# Patient Record
Sex: FEMALE | Race: BLACK OR AFRICAN AMERICAN | NOT HISPANIC OR LATINO | Employment: FULL TIME | URBAN - METROPOLITAN AREA
[De-identification: names, ages, dates, MRNs, and addresses within clinical notes are randomized per-mention and may not be internally consistent; named-entity substitution may affect disease eponyms.]

---

## 2017-01-11 ENCOUNTER — PATIENT MESSAGE (OUTPATIENT)
Dept: INTERNAL MEDICINE | Facility: CLINIC | Age: 52
End: 2017-01-11

## 2017-01-11 ENCOUNTER — PATIENT MESSAGE (OUTPATIENT)
Dept: OBSTETRICS AND GYNECOLOGY | Facility: CLINIC | Age: 52
End: 2017-01-11

## 2017-01-12 RX ORDER — NAPROXEN 500 MG/1
TABLET ORAL
Qty: 60 TABLET | Refills: 1 | Status: SHIPPED | OUTPATIENT
Start: 2017-01-12 | End: 2017-02-17

## 2017-01-19 ENCOUNTER — PATIENT MESSAGE (OUTPATIENT)
Dept: PODIATRY | Facility: CLINIC | Age: 52
End: 2017-01-19

## 2017-01-26 ENCOUNTER — HOSPITAL ENCOUNTER (OUTPATIENT)
Dept: RADIOLOGY | Facility: HOSPITAL | Age: 52
Discharge: HOME | End: 2017-01-26
Attending: ORTHOPAEDIC SURGERY
Payer: COMMERCIAL

## 2017-01-26 ENCOUNTER — OFFICE VISIT (OUTPATIENT)
Dept: SPORTS MEDICINE | Facility: CLINIC | Age: 52
End: 2017-01-26
Payer: COMMERCIAL

## 2017-01-26 VITALS
WEIGHT: 263 LBS | DIASTOLIC BLOOD PRESSURE: 101 MMHG | HEIGHT: 62 IN | BODY MASS INDEX: 48.4 KG/M2 | HEART RATE: 76 BPM | SYSTOLIC BLOOD PRESSURE: 163 MMHG

## 2017-01-26 DIAGNOSIS — M25.511 CHRONIC RIGHT SHOULDER PAIN: Primary | ICD-10-CM

## 2017-01-26 DIAGNOSIS — G89.29 CHRONIC RIGHT SHOULDER PAIN: Primary | ICD-10-CM

## 2017-01-26 DIAGNOSIS — M54.9 BACK PAIN: ICD-10-CM

## 2017-01-26 DIAGNOSIS — G89.29 CHRONIC RIGHT SHOULDER PAIN: ICD-10-CM

## 2017-01-26 DIAGNOSIS — M25.511 CHRONIC RIGHT SHOULDER PAIN: ICD-10-CM

## 2017-01-26 PROCEDURE — 1159F MED LIST DOCD IN RCRD: CPT | Mod: S$GLB,,, | Performed by: ORTHOPAEDIC SURGERY

## 2017-01-26 PROCEDURE — 73030 X-RAY EXAM OF SHOULDER: CPT | Mod: TC,PO,RT

## 2017-01-26 PROCEDURE — 73030 X-RAY EXAM OF SHOULDER: CPT | Mod: 26,RT,, | Performed by: RADIOLOGY

## 2017-01-26 PROCEDURE — 99999 PR PBB SHADOW E&M-EST. PATIENT-LVL III: CPT | Mod: PBBFAC,,, | Performed by: ORTHOPAEDIC SURGERY

## 2017-01-26 PROCEDURE — 99214 OFFICE O/P EST MOD 30 MIN: CPT | Mod: S$GLB,,, | Performed by: ORTHOPAEDIC SURGERY

## 2017-01-26 NOTE — MR AVS SNAPSHOT
[facility]  [address]  [facility] [address]  Phone: [phone]                  Paloma Bang   2017 12:45 PM   Appointment    Description:  Female : 1965   Provider:  Bridger Ernst MD   Department:  [facility]                To Do List           Future Appointments        Provider Department Dept Phone    2017 12:45 PM Bridger Ernst MD [facility] [phone]      Goals (5 Years of Data)     None      [facility]     [facility] Care [facility] -  Assistance  Registered nurses in the [facility] On Call Center provide clinical advisement, health education, appointment booking, and other advisory services.  Call for this free service at [phone].             Medications           Message regarding Medications     Verify the changes and/or additions to your medication regime listed below are the same as discussed with your clinician today.  If any of these changes or additions are incorrect, please notify your healthcare provider.             Verify that the below list of medications is an accurate representation of the medications you are currently taking.  If none reported, the list may be blank. If incorrect, please contact your healthcare provider. Carry this list with you in case of emergency.           Current Medications     cyclobenzaprine (FLEXERIL) 5 MG tablet Take 1 tablet (5 mg total) by mouth 3 (three) times daily as needed for Muscle spasms (no working or driving on this medication).    cyclobenzaprine (FLEXERIL) 5 MG tablet TAKE 1 TABLET (5 MG TOTAL) BY MOUTH NIGHTLY.    desonide (DESOWEN) 0.05 % lotion Apply topically 2 (two) times daily. On face    diclofenac sodium 1.5 % Drop Apply 1 application topically 3 (three) times daily.    ERGOCALCIFEROL, VITAMIN D2, (VITAMIN D ORAL) Take 1,000 mg by mouth once daily.    hydrocortisone (WESTCORT) 0.2 % cream Apply topically 2 (two)  times daily.    hydrOXYzine HCl (ATARAX) 25 MG tablet Take 1 tablet (25 mg total) by mouth nightly as needed for Itching.    multivitamin with minerals tablet Take 1 tablet by mouth once daily.    mupirocin (BACTROBAN) 2 % ointment Apply topically 3 (three) times daily.    naproxen (NAPROSYN) 500 MG tablet TAKE 1 TABLET (500 MG TOTAL) BY MOUTH 2 (TWO) TIMES DAILY.    pantoprazole (PROTONIX) 40 MG tablet Take 1 tablet (40 mg total) by mouth once daily.    silver sulfADIAZINE 1% (SILVADENE) 1 % cream Apply topically 2 (two) times daily. Apply with a Qtip    sulfamethoxazole-trimethoprim 800-160mg (BACTRIM DS) 800-160 mg Tab Take 1 tablet by mouth 2 (two) times daily.    tramadol (ULTRAM) 50 mg tablet Take 1 tablet (50 mg total) by mouth 3 (three) times daily as needed for Pain.    triamcinolone acetonide 0.1% (KENALOG) 0.1 % cream Apply topically 2 (two) times daily.           Clinical Reference Information           Vital Signs - Last Recorded     LMP                   (LMP Unknown)           Allergies as of 1/26/2017     No Known Allergies      Immunizations Administered on Date of Encounter - 1/26/2017     None

## 2017-01-26 NOTE — PROGRESS NOTES
CC: RIGHT shoulder pain     51 y.o. Female with a history of right shoulder pain for 10 years.  She reports getting an MRI for both shoulders about 10 years ago that showed a bilateral rotator cuff tears.  At the time she had a left rotator cuff repair, but never addressed the right shoulder.  She tried physical therapy last year and reports temporary improvement of pain and symptoms.    She works at a desk.  No longer lifts anything heavy due to the right shoulder pain.    She reports that the pain and weakness is worse with overhead activity. It also bothers her at night.    Is affecting ADLs.  Pain is 5/10 at it's worst.       Past Medical History   Diagnosis Date    Deep vein thrombosis      occurred after ankle fracture    GERD (gastroesophageal reflux disease)     Joint pain     Morbid obesity     Pulmonary embolism      occurred after ankle fracture       Past Surgical History   Procedure Laterality Date    Hysterectomy       section      Ankle surgery      Myomectomy      Laparoscopic gastric banding      Pilonidal cyst drainage       removed    Back surgery       Lumbar spinal fusion with shaving of a disk    Rotator cuff repair       Left       Family History   Problem Relation Age of Onset    Diabetes Father     Hypertension Father     Diabetes Mother     Hypertension Mother     Breast cancer Neg Hx     Colon cancer Neg Hx     Ovarian cancer Neg Hx     Acne Neg Hx     Eczema Neg Hx     Lupus Neg Hx     Psoriasis Neg Hx     Melanoma Neg Hx          Current Outpatient Prescriptions:     cyclobenzaprine (FLEXERIL) 5 MG tablet, Take 1 tablet (5 mg total) by mouth 3 (three) times daily as needed for Muscle spasms (no working or driving on this medication)., Disp: 45 tablet, Rfl: 1    cyclobenzaprine (FLEXERIL) 5 MG tablet, TAKE 1 TABLET (5 MG TOTAL) BY MOUTH NIGHTLY., Disp: 30 tablet, Rfl: 3    naproxen (NAPROSYN) 500 MG tablet, TAKE 1 TABLET (500 MG TOTAL) BY MOUTH 2 (TWO)  TIMES DAILY., Disp: 60 tablet, Rfl: 1    desonide (DESOWEN) 0.05 % lotion, Apply topically 2 (two) times daily. On face, Disp: 118 mL, Rfl: 3    diclofenac sodium 1.5 % Drop, Apply 1 application topically 3 (three) times daily., Disp: 150 mL, Rfl: 2    ERGOCALCIFEROL, VITAMIN D2, (VITAMIN D ORAL), Take 1,000 mg by mouth once daily., Disp: , Rfl:     hydrocortisone (WESTCORT) 0.2 % cream, Apply topically 2 (two) times daily., Disp: 15 g, Rfl: 0    hydrOXYzine HCl (ATARAX) 25 MG tablet, Take 1 tablet (25 mg total) by mouth nightly as needed for Itching., Disp: 30 tablet, Rfl: 0    multivitamin with minerals tablet, Take 1 tablet by mouth once daily., Disp: , Rfl:     mupirocin (BACTROBAN) 2 % ointment, Apply topically 3 (three) times daily., Disp: 30 g, Rfl: 0    pantoprazole (PROTONIX) 40 MG tablet, Take 1 tablet (40 mg total) by mouth once daily. (Patient taking differently: Take 40 mg by mouth daily as needed. ), Disp: 30 tablet, Rfl: 11    silver sulfADIAZINE 1% (SILVADENE) 1 % cream, Apply topically 2 (two) times daily. Apply with a Qtip, Disp: 85 g, Rfl: 1    sulfamethoxazole-trimethoprim 800-160mg (BACTRIM DS) 800-160 mg Tab, Take 1 tablet by mouth 2 (two) times daily., Disp: 14 tablet, Rfl: 0    tramadol (ULTRAM) 50 mg tablet, Take 1 tablet (50 mg total) by mouth 3 (three) times daily as needed for Pain., Disp: 60 tablet, Rfl: 1    triamcinolone acetonide 0.1% (KENALOG) 0.1 % cream, Apply topically 2 (two) times daily., Disp: 80 g, Rfl: 0    Review of patient's allergies indicates:  No Known Allergies       REVIEW OF SYSTEMS:  Constitution: Negative. Negative for chills, fever and night sweats.   HENT: Negative for congestion and headaches.    Eyes: Negative for blurred vision, left vision loss and right vision loss.   Cardiovascular: Negative for chest pain and syncope.   Respiratory: Negative for cough and shortness of breath.    Endocrine: Negative for polydipsia, polyphagia and polyuria.  "  Hematologic/Lymphatic: Negative for bleeding problem. Does not bruise/bleed easily.   Skin: Negative for dry skin, itching and rash.   Musculoskeletal: Negative for falls.  Positive for right shoulder pain and muscle weakness.   Gastrointestinal: Negative for abdominal pain and bowel incontinence.   Genitourinary: Negative for bladder incontinence and nocturia.   Neurological: Negative for disturbances in coordination, loss of balance and seizures.   Psychiatric/Behavioral: Negative for depression. The patient does not have insomnia.    Allergic/Immunologic: Negative for hives and persistent infections.      PHYSICAL EXAMINATION:  Vitals:    Visit Vitals    BP (!) 163/101    Pulse 76    Ht 5' 2" (1.575 m)    Wt 119.3 kg (263 lb)    LMP  (LMP Unknown)    BMI 48.1 kg/m2      General: The patient is alert and oriented x 3.  Mood is pleasant.  Observation of ears, eyes and nose reveal no gross abnormalities.  No labored breathing observed.  Gait is coordinated. Patient can toe walk and heel walk without difficulty.      RIGHT Shoulder / Upper Extremity Exam    OBSERVATION:     Swelling  none  Deformity  none   Discoloration  none   Scapular winging none   Scars   none  Atrophy  none    TENDERNESS / CREPITUS (T/C):          T/C      T/C   Clavicle   -/-  SUPRAspinatus    +/-     AC Jt.    -/-  INFRAspinatus  -/-    SC Jt.    -/-  Deltoid    -/-      G. Tuberosity  -/-  LH BICEP groove  +/-   Acromion:  -/-  Midline Neck   -/-     Scapular Spine -/-  Trapezium   -/-   SMA Scapula  -/-  GH jt. line - post  +/-     Scapulothoracic  -/-         ROM: (* = with pain)  Left shoulder   Right shoulder        AROM (PROM)   AROM (PROM)   FE    170° (175°)     160° (165°)     ER at 0°    60°  (65°)    30°  (35°)   IR (spine level)   T10     L5    STRENGTH: (* = with pain) Left shoulder   Right " shoulder   SCAPTION   5/5    4+/5    IR    5/5    4+/5   ER    5/5    4+/5   BICEPS   5/5    4+/5   Deltoid    5/5    5/5     SIGNS:  Painful side       NEER   +    OPROSPERS  neg    EASTMAN   +    SPEEDS  neg     DROP ARM   -   BELLY PRESS neg   Superior escape none    LIFT-OFF  neg   X-Body ADD    neg    MOVING VALGUS neg        STABILITY TESTING    Left shoulder   Right shoulder    Translation     Anterior  up face     up face    Posterior  up face    up face    Sulcus   < 10mm    < 10 mm     Signs   Apprehension   neg      neg       Relocation   no change     no change      Jerk test  neg     neg    EXTREMITY NEURO-VASCULAR EXAM:    Sensation grossly intact to light touch all dermatomal regions.    DTR 2+ Biceps, Triceps, BR and Negative Galos sign   Grossly intact motor function at Elbow, Wrist and Hand   Distal pulses radial and ulnar 2+, brisk cap refill, symmetric.      NECK:  Painless FROM and spinous processes non-tender. Negative Spurlings sign.      OTHER FINDINGS:  + scapular dyskinesia    XRAYS (1/26/17): Mild DJD.  No fracture or dislocation.  No bone destruction identified.      ASSESSMENT:     Right shoulder pain  Back pain    PLAN:      1. MRI to evaluate for progressing rotator cuff tear  2. Follow up in clinic for MRI results  3. Consult to back and spine    All questions were answered, patient will contact us for questions or concerns in the interim.

## 2017-02-01 ENCOUNTER — PATIENT MESSAGE (OUTPATIENT)
Dept: PODIATRY | Facility: CLINIC | Age: 52
End: 2017-02-01

## 2017-02-01 ENCOUNTER — PATIENT MESSAGE (OUTPATIENT)
Dept: OBSTETRICS AND GYNECOLOGY | Facility: CLINIC | Age: 52
End: 2017-02-01

## 2017-02-01 ENCOUNTER — PATIENT MESSAGE (OUTPATIENT)
Dept: INTERNAL MEDICINE | Facility: CLINIC | Age: 52
End: 2017-02-01

## 2017-02-02 ENCOUNTER — HOSPITAL ENCOUNTER (OUTPATIENT)
Dept: RADIOLOGY | Facility: HOSPITAL | Age: 52
Discharge: HOME | End: 2017-02-02
Attending: ORTHOPAEDIC SURGERY
Payer: COMMERCIAL

## 2017-02-02 DIAGNOSIS — G89.29 CHRONIC RIGHT SHOULDER PAIN: ICD-10-CM

## 2017-02-02 DIAGNOSIS — M25.511 CHRONIC RIGHT SHOULDER PAIN: ICD-10-CM

## 2017-02-02 PROCEDURE — 73221 MRI JOINT UPR EXTREM W/O DYE: CPT | Mod: TC,RT

## 2017-02-02 PROCEDURE — 73221 MRI JOINT UPR EXTREM W/O DYE: CPT | Mod: 26,RT,, | Performed by: RADIOLOGY

## 2017-02-07 ENCOUNTER — OFFICE VISIT (OUTPATIENT)
Dept: SPORTS MEDICINE | Facility: CLINIC | Age: 52
End: 2017-02-07
Payer: COMMERCIAL

## 2017-02-07 VITALS
SYSTOLIC BLOOD PRESSURE: 147 MMHG | HEIGHT: 62 IN | HEART RATE: 100 BPM | BODY MASS INDEX: 48.4 KG/M2 | WEIGHT: 263 LBS | DIASTOLIC BLOOD PRESSURE: 97 MMHG

## 2017-02-07 DIAGNOSIS — M54.2 NECK PAIN: Primary | ICD-10-CM

## 2017-02-07 PROCEDURE — 99214 OFFICE O/P EST MOD 30 MIN: CPT | Mod: S$GLB,,, | Performed by: ORTHOPAEDIC SURGERY

## 2017-02-07 PROCEDURE — 99999 PR PBB SHADOW E&M-EST. PATIENT-LVL III: CPT | Mod: PBBFAC,,, | Performed by: ORTHOPAEDIC SURGERY

## 2017-02-07 NOTE — PROGRESS NOTES
CC: RIGHT shoulder pain     51 y.o. Female with a history of right shoulder pain for 10 years.  She reports getting an MRI for both shoulders about 10 years ago that showed a bilateral rotator cuff tears.  At the time she had a left rotator cuff repair, but never addressed the right shoulder.  She tried physical therapy last year and reports temporary improvement of pain and symptoms.    She works at a desk.  No longer lifts anything heavy due to the right shoulder pain.    She reports that the pain and weakness is worse with overhead activity. It also bothers her at night.    Is affecting ADLs.  Pain is 5/10 at it's worst.       Past Medical History   Diagnosis Date    Deep vein thrombosis      occurred after ankle fracture    GERD (gastroesophageal reflux disease)     Joint pain     Morbid obesity     Pulmonary embolism      occurred after ankle fracture       Past Surgical History   Procedure Laterality Date    Hysterectomy       section      Ankle surgery      Myomectomy      Laparoscopic gastric banding      Pilonidal cyst drainage       removed    Back surgery       Lumbar spinal fusion with shaving of a disk    Rotator cuff repair       Left       Family History   Problem Relation Age of Onset    Diabetes Father     Hypertension Father     Diabetes Mother     Hypertension Mother     Breast cancer Neg Hx     Colon cancer Neg Hx     Ovarian cancer Neg Hx     Acne Neg Hx     Eczema Neg Hx     Lupus Neg Hx     Psoriasis Neg Hx     Melanoma Neg Hx          Current Outpatient Prescriptions:     cyclobenzaprine (FLEXERIL) 5 MG tablet, Take 1 tablet (5 mg total) by mouth 3 (three) times daily as needed for Muscle spasms (no working or driving on this medication)., Disp: 45 tablet, Rfl: 1    cyclobenzaprine (FLEXERIL) 5 MG tablet, TAKE 1 TABLET (5 MG TOTAL) BY MOUTH NIGHTLY., Disp: 30 tablet, Rfl: 3    ERGOCALCIFEROL, VITAMIN D2, (VITAMIN D ORAL), Take 1,000 mg by mouth once daily.,  Disp: , Rfl:     hydrOXYzine HCl (ATARAX) 25 MG tablet, Take 1 tablet (25 mg total) by mouth nightly as needed for Itching., Disp: 30 tablet, Rfl: 0    multivitamin with minerals tablet, Take 1 tablet by mouth once daily., Disp: , Rfl:     mupirocin (BACTROBAN) 2 % ointment, Apply topically 3 (three) times daily., Disp: 30 g, Rfl: 0    naproxen (NAPROSYN) 500 MG tablet, TAKE 1 TABLET (500 MG TOTAL) BY MOUTH 2 (TWO) TIMES DAILY., Disp: 60 tablet, Rfl: 1    silver sulfADIAZINE 1% (SILVADENE) 1 % cream, Apply topically 2 (two) times daily. Apply with a Qtip, Disp: 85 g, Rfl: 1    sulfamethoxazole-trimethoprim 800-160mg (BACTRIM DS) 800-160 mg Tab, Take 1 tablet by mouth 2 (two) times daily., Disp: 14 tablet, Rfl: 0    tramadol (ULTRAM) 50 mg tablet, Take 1 tablet (50 mg total) by mouth 3 (three) times daily as needed for Pain., Disp: 60 tablet, Rfl: 1    desonide (DESOWEN) 0.05 % lotion, Apply topically 2 (two) times daily. On face, Disp: 118 mL, Rfl: 3    diclofenac sodium 1.5 % Drop, Apply 1 application topically 3 (three) times daily., Disp: 150 mL, Rfl: 2    hydrocortisone (WESTCORT) 0.2 % cream, Apply topically 2 (two) times daily., Disp: 15 g, Rfl: 0    pantoprazole (PROTONIX) 40 MG tablet, Take 1 tablet (40 mg total) by mouth once daily. (Patient taking differently: Take 40 mg by mouth daily as needed. ), Disp: 30 tablet, Rfl: 11    triamcinolone acetonide 0.1% (KENALOG) 0.1 % cream, Apply topically 2 (two) times daily., Disp: 80 g, Rfl: 0    Review of patient's allergies indicates:  No Known Allergies       REVIEW OF SYSTEMS:  Constitution: Negative. Negative for chills, fever and night sweats.   HENT: Negative for congestion and headaches.    Eyes: Negative for blurred vision, left vision loss and right vision loss.   Cardiovascular: Negative for chest pain and syncope.   Respiratory: Negative for cough and shortness of breath.    Endocrine: Negative for polydipsia, polyphagia and polyuria.  "  Hematologic/Lymphatic: Negative for bleeding problem. Does not bruise/bleed easily.   Skin: Negative for dry skin, itching and rash.   Musculoskeletal: Negative for falls.  Positive for right shoulder pain and muscle weakness.   Gastrointestinal: Negative for abdominal pain and bowel incontinence.   Genitourinary: Negative for bladder incontinence and nocturia.   Neurological: Negative for disturbances in coordination, loss of balance and seizures.   Psychiatric/Behavioral: Negative for depression. The patient does not have insomnia.    Allergic/Immunologic: Negative for hives and persistent infections.      PHYSICAL EXAMINATION:  Vitals:    Visit Vitals    BP (!) 147/97    Pulse 100    Ht 5' 2" (1.575 m)    Wt 119.3 kg (263 lb)    LMP  (LMP Unknown)    BMI 48.1 kg/m2      General: The patient is alert and oriented x 3.  Mood is pleasant.  Observation of ears, eyes and nose reveal no gross abnormalities.  No labored breathing observed.  Gait is coordinated. Patient can toe walk and heel walk without difficulty.      RIGHT Shoulder / Upper Extremity Exam    OBSERVATION:     Swelling  none  Deformity  none   Discoloration  none   Scapular winging none   Scars   none  Atrophy  none    TENDERNESS / CREPITUS (T/C):          T/C      T/C   Clavicle   -/-  SUPRAspinatus    +/-     AC Jt.    -/-  INFRAspinatus  -/-    SC Jt.    -/-  Deltoid    -/-      G. Tuberosity  -/-  LH BICEP groove  +/-   Acromion:  -/-  Midline Neck   -/-     Scapular Spine -/-  Trapezium   -/-   SMA Scapula  -/-  GH jt. line - post  +/-     Scapulothoracic  -/-         ROM: (* = with pain)  Left shoulder   Right shoulder        AROM (PROM)   AROM (PROM)   FE    170° (175°)     160° (165°)     ER at 0°    60°  (65°)    30°  (35°)   IR (spine level)   T10     L5    STRENGTH: (* = with pain) Left shoulder   Right " shoulder   SCAPTION   5/5    4+/5    IR    5/5    4+/5   ER    5/5    4+/5   BICEPS   5/5    4+/5   Deltoid    5/5    5/5     SIGNS:  Painful side       NEER   +    OPROSPERS  neg    EASTMAN   +    SPEEDS  neg     DROP ARM   -   BELLY PRESS neg   Superior escape none    LIFT-OFF  neg   X-Body ADD    neg    MOVING VALGUS neg        STABILITY TESTING    Left shoulder   Right shoulder    Translation     Anterior  up face     up face    Posterior  up face    up face    Sulcus   < 10mm    < 10 mm     Signs   Apprehension   neg      neg       Relocation   no change     no change      Jerk test  neg     neg    EXTREMITY NEURO-VASCULAR EXAM:    Sensation grossly intact to light touch all dermatomal regions.    DTR 2+ Biceps, Triceps, BR and Negative Galos sign   Grossly intact motor function at Elbow, Wrist and Hand   Distal pulses radial and ulnar 2+, brisk cap refill, symmetric.      NECK:  Painless FROM and spinous processes non-tender. Negative Spurlings sign.      OTHER FINDINGS:  + scapular dyskinesia    XRAYS (1/26/17): Mild DJD.  No fracture or dislocation.  No bone destruction identified.      MRI:  Full-thickness, near full-width tear of the supraspinatus tendon with 2.3 cm of medial fiber retraction and mild resulting volume loss. See description.    Moderate infraspinatus tendinosis with a small intratendinous footprint tear that extends medially up to the myotendinous junction.    Severe biceps tendinosis with high-grade interstitial tearing.    Degenerative fraying of the glenoid labrum.    Moderate AC joint hypertrophy with moderate undersurface spurring of the anterior-inferior acromion.    Subacromial/subdeltoid bursitis      ASSESSMENT:     Right shoulder pain  Back pain    PLAN:      1. Pain control    2. Follow up with ortho spine; will call after spine followup to schedule surgery    3.We reviewed with Paloma today, the pathology and natural history of her diagnosis. We have discussed a  variety of treatment options including medications, physical therapy and other alternative treatments. I also explained the indications, risks and benefits of surgery. After discussion, Paloma decided to proceed with surgery. The decision was made to go forward with     Right shoulder arthroscopic rotator cuff repair  Right shoulder DCE/ acromioplasty  Right shoulder subpec biceps tenodesis      The details of the surgical procedure were explained, including the location of probable incisions and a description of likely hardware and/or grafts to be used.  The patient understands the likely convalescence after surgery.  Also, we have thoroughly discussed the risks, benefits and alternatives to surgery, including, but not limited to, the risk of infection, joint stiffness, blood clot (including DVT and/or pulmonary embolus), neurologic and vascular injury.  It was explained that, if tissue has been repaired or reconstructed, there is a chance of failure, which may require further management.      All of the patient's questions were answered and informed consent was obtained. The patient will contact us if they have any questions or concerns in the interim.

## 2017-02-07 NOTE — MR AVS SNAPSHOT
[facility]  [address]  [facility] [address]  Phone: [phone]                  Paloma Bang   2017 2:45 PM   Appointment    Description:  Female : 1965   Provider:  Bridger Ernst MD   Department:  [facility]                To Do List           Future Appointments        Provider Department Dept Phone    2017 3:15 PM Elaine Tirado MD [facility] - OB/GYN [address] [phone]    2017 11:00 AM Elio Ac DPM [facility] - Podiatry [phone]      Goals (5 Years of Data)     None      [facility]     [facility] On Call Nurse Care Line -  Assistance  Registered nurses in the [facility] On Call Center provide clinical advisement, health education, appointment booking, and other advisory services.  Call for this free service at [phone].             Medications           Message regarding Medications     Verify the changes and/or additions to your medication regime listed below are the same as discussed with your clinician today.  If any of these changes or additions are incorrect, please notify your healthcare provider.             Verify that the below list of medications is an accurate representation of the medications you are currently taking.  If none reported, the list may be blank. If incorrect, please contact your healthcare provider. Carry this list with you in case of emergency.           Current Medications     cyclobenzaprine (FLEXERIL) 5 MG tablet Take 1 tablet (5 mg total) by mouth 3 (three) times daily as needed for Muscle spasms (no working or driving on this medication).    cyclobenzaprine (FLEXERIL) 5 MG tablet TAKE 1 TABLET (5 MG TOTAL) BY MOUTH NIGHTLY.    desonide (DESOWEN) 0.05 % lotion Apply topically 2 (two) times daily. On face    diclofenac sodium 1.5 % Drop Apply 1 application topically 3 (three) times daily.    ERGOCALCIFEROL, VITAMIN D2, (VITAMIN D ORAL) Take 1,000 mg by mouth once  daily.    hydrocortisone (WESTCORT) 0.2 % cream Apply topically 2 (two) times daily.    hydrOXYzine HCl (ATARAX) 25 MG tablet Take 1 tablet (25 mg total) by mouth nightly as needed for Itching.    multivitamin with minerals tablet Take 1 tablet by mouth once daily.    mupirocin (BACTROBAN) 2 % ointment Apply topically 3 (three) times daily.    naproxen (NAPROSYN) 500 MG tablet TAKE 1 TABLET (500 MG TOTAL) BY MOUTH 2 (TWO) TIMES DAILY.    pantoprazole (PROTONIX) 40 MG tablet Take 1 tablet (40 mg total) by mouth once daily.    silver sulfADIAZINE 1% (SILVADENE) 1 % cream Apply topically 2 (two) times daily. Apply with a Qtip    sulfamethoxazole-trimethoprim 800-160mg (BACTRIM DS) 800-160 mg Tab Take 1 tablet by mouth 2 (two) times daily.    tramadol (ULTRAM) 50 mg tablet Take 1 tablet (50 mg total) by mouth 3 (three) times daily as needed for Pain.    triamcinolone acetonide 0.1% (KENALOG) 0.1 % cream Apply topically 2 (two) times daily.           Clinical Reference Information           Your Vitals Were     Last Period                   (LMP Unknown)           Allergies as of 2/7/2017     No Known Allergies      Immunizations Administered on Date of Encounter - 2/7/2017     None      Language Assistance Services     ATTENTION: Language assistance services are available, free of charge. Please call [phone].      ATENCIÓN: Si habla español, tiene a howard disposición servicios gratuitos de asistencia lingüística. Llame al [phone].     [facility] Ý: N?u b?n nói Ti?ng Vi?t, có các d?ch v? h? tr? ngôn ng? mi?n phí dành cho b?n. G?i s? [phone].         [facility] complies with applicable Federal civil rights laws and does not discriminate on the basis of race, color, national origin, age, disability, or sex.

## 2017-02-08 ENCOUNTER — OFFICE VISIT (OUTPATIENT)
Dept: INTERNAL MEDICINE | Facility: CLINIC | Age: 52
End: 2017-02-08
Payer: COMMERCIAL

## 2017-02-08 VITALS
WEIGHT: 271.38 LBS | HEART RATE: 80 BPM | SYSTOLIC BLOOD PRESSURE: 130 MMHG | RESPIRATION RATE: 10 BRPM | HEIGHT: 62 IN | BODY MASS INDEX: 49.94 KG/M2 | DIASTOLIC BLOOD PRESSURE: 88 MMHG

## 2017-02-08 DIAGNOSIS — J32.9 SINUSITIS, UNSPECIFIED CHRONICITY, UNSPECIFIED LOCATION: Primary | ICD-10-CM

## 2017-02-08 PROCEDURE — 99213 OFFICE O/P EST LOW 20 MIN: CPT | Mod: S$GLB,,, | Performed by: INTERNAL MEDICINE

## 2017-02-08 PROCEDURE — 99999 PR PBB SHADOW E&M-EST. PATIENT-LVL III: CPT | Mod: PBBFAC,,, | Performed by: INTERNAL MEDICINE

## 2017-02-08 RX ORDER — FLUCONAZOLE 150 MG/1
150 TABLET ORAL ONCE
Qty: 2 TABLET | Refills: 0 | Status: SHIPPED | OUTPATIENT
Start: 2017-02-08 | End: 2017-02-08

## 2017-02-08 RX ORDER — FLUTICASONE PROPIONATE 50 MCG
1 SPRAY, SUSPENSION (ML) NASAL DAILY
Qty: 16 G | Refills: 11 | Status: SHIPPED | OUTPATIENT
Start: 2017-02-08 | End: 2017-03-10

## 2017-02-08 RX ORDER — AMOXICILLIN AND CLAVULANATE POTASSIUM 875; 125 MG/1; MG/1
1 TABLET, FILM COATED ORAL 2 TIMES DAILY
Qty: 14 TABLET | Refills: 0 | Status: SHIPPED | OUTPATIENT
Start: 2017-02-08 | End: 2021-12-08 | Stop reason: SDUPTHER

## 2017-02-08 NOTE — PROGRESS NOTES
Subjective:       Patient ID: Paloma Bang is a 51 y.o. female.    Chief Complaint: Sore Throat; Fever; and Generalized Body Aches    HPI     51-year-old female here for evaluation of sore throat, fever, and body aches.  She has had congestion, sore throat, body aches.  She has had subjective fevers.  She has had stomach cramping as well.  She has had loose bowels, but no diarrhea.  She has had otalgia.  Right eye pain.  This has been going on a week.  She thought she could shake it.  She has taken OTC theraflu, chloraseptic spray.  She has a cough which is dry.  She was trying to cough up some mucus.  This is the same as she was a week ago.  Sore throat is better.  She has been having a hard time sleeping.  She has sinus congestion, so she cannot breath.  She tosses and turns trying to get comfortable.    Review of Systems    Objective:      Physical Exam   Constitutional: She is oriented to person, place, and time. She appears well-developed and well-nourished.   HENT:   Head: Normocephalic and atraumatic.   Mouth/Throat: No oropharyngeal exudate.   Eyes: EOM are normal. Pupils are equal, round, and reactive to light. Right eye exhibits no discharge. Left eye exhibits no discharge. No scleral icterus.   Neck: Normal range of motion. Neck supple. No tracheal deviation present. No thyromegaly present.   Cardiovascular: Normal rate, regular rhythm and normal heart sounds.  Exam reveals no gallop and no friction rub.    No murmur heard.  Pulmonary/Chest: Effort normal and breath sounds normal. No respiratory distress. She has no wheezes. She has no rales. She exhibits no tenderness.   Abdominal: Soft. Bowel sounds are normal. She exhibits no distension and no mass. There is no tenderness. There is no rebound and no guarding.   Musculoskeletal: Normal range of motion. She exhibits no edema or tenderness.   Neurological: She is alert and oriented to person, place, and time.   Skin: Skin is warm and dry. No  rash noted. No erythema. No pallor.   Psychiatric: She has a normal mood and affect. Her behavior is normal.   Vitals reviewed.      Assessment:       1. Sinusitis, unspecified chronicity, unspecified location        Plan:       1.  Augmentin 875 mg twice a day ×7 days, Flonase, over-the-counter antihistamine.  Diflucan if needed for yeast infection.

## 2017-02-13 ENCOUNTER — OFFICE VISIT (OUTPATIENT)
Dept: OBSTETRICS AND GYNECOLOGY | Facility: CLINIC | Age: 52
End: 2017-02-13
Attending: OBSTETRICS & GYNECOLOGY
Payer: COMMERCIAL

## 2017-02-13 VITALS
DIASTOLIC BLOOD PRESSURE: 80 MMHG | HEIGHT: 62 IN | WEIGHT: 271.63 LBS | SYSTOLIC BLOOD PRESSURE: 118 MMHG | BODY MASS INDEX: 49.99 KG/M2

## 2017-02-13 DIAGNOSIS — Z12.11 ENCOUNTER FOR SCREENING COLONOSCOPY: ICD-10-CM

## 2017-02-13 DIAGNOSIS — Z01.419 VISIT FOR GYNECOLOGIC EXAMINATION: Primary | ICD-10-CM

## 2017-02-13 DIAGNOSIS — Z12.31 ENCOUNTER FOR SCREENING MAMMOGRAM FOR BREAST CANCER: ICD-10-CM

## 2017-02-13 PROCEDURE — 99396 PREV VISIT EST AGE 40-64: CPT | Mod: S$GLB,,, | Performed by: OBSTETRICS & GYNECOLOGY

## 2017-02-13 PROCEDURE — 99999 PR PBB SHADOW E&M-EST. PATIENT-LVL III: CPT | Mod: PBBFAC,,, | Performed by: OBSTETRICS & GYNECOLOGY

## 2017-02-13 RX ORDER — FLUCONAZOLE 50 MG/1
TABLET ORAL DAILY
COMMUNITY
End: 2017-04-26 | Stop reason: ALTCHOICE

## 2017-02-13 NOTE — PROGRESS NOTES
"CC: Well woman exam    Paloma Bang is a 51 y.o. female  presents for a well woman exam.  LMP: No LMP recorded (lmp unknown). Patient has had a hysterectomy..      Reports continued LLQ pain.  She is s/p hysterectomy and LSO.      Past Medical History   Diagnosis Date    Deep vein thrombosis      occurred after ankle fracture    GERD (gastroesophageal reflux disease)     Joint pain     Morbid obesity     Pulmonary embolism      occurred after ankle fracture     Past Surgical History   Procedure Laterality Date    Hysterectomy       section      Ankle surgery      Myomectomy      Laparoscopic gastric banding      Pilonidal cyst drainage       removed    Back surgery       Lumbar spinal fusion with shaving of a disk    Rotator cuff repair       Left     Social History     Social History    Marital status: Single     Spouse name: N/A    Number of children: N/A    Years of education: N/A     Social History Main Topics    Smoking status: Never Smoker    Smokeless tobacco: Never Used    Alcohol use Yes      Comment: occasionally/ not weekly    Drug use: No    Sexual activity: Not Currently     Birth control/ protection: Surgical     Other Topics Concern    None     Social History Narrative     Family History   Problem Relation Age of Onset    Diabetes Father     Hypertension Father     Diabetes Mother     Hypertension Mother     Breast cancer Neg Hx     Colon cancer Neg Hx     Ovarian cancer Neg Hx     Acne Neg Hx     Eczema Neg Hx     Lupus Neg Hx     Psoriasis Neg Hx     Melanoma Neg Hx      OB History      Para Term  AB TAB SAB Ectopic Multiple Living    2 1   1     1          Visit Vitals    /80    Ht 5' 2" (1.575 m)    Wt 123.2 kg (271 lb 9.7 oz)    LMP  (LMP Unknown)    BMI 49.68 kg/m2         ROS:    ROS:  GENERAL: Denies weight gain or weight loss. Feeling well overall.   SKIN: Denies rash or lesions.   HEAD: Denies head injury or " headache.   NODES: Denies enlarged lymph nodes.   CHEST: Denies chest pain or shortness of breath.   CARDIOVASCULAR: Denies palpitations or left sided chest pain.   ABDOMEN: No abdominal pain, constipation, diarrhea, nausea, vomiting or rectal bleeding.   URINARY: No frequency, dysuria, hematuria, or burning on urination.  REPRODUCTIVE: See HPI.   BREASTS: The patient performs breast self-examination and denies pain, lumps, or nipple discharge.   HEMATOLOGIC: No easy bruisability or excessive bleeding.   MUSCULOSKELETAL: Denies joint pain or swelling.   NEUROLOGIC: Denies syncope or weakness.   PSYCHIATRIC: Denies depression, anxiety or mood swings.    PHYSICAL EXAM:    APPEARANCE: Well nourished, well developed, in no acute distress.  AFFECT: WNL, alert and oriented x 3  SKIN: No acne or hirsutism  NECK: Neck symmetric without masses or thyromegaly  NODES: No inguinal, cervical, axillary, or femoral lymph node enlargement  CHEST: Good respiratory effect  ABDOMEN: Soft.  No tenderness or masses.  No hepatosplenomegaly.  No hernias.  BREASTS: Symmetrical, no skin changes or visible lesions.  No palpable masses, nipple discharge bilaterally.  PELVIC: Normal external genitalia without lesions.  Normal hair distribution.  Adequate perineal body, normal urethral meatus.  Vagina moist and well rugated without lesions or discharge.  Cervix pink, without lesions, discharge or tenderness.  No significant cystocele or rectocele.  Bimanual exam shows uterus to be normal size, regular, mobile and nontender.  Adnexa without masses or tenderness.    RECTAL: Rectovaginal exam confirms above with normal sphincter tone, no masses.  EXTREMITIES: No edema.      ICD-10-CM ICD-9-CM    1. Visit for gynecologic examination Z01.419 V72.31    2. Encounter for screening mammogram for breast cancer Z12.31 V76.12 Mammo Digital Screening Bilat with CAD         Patient was counseled today on A.C.S. Pap guidelines and recommendations for yearly  pelvic exams, mammograms and monthly self breast exams; to see her PCP for other health maintenance.     Recommended f/u with PCP regarding LLQ pain as multiple ultrasounds and CT Scan have been within normal limits.      Screening colonoscopy ordered.      Return in about 1 year (around 2/13/2018).

## 2017-02-16 ENCOUNTER — TELEPHONE (OUTPATIENT)
Dept: ORTHOPEDICS | Facility: CLINIC | Age: 52
End: 2017-02-16

## 2017-02-16 NOTE — TELEPHONE ENCOUNTER
Spoke to pt regarding appt Friday 2/17/17, 930a with Aniya Armstrong PA-C. Pt was informed to arrive on the 2nd floor at 9a, then up to floor 5 to see Aniya. Pt verbalized understanding.    Esther HAMMER

## 2017-02-17 ENCOUNTER — HOSPITAL ENCOUNTER (OUTPATIENT)
Dept: RADIOLOGY | Facility: HOSPITAL | Age: 52
Discharge: HOME | End: 2017-02-17
Attending: ORTHOPAEDIC SURGERY
Payer: COMMERCIAL

## 2017-02-17 ENCOUNTER — OFFICE VISIT (OUTPATIENT)
Dept: ORTHOPEDICS | Facility: CLINIC | Age: 52
End: 2017-02-17
Payer: COMMERCIAL

## 2017-02-17 ENCOUNTER — OFFICE VISIT (OUTPATIENT)
Dept: PODIATRY | Facility: CLINIC | Age: 52
End: 2017-02-17
Payer: COMMERCIAL

## 2017-02-17 VITALS — BODY MASS INDEX: 48.02 KG/M2 | WEIGHT: 271 LBS | HEIGHT: 63 IN

## 2017-02-17 VITALS
HEART RATE: 84 BPM | HEIGHT: 63 IN | DIASTOLIC BLOOD PRESSURE: 86 MMHG | SYSTOLIC BLOOD PRESSURE: 129 MMHG | WEIGHT: 271.81 LBS | BODY MASS INDEX: 48.16 KG/M2

## 2017-02-17 DIAGNOSIS — M25.511 RIGHT SHOULDER PAIN, UNSPECIFIED CHRONICITY: ICD-10-CM

## 2017-02-17 DIAGNOSIS — M54.2 NECK PAIN: Primary | ICD-10-CM

## 2017-02-17 DIAGNOSIS — M54.2 NECK PAIN: ICD-10-CM

## 2017-02-17 DIAGNOSIS — M54.50 BILATERAL LOW BACK PAIN WITHOUT SCIATICA, UNSPECIFIED CHRONICITY: ICD-10-CM

## 2017-02-17 DIAGNOSIS — M21.6X9 ACQUIRED EQUINUS DEFORMITY OF FOOT, UNSPECIFIED LATERALITY: Primary | ICD-10-CM

## 2017-02-17 DIAGNOSIS — G60.9 IDIOPATHIC PERIPHERAL NEUROPATHY: ICD-10-CM

## 2017-02-17 DIAGNOSIS — M54.9 BACK PAIN, UNSPECIFIED BACK LOCATION, UNSPECIFIED BACK PAIN LATERALITY, UNSPECIFIED CHRONICITY: ICD-10-CM

## 2017-02-17 PROCEDURE — 99999 PR PBB SHADOW E&M-EST. PATIENT-LVL IV: CPT | Mod: PBBFAC,,, | Performed by: PHYSICIAN ASSISTANT

## 2017-02-17 PROCEDURE — 72050 X-RAY EXAM NECK SPINE 4/5VWS: CPT | Mod: TC

## 2017-02-17 PROCEDURE — 99999 PR PBB SHADOW E&M-EST. PATIENT-LVL III: CPT | Mod: PBBFAC,,, | Performed by: PODIATRIST

## 2017-02-17 PROCEDURE — 72141 MRI NECK SPINE W/O DYE: CPT | Mod: TC

## 2017-02-17 PROCEDURE — 72050 X-RAY EXAM NECK SPINE 4/5VWS: CPT | Mod: 26,,, | Performed by: RADIOLOGY

## 2017-02-17 PROCEDURE — 72141 MRI NECK SPINE W/O DYE: CPT | Mod: 26,,, | Performed by: RADIOLOGY

## 2017-02-17 PROCEDURE — 99213 OFFICE O/P EST LOW 20 MIN: CPT | Mod: S$GLB,,, | Performed by: PODIATRIST

## 2017-02-17 PROCEDURE — 99204 OFFICE O/P NEW MOD 45 MIN: CPT | Mod: S$GLB,,, | Performed by: PHYSICIAN ASSISTANT

## 2017-02-17 RX ORDER — MELOXICAM 15 MG/1
15 TABLET ORAL DAILY
Qty: 30 TABLET | Refills: 1 | Status: SHIPPED | OUTPATIENT
Start: 2017-02-17 | End: 2017-03-19

## 2017-02-17 RX ORDER — METHOCARBAMOL 750 MG/1
750 TABLET, FILM COATED ORAL 3 TIMES DAILY
Qty: 60 TABLET | Refills: 0 | Status: SHIPPED | OUTPATIENT
Start: 2017-02-17 | End: 2017-06-09 | Stop reason: SDUPTHER

## 2017-02-17 RX ORDER — TRAMADOL HYDROCHLORIDE 50 MG/1
50-100 TABLET ORAL
Qty: 90 TABLET | Refills: 0 | Status: SHIPPED | OUTPATIENT
Start: 2017-02-17 | End: 2017-02-27

## 2017-02-17 NOTE — PROGRESS NOTES
DATE: 2017  PATIENT: Paloma Bang    Supervising Physician: Marin Presley M.D.    CHIEF COMPLAINT: neck pain, back pain and right shoulder pain    HISTORY:  Paloma Bang is a 51 y.o. female who fell in the bathtub in 2016 here for initial evaluation of neck pain, right shoulder pain, and back pain pain (Neck - 4, Arm - 5). The pain has been present since she fell.  She has a long history of right shoulder pain.  She is seeing Dr. Ernst in Sports Medicine and is discussing surgery with him.  The patient describes the pain as aching. The pain is worse with laying on her right side and at night and improved by sitting up. There is associated numbness and tingling in the right hand. There is subjective weakness in the right arm. Prior treatments have included naproxen, tramadol, flexeril and physical therapy, but no ESIs or surgery.  She reports good relief with physical therapy but had to stop because she was traveling and never got back into it.       The patient denies myelopathic symptoms such as handwriting changes or difficulty with buttons/coins/keys. Denies perineal paresthesias, bowel/bladder dysfunction.    PAST MEDICAL/SURGICAL HISTORY:  Past Medical History   Diagnosis Date    Deep vein thrombosis      occurred after ankle fracture    GERD (gastroesophageal reflux disease)     Joint pain     Morbid obesity     Pulmonary embolism      occurred after ankle fracture     Past Surgical History   Procedure Laterality Date    Hysterectomy       section      Ankle surgery      Myomectomy      Laparoscopic gastric banding      Pilonidal cyst drainage       removed    Back surgery       Lumbar spinal fusion with shaving of a disk    Rotator cuff repair       Left       Medications:  Current Outpatient Prescriptions on File Prior to Visit   Medication Sig Dispense Refill    ERGOCALCIFEROL, VITAMIN D2, (VITAMIN D ORAL) Take 1,000 mg by mouth once daily.       fluconazole (DIFLUCAN) 50 MG Tab Take by mouth once daily.      fluticasone (FLONASE) 50 mcg/actuation nasal spray 1 spray by Each Nare route once daily. 16 g 11    multivitamin with minerals tablet Take 1 tablet by mouth once daily.      [DISCONTINUED] naproxen (NAPROSYN) 500 MG tablet TAKE 1 TABLET (500 MG TOTAL) BY MOUTH 2 (TWO) TIMES DAILY. 60 tablet 1    [DISCONTINUED] tramadol (ULTRAM) 50 mg tablet Take 1 tablet (50 mg total) by mouth 3 (three) times daily as needed for Pain. 60 tablet 1    desonide (DESOWEN) 0.05 % lotion Apply topically 2 (two) times daily. On face 118 mL 3    diclofenac sodium 1.5 % Drop Apply 1 application topically 3 (three) times daily. 150 mL 2    hydrocortisone (WESTCORT) 0.2 % cream Apply topically 2 (two) times daily. 15 g 0    pantoprazole (PROTONIX) 40 MG tablet Take 1 tablet (40 mg total) by mouth once daily. (Patient taking differently: Take 40 mg by mouth daily as needed. ) 30 tablet 11    triamcinolone acetonide 0.1% (KENALOG) 0.1 % cream Apply topically 2 (two) times daily. 80 g 0     No current facility-administered medications on file prior to visit.        Social History:   Social History     Social History    Marital status: Single     Spouse name: N/A    Number of children: N/A    Years of education: N/A     Occupational History    Not on file.     Social History Main Topics    Smoking status: Never Smoker    Smokeless tobacco: Never Used    Alcohol use Yes      Comment: occasionally/ not weekly    Drug use: No    Sexual activity: Not Currently     Birth control/ protection: Surgical     Other Topics Concern    Not on file     Social History Narrative       REVIEW OF SYSTEMS:  Constitution: Negative. Negative for chills, fever and night sweats.   Cardiovascular: Negative for chest pain and syncope.   Respiratory: Negative for cough and shortness of breath.   Gastrointestinal: See HPI. Negative for nausea/vomiting. Negative for abdominal  "pain.  Genitourinary: See HPI. Negative for discoloration or dysuria.  Skin: Negative for dry skin, itching and rash.   Hematologic/Lymphatic: Negative for bleeding problem. Does not bruise/bleed easily.   Musculoskeletal: Negative for falls and muscle weakness.   Neurological: See HPI. No seizures.   Endocrine: Negative for polydipsia, polyphagia and polyuria.   Allergic/Immunologic: Negative for hives and persistent infections.  Psychiatric/Behavioral: Negative for depression and insomnia.         EXAM:  Visit Vitals    /86 (BP Location: Right arm, Patient Position: Sitting, BP Method: Automatic)    Pulse 84    Ht 5' 3" (1.6 m)    Wt 123.3 kg (271 lb 13.2 oz)    LMP  (LMP Unknown)    BMI 48.15 kg/m2       General: The patient is a very pleasant 51 y.o. female in no apparent distress, the patient is oriented to person, place and time.  Psych: Normal mood and affect  HEENT: Vision grossly intact, hearing intact to the spoken word.  Lungs: Respirations unlabored.  Gait: Normal station and gait, no difficulty with toe or heel walk.   Skin: Cervical skin negative for rashes, lesions, hairy patches and surgical scars.  Range of motion: Cervical range of motion is acceptable. There is mild tenderness to palpation.  Spinal Balance: Global saggital and coronal spinal balance acceptable, no significant for scoliosis and kyphosis.  Musculoskeletal: No pain with the range of motion of the bilateral shoulders and elbows. Normal bulk and contour of the bilateral hands.  Vascular: Bilateral hands warm and well perfused, radial pulses 2+ bilaterally.  Neurological: Normal strength and tone in all major motor groups in the bilateral upper and lower extremities. Normal sensation to light touch in the C5-T1 and L2-S1 dermatomes bilaterally.  Deep tendon reflexes symmetric 2+ in the bilateral upper and lower extremities.  Negative Inverted Radial Reflex and Gilliland's bilaterally. Negative Babinski bilaterally. "     IMAGING:   Today I personally reviewed AP, Lat and Flex/Ex  upright C-spine films that demonstrate mild degenerative changes of the cervical spine.    MRI cervical spine demonstrates mild to moderate degenerative changes with mild spinal stenosis and multilevel mild to moderate neural foraminal narrowing.    ASSESSMENT/PLAN:    Diagnoses and all orders for this visit:    Neck pain  -     Ambulatory Referral to Physical/Occupational Therapy    Bilateral low back pain without sciatica, unspecified chronicity  -     Ambulatory Referral to Physical/Occupational Therapy    Right shoulder pain, unspecified chronicity  -     Ambulatory Referral to Physical/Occupational Therapy    Other orders  -     tramadol (ULTRAM) 50 mg tablet; Take 1-2 tablets ( mg total) by mouth every 4 to 6 hours as needed.  -     meloxicam (MOBIC) 15 MG tablet; Take 1 tablet (15 mg total) by mouth once daily.  -     methocarbamol (ROBAXIN) 750 MG Tab; Take 1 tablet (750 mg total) by mouth 3 (three) times daily. As needed for muscle spasms    The patient is having neck pain, back pain and right shoulder pain.  No myelopathic symptoms.  She had good relief with physical therapy in the past but had to stop because she was traveling and then never returned.  Referral for PT at [facility] placed today.  Will switch her to mobic and robaxin as she feels the naproxen and flexeril are not providing much relief anymore.  Follow up with me after therapy in about 6 weeks if symptoms persist.     Return in about 6 weeks (around 3/31/2017), or if symptoms worsen or fail to improve.

## 2017-02-17 NOTE — PROGRESS NOTES
Subjective:      Patient ID: Paloma Bang is a 51 y.o. female.    Chief Complaint: Foot Pain (bilateral)    Neck, back, and radiating right leg pain.  Gradual onset following a fall, worsening over past several months/years, aggravated by increased weight bearing, shoe gear, pressure.  No previous medical treatment.  OTC pain med not helping.  Wants orthotics to try to help symptoms.      Review of Systems   Constitution: Negative for chills, diaphoresis, fever, malaise/fatigue and night sweats.   Cardiovascular: Negative for claudication, cyanosis, leg swelling and syncope.   Skin: Negative for color change, dry skin, nail changes, rash, suspicious lesions and unusual hair distribution.   Musculoskeletal: Negative for falls, joint pain, joint swelling, muscle cramps, muscle weakness and stiffness.   Gastrointestinal: Negative for constipation, diarrhea, nausea and vomiting.   Neurological: Positive for paresthesias and sensory change. Negative for brief paralysis, disturbances in coordination, focal weakness, numbness and tremors.           Objective:      Physical Exam   Constitutional: She appears well-developed and well-nourished. She is cooperative. No distress.   Cardiovascular:   Pulses:       Popliteal pulses are 2+ on the right side, and 2+ on the left side.        Dorsalis pedis pulses are 2+ on the right side, and 2+ on the left side.        Posterior tibial pulses are 2+ on the right side, and 2+ on the left side.   Capillary refill 3 seconds all toes/distal feet, all toes/both feet warm to touch.      Negative lymphadenopathy bilateral popliteal fossa and tarsal tunnel.      Negavie lower extremity edema bilateral.     Musculoskeletal:        Right ankle: Normal. She exhibits normal range of motion, no swelling, no ecchymosis, no deformity, no laceration and normal pulse. Achilles tendon normal. Achilles tendon exhibits no pain, no defect and normal Treadwell's test results.   Ankle  dorsiflexion decreased at <10 degrees bilateral with moderate increase with knee flexion bilateral.    Otherwise, Normal angle, base, station of gait. All ten toes without clubbing, cyanosis, or signs of ischemia.  No pain to palpation bilateral lower extremities.  Range of motion, stability, muscle strength, and muscle tone normal bilateral feet and legs.     Lymphadenopathy: No inguinal adenopathy noted on the right or left side.   Negative lymphadenopathy bilateral popliteal fossa and tarsal tunnel.   Neurological: She is alert. She has normal strength. She displays no atrophy and no tremor. No sensory deficit. She exhibits normal muscle tone. She displays no seizure activity. Gait normal.   Reflex Scores:       Patellar reflexes are 2+ on the right side and 2+ on the left side.       Achilles reflexes are 2+ on the right side and 2+ on the left side.  Negative tinel sign to percussion sural, superficial peroneal, deep peroneal, saphenous, and posterior tibial nerves right and left ankles and feet.     Skin: Skin is warm, dry and intact. No abrasion, no bruising, no burn, no ecchymosis, no laceration, no lesion and no rash noted. She is not diaphoretic. No cyanosis or erythema. No pallor. Nails show no clubbing.     Skin is normal age and health appropriate color, turgor, texture, and temperature bilateral lower extremities without ulceration, hyperpigmentation, discoloration, masses nodules or cords palpated.  No ecchymosis, erythema, edema, or cardinal signs of infection bilateral lower extremities.               Assessment:       Encounter Diagnoses   Name Primary?    Acquired equinus deformity of foot, unspecified laterality Yes    Back pain, unspecified back location, unspecified back pain laterality, unspecified chronicity     Idiopathic peripheral neuropathy          Plan:       Paloma was seen today for foot pain.    Diagnoses and all orders for this visit:    Acquired equinus deformity of foot,  unspecified laterality  -     ORTHOTIC DEVICE (DME)    Back pain, unspecified back location, unspecified back pain laterality, unspecified chronicity  -     Ambulatory consult to Physical Medicine Rehab  -     ORTHOTIC DEVICE (DME)    Idiopathic peripheral neuropathy  -     EMG - 2 Extremities; Future  -     Ambulatory consult to Physical Medicine Rehab  -     ORTHOTIC DEVICE (DME)      I counseled the patient on her conditions, their implications and medical management.        counselledthat orthotics may affect back, leg, neck, but not in any qualtifiable  Or predictable way.    Rx custom orthotics.      Consult PM&R, EMG.          Return if symptoms worsen or fail to improve.

## 2017-03-06 ENCOUNTER — OFFICE VISIT (OUTPATIENT)
Dept: INTERNAL MEDICINE | Facility: CLINIC | Age: 52
End: 2017-03-06
Payer: COMMERCIAL

## 2017-03-06 VITALS
HEIGHT: 62 IN | TEMPERATURE: 98 F | SYSTOLIC BLOOD PRESSURE: 149 MMHG | DIASTOLIC BLOOD PRESSURE: 101 MMHG | WEIGHT: 276.25 LBS | BODY MASS INDEX: 50.83 KG/M2 | RESPIRATION RATE: 16 BRPM | HEART RATE: 85 BPM

## 2017-03-06 DIAGNOSIS — I10 ESSENTIAL HYPERTENSION: ICD-10-CM

## 2017-03-06 DIAGNOSIS — L30.9 DERMATITIS OF FACE: Primary | ICD-10-CM

## 2017-03-06 PROCEDURE — 99999 PR PBB SHADOW E&M-EST. PATIENT-LVL III: CPT | Mod: PBBFAC,,, | Performed by: INTERNAL MEDICINE

## 2017-03-06 PROCEDURE — 3077F SYST BP >= 140 MM HG: CPT | Mod: S$GLB,,, | Performed by: INTERNAL MEDICINE

## 2017-03-06 PROCEDURE — 99214 OFFICE O/P EST MOD 30 MIN: CPT | Mod: 25,S$GLB,, | Performed by: INTERNAL MEDICINE

## 2017-03-06 PROCEDURE — 96372 THER/PROPH/DIAG INJ SC/IM: CPT | Mod: S$GLB,,, | Performed by: INTERNAL MEDICINE

## 2017-03-06 PROCEDURE — 1160F RVW MEDS BY RX/DR IN RCRD: CPT | Mod: S$GLB,,, | Performed by: INTERNAL MEDICINE

## 2017-03-06 PROCEDURE — 3080F DIAST BP >= 90 MM HG: CPT | Mod: S$GLB,,, | Performed by: INTERNAL MEDICINE

## 2017-03-06 RX ORDER — LOSARTAN POTASSIUM AND HYDROCHLOROTHIAZIDE 12.5; 5 MG/1; MG/1
1 TABLET ORAL DAILY
Qty: 30 TABLET | Refills: 0 | Status: SHIPPED | OUTPATIENT
Start: 2017-03-06 | End: 2017-04-26 | Stop reason: SDUPTHER

## 2017-03-06 RX ORDER — DESONIDE 0.5 MG/ML
LOTION TOPICAL 2 TIMES DAILY
Qty: 118 ML | Refills: 0 | Status: SHIPPED | OUTPATIENT
Start: 2017-03-06 | End: 2017-07-26

## 2017-03-06 RX ORDER — TRIAMCINOLONE ACETONIDE 40 MG/ML
40 INJECTION, SUSPENSION INTRA-ARTICULAR; INTRAMUSCULAR
Status: COMPLETED | OUTPATIENT
Start: 2017-03-06 | End: 2017-03-06

## 2017-03-06 RX ADMIN — TRIAMCINOLONE ACETONIDE 40 MG: 40 INJECTION, SUSPENSION INTRA-ARTICULAR; INTRAMUSCULAR at 03:03

## 2017-03-06 NOTE — PROGRESS NOTES
Subjective:       Patient ID: Paloma Bang is a 51 y.o. female.    Chief Complaint: Rash    HPI   Pt with HTN is here for evaluation of an itchy erythematous rash located on the right side of her face and forehead which has been worsening over the last 3-4 days. Minimal relief with Abx ointment. Nothing makes it worse. Pt denies any environmental/food allergies.   Review of Systems   Constitutional: Negative for activity change, appetite change, chills, diaphoresis, fatigue, fever and unexpected weight change.   HENT: Negative for postnasal drip, rhinorrhea, sinus pressure, sneezing, sore throat, trouble swallowing and voice change.    Respiratory: Negative for cough, shortness of breath and wheezing.    Cardiovascular: Negative for chest pain, palpitations and leg swelling.   Gastrointestinal: Negative for abdominal pain, blood in stool, constipation, diarrhea, nausea and vomiting.   Genitourinary: Negative for dysuria.   Musculoskeletal: Negative for arthralgias and myalgias.   Skin: Positive for rash. Negative for wound.   Allergic/Immunologic: Negative for environmental allergies and food allergies.   Hematological: Negative for adenopathy. Does not bruise/bleed easily.       Objective:      Physical Exam   Constitutional: She is oriented to person, place, and time. She appears well-developed and well-nourished. No distress.   HENT:   Head: Normocephalic and atraumatic.       Right Ear: External ear normal.   Left Ear: External ear normal.   Nose: Nose normal.   Mouth/Throat: Oropharynx is clear and moist. No oropharyngeal exudate.   erythema   Eyes: Conjunctivae and EOM are normal. Pupils are equal, round, and reactive to light. Right eye exhibits no discharge. Left eye exhibits no discharge. No scleral icterus.   Neck: Neck supple. No JVD present.   Cardiovascular: Normal rate, regular rhythm, normal heart sounds and intact distal pulses.    Pulmonary/Chest: Effort normal and breath sounds normal. No  respiratory distress. She has no wheezes. She has no rales.   Abdominal: Soft. Bowel sounds are normal. There is no tenderness.   Musculoskeletal: She exhibits no edema.   Lymphadenopathy:     She has no cervical adenopathy.   Neurological: She is alert and oriented to person, place, and time.   Skin: Skin is warm and dry. No rash noted. She is not diaphoretic. No pallor.       Assessment:       1. Dermatitis of face    2. Essential hypertension        Plan:    1. Refill Desonide lotion BID PRN, start zyrtec daily, Kenalog 40 mg IM   2. Rx Hyzaar 50-12.5 mg daily       Document BP BID   3. F/u in 2 weeks for HTN

## 2017-03-15 ENCOUNTER — CLINICAL SUPPORT (OUTPATIENT)
Dept: REHABILITATION | Facility: HOSPITAL | Age: 52
End: 2017-03-15
Attending: ORTHOPAEDIC SURGERY
Payer: COMMERCIAL

## 2017-03-15 DIAGNOSIS — M54.2 NECK PAIN: ICD-10-CM

## 2017-03-15 DIAGNOSIS — S43.401A SPRAIN OF RIGHT SHOULDER, UNSPECIFIED SHOULDER SPRAIN TYPE, INITIAL ENCOUNTER: Primary | ICD-10-CM

## 2017-03-15 DIAGNOSIS — M54.41 BILATERAL LOW BACK PAIN WITH RIGHT-SIDED SCIATICA, UNSPECIFIED CHRONICITY: ICD-10-CM

## 2017-03-15 PROCEDURE — 97161 PT EVAL LOW COMPLEX 20 MIN: CPT

## 2017-03-15 NOTE — PROGRESS NOTES
Physical Therapy Evaluation    Name: Paloma Bang  Clinic Number: [id number]      Diagnosis:   No diagnosis found.  Physician: Marin Presley MD  Treatment Orders: PT Eval and Treat    Past Medical History:   Diagnosis Date    Deep vein thrombosis     occurred after ankle fracture    GERD (gastroesophageal reflux disease)     Joint pain     Morbid obesity     Pulmonary embolism     occurred after ankle fracture     Current Outpatient Prescriptions   Medication Sig    desonide (DESOWEN) 0.05 % lotion Apply topically 2 (two) times daily. On face    ERGOCALCIFEROL, VITAMIN D2, (VITAMIN D ORAL) Take 1,000 mg by mouth once daily.    fluconazole (DIFLUCAN) 50 MG Tab Take by mouth once daily.    hydrocortisone (WESTCORT) 0.2 % cream Apply topically 2 (two) times daily.    losartan-hydrochlorothiazide 50-12.5 mg (HYZAAR) 50-12.5 mg per tablet Take 1 tablet by mouth once daily.    meloxicam (MOBIC) 15 MG tablet Take 1 tablet (15 mg total) by mouth once daily.    multivitamin with minerals tablet Take 1 tablet by mouth once daily.    pantoprazole (PROTONIX) 40 MG tablet Take 1 tablet (40 mg total) by mouth once daily. (Patient taking differently: Take 40 mg by mouth daily as needed. )    triamcinolone acetonide 0.1% (KENALOG) 0.1 % cream Apply topically 2 (two) times daily.     No current facility-administered medications for this visit.      Review of patient's allergies indicates:  No Known Allergies  Precautions: hx of back fusion    Evaluation Date: 03/15/2017  Visit # authorized: 25  Authorization period:  Until 12/31/17      Subjective   Onset/LINDSEY: sudden    Primary concern/ Chief complaints:    Paloma is a 51 y.o. female that presents to [facility] outpatient physical therapy secondary to low back pain with L sided sciatica, neck pain and R shoulder pain. Pt states that she slipped and fell when stepping out of a tall bathtub, bracing her fall with her right shoulder on the bathroom floor  around July 2, 2016. Pt has had no falls since then. Pt reports some difficulty with maintaining her balance while walking and while standing statically. Pt reports that at times, her right leg will want to buckle on her, but has not happened.     Pt reports pain in her neck with lying on her back and with looking up and holding her head looking down. Pt reports numbness that will radiate in her entire hand diffusely. Has never had any numbness in the left hand with the exception of pain in her pinky.     Pt reports pain in the right shoulder increases with lying on her right side, overhead reaching, carrying objects, and abduction.     Pt reports having nerve damage and cramps in her right leg and reports numbness in the right leg. Pt reports having a supraspinatus tear in her right side and is looking to avoid surgery.     X-ray was taken and revealed no fractures or dislocations.  Pt has had previous L4-5 fusion and L RCR and reports doing well with her healing process with both. Pt uses moist heat, stretching in the past, previous physical therapy, naproxen to control LBP and shoulder symptoms. Pt reports past success with previous physical therapy. Pt has a decreased ability to perform ADLs. Pt works as a  and job related duties include computer work, move supply boxes.  No cultural, environmental, or spiritual barriers identified to treatment or learning.    Pain Scale:  Lashandra rates neck pain on a scale of 0-10 to be 7 at worst; 2 currently; 0 at best .  Lashandra rates shoulder pain on a scale of 0-10 to be 10 at worst; 5 currently; 3 at best.   Lashandra rates low back pain on a scale of 0-10 to be 10 at worst; 5 currently; 3 at best.    Patient Goals: To improve strength and to be able to reach overhead without pain or limitations.      Objective     Observation: ambulates independently to clinic    Posture:  Increased lumbar lordosis with anterior pelvic tilt    Cervical range of  motion:   Flexion: 45, reported pain at end range as a pop/stretch in the middle of the neck  Extension: 40, no pain  Side bending L: 25, stretch in R upper trap  Side bending R: 25, pinch on R side of neck  Rotation L: 100%, very little pain  Rotation R: 80%    Active Range of Motion:   Shoulder Right Left   Flexion 130* 160   Abduction 110* 160   ER at 0 50 50   IR WNL WNL   Extension 20 20   *pain at the anterior R shoulder    Functional reach: RUE able to reach back of head overhead, and able to reach S1 when reaching behind. LUE able to reach L3 and C7.    Strength:  Shoulder Right Left   Flexion 4-/5 5/5   Abduction 4-/5 5/5   ER 4-/5 5/5   IR 4-/5 5/5   Middle trap 4/5 5/5   Lower trap 4-/5 5/5   Rhomboids 4+/5 5/5       Lumbar Range of Motion:    Degrees Pain   Flexion 60   None, HS stretch        Extension 10   R radicular pain to buttocks        Left Side Bending 25 L side pain        Right Side Bending 25 R side pain        Left Rotation 50% No pain   Right Rotation 50% No pain       Lower Extremity Strength  Right LE  Left LE    Quadriceps: 4/5 Quadriceps: 5/5   Hamstrings: 4+/5 Hamstrings: 5/5   Iliospoas: 4/5 Iliospoas: 5/5   Hip extension:  4/5 Hip extension: 4/5   PGM: 4-/5 PGM: 4/5   Hip ER:  4-/5 Hip ER: 5/5   Hip IR: 3+/5 Hip IR: 5/5   Ankle dorsiflexion: 5/5 Ankle dorsiflexion: 5/5   Ankle plantarflexion: 5/5 Ankle plantarflexion: 5/5     Dermatomes (sensation): impaired on the R throughout S1 dermatome  Myotomes: normal    Special Tests:  -SLR Test: negative B  -Bridge Test: positive     Joint Mobility: unable to assess secondary to excess adipose tissue    Flexibility: decreased in bilateral hip flexors and hamstrings as assessed posturally and with lumbar AROM measurements (knee flexion noted with lumbar flexion, increased lumbar lordosis in standing)      PT Evaluation Completed? Yes  Discussed Plan of Care with patient: Yes    TREATMENT:  Paloma received therapeutic exercises to develop  strength and endurance, flexibility for 10 minutes including:   Levator scapula stretches, scapular rows with OTB, supine SLR     Instructed pt. Regarding: Proper technique with all exercises. Pt demo good understanding of the education provided. Paloma demonstrated good return demonstration of activities.      Assessment     This is a 51 y.o. female referred to outpatient physical therapy and presents with a medical diagnosis of B LBP with R sided sciatica, neck pain, and R shoulder sprain. Pt presents with decreased R shoulder AROM, decreased R shoulder strength, decreased lumbar ROM, decreased core/hip strength, decreased neck ROM, decreased periscapular strength, and decreased flexibility.  Pt will benefit from physcial therapy services, specifically R shoulder stretching, R shoulder strengthening, B LE stretching and core/hip strengthening, stretching of tight neck musculature and periscapular strengthening in order to maximize pain free functional independence. The following goals were discussed with the patient and patient is in agreement with them as to be addressed in the treatment plan. Pt was given a HEP consisting of exercises listed above. Pt verbally understood the instructions as they were given and demonstrated proper form and technique during therapy. Pt was advised to perform these exercises free of pain, and to stop performing them if pain occurs.       Patient History Examination Clinical Presentation Clinical Decision Making   Comorbidities:  Shoulder pain, weakness, back pain, right foot pain, morbid obesity, pmh of lumbar fusion    Personal Factors:  None       Activity and Participation Restriction:  General tasks and demands  Mobility    Body Systems:  Musculoskeletal  Neuromuscular    Body Regions:  Shoulder  Lumbar  Cervical Stable and uncomplicated     Low          Medical necessity is demonstrated by the following IMPAIRMENTS/PROMBLEM LIST:   1)Increase in pain level limiting  function   2)decreased lumbar ROM/R shoulder ROM   3)decreased core/hip strength and R shoulder strength   4)decreased flexibility   5)Lack of HEP    GOALS: Short Term Goals:  3 weeks  1.Report decreased overall pain  < / =  3-4 /10  to increase tolerance for ADLs.  2. Pt to increase B LE flexibility in order to improve posture and gait.   3. Increase lumbar ROM to min to no loss in order to improve functional mobility.    4. Increase R shoulder AROM to WNLs in order to improve functional mobility.    5. Increase strength by 1 MMT grade in B LE and R shoulder in order to improve tolerance for ADLs.  6. Pt to tolerate HEP to improve ROM and independence with ADL's      Long Term Goals: 6 weeks  1.Report decreased overall pain  <   / =  1-2  /10  to increase tolerance for ADLs.  2.Increase strength to >/= 1.5 MMT grade for B LE  to increase tolerance for ADL and work activities.  3. Increase strength to >/= 1.5 MMT grade for R shoulder  to increase tolerance for ADL and work activities.  4. Pt to demonstrate negative Bridge Test in order to show improved core strength for lumbar stabilization.   5. Pt to be independent with HEP.    Plan     Pt will be treated by physical therapy 1-2 times a week for Pt Education, HEP, therapeutic exercises, neuromuscular re-education, joint mobilizations, modalities prn to achieve established goals. Paloma may at times be seen by a PTA as part of the Rehab Team.     Cont PT for 6 weeks.     Olivia Pitts, PT, DPT  03/15/2017      I certify the need for these services furnished under this plan of treatment and while under my care.______________________________ Physician/Referring Practitioner  Date of Signature

## 2017-03-16 PROBLEM — M54.2 NECK PAIN: Status: ACTIVE | Noted: 2017-03-16

## 2017-03-16 PROBLEM — M54.41 BILATERAL LOW BACK PAIN WITH RIGHT-SIDED SCIATICA: Status: ACTIVE | Noted: 2017-03-16

## 2017-03-16 PROBLEM — M54.42 CHRONIC BILATERAL LOW BACK PAIN WITH LEFT-SIDED SCIATICA: Status: ACTIVE | Noted: 2017-03-16

## 2017-03-16 PROBLEM — G89.29 CHRONIC BILATERAL LOW BACK PAIN WITH LEFT-SIDED SCIATICA: Status: ACTIVE | Noted: 2017-03-16

## 2017-03-16 NOTE — PLAN OF CARE
Physical Therapy Evaluation    Name: Paloma Bang  Clinic Number: [id number]      Diagnosis:   No diagnosis found.  Physician: Marin Presley MD  Treatment Orders: PT Eval and Treat    Past Medical History:   Diagnosis Date    Deep vein thrombosis     occurred after ankle fracture    GERD (gastroesophageal reflux disease)     Joint pain     Morbid obesity     Pulmonary embolism     occurred after ankle fracture     Current Outpatient Prescriptions   Medication Sig    desonide (DESOWEN) 0.05 % lotion Apply topically 2 (two) times daily. On face    ERGOCALCIFEROL, VITAMIN D2, (VITAMIN D ORAL) Take 1,000 mg by mouth once daily.    fluconazole (DIFLUCAN) 50 MG Tab Take by mouth once daily.    hydrocortisone (WESTCORT) 0.2 % cream Apply topically 2 (two) times daily.    losartan-hydrochlorothiazide 50-12.5 mg (HYZAAR) 50-12.5 mg per tablet Take 1 tablet by mouth once daily.    meloxicam (MOBIC) 15 MG tablet Take 1 tablet (15 mg total) by mouth once daily.    multivitamin with minerals tablet Take 1 tablet by mouth once daily.    pantoprazole (PROTONIX) 40 MG tablet Take 1 tablet (40 mg total) by mouth once daily. (Patient taking differently: Take 40 mg by mouth daily as needed. )    triamcinolone acetonide 0.1% (KENALOG) 0.1 % cream Apply topically 2 (two) times daily.     No current facility-administered medications for this visit.      Review of patient's allergies indicates:  No Known Allergies  Precautions: hx of back fusion    Evaluation Date: 03/15/2017  Visit # authorized: 25  Authorization period:  Until 12/31/17      Subjective   Onset/LINDSEY: sudden    Primary concern/ Chief complaints:    Paloma is a 51 y.o. female that presents to [facility] outpatient physical therapy secondary to low back pain with L sided sciatica, neck pain and R shoulder pain. Pt states that she slipped and fell when stepping out of a tall bathtub, bracing her fall with her right shoulder on the bathroom floor  around July 2, 2016. Pt has had no falls since then. Pt reports some difficulty with maintaining her balance while walking and while standing statically. Pt reports that at times, her right leg will want to buckle on her, but has not happened.     Pt reports pain in her neck with lying on her back and with looking up and holding her head looking down. Pt reports numbness that will radiate in her entire hand diffusely. Has never had any numbness in the left hand with the exception of pain in her pinky.     Pt reports pain in the right shoulder increases with lying on her right side, overhead reaching, carrying objects, and abduction.     Pt reports having nerve damage and cramps in her right leg and reports numbness in the right leg. Pt reports having a supraspinatus tear in her right side and is looking to avoid surgery.     X-ray was taken and revealed no fractures or dislocations.  Pt has had previous L4-5 fusion and L RCR and reports doing well with her healing process with both. Pt uses moist heat, stretching in the past, previous physical therapy, naproxen to control LBP and shoulder symptoms. Pt reports past success with previous physical therapy. Pt has a decreased ability to perform ADLs. Pt works as a  and job related duties include computer work, move supply boxes.  No cultural, environmental, or spiritual barriers identified to treatment or learning.    Pain Scale:  Lashandra rates neck pain on a scale of 0-10 to be 7 at worst; 2 currently; 0 at best .  Lashandra rates shoulder pain on a scale of 0-10 to be 10 at worst; 5 currently; 3 at best.   Lashandra rates low back pain on a scale of 0-10 to be 10 at worst; 5 currently; 3 at best.    Patient Goals: To improve strength and to be able to reach overhead without pain or limitations.      Objective     Observation: ambulates independently to clinic    Posture:  Increased lumbar lordosis with anterior pelvic tilt    Cervical range of  motion:   Flexion: 45, reported pain at end range as a pop/stretch in the middle of the neck  Extension: 40, no pain  Side bending L: 25, stretch in R upper trap  Side bending R: 25, pinch on R side of neck  Rotation L: 100%, very little pain  Rotation R: 80%    Active Range of Motion:   Shoulder Right Left   Flexion 130* 160   Abduction 110* 160   ER at 0 50 50   IR WNL WNL   Extension 20 20   *pain at the anterior R shoulder    Functional reach: RUE able to reach back of head overhead, and able to reach S1 when reaching behind. LUE able to reach L3 and C7.    Strength:  Shoulder Right Left   Flexion 4-/5 5/5   Abduction 4-/5 5/5   ER 4-/5 5/5   IR 4-/5 5/5   Middle trap 4/5 5/5   Lower trap 4-/5 5/5   Rhomboids 4+/5 5/5       Lumbar Range of Motion:    Degrees Pain   Flexion 60   None, HS stretch        Extension 10   R radicular pain to buttocks        Left Side Bending 25 L side pain        Right Side Bending 25 R side pain        Left Rotation 50% No pain   Right Rotation 50% No pain       Lower Extremity Strength  Right LE  Left LE    Quadriceps: 4/5 Quadriceps: 5/5   Hamstrings: 4+/5 Hamstrings: 5/5   Iliospoas: 4/5 Iliospoas: 5/5   Hip extension:  4/5 Hip extension: 4/5   PGM: 4-/5 PGM: 4/5   Hip ER:  4-/5 Hip ER: 5/5   Hip IR: 3+/5 Hip IR: 5/5   Ankle dorsiflexion: 5/5 Ankle dorsiflexion: 5/5   Ankle plantarflexion: 5/5 Ankle plantarflexion: 5/5     Dermatomes (sensation): impaired on the R throughout S1 dermatome  Myotomes: normal    Special Tests:  -SLR Test: negative B  -Bridge Test: positive     Joint Mobility: unable to assess secondary to excess adipose tissue    Flexibility: decreased in bilateral hip flexors and hamstrings as assessed posturally and with lumbar AROM measurements (knee flexion noted with lumbar flexion, increased lumbar lordosis in standing)      PT Evaluation Completed? Yes  Discussed Plan of Care with patient: Yes    TREATMENT:  Paloma received therapeutic exercises to develop  strength and endurance, flexibility for 10 minutes including:   Levator scapula stretches, scapular rows with OTB, supine SLR     Instructed pt. Regarding: Proper technique with all exercises. Pt demo good understanding of the education provided. Paloma demonstrated good return demonstration of activities.      Assessment     This is a 51 y.o. female referred to outpatient physical therapy and presents with a medical diagnosis of B LBP with R sided sciatica, neck pain, and R shoulder sprain. Pt presents with decreased R shoulder AROM, decreased R shoulder strength, decreased lumbar ROM, decreased core/hip strength, decreased neck ROM, decreased periscapular strength, and decreased flexibility.  Pt will benefit from physcial therapy services, specifically R shoulder stretching, R shoulder strengthening, B LE stretching and core/hip strengthening, stretching of tight neck musculature and periscapular strengthening in order to maximize pain free functional independence. The following goals were discussed with the patient and patient is in agreement with them as to be addressed in the treatment plan. Pt was given a HEP consisting of exercises listed above. Pt verbally understood the instructions as they were given and demonstrated proper form and technique during therapy. Pt was advised to perform these exercises free of pain, and to stop performing them if pain occurs.       Patient History Examination Clinical Presentation Clinical Decision Making   Comorbidities:  Shoulder pain, weakness, back pain, right foot pain, morbid obesity, pmh of lumbar fusion    Personal Factors:  None       Activity and Participation Restriction:  General tasks and demands  Mobility    Body Systems:  Musculoskeletal  Neuromuscular    Body Regions:  Shoulder  Lumbar  Cervical Stable and uncomplicated     Low          Medical necessity is demonstrated by the following IMPAIRMENTS/PROMBLEM LIST:   1)Increase in pain level limiting  function   2)decreased lumbar ROM/R shoulder ROM   3)decreased core/hip strength and R shoulder strength   4)decreased flexibility   5)Lack of HEP    GOALS: Short Term Goals:  3 weeks  1.Report decreased overall pain  < / =  3-4 /10  to increase tolerance for ADLs.  2. Pt to increase B LE flexibility in order to improve posture and gait.   3. Increase lumbar ROM to min to no loss in order to improve functional mobility.    4. Increase R shoulder AROM to WNLs in order to improve functional mobility.    5. Increase strength by 1 MMT grade in B LE and R shoulder in order to improve tolerance for ADLs.  6. Pt to tolerate HEP to improve ROM and independence with ADL's      Long Term Goals: 6 weeks  1.Report decreased overall pain  <   / =  1-2  /10  to increase tolerance for ADLs.  2.Increase strength to >/= 1.5 MMT grade for B LE  to increase tolerance for ADL and work activities.  3. Increase strength to >/= 1.5 MMT grade for R shoulder  to increase tolerance for ADL and work activities.  4. Pt to demonstrate negative Bridge Test in order to show improved core strength for lumbar stabilization.   5. Pt to be independent with HEP.    Plan     Pt will be treated by physical therapy 1-2 times a week for Pt Education, HEP, therapeutic exercises, neuromuscular re-education, joint mobilizations, modalities prn to achieve established goals. Paloma may at times be seen by a PTA as part of the Rehab Team.     Cont PT for 6 weeks.     Olivia Pitts, PT, DPT  03/15/2017      I certify the need for these services furnished under this plan of treatment and while under my care.______________________________ Physician/Referring Practitioner  Date of Signature

## 2017-03-20 ENCOUNTER — CLINICAL SUPPORT (OUTPATIENT)
Dept: REHABILITATION | Facility: HOSPITAL | Age: 52
End: 2017-03-20
Attending: ORTHOPAEDIC SURGERY
Payer: COMMERCIAL

## 2017-03-20 DIAGNOSIS — G89.29 CHRONIC BILATERAL LOW BACK PAIN WITH LEFT-SIDED SCIATICA: ICD-10-CM

## 2017-03-20 DIAGNOSIS — S43.401A SPRAIN OF RIGHT SHOULDER, UNSPECIFIED SHOULDER SPRAIN TYPE, INITIAL ENCOUNTER: Primary | ICD-10-CM

## 2017-03-20 DIAGNOSIS — M54.2 NECK PAIN: ICD-10-CM

## 2017-03-20 DIAGNOSIS — M54.41 BILATERAL LOW BACK PAIN WITH RIGHT-SIDED SCIATICA, UNSPECIFIED CHRONICITY: ICD-10-CM

## 2017-03-20 DIAGNOSIS — M54.42 CHRONIC BILATERAL LOW BACK PAIN WITH LEFT-SIDED SCIATICA: ICD-10-CM

## 2017-03-20 PROCEDURE — 97110 THERAPEUTIC EXERCISES: CPT

## 2017-03-20 NOTE — PROGRESS NOTES
Physical Therapy Progress Note     Name: Paloma Bang  Clinic Number: [id number]  Diagnosis:   Encounter Diagnoses   Name Primary?    Sprain of right shoulder, unspecified shoulder sprain type, initial encounter Yes    Neck pain     Bilateral low back pain with right-sided sciatica, unspecified chronicity      Physician: Marin Presley MD  Treatment Orders: PT Eval and Treat  Past Medical History:   Diagnosis Date    Deep vein thrombosis     occurred after ankle fracture    GERD (gastroesophageal reflux disease)     Joint pain     Morbid obesity     Pulmonary embolism     occurred after ankle fracture       Precautions: standard;   Visit #: 2/12  Authorization Period Expiration:  12/31/2017  Referring Provider: Marin Presley MD  Subjective   Pt reports: Doing better today. Has mostly central low back pain, but is trying to prevent from sitting too long.  Pain Scale:  6/10    Objective     Patient received group therapy to increase strength with 1 other patients with activities as follows:     MH to lumbar spine x8 min at end of treatment    Paloma received therapeutic exercises to develop core stabilization for 50 minutes including:     Nustep x6 min resistance 4  LTR with physioball 2x15  DKTC with physioball 3x10  Bridging on physioball 3x10  SKTC stretch 2x30 sec BLE  HSS on stairs 2x30 sec BLE  Hip abduction machine 3x10 40#  Hip flexor stretch on stairs 3x30 sec BLE    Written Home Exercises Provided: on initial evaluation, includes levator scapula stretches, scapular rows with OTB, supine SLR  Pt demo good understanding of the education provided. Paloma demonstrated good return demonstration of activities.     Education provided re:  No spiritual or educational barriers to learning provided    Pt has no cultural, educational or language barriers to learning provided.  Assessment   This is a 51 y.o. female referred to outpatient  physical therapy and presents with a medical diagnosis of right shoulder sprain, low back pain, neck pain and demonstrates limitations as described in the problem list Pt prognosis is Fair. Pt will continue to benefit from skilled outpatient physical therapy to address the deficits listed below in the problem list, provide pt/family education and to maximize pt's level of independence in the home and community environment.     Pt tolerated treatment well and was able to complete all exercises without increases in pain. Would benefit from continued AROM exercise and core strengthening as tolerated to improve stability and mobility at the lumbar spine. Continue as tolerated.     Anticipated barriers to physical therapy: none    Pt's spiritual, cultural and educational needs considered and pt agreeable to plan of care and goals as stated below:     GOALS: Short Term Goals:  3 weeks  1.Report decreased overall pain  < / =  3-4 /10  to increase tolerance for ADLs.  2. Pt to increase B LE flexibility in order to improve posture and gait.   3. Increase lumbar ROM to min to no loss in order to improve functional mobility.    4. Increase R shoulder AROM to WNLs in order to improve functional mobility.    5. Increase strength by 1 MMT grade in B LE and R shoulder in order to improve tolerance for ADLs.  6. Pt to tolerate HEP to improve ROM and independence with ADL's      Long Term Goals: 6 weeks  1.Report decreased overall pain  <   / =  1-2  /10  to increase tolerance for ADLs.  2.Increase strength to >/= 1.5 MMT grade for B LE  to increase tolerance for ADL and work activities.  3. Increase strength to >/= 1.5 MMT grade for R shoulder  to increase tolerance for ADL and work activities.  4. Pt to demonstrate negative Bridge Test in order to show improved core strength for lumbar stabilization.   5. Pt to be independent with HEP.       Plan   Continue with established Plan of Care towards PT goals.   PT/PTA  face-to-face:discussed Pt's POC and progress towards established PT goals.    Olivia Pitts DPT  3/20/2017

## 2017-03-22 ENCOUNTER — CLINICAL SUPPORT (OUTPATIENT)
Dept: REHABILITATION | Facility: HOSPITAL | Age: 52
End: 2017-03-22
Attending: ORTHOPAEDIC SURGERY
Payer: COMMERCIAL

## 2017-03-22 DIAGNOSIS — S43.401A SPRAIN OF RIGHT SHOULDER, UNSPECIFIED SHOULDER SPRAIN TYPE, INITIAL ENCOUNTER: Primary | ICD-10-CM

## 2017-03-29 ENCOUNTER — CLINICAL SUPPORT (OUTPATIENT)
Dept: REHABILITATION | Facility: HOSPITAL | Age: 52
End: 2017-03-29
Attending: ORTHOPAEDIC SURGERY
Payer: COMMERCIAL

## 2017-03-29 DIAGNOSIS — S43.401A SPRAIN OF RIGHT SHOULDER, UNSPECIFIED SHOULDER SPRAIN TYPE, INITIAL ENCOUNTER: Primary | ICD-10-CM

## 2017-03-29 PROCEDURE — 97110 THERAPEUTIC EXERCISES: CPT

## 2017-03-29 NOTE — PROGRESS NOTES
Physical Therapy Progress Note     Name: Paloma Bang  Clinic Number: [id number]  Diagnosis:   Encounter Diagnosis   Name Primary?    Sprain of right shoulder, unspecified shoulder sprain type, initial encounter Yes     Physician: Marin Presley MD  Treatment Orders: PT Eval and Treat  Past Medical History:   Diagnosis Date    Deep vein thrombosis     occurred after ankle fracture    GERD (gastroesophageal reflux disease)     Joint pain     Morbid obesity     Pulmonary embolism     occurred after ankle fracture       Precautions: standard;   Visit #: 2/12  Authorization Period Expiration:  12/31/2017  Referring Provider: Marin Presley MD  Subjective   Pt reports: I am doing good , having a little pain in the lower back and down the R leg  Pain Scale:  3/10    Objective     Patient received group therapy to increase strength with 1 other patients with activities as follows:     MH to lumbar spine x8 min at end of treatment    Paloma received therapeutic exercises to develop core stabilization for 50 minutes including:     Nustep x6 min resistance 4  LTR with physioball 2x15  DKTC with physioball 3x10  Bridging on physioball 3x10  SKTC stretch 2x30 sec BLE  HSS on stairs 2x30 sec BLE  Hip abduction machine 3x10 40#  Hip Adduction machine 50# 3x10  Hip flexor stretch on stairs 3x30 sec BLE  Supine SLR's 2x10    Written Home Exercises Provided: on initial evaluation, includes levator scapula stretches, scapular rows with OTB, supine SLR  Pt demo good understanding of the education provided. Paloma demonstrated good return demonstration of activities.     Education provided re:  No spiritual or educational barriers to learning provided    Pt has no cultural, educational or language barriers to learning provided.  Assessment   This is a 51 y.o. female referred to outpatient physical therapy and presents with a medical diagnosis of right  shoulder sprain, low back pain, neck pain and demonstrates limitations as described in the problem list Pt prognosis is Fair. Pt will continue to benefit from skilled outpatient physical therapy to address the deficits listed below in the problem list, provide pt/family education and to maximize pt's level of independence in the home and community environment.     Pt tolerated treatment well and was able to complete all exercises without increases in pain.  Pt progressed in there-ex today.   Would benefit from continued AROM exercise and core strengthening as tolerated to improve stability and mobility at the lumbar spine. Continue as tolerated.     Anticipated barriers to physical therapy: none    Pt's spiritual, cultural and educational needs considered and pt agreeable to plan of care and goals as stated below:     GOALS: Short Term Goals:  3 weeks  1.Report decreased overall pain  < / =  3-4 /10  to increase tolerance for ADLs.  2. Pt to increase B LE flexibility in order to improve posture and gait.   3. Increase lumbar ROM to min to no loss in order to improve functional mobility.    4. Increase R shoulder AROM to WNLs in order to improve functional mobility.    5. Increase strength by 1 MMT grade in B LE and R shoulder in order to improve tolerance for ADLs.  6. Pt to tolerate HEP to improve ROM and independence with ADL's      Long Term Goals: 6 weeks  1.Report decreased overall pain  <   / =  1-2  /10  to increase tolerance for ADLs.  2.Increase strength to >/= 1.5 MMT grade for B LE  to increase tolerance for ADL and work activities.  3. Increase strength to >/= 1.5 MMT grade for R shoulder  to increase tolerance for ADL and work activities.  4. Pt to demonstrate negative Bridge Test in order to show improved core strength for lumbar stabilization.   5. Pt to be independent with HEP.       Plan   Continue with established Plan of Care towards PT goals.   PT/PTA face-to-face:discussed Pt's POC and progress  towards established PT goals.    Bridger Quiroga, PTA  3/29/2017

## 2017-04-03 ENCOUNTER — CLINICAL SUPPORT (OUTPATIENT)
Dept: REHABILITATION | Facility: HOSPITAL | Age: 52
End: 2017-04-03
Attending: ORTHOPAEDIC SURGERY
Payer: COMMERCIAL

## 2017-04-03 DIAGNOSIS — M54.41 BILATERAL LOW BACK PAIN WITH RIGHT-SIDED SCIATICA, UNSPECIFIED CHRONICITY: ICD-10-CM

## 2017-04-03 DIAGNOSIS — S43.401A SPRAIN OF RIGHT SHOULDER, UNSPECIFIED SHOULDER SPRAIN TYPE, INITIAL ENCOUNTER: Primary | ICD-10-CM

## 2017-04-03 DIAGNOSIS — M54.2 NECK PAIN: ICD-10-CM

## 2017-04-03 DIAGNOSIS — R29.898 BILATERAL LEG WEAKNESS: ICD-10-CM

## 2017-04-03 PROCEDURE — 97110 THERAPEUTIC EXERCISES: CPT

## 2017-04-03 NOTE — PROGRESS NOTES
Physical Therapy Progress Note     Name: Paloma Bang  Clinic Number: [id number]  Diagnosis:   Encounter Diagnoses   Name Primary?    Sprain of right shoulder, unspecified shoulder sprain type, initial encounter Yes    Neck pain     Bilateral low back pain with right-sided sciatica, unspecified chronicity      Physician: Marin Presley MD  Treatment Orders: PT Eval and Treat  Past Medical History:   Diagnosis Date    Deep vein thrombosis     occurred after ankle fracture    GERD (gastroesophageal reflux disease)     Joint pain     Morbid obesity     Pulmonary embolism     occurred after ankle fracture       Precautions: standard;   Visit #: 5/12  Authorization Period Expiration:  12/31/2017  Referring Provider: Marin Presley MD  Subjective   Pt reports: Pt reports that she is having some ankle pain on her right side. Hasn't had numbness lately, but more pressure on the outside of her foot an ankle.  Pain Scale:  3/10    Objective     Patient received group therapy to increase strength with 1 other patients with activities as follows:     Paloma received therapeutic exercises to develop core stabilization for 60 minutes including:     Nustep x6 min resistance 4  Seated dorsiflexion, inversion and eversion with YTB 2x10 all  LTR with physioball 2x15  DKTC with physioball 2x15  Bridging on physioball 3x10  SKTC stretch 2x30 sec BLE  Supine SLR's 2x10  HSS on stairs 2x30 sec BLE  Hip abduction machine 3x10 40#  Hip Adduction machine 50# 3x10  Hip flexor stretch on stairs 3x30 sec BLE      Written Home Exercises Provided: on initial evaluation, includes levator scapula stretches, scapular rows with OTB, supine SLR  Pt demo good understanding of the education provided. Paloma demonstrated good return demonstration of activities.     Education provided re:  No spiritual or educational barriers to learning provided    Pt has no cultural,  educational or language barriers to learning provided.  Assessment   This is a 51 y.o. female referred to outpatient physical therapy and presents with a medical diagnosis of right shoulder sprain, low back pain, neck pain and demonstrates limitations as described in the problem list Pt prognosis is Fair. Pt will continue to benefit from skilled outpatient physical therapy to address the deficits listed below in the problem list, provide pt/family education and to maximize pt's level of independence in the home and community environment.     Pt tolerated treatment well and was able to complete all exercises without increases in pain.  Pt progressed in there-ex today. Added ankle therex to increase stability and improve biomechanics of RLE.  Would benefit from continued AROM exercise and core strengthening as tolerated to improve stability and mobility at the lumbar spine. Continue as tolerated.     Anticipated barriers to physical therapy: none    Pt's spiritual, cultural and educational needs considered and pt agreeable to plan of care and goals as stated below:     GOALS: Short Term Goals:  3 weeks  1.Report decreased overall pain  < / =  3-4 /10  to increase tolerance for ADLs.  2. Pt to increase B LE flexibility in order to improve posture and gait.   3. Increase lumbar ROM to min to no loss in order to improve functional mobility.    4. Increase R shoulder AROM to WNLs in order to improve functional mobility.    5. Increase strength by 1 MMT grade in B LE and R shoulder in order to improve tolerance for ADLs.  6. Pt to tolerate HEP to improve ROM and independence with ADL's      Long Term Goals: 6 weeks  1.Report decreased overall pain  <   / =  1-2  /10  to increase tolerance for ADLs.  2.Increase strength to >/= 1.5 MMT grade for B LE  to increase tolerance for ADL and work activities.  3. Increase strength to >/= 1.5 MMT grade for R shoulder  to increase tolerance for ADL and work activities.  4. Pt to  demonstrate negative Bridge Test in order to show improved core strength for lumbar stabilization.   5. Pt to be independent with HEP.       Plan   Continue with established Plan of Care towards PT goals.   PT/PTA face-to-face:discussed Pt's POC and progress towards established PT goals.    Olivia Pitts DPT  4/3/2017

## 2017-04-11 ENCOUNTER — CLINICAL SUPPORT (OUTPATIENT)
Dept: REHABILITATION | Facility: HOSPITAL | Age: 52
End: 2017-04-11
Attending: ORTHOPAEDIC SURGERY
Payer: COMMERCIAL

## 2017-04-11 DIAGNOSIS — S43.401A SPRAIN OF RIGHT SHOULDER, UNSPECIFIED SHOULDER SPRAIN TYPE, INITIAL ENCOUNTER: Primary | ICD-10-CM

## 2017-04-11 PROCEDURE — 97110 THERAPEUTIC EXERCISES: CPT

## 2017-04-11 NOTE — PROGRESS NOTES
Physical Therapy Progress Note     Name: Paloma Bang  Clinic Number: [id number]  Diagnosis:   Encounter Diagnosis   Name Primary?    Sprain of right shoulder, unspecified shoulder sprain type, initial encounter Yes     Physician: Marin Presley MD  Treatment Orders: PT Eval and Treat  Past Medical History:   Diagnosis Date    Deep vein thrombosis     occurred after ankle fracture    GERD (gastroesophageal reflux disease)     Joint pain     Morbid obesity     Pulmonary embolism     occurred after ankle fracture       Precautions: standard;   Visit #: 5/12  Authorization Period Expiration:  12/31/2017  Referring Provider: Marin Presley MD  Subjective   Pt reports: Pt reports 2/10 LBP today  Pain Scale:  2/10    Objective          Paloma was instructed in and performed therapeutic exercises to develop core stabilization for 60 minutes including:     stat bike 10 min  Seated dorsiflexion, inversion and eversion with YTB 3x10 all  LTR with physioball 2x15  DKTC with physioball 2x15  Bridging on physioball 3x10  SKTC stretch 3x30 sec BLE  Supine SLR's 3x10  HSS on stairs 3x30 sec BLE  Hip abduction machine 3x10 40#  Hip Adduction machine 50# 3x10  Hip flexor stretch on stairs 3x30 sec BLE      Written Home Exercises Provided: on initial evaluation, includes levator scapula stretches, scapular rows with OTB, supine SLR  Pt demo good understanding of the education provided. Paloma demonstrated good return demonstration of activities.     Education provided re:  No spiritual or educational barriers to learning provided    Pt has no cultural, educational or language barriers to learning provided.  Assessment   Pt gab tx with ther ex well with no c/o pn sx throughout tx. This is a 51 y.o. female referred to outpatient physical therapy and presents with a medical diagnosis of right shoulder sprain, low back pain, neck pain and demonstrates  limitations as described in the problem list Pt prognosis is Fair. Pt will continue to benefit from skilled outpatient physical therapy to address the deficits listed below in the problem list, provide pt/family education and to maximize pt's level of independence in the home and community environment.     Pt tolerated treatment well and was able to complete all exercises without increases in pain.  Pt progressed in there-ex today. Added ankle therex to increase stability and improve biomechanics of RLE.  Would benefit from continued AROM exercise and core strengthening as tolerated to improve stability and mobility at the lumbar spine. Continue as tolerated.     Anticipated barriers to physical therapy: none    Pt's spiritual, cultural and educational needs considered and pt agreeable to plan of care and goals as stated below:     GOALS: Short Term Goals:  3 weeks  1.Report decreased overall pain  < / =  3-4 /10  to increase tolerance for ADLs.  2. Pt to increase B LE flexibility in order to improve posture and gait.   3. Increase lumbar ROM to min to no loss in order to improve functional mobility.    4. Increase R shoulder AROM to WNLs in order to improve functional mobility.    5. Increase strength by 1 MMT grade in B LE and R shoulder in order to improve tolerance for ADLs.  6. Pt to tolerate HEP to improve ROM and independence with ADL's      Long Term Goals: 6 weeks  1.Report decreased overall pain  <   / =  1-2  /10  to increase tolerance for ADLs.  2.Increase strength to >/= 1.5 MMT grade for B LE  to increase tolerance for ADL and work activities.  3. Increase strength to >/= 1.5 MMT grade for R shoulder  to increase tolerance for ADL and work activities.  4. Pt to demonstrate negative Bridge Test in order to show improved core strength for lumbar stabilization.   5. Pt to be independent with HEP.       Plan   Continue with established Plan of Care towards PT goals.   PT/PTA face-to-face:discussed Pt's POC  and progress towards established PT goals.    Olivia Pitts, DPT  4/3/2017

## 2017-04-23 ENCOUNTER — NURSE TRIAGE (OUTPATIENT)
Dept: ADMINISTRATIVE | Facility: CLINIC | Age: 52
End: 2017-04-23

## 2017-04-23 NOTE — TELEPHONE ENCOUNTER
Reason for Disposition   [1] MILD-MODERATE headache AND [2] present > 72 hours   [1] BP  >= 140/90 AND [2] not taking BP medications    Protocols used: ST HEADACHE-A-,  HIGH BLOOD PRESSURE-A-    Pt calling with concerns of headaches for 3 days and obtained a blood pressure reading of 157/99.  Pt rates pain 7/10 on pain scale.  Pt states she has not taken a pain reliever for headache.  Advised Tylenol or Ibuprofen.  Offered UC appointment today, pt declined.  Care Advice given.

## 2017-04-24 ENCOUNTER — CLINICAL SUPPORT (OUTPATIENT)
Dept: REHABILITATION | Facility: HOSPITAL | Age: 52
End: 2017-04-24
Attending: ORTHOPAEDIC SURGERY
Payer: COMMERCIAL

## 2017-04-24 ENCOUNTER — TELEPHONE (OUTPATIENT)
Dept: INTERNAL MEDICINE | Facility: CLINIC | Age: 52
End: 2017-04-24

## 2017-04-24 PROCEDURE — 97530 THERAPEUTIC ACTIVITIES: CPT

## 2017-04-24 PROCEDURE — 97110 THERAPEUTIC EXERCISES: CPT

## 2017-04-24 NOTE — PROGRESS NOTES
Physical Therapy Progress Note     Name: Paloma Bang  Clinic Number: [id number]  Diagnosis:   No diagnosis found.  Physician: Marin Presley MD  Treatment Orders: PT Eval and Treat  Past Medical History:   Diagnosis Date    Deep vein thrombosis     occurred after ankle fracture    GERD (gastroesophageal reflux disease)     Joint pain     Morbid obesity     Pulmonary embolism     occurred after ankle fracture       Precautions: standard;   Visit #: 6/12  Authorization Period Expiration:  12/31/2017  Referring Provider: Marin Presley MD  Subjective   Pt reports: Pt continues to have pressure on the outside of her L ankle. Reports tightness in the ankle. Back isn't doing too bad, but reports some upper trap strain today. Reports aching in both sides of her vertebrae in her back. Has more upper trap pain than low back pain, though. Reports having trouble with high blood pressure which is new for her. Was instructed to see her MD within 24 hours of calling [facility] yesterday, but was unable. During completion of upper trap stretches, pt reported feeling dizzy and that her head was starting to pound.  Pain Scale:  2/10    Objective     Patient received individual therapy to increase strength with 0 other patients with activities as follows:     Paloma received therapeutic exercises to develop core stabilization for 15 minutes including:     UBE x6 min in reverse  Seated upper trap stretch 3x30 sec    Pt reported headache at this time, requested BP to be taken as she has not had a chance to check today.     BP taken twice in in clinic at pt request (L brachial): 183/127, 181/137. Taken on the RUE and was 189/152.  Norm is 106/70 per pt. Due to APTA contraindications to participation in exercise/physical therapy including resting DBP >110mmHg, today's visit was stopped at this point. Pt called [facility] on-call at this point and talked to a nurse who  suggested taking BP on the RUE which was still high. Pt called her daughter to drive her to the ER at her nurse's request. Pt stayed in-clinic under PT supervision until her daughter arrived. Continued to have pounding in her head on departure, but reported that it had not gotten better or worse than when it started.    Written Home Exercises Provided: on initial evaluation, includes levator scapula stretches, scapular rows with OTB, supine SLR  Pt demo good understanding of the education provided. Obiera demonstrated good return demonstration of activities.     Education provided re:  No spiritual or educational barriers to learning provided    Pt has no cultural, educational or language barriers to learning provided.  Assessment   This is a 51 y.o. female referred to outpatient physical therapy and presents with a medical diagnosis of right shoulder sprain, low back pain, neck pain and demonstrates limitations as described in the problem list Pt prognosis is Fair. Pt will continue to benefit from skilled outpatient physical therapy to address the deficits listed below in the problem list, provide pt/family education and to maximize pt's level of independence in the home and community environment.     Pt tolerated treatment poorly today due to problems with hypertension and headaches. Pt's diastolic blood pressure was deemed too high to exercise today and treatment will be deferred when this occurs in the future. Instructed pt to see her MD to regulate her new symptoms of HTN. Continue as tolerated at next visit.     Anticipated barriers to physical therapy: none    Pt's spiritual, cultural and educational needs considered and pt agreeable to plan of care and goals as stated below:     GOALS: Short Term Goals:  3 weeks  1.Report decreased overall pain  < / =  3-4 /10  to increase tolerance for ADLs.  2. Pt to increase B LE flexibility in order to improve posture and gait.   3. Increase lumbar ROM to min to no  loss in order to improve functional mobility.    4. Increase R shoulder AROM to WNLs in order to improve functional mobility.    5. Increase strength by 1 MMT grade in B LE and R shoulder in order to improve tolerance for ADLs.  6. Pt to tolerate HEP to improve ROM and independence with ADL's      Long Term Goals: 6 weeks  1.Report decreased overall pain  <   / =  1-2  /10  to increase tolerance for ADLs.  2.Increase strength to >/= 1.5 MMT grade for B LE  to increase tolerance for ADL and work activities.  3. Increase strength to >/= 1.5 MMT grade for R shoulder  to increase tolerance for ADL and work activities.  4. Pt to demonstrate negative Bridge Test in order to show improved core strength for lumbar stabilization.   5. Pt to be independent with HEP.       Plan   Continue with established Plan of Care towards PT goals.   PT/PTA face-to-face:discussed Pt's POC and progress towards established PT goals.    Olivia Pitts DPT  4/24/2017

## 2017-04-24 NOTE — TELEPHONE ENCOUNTER
I spoke to the patient.  Her BP is elevated 181/136.  Has not started any new medications today. I asked her to recheck it while I had her on the line.  183/127 1st reading, 181/136. Patient in pain 9/10. Pain started with physical therapy . This weekend 157/99. Took a Goody Headache powder. Is having pain in her head. RIght are 189/152. Patient never informed she was supposed to start the Losartan-hydrochlorothiazide according to her.  She is having a headache 9/10 and eye pain as well. Advised to go to ED and book a follow up visit with PCP after.

## 2017-04-25 ENCOUNTER — PATIENT MESSAGE (OUTPATIENT)
Dept: INTERNAL MEDICINE | Facility: CLINIC | Age: 52
End: 2017-04-25

## 2017-04-26 ENCOUNTER — OFFICE VISIT (OUTPATIENT)
Dept: INTERNAL MEDICINE | Facility: CLINIC | Age: 52
End: 2017-04-26
Payer: COMMERCIAL

## 2017-04-26 VITALS
TEMPERATURE: 99 F | HEART RATE: 88 BPM | SYSTOLIC BLOOD PRESSURE: 158 MMHG | RESPIRATION RATE: 16 BRPM | WEIGHT: 270.75 LBS | HEIGHT: 63 IN | BODY MASS INDEX: 47.97 KG/M2 | DIASTOLIC BLOOD PRESSURE: 90 MMHG

## 2017-04-26 DIAGNOSIS — R94.31 ABNORMAL EKG: ICD-10-CM

## 2017-04-26 DIAGNOSIS — R07.9 CHEST PAIN, UNSPECIFIED TYPE: ICD-10-CM

## 2017-04-26 DIAGNOSIS — R35.0 URINE FREQUENCY: ICD-10-CM

## 2017-04-26 DIAGNOSIS — I10 ESSENTIAL HYPERTENSION: Primary | ICD-10-CM

## 2017-04-26 LAB
BILIRUB UR QL STRIP: NEGATIVE
CLARITY UR REFRACT.AUTO: CLEAR
COLOR UR AUTO: YELLOW
GLUCOSE UR QL STRIP: NEGATIVE
HGB UR QL STRIP: ABNORMAL
KETONES UR QL STRIP: NEGATIVE
LEUKOCYTE ESTERASE UR QL STRIP: NEGATIVE
MICROSCOPIC COMMENT: NORMAL
NITRITE UR QL STRIP: NEGATIVE
PH UR STRIP: 7 [PH] (ref 5–8)
PROT UR QL STRIP: NEGATIVE
RBC #/AREA URNS AUTO: 2 /HPF (ref 0–4)
SP GR UR STRIP: 1.02 (ref 1–1.03)
SQUAMOUS #/AREA URNS AUTO: 1 /HPF
URN SPEC COLLECT METH UR: ABNORMAL
UROBILINOGEN UR STRIP-ACNC: NEGATIVE EU/DL
WBC #/AREA URNS AUTO: 0 /HPF (ref 0–5)

## 2017-04-26 PROCEDURE — 1160F RVW MEDS BY RX/DR IN RCRD: CPT | Mod: S$GLB,,, | Performed by: INTERNAL MEDICINE

## 2017-04-26 PROCEDURE — 87086 URINE CULTURE/COLONY COUNT: CPT

## 2017-04-26 PROCEDURE — 93010 ELECTROCARDIOGRAM REPORT: CPT | Mod: S$GLB,,, | Performed by: INTERNAL MEDICINE

## 2017-04-26 PROCEDURE — 99214 OFFICE O/P EST MOD 30 MIN: CPT | Mod: S$GLB,,, | Performed by: INTERNAL MEDICINE

## 2017-04-26 PROCEDURE — 99999 PR PBB SHADOW E&M-EST. PATIENT-LVL III: CPT | Mod: PBBFAC,,, | Performed by: INTERNAL MEDICINE

## 2017-04-26 PROCEDURE — 93005 ELECTROCARDIOGRAM TRACING: CPT | Mod: S$GLB,,, | Performed by: INTERNAL MEDICINE

## 2017-04-26 PROCEDURE — 81001 URINALYSIS AUTO W/SCOPE: CPT

## 2017-04-26 PROCEDURE — 3077F SYST BP >= 140 MM HG: CPT | Mod: S$GLB,,, | Performed by: INTERNAL MEDICINE

## 2017-04-26 PROCEDURE — 3080F DIAST BP >= 90 MM HG: CPT | Mod: S$GLB,,, | Performed by: INTERNAL MEDICINE

## 2017-04-26 RX ORDER — FLUTICASONE PROPIONATE 50 MCG
SPRAY, SUSPENSION (ML) NASAL
COMMUNITY
Start: 2017-04-21 | End: 2020-10-30

## 2017-04-26 RX ORDER — AMLODIPINE BESYLATE 5 MG/1
TABLET ORAL DAILY
Status: ON HOLD | COMMUNITY
Start: 2017-04-24 | End: 2017-11-07

## 2017-04-26 RX ORDER — TRAMADOL HYDROCHLORIDE 50 MG/1
TABLET ORAL
COMMUNITY
Start: 2017-04-21 | End: 2017-07-26

## 2017-04-26 RX ORDER — LOSARTAN POTASSIUM AND HYDROCHLOROTHIAZIDE 12.5; 5 MG/1; MG/1
1 TABLET ORAL DAILY
Qty: 30 TABLET | Refills: 6 | Status: SHIPPED | OUTPATIENT
Start: 2017-04-26 | End: 2017-07-10

## 2017-04-26 RX ORDER — PANTOPRAZOLE SODIUM 40 MG/1
TABLET, DELAYED RELEASE ORAL
COMMUNITY
Start: 2017-04-24 | End: 2017-07-26

## 2017-04-26 NOTE — PROGRESS NOTES
Subjective:       Patient ID: Paloma Bang is a 51 y.o. female.    Chief Complaint: Hypertension and Urinary Frequency    Patient is a 51 y.o.female who presents today for ER follow up. Her blood pressure had been running high at home and she had a headache. She checked her bp at home and it was 155/99. The nurse triage advised her to be seen in the ER and take advil or tylenol. In the  ER, she was given a medication to lower her bp. She was started on amlodipine 5mg in the evening from the emergency room.  She does complain of a headache this morning; right sided in nature. Sometimes blurry vision. Sometimes chest pain. Usually it is sharp, lasts 30-60 seconds intermittently. Usually at rest. Today, she also complains of urinary frequency. No dysuria.    Review of Systems   Constitutional: Negative for appetite change, chills, diaphoresis, fatigue and fever.   HENT: Negative for congestion, dental problem, ear discharge, ear pain, hearing loss, postnasal drip, sinus pressure and sore throat.    Eyes: Negative for discharge, redness and itching.   Respiratory: Negative for cough, chest tightness, shortness of breath and wheezing.    Cardiovascular: Negative for chest pain, palpitations and leg swelling.   Gastrointestinal: Negative for abdominal pain, constipation, diarrhea, nausea and vomiting.   Endocrine: Negative for cold intolerance and heat intolerance.   Genitourinary: Positive for frequency. Negative for difficulty urinating, hematuria and urgency.   Musculoskeletal: Negative for arthralgias, back pain, gait problem, myalgias and neck pain.   Skin: Negative for color change and rash.   Neurological: Positive for headaches. Negative for dizziness and syncope.   Hematological: Negative for adenopathy.   Psychiatric/Behavioral: Negative for behavioral problems and sleep disturbance. The patient is not nervous/anxious.        Objective:      Physical Exam   Constitutional: She is oriented to person,  place, and time. She appears well-developed and well-nourished. No distress.   HENT:   Head: Normocephalic and atraumatic.   Right Ear: External ear normal.   Left Ear: External ear normal.   Eyes: Conjunctivae and EOM are normal. Pupils are equal, round, and reactive to light. Right eye exhibits no discharge. Left eye exhibits no discharge. No scleral icterus.   Neck: Normal range of motion. Neck supple. No JVD present. No thyromegaly present.   Cardiovascular: Normal rate, regular rhythm, normal heart sounds and intact distal pulses.  Exam reveals no gallop and no friction rub.    No murmur heard.  Pulmonary/Chest: Effort normal and breath sounds normal. No stridor. No respiratory distress. She has no wheezes. She has no rales. She exhibits no tenderness.   Abdominal: Soft. Bowel sounds are normal. She exhibits no distension. There is no tenderness. There is no rebound.   Musculoskeletal: Normal range of motion. She exhibits no edema or tenderness.   Lymphadenopathy:     She has no cervical adenopathy.   Neurological: She is alert and oriented to person, place, and time.   Skin: Skin is warm. No rash noted. She is not diaphoretic. No erythema.   Psychiatric: She has a normal mood and affect. Her behavior is normal.   Nursing note and vitals reviewed.      Assessment and Plan:       1. Essential hypertension  - advised pt to start a low salt diet  - monitor bp at home bid  - continue amlodipine 5 mg at night  - start losartan/ hctz 50/12.5 mg in the AM  - rtc in one week with pcp to monitor bp  - advised pt to book a f/u with her pcp for her labs to be done; last done in 2015    2. Chest pain, unspecified type  - EKG 12-lead  - cardiac treadmill stress test ordered since ekg with st changes    3. Urine frequency  - Urinalysis  - Urine culture        No Follow-up on file.

## 2017-04-27 ENCOUNTER — TELEPHONE (OUTPATIENT)
Dept: INTERNAL MEDICINE | Facility: CLINIC | Age: 52
End: 2017-04-27

## 2017-04-27 DIAGNOSIS — Z00.00 ROUTINE GENERAL MEDICAL EXAMINATION AT A HEALTH CARE FACILITY: Primary | ICD-10-CM

## 2017-04-27 LAB — BACTERIA UR CULT: NORMAL

## 2017-04-27 NOTE — TELEPHONE ENCOUNTER
----- Message from Morena Peterson sent at 4/26/2017  8:37 AM CDT -----  Contact: [phone]  Hello, pt is requesting a urgent appointment for a B/P f/u. She is requesting a call to discuss.    Doctor appointment and lab have been scheduled.  Please link lab orders to the lab appointment.  Date of doctor appointment:  8/18/17  Physical or EP:  Physical  Date of lab appointment:  8/11/17  Comments:

## 2017-04-28 ENCOUNTER — TELEPHONE (OUTPATIENT)
Dept: INTERNAL MEDICINE | Facility: CLINIC | Age: 52
End: 2017-04-28

## 2017-04-28 NOTE — TELEPHONE ENCOUNTER
BP check has been schedule and also the Stress test referral has been sent to the referral coordinator to scheduled

## 2017-04-28 NOTE — TELEPHONE ENCOUNTER
----- Message from Kami Peck sent at 4/27/2017 12:35 PM CDT -----  Contact: pt [phone]  Patient is returning a phone call.  Who left a message for the patient: Hui  Does patient know what this is regarding:  A message was left  Comments:

## 2017-05-02 ENCOUNTER — CLINICAL SUPPORT (OUTPATIENT)
Dept: INTERNAL MEDICINE | Facility: CLINIC | Age: 52
End: 2017-05-02
Payer: COMMERCIAL

## 2017-05-02 NOTE — PROGRESS NOTES
LOV-4/26/2017  158/90  Patient came in the office for a BP check today and the BP was 126/90 pulse 77  Patient states she has a stress test scheduled and she has been taking her BP meds as indicated Patient states she will call for a f/u  D/C patient

## 2017-05-04 ENCOUNTER — CLINICAL SUPPORT (OUTPATIENT)
Dept: CARDIOLOGY | Facility: CLINIC | Age: 52
End: 2017-05-04
Payer: COMMERCIAL

## 2017-05-04 DIAGNOSIS — R94.31 ABNORMAL EKG: ICD-10-CM

## 2017-05-04 LAB — DIASTOLIC DYSFUNCTION: NO

## 2017-05-04 PROCEDURE — 93015 CV STRESS TEST SUPVJ I&R: CPT | Mod: S$GLB,,, | Performed by: INTERNAL MEDICINE

## 2017-06-12 RX ORDER — METHOCARBAMOL 750 MG/1
750 TABLET, FILM COATED ORAL 3 TIMES DAILY
Qty: 60 TABLET | Refills: 0 | Status: SHIPPED | OUTPATIENT
Start: 2017-06-12 | End: 2017-07-03 | Stop reason: SDUPTHER

## 2017-06-12 NOTE — PROGRESS NOTES
Name: Chentenancy Bang  Referring Provider: Fernandez  PT Order: PT evaluate and treat  Clinical #: [id number]  Discharge Summary Date: 6/12/2017  Diagnosis: neck pain, low back pain without sciatica, right shoulder pain    Patient was seen for 7 OP PT visits from 3/15/2017 to 4/24/2017. Pt missed/no visit 4 scheduled sessions. Treatment included: evaluation, HEP, pt education, aquatic therapy, joint mobilizations, ther ex, and stretching. PT unable to fully assess goal achievement as pt did not return for follow up sessions/did not reschedule follow up visits. This patient is discharged from OP PT Services.    Olivia Pitts DPT  6/12/2017

## 2017-07-05 RX ORDER — METHOCARBAMOL 750 MG/1
750 TABLET, FILM COATED ORAL 3 TIMES DAILY
Qty: 60 TABLET | Refills: 0 | Status: SHIPPED | OUTPATIENT
Start: 2017-07-05 | End: 2017-07-26

## 2017-07-10 RX ORDER — LOSARTAN POTASSIUM AND HYDROCHLOROTHIAZIDE 12.5; 5 MG/1; MG/1
1 TABLET ORAL DAILY
Qty: 30 TABLET | Refills: 5 | Status: SHIPPED | OUTPATIENT
Start: 2017-07-10 | End: 2017-08-18

## 2017-07-26 ENCOUNTER — OFFICE VISIT (OUTPATIENT)
Dept: INTERNAL MEDICINE | Facility: CLINIC | Age: 52
End: 2017-07-26
Payer: COMMERCIAL

## 2017-07-26 VITALS
DIASTOLIC BLOOD PRESSURE: 86 MMHG | HEART RATE: 79 BPM | TEMPERATURE: 99 F | HEIGHT: 63 IN | BODY MASS INDEX: 48.64 KG/M2 | SYSTOLIC BLOOD PRESSURE: 124 MMHG | WEIGHT: 274.5 LBS

## 2017-07-26 DIAGNOSIS — M47.816 OSTEOARTHRITIS OF LUMBAR SPINE, UNSPECIFIED SPINAL OSTEOARTHRITIS COMPLICATION STATUS: Primary | ICD-10-CM

## 2017-07-26 DIAGNOSIS — E66.01 MORBID OBESITY WITH BODY MASS INDEX OF 45.0-49.9 IN ADULT: ICD-10-CM

## 2017-07-26 PROCEDURE — 99214 OFFICE O/P EST MOD 30 MIN: CPT | Mod: S$GLB,,, | Performed by: INTERNAL MEDICINE

## 2017-07-26 PROCEDURE — 99999 PR PBB SHADOW E&M-EST. PATIENT-LVL III: CPT | Mod: PBBFAC,,, | Performed by: INTERNAL MEDICINE

## 2017-07-26 RX ORDER — TRAMADOL HYDROCHLORIDE 50 MG/1
50 TABLET ORAL EVERY 6 HOURS PRN
Qty: 60 TABLET | Refills: 3 | Status: SHIPPED | OUTPATIENT
Start: 2017-07-26 | End: 2017-08-05

## 2017-07-26 RX ORDER — METHOCARBAMOL 750 MG/1
750 TABLET, FILM COATED ORAL 3 TIMES DAILY PRN
Qty: 80 TABLET | Refills: 3 | Status: SHIPPED | OUTPATIENT
Start: 2017-07-26 | End: 2017-08-05

## 2017-07-26 NOTE — PROGRESS NOTES
Subjective:       Patient ID: Paloma Bang is a 51 y.o. female.    Chief Complaint: Back Pain (10 per pain scale)    HPI   Pt with severe arthritis of low back, Morbid obesity is here c/o worsening LBP over the past few months. It is described as a dull ache without radiation which is worse in the morning.  Some relief with Tramadol   Review of Systems   Constitutional: Negative for activity change, appetite change, chills, diaphoresis, fatigue, fever and unexpected weight change.   HENT: Negative for postnasal drip, rhinorrhea, sinus pressure, sneezing, sore throat, trouble swallowing and voice change.    Respiratory: Negative for cough, shortness of breath and wheezing.    Cardiovascular: Negative for chest pain, palpitations and leg swelling.   Gastrointestinal: Negative for abdominal pain, blood in stool, constipation, diarrhea, nausea and vomiting.   Genitourinary: Negative for dysuria.   Musculoskeletal: Positive for arthralgias, back pain and myalgias.   Skin: Negative for rash and wound.   Allergic/Immunologic: Negative for environmental allergies and food allergies.   Hematological: Negative for adenopathy. Does not bruise/bleed easily.       Objective:      Physical Exam   Constitutional: She is oriented to person, place, and time. She appears well-developed and well-nourished. No distress.   HENT:   Head: Normocephalic and atraumatic.   Eyes: Conjunctivae and EOM are normal. Pupils are equal, round, and reactive to light. Right eye exhibits no discharge. Left eye exhibits no discharge. No scleral icterus.   Neck: Neck supple. No JVD present.   Cardiovascular: Normal rate, regular rhythm, normal heart sounds and intact distal pulses.    Pulmonary/Chest: Effort normal and breath sounds normal. No respiratory distress. She has no wheezes. She has no rales.   Musculoskeletal: She exhibits no edema.        Lumbar back: She exhibits tenderness, pain and spasm. She exhibits normal pulse.    Lymphadenopathy:     She has no cervical adenopathy.   Neurological: She is alert and oriented to person, place, and time.   Skin: Skin is warm and dry. No rash noted. She is not diaphoretic. No pallor.       Assessment:       1. Osteoarthritis of lumbar spine, unspecified spinal osteoarthritis complication status    2. Morbid obesity with body mass index of 45.0-49.9 in adult        Plan:    1. Rx Robaxin 750 mg TID PRN and Tramadol 50 mg q6 PRN severe pain        Referral to PT   2. Pt advised on proper diet/exercise for weight loss

## 2017-08-07 ENCOUNTER — PATIENT MESSAGE (OUTPATIENT)
Dept: DERMATOLOGY | Facility: CLINIC | Age: 52
End: 2017-08-07

## 2017-08-07 ENCOUNTER — CLINICAL SUPPORT (OUTPATIENT)
Dept: REHABILITATION | Facility: HOSPITAL | Age: 52
End: 2017-08-07
Attending: INTERNAL MEDICINE
Payer: COMMERCIAL

## 2017-08-07 DIAGNOSIS — M54.41 BILATERAL LOW BACK PAIN WITH RIGHT-SIDED SCIATICA, UNSPECIFIED CHRONICITY: Primary | ICD-10-CM

## 2017-08-07 PROCEDURE — 97161 PT EVAL LOW COMPLEX 20 MIN: CPT

## 2017-08-07 PROCEDURE — 97110 THERAPEUTIC EXERCISES: CPT

## 2017-08-07 NOTE — PROGRESS NOTES
Physical Therapy Evaluation    Name: Paloma Bang  Clinic Number: [id number]      Diagnosis:   Encounter Diagnosis   Name Primary?    Bilateral low back pain with right-sided sciatica, unspecified chronicity Yes     Physician: Anshu Cheung, *  Treatment Orders: PT Eval and Treat    Past Medical History:   Diagnosis Date    Deep vein thrombosis     occurred after ankle fracture    GERD (gastroesophageal reflux disease)     Joint pain     Morbid obesity     Pulmonary embolism     occurred after ankle fracture     Current Outpatient Prescriptions   Medication Sig    amlodipine (NORVASC) 5 MG tablet     ERGOCALCIFEROL, VITAMIN D2, (VITAMIN D ORAL) Take 1,000 mg by mouth once daily.    fluticasone (FLONASE) 50 mcg/actuation nasal spray     losartan-hydrochlorothiazide 50-12.5 mg (HYZAAR) 50-12.5 mg per tablet TAKE 1 TABLET BY MOUTH ONCE DAILY.    multivitamin with minerals tablet Take 1 tablet by mouth once daily.     No current facility-administered medications for this visit.      Review of patient's allergies indicates:  No Known Allergies  Precautions: none    Evaluation Date: 08/07/2017  Visit # authorized: 1/20  Authorization expiration: 12/31/2017  Plan of Care Expiration: 9/7/2017    Subjective   Onset/LINDSEY: gradual    Primary concern/ Chief complaints:    Paloma is a 51 y.o. female with a PMH of lumbar surgery (repaired and fused L4-5, shaved off bulging disc in 2009) and morbid obesity that presents to [facility] outpatient physical therapy secondary to back pain that has worsened over time. Pt reports being in constant pain across her low back to the point that it is debilitating. Pt has problems in the morninig, even with simple things like rolling over in bed and walking. Her pain is worst in the morning. Reports that her pain is like a severe ache. She saw her MD for orthotics and it was never set up.  Injury/surgery occurred on years ago.  X-ray was taken and revealed lumbar  arthritis. Previous treatment included PT which helped. Pt reports that rolling over in the morning, sitting down, getting up from sitting down, and long distance walking increases low back pain and reports low back pain at worst is a 10+ on the VAS. Pt uses muscle relaxers, tramadol PRN at night to control low back pain symptoms. Pt has a decreased ability to perform ADLs such as sitting, standing, walking. 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 Pt reports intermittent numbness and tingling in both hamstring distributions to the knee. Reports that it can be hard to determine what is new and what is not due to past nerve damage. No cultural, environmental, or spiritual barriers identified to treatment or learning.      Pain Scale: Paloma rates pain on a scale of 0-10 to be 10 at worst; 2 currently; 2 at best .    Patient Goals: Pt would like to decrease pain and increase function so she can return to pain-free completion of all normal daily activities.        Objective     Observation: ambulates independently, slowed gait    Posture:  Increased lordosis    Lumbar Range of Motion:    Degrees Pain   Flexion 55 degrees  (60 is norm) Pain running to B thighs in the back        Extension 15 degrees  (25 is norm) Pain at end range, achey        Left Side Bending 20 degrees  (25 is norm) Pulling in R QL        Right Side Bending 15 degrees  (25 is norm) Pulling in L QL, more painful than L side bending        Left Rotation   30% Achey pain at end range        Right Rotation   30% Achey pain at end range             Lower Extremity Strength  Right LE  Left LE    Knee extension: 4+/5 Knee extension: 4+/5   Knee flexion: 4/5 Knee flexion:  Twinge in R low back 4/5   Hip flexion: 4/5 Hip flexion: 4/5   Hip extension:  4-/5 Hip extension: 4-/5   Hip abduction: 4-/5 Hip abduction: 4-/5   Hip adduction: 4+/5 Hip adduction  4+/5   Ankle dorsiflexion: 5/5 Ankle dorsiflexion: 5/5   Ankle plantarflexion: 5/5 Ankle plantarflexion: 5/5       Special Tests:  -SLR Test: + BLE  -Bridge Test: +    Joint Mobility: WNL throughout Lx with exception of L4-5 to PAs secondary to Lx fusion    Palpation: ttp to bilateral lumbar paraspinals    Sensation: mildly decreased R sural distribution    Flexibility: WNL    Outcome measures:    FOTO back: patient score = 55% impairment    G-code mobility current:  CK (indicating 40-60% impaired)    G-code mobility goal:  CK (indicating 40-60% impaired)    PT reviewed FOTO scores for Paloma Bang on 08/07/2017.   FOTO scores were entered into [employer] - see media section.    PT Evaluation Completed? Yes  Discussed Plan of Care with patient: Yes    TREATMENT:  Paloma received therapeutic exercises to develop strength and endurance, flexibility for 15 minutes including:  LTR 2x15     Instructed pt. Regarding: Proper technique with all exercises. Pt demo good understanding of the education provided. Paloma demonstrated good return demonstration of activities.    Assessment     This is a 51 y.o. female referred to outpatient physical therapy and presents with a medical diagnosis of low back pain and demonstrates limitations as described in the problem list. Pt will benefit from physical therapy services in order to maximize pain free functional independence. The following goals were discussed with the patient and patient is in agreement with them as to be addressed in the treatment plan. Pt was given a HEP consisting of lower trunk rotations. Pt verbally understood the instructions as they were given and demonstrated proper form and technique during therapy. Pt was advised to perform these exercises free of pain, and to stop performing them if pain occurs.     Pt presents with limitations in lumbar AROM in all planes of motion and presented with weak core musculature indicating decreased support  of the lumbar spine with completion of ADLs. In order to decrease stresses on the lumbar spine with completion of functional movements, Paloma Bang  would greatly benefit from stretching of bilateral hamstrings and hip flexors as well as strengthening of core and hip musculature in order to increase dynamic stability at the lumbar spine and improve biomechanics throughout the lumbar spine.    As pt's tolerance for land therapy is significantly decreased based on lack of tolerance for positional changes, pt would benefit from a trial of aquatic therapy with a goal of progression to land therapy as soon as possible in order to continue progression of strengthening and facilitate return to pain-free completion of ADLs.      Patient History Examination Clinical Presentation Clinical Decision Making   Comorbidities:  Morbid obesity    Personal Factors:  none       Activity and Participation Restriction:  Mobility  General tasks and demands    Body Systems:  Musculoskeletal      Body Regions:  Lumbar Stable and uncomplicated     Low              Medical necessity is demonstrated by the following IMPAIRMENTS/PROBLEM LIST:   1)Increase in pain level limiting function   2)Decreased range of motion limiting function   3)Decreased strength limiting function   4)Impaired posture   5)Lack of HEP    GOALS: Short Term Goals: 3 weeks  1. Report decreased low back pain  <   / =  2  /10  to increase tolerance for completion of ADLs  2. Pt to increase B popliteal angle by > or = 10 degrees in order to improve flexibility and posture.   3. Increased MMT  for  hip extension to 4+/5 bilaterally to increase tolerance for ADL and work activities.  4. Pt to achieve 60 degrees of active lumbar flexion in order to demonstrate improvements in mobility.   5. Pt to tolerate HEP to improve ROM and independence with ADL's    Long Term Goals: 6 weeks  1. Report decreased low back pain  <   / =  1 /10  to increase tolerance for completion  of ADLs  2. Pt to increase B popliteal angle by > or = 20 degrees in order to improve flexibility and posture.   3. Increased MMT  for  hip extension to 5/5 bilaterally to increase tolerance for ADL and work activities.  4. Pt will report at CK level (40-60% impaired) on FOTO score for low back pain disability to demonstrate decrease in disability and improvement in back pain.  5. Pt to be Independent with HEP to improve ROM and independence with ADL's  6. Pt to demonstrate negative Bridge Test in order to show improved core strength for lumbar stabilization.     Plan     Pt will be treated by physical therapy 1-3 times a week for 6 weeks for Pt Education, HEP, therapeutic exercises, neuromuscular re-education, joint mobilizations, modalities prn to achieve established goals. Paloma may at times be seen by a PTA as part of the Rehab Team.     Cont PT for 6 weeks.     Olivia Pitts, DPT  08/07/2017.     I certify the need for these services furnished under this plan of treatment and while under my care.    ______________________________ Physician/Referring Practitioner  Date of Signature

## 2017-08-08 NOTE — PLAN OF CARE
Physical Therapy Evaluation    Name: Paloma Bang  Clinic Number: [id number]      Diagnosis:   Encounter Diagnosis   Name Primary?    Bilateral low back pain with right-sided sciatica, unspecified chronicity Yes     Physician: Asnhu Cheung, *  Treatment Orders: PT Eval and Treat    Past Medical History:   Diagnosis Date    Deep vein thrombosis     occurred after ankle fracture    GERD (gastroesophageal reflux disease)     Joint pain     Morbid obesity     Pulmonary embolism     occurred after ankle fracture     Current Outpatient Prescriptions   Medication Sig    amlodipine (NORVASC) 5 MG tablet     ERGOCALCIFEROL, VITAMIN D2, (VITAMIN D ORAL) Take 1,000 mg by mouth once daily.    fluticasone (FLONASE) 50 mcg/actuation nasal spray     losartan-hydrochlorothiazide 50-12.5 mg (HYZAAR) 50-12.5 mg per tablet TAKE 1 TABLET BY MOUTH ONCE DAILY.    multivitamin with minerals tablet Take 1 tablet by mouth once daily.     No current facility-administered medications for this visit.      Review of patient's allergies indicates:  No Known Allergies  Precautions: none    Evaluation Date: 08/07/2017  Visit # authorized: 1/20  Authorization expiration: 12/31/2017  Plan of Care Expiration: 9/7/2017    Subjective   Onset/LINDSEY: gradual    Primary concern/ Chief complaints:    Paloma is a 51 y.o. female with a PMH of lumbar surgery (repaired and fused L4-5, shaved off bulging disc in 2009) and morbid obesity that presents to [facility] outpatient physical therapy secondary to back pain that has worsened over time. Pt reports being in constant pain across her low back to the point that it is debilitating. Pt has problems in the morninig, even with simple things like rolling over in bed and walking. Her pain is worst in the morning. Reports that her pain is like a severe ache. She saw her MD for orthotics and it was never set up.  Injury/surgery occurred on years ago.  X-ray was taken and revealed lumbar  arthritis. Previous treatment included PT which helped. Pt reports that rolling over in the morning, sitting down, getting up from sitting down, and long distance walking increases low back pain and reports low back pain at worst is a 10+ on the VAS. Pt uses muscle relaxers, tramadol PRN at night to control low back pain symptoms. Pt has a decreased ability to perform ADLs such as sitting, standing, walking. 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 Pt reports intermittent numbness and tingling in both hamstring distributions to the knee. Reports that it can be hard to determine what is new and what is not due to past nerve damage. No cultural, environmental, or spiritual barriers identified to treatment or learning.      Pain Scale: Paloma rates pain on a scale of 0-10 to be 10 at worst; 2 currently; 2 at best .    Patient Goals: Pt would like to decrease pain and increase function so she can return to pain-free completion of all normal daily activities.        Objective     Observation: ambulates independently, slowed gait    Posture:  Increased lordosis    Lumbar Range of Motion:    Degrees Pain   Flexion 55 degrees  (60 is norm) Pain running to B thighs in the back        Extension 15 degrees  (25 is norm) Pain at end range, achey        Left Side Bending 20 degrees  (25 is norm) Pulling in R QL        Right Side Bending 15 degrees  (25 is norm) Pulling in L QL, more painful than L side bending        Left Rotation   30% Achey pain at end range        Right Rotation   30% Achey pain at end range             Lower Extremity Strength  Right LE  Left LE    Knee extension: 4+/5 Knee extension: 4+/5   Knee flexion: 4/5 Knee flexion:  Twinge in R low back 4/5   Hip flexion: 4/5 Hip flexion: 4/5   Hip extension:  4-/5 Hip extension: 4-/5   Hip abduction: 4-/5 Hip abduction: 4-/5   Hip adduction: 4+/5 Hip adduction  4+/5   Ankle dorsiflexion: 5/5 Ankle dorsiflexion: 5/5   Ankle plantarflexion: 5/5 Ankle plantarflexion: 5/5       Special Tests:  -SLR Test: + BLE  -Bridge Test: +    Joint Mobility: WNL throughout Lx with exception of L4-5 to PAs secondary to Lx fusion    Palpation: ttp to bilateral lumbar paraspinals    Sensation: mildly decreased R sural distribution    Flexibility: WNL    Outcome measures:    FOTO back: patient score = 55% impairment    G-code mobility current:  CK (indicating 40-60% impaired)    G-code mobility goal:  CK (indicating 40-60% impaired)    PT reviewed FOTO scores for Paloma Bang on 08/07/2017.   FOTO scores were entered into [employer] - see media section.    PT Evaluation Completed? Yes  Discussed Plan of Care with patient: Yes    TREATMENT:  Palmoa received therapeutic exercises to develop strength and endurance, flexibility for 15 minutes including:  LTR 2x15     Instructed pt. Regarding: Proper technique with all exercises. Pt demo good understanding of the education provided. Paloma demonstrated good return demonstration of activities.    Assessment     This is a 51 y.o. female referred to outpatient physical therapy and presents with a medical diagnosis of low back pain and demonstrates limitations as described in the problem list. Pt will benefit from physical therapy services in order to maximize pain free functional independence. The following goals were discussed with the patient and patient is in agreement with them as to be addressed in the treatment plan. Pt was given a HEP consisting of lower trunk rotations. Pt verbally understood the instructions as they were given and demonstrated proper form and technique during therapy. Pt was advised to perform these exercises free of pain, and to stop performing them if pain occurs.     Pt presents with limitations in lumbar AROM in all planes of motion and presented with weak core musculature indicating decreased support  of the lumbar spine with completion of ADLs. In order to decrease stresses on the lumbar spine with completion of functional movements, Paloma Bang  would greatly benefit from stretching of bilateral hamstrings and hip flexors as well as strengthening of core and hip musculature in order to increase dynamic stability at the lumbar spine and improve biomechanics throughout the lumbar spine.    As pt's tolerance for land therapy is significantly decreased based on lack of tolerance for positional changes, pt would benefit from a trial of aquatic therapy with a goal of progression to land therapy as soon as possible in order to continue progression of strengthening and facilitate return to pain-free completion of ADLs.      Patient History Examination Clinical Presentation Clinical Decision Making   Comorbidities:  Morbid obesity    Personal Factors:  none       Activity and Participation Restriction:  Mobility  General tasks and demands    Body Systems:  Musculoskeletal      Body Regions:  Lumbar Stable and uncomplicated     Low              Medical necessity is demonstrated by the following IMPAIRMENTS/PROBLEM LIST:   1)Increase in pain level limiting function   2)Decreased range of motion limiting function   3)Decreased strength limiting function   4)Impaired posture   5)Lack of HEP    GOALS: Short Term Goals: 3 weeks  1. Report decreased low back pain  <   / =  2  /10  to increase tolerance for completion of ADLs  2. Pt to increase B popliteal angle by > or = 10 degrees in order to improve flexibility and posture.   3. Increased MMT  for  hip extension to 4+/5 bilaterally to increase tolerance for ADL and work activities.  4. Pt to achieve 60 degrees of active lumbar flexion in order to demonstrate improvements in mobility.   5. Pt to tolerate HEP to improve ROM and independence with ADL's    Long Term Goals: 6 weeks  1. Report decreased low back pain  <   / =  1 /10  to increase tolerance for completion  of ADLs  2. Pt to increase B popliteal angle by > or = 20 degrees in order to improve flexibility and posture.   3. Increased MMT  for  hip extension to 5/5 bilaterally to increase tolerance for ADL and work activities.  4. Pt will report at CK level (40-60% impaired) on FOTO score for low back pain disability to demonstrate decrease in disability and improvement in back pain.  5. Pt to be Independent with HEP to improve ROM and independence with ADL's  6. Pt to demonstrate negative Bridge Test in order to show improved core strength for lumbar stabilization.     Plan     Pt will be treated by physical therapy 1-3 times a week for 6 weeks for Pt Education, HEP, therapeutic exercises, neuromuscular re-education, joint mobilizations, modalities prn to achieve established goals. Paloma may at times be seen by a PTA as part of the Rehab Team.     Cont PT for 6 weeks.     Olivia Pitts, DPT  08/07/2017.     I certify the need for these services furnished under this plan of treatment and while under my care.    ______________________________ Physician/Referring Practitioner  Date of Signature

## 2017-08-09 ENCOUNTER — CLINICAL SUPPORT (OUTPATIENT)
Dept: REHABILITATION | Facility: HOSPITAL | Age: 52
End: 2017-08-09
Attending: INTERNAL MEDICINE
Payer: COMMERCIAL

## 2017-08-09 DIAGNOSIS — M54.41 BILATERAL LOW BACK PAIN WITH RIGHT-SIDED SCIATICA, UNSPECIFIED CHRONICITY: Primary | ICD-10-CM

## 2017-08-09 PROCEDURE — 97113 AQUATIC THERAPY/EXERCISES: CPT

## 2017-08-09 NOTE — PROGRESS NOTES
Time In: 1735  Time Out: 1855    Subjective  Pt reports: pain across low back and extending down LLE to knee    Pt pain level: 5/10       Objective    Treatment: Pt was instructed in and performed therapeutic exercises to develop  for 60 minutes. Patient performed therapeutic exercises consisting of the following exercises:    Warm-up Laps 2 x each  fwd/bkw/lat     LE Stretch: 2 x 20 sec  HS     LE exs: 20x each   Mini Squats with QS  Heel Raise with GS  HS curl/Hip ext  Hip flex/LAQ  Hip abd/add  Step ups    Core:  DKTC in // bars 2x10 np    UE exs: 20x each  Shld flex/ext apo   Shld abd/add apo  Lat pull ytb  Horiz Row rtc     Endurance:   Marching 2 min  Bicycle in // bars 2 min    Cool down laps 1 x each  fwd/bkw/lat      Patient was not issued HEP for pool.      Assessment  Pt's tolerated initial aquatic tx well w/ no increase in symptoms.  Tx focused on BLE flexibility/strengthening and hip/core stabilization.  Will progress as tolerated.  Patient will continue to benefit from skilled PT intervention.    Paloma Bang is making good progress towards established goals.    Medical necessity is demonstrated by the following IMPAIRMENTS/PROBLEM LIST:   1)Increase in pain level limiting function   2)Decreased range of motion limiting function   3)Decreased strength limiting function   4)Impaired posture   5)Lack of HEP    GOALS: Short Term Goals: 3 weeks  1. Report decreased low back pain  <   / =  2  /10  to increase tolerance for completion of ADLs  2. Pt to increase B popliteal angle by > or = 10 degrees in order to improve flexibility and posture.   3. Increased MMT  for  hip extension to 4+/5 bilaterally to increase tolerance for ADL and work activities.  4. Pt to achieve 60 degrees of active lumbar flexion in order to demonstrate improvements in mobility.   5. Pt to tolerate HEP to improve ROM and independence with ADL's    Long Term Goals: 6 weeks  1. Report decreased low back pain  <   / =  1 /10   to increase tolerance for completion of ADLs  2. Pt to increase B popliteal angle by > or = 20 degrees in order to improve flexibility and posture.   3. Increased MMT  for  hip extension to 5/5 bilaterally to increase tolerance for ADL and work activities.  4. Pt will report at CK level (40-60% impaired) on FOTO score for low back pain disability to demonstrate decrease in disability and improvement in back pain.  5. Pt to be Independent with HEP to improve ROM and independence with ADL's  6. Pt to demonstrate negative Bridge Test in order to show improved core strength for lumbar stabilization.       Plan  Pt will be treated by physical therapy 1-3 times a week for 6 weeks for Pt Education, HEP, therapeutic exercises, neuromuscular re-education, joint mobilizations, modalities prn to achieve established goals. Paloma may at times be seen by a PTA as part of the Rehab Team.

## 2017-08-11 ENCOUNTER — LAB VISIT (OUTPATIENT)
Dept: LAB | Facility: HOSPITAL | Age: 52
End: 2017-08-11
Attending: INTERNAL MEDICINE
Payer: COMMERCIAL

## 2017-08-11 DIAGNOSIS — Z00.00 ROUTINE GENERAL MEDICAL EXAMINATION AT A HEALTH CARE FACILITY: ICD-10-CM

## 2017-08-11 LAB
25(OH)D3+25(OH)D2 SERPL-MCNC: 20 NG/ML
ALBUMIN SERPL BCP-MCNC: 3.3 G/DL
ALP SERPL-CCNC: 65 U/L
ALT SERPL W/O P-5'-P-CCNC: 13 U/L
ANION GAP SERPL CALC-SCNC: 10 MMOL/L
AST SERPL-CCNC: 18 U/L
BASOPHILS # BLD AUTO: 0.04 K/UL
BASOPHILS NFR BLD: 0.5 %
BILIRUB SERPL-MCNC: 0.7 MG/DL
BUN SERPL-MCNC: 12 MG/DL
CALCIUM SERPL-MCNC: 9.2 MG/DL
CHLORIDE SERPL-SCNC: 104 MMOL/L
CHOLEST/HDLC SERPL: 2.8 {RATIO}
CO2 SERPL-SCNC: 25 MMOL/L
CREAT SERPL-MCNC: 0.9 MG/DL
DIFFERENTIAL METHOD: ABNORMAL
EOSINOPHIL # BLD AUTO: 0.2 K/UL
EOSINOPHIL NFR BLD: 2 %
ERYTHROCYTE [DISTWIDTH] IN BLOOD BY AUTOMATED COUNT: 14.8 %
EST. GFR  (AFRICAN AMERICAN): >60 ML/MIN/1.73 M^2
EST. GFR  (NON AFRICAN AMERICAN): >60 ML/MIN/1.73 M^2
ESTIMATED AVG GLUCOSE: 134 MG/DL
GLUCOSE SERPL-MCNC: 106 MG/DL
HBA1C MFR BLD HPLC: 6.3 %
HCT VFR BLD AUTO: 40.9 %
HDL/CHOLESTEROL RATIO: 35.7 %
HDLC SERPL-MCNC: 154 MG/DL
HDLC SERPL-MCNC: 55 MG/DL
HGB BLD-MCNC: 13.9 G/DL
LDLC SERPL CALC-MCNC: 79.4 MG/DL
LYMPHOCYTES # BLD AUTO: 3.1 K/UL
LYMPHOCYTES NFR BLD: 38 %
MCH RBC QN AUTO: 29.7 PG
MCHC RBC AUTO-ENTMCNC: 34 G/DL
MCV RBC AUTO: 87 FL
MONOCYTES # BLD AUTO: 0.6 K/UL
MONOCYTES NFR BLD: 8 %
NEUTROPHILS # BLD AUTO: 4.1 K/UL
NEUTROPHILS NFR BLD: 51.3 %
NONHDLC SERPL-MCNC: 99 MG/DL
PLATELET # BLD AUTO: 277 K/UL
PMV BLD AUTO: 10.5 FL
POTASSIUM SERPL-SCNC: 4.2 MMOL/L
PROT SERPL-MCNC: 6.9 G/DL
RBC # BLD AUTO: 4.68 M/UL
SODIUM SERPL-SCNC: 139 MMOL/L
TRIGL SERPL-MCNC: 98 MG/DL
TSH SERPL DL<=0.005 MIU/L-ACNC: 2.26 UIU/ML
WBC # BLD AUTO: 8.05 K/UL

## 2017-08-11 PROCEDURE — 83036 HEMOGLOBIN GLYCOSYLATED A1C: CPT

## 2017-08-11 PROCEDURE — 80053 COMPREHEN METABOLIC PANEL: CPT

## 2017-08-11 PROCEDURE — 84443 ASSAY THYROID STIM HORMONE: CPT

## 2017-08-11 PROCEDURE — 85025 COMPLETE CBC W/AUTO DIFF WBC: CPT

## 2017-08-11 PROCEDURE — 36415 COLL VENOUS BLD VENIPUNCTURE: CPT | Mod: PO

## 2017-08-11 PROCEDURE — 80061 LIPID PANEL: CPT

## 2017-08-11 PROCEDURE — 82306 VITAMIN D 25 HYDROXY: CPT

## 2017-08-14 ENCOUNTER — CLINICAL SUPPORT (OUTPATIENT)
Dept: REHABILITATION | Facility: HOSPITAL | Age: 52
End: 2017-08-14
Attending: INTERNAL MEDICINE
Payer: COMMERCIAL

## 2017-08-14 DIAGNOSIS — M54.41 BILATERAL LOW BACK PAIN WITH RIGHT-SIDED SCIATICA, UNSPECIFIED CHRONICITY: Primary | ICD-10-CM

## 2017-08-14 PROCEDURE — 97113 AQUATIC THERAPY/EXERCISES: CPT

## 2017-08-14 NOTE — PROGRESS NOTES
Time In: 1730  Time Out: 1830     Subjective  Pt reports: pain across low back and extending down LLE to knee    Pt pain level: 4/10       Objective    Treatment: Pt was instructed in and performed therapeutic exercises to develop  for 60 minutes. Patient performed therapeutic exercises consisting of the following exercises:    Warm-up Laps 2 x each  fwd/bkw/lat     LE Stretch: 2 x 20 sec  HS     LE exs: 10x each   Mini Squats with QS  Heel Raise with GS  HS curl/Hip ext  Hip flex/LAQ  Hip abd/add  Hip ext  Step ups    Core:  DKTC in // bars 2x10 np    UE exs: 10x each  Shld flex/ext apo   Shld abd/add apo  Lat pull ytb  Horiz Row rtc     Endurance:   Marching 2 min np  Bicycle in // bars 2 min np    Cool down laps 1 x each  fwd/bkw/lat      Patient was not issued HEP for pool.      Assessment  Pt reported significant amount of pain upon awakening and getting out of bed.  Pt was instructed to observe whether or not pain is flexion based or extension based. Tx focused on BLE flexibility/strengthening and hip/core stabilization.  Will progress as tolerated.  Patient will continue to benefit from skilled PT intervention.    Paloma Bang is making good progress towards established goals.    Medical necessity is demonstrated by the following IMPAIRMENTS/PROBLEM LIST:   1)Increase in pain level limiting function   2)Decreased range of motion limiting function   3)Decreased strength limiting function   4)Impaired posture   5)Lack of HEP    GOALS: Short Term Goals: 3 weeks  1. Report decreased low back pain  <   / =  2  /10  to increase tolerance for completion of ADLs  2. Pt to increase B popliteal angle by > or = 10 degrees in order to improve flexibility and posture.   3. Increased MMT  for  hip extension to 4+/5 bilaterally to increase tolerance for ADL and work activities.  4. Pt to achieve 60 degrees of active lumbar flexion in order to demonstrate improvements in mobility.   5. Pt to tolerate HEP to improve  ROM and independence with ADL's    Long Term Goals: 6 weeks  1. Report decreased low back pain  <   / =  1 /10  to increase tolerance for completion of ADLs  2. Pt to increase B popliteal angle by > or = 20 degrees in order to improve flexibility and posture.   3. Increased MMT  for  hip extension to 5/5 bilaterally to increase tolerance for ADL and work activities.  4. Pt will report at CK level (40-60% impaired) on FOTO score for low back pain disability to demonstrate decrease in disability and improvement in back pain.  5. Pt to be Independent with HEP to improve ROM and independence with ADL's  6. Pt to demonstrate negative Bridge Test in order to show improved core strength for lumbar stabilization.       Plan  Pt will be treated by physical therapy 1-3 times a week for 6 weeks for Pt Education, HEP, therapeutic exercises, neuromuscular re-education, joint mobilizations, modalities prn to achieve established goals. Paloma may at times be seen by a PTA as part of the Rehab Team.

## 2017-08-18 ENCOUNTER — LAB VISIT (OUTPATIENT)
Dept: LAB | Facility: HOSPITAL | Age: 52
End: 2017-08-18
Attending: INTERNAL MEDICINE
Payer: COMMERCIAL

## 2017-08-18 ENCOUNTER — OFFICE VISIT (OUTPATIENT)
Dept: INTERNAL MEDICINE | Facility: CLINIC | Age: 52
End: 2017-08-18
Payer: COMMERCIAL

## 2017-08-18 VITALS
DIASTOLIC BLOOD PRESSURE: 60 MMHG | BODY MASS INDEX: 48.05 KG/M2 | HEIGHT: 63 IN | SYSTOLIC BLOOD PRESSURE: 108 MMHG | HEART RATE: 68 BPM | RESPIRATION RATE: 16 BRPM | TEMPERATURE: 99 F | WEIGHT: 271.19 LBS

## 2017-08-18 DIAGNOSIS — Z12.11 COLON CANCER SCREENING: ICD-10-CM

## 2017-08-18 DIAGNOSIS — Z00.00 ROUTINE GENERAL MEDICAL EXAMINATION AT A HEALTH CARE FACILITY: Primary | ICD-10-CM

## 2017-08-18 DIAGNOSIS — Z00.00 ROUTINE GENERAL MEDICAL EXAMINATION AT A HEALTH CARE FACILITY: ICD-10-CM

## 2017-08-18 LAB
BACTERIA #/AREA URNS AUTO: NORMAL /HPF
BILIRUB UR QL STRIP: NEGATIVE
CLARITY UR REFRACT.AUTO: ABNORMAL
COLOR UR AUTO: YELLOW
GLUCOSE UR QL STRIP: NEGATIVE
HGB UR QL STRIP: ABNORMAL
KETONES UR QL STRIP: NEGATIVE
LEUKOCYTE ESTERASE UR QL STRIP: NEGATIVE
MICROSCOPIC COMMENT: NORMAL
NITRITE UR QL STRIP: NEGATIVE
PH UR STRIP: 5 [PH] (ref 5–8)
PROT UR QL STRIP: NEGATIVE
RBC #/AREA URNS AUTO: 1 /HPF (ref 0–4)
SP GR UR STRIP: 1.02 (ref 1–1.03)
SQUAMOUS #/AREA URNS AUTO: 1 /HPF
URN SPEC COLLECT METH UR: ABNORMAL
UROBILINOGEN UR STRIP-ACNC: NEGATIVE EU/DL
WBC #/AREA URNS AUTO: 1 /HPF (ref 0–5)

## 2017-08-18 PROCEDURE — 99999 PR PBB SHADOW E&M-EST. PATIENT-LVL III: CPT | Mod: PBBFAC,,, | Performed by: INTERNAL MEDICINE

## 2017-08-18 PROCEDURE — 99396 PREV VISIT EST AGE 40-64: CPT | Mod: S$GLB,,, | Performed by: INTERNAL MEDICINE

## 2017-08-18 PROCEDURE — 81001 URINALYSIS AUTO W/SCOPE: CPT

## 2017-08-18 PROCEDURE — 87086 URINE CULTURE/COLONY COUNT: CPT

## 2017-08-18 RX ORDER — LOSARTAN POTASSIUM 50 MG/1
50 TABLET ORAL DAILY
Qty: 90 TABLET | Refills: 3 | Status: SHIPPED | OUTPATIENT
Start: 2017-08-18 | End: 2018-05-30 | Stop reason: SDUPTHER

## 2017-08-18 RX ORDER — MELOXICAM 15 MG/1
15 TABLET ORAL DAILY PRN
Qty: 90 TABLET | Refills: 1 | Status: SHIPPED | OUTPATIENT
Start: 2017-08-18 | End: 2017-11-03

## 2017-08-18 RX ORDER — ERGOCALCIFEROL 1.25 MG/1
50000 CAPSULE ORAL
Qty: 12 CAPSULE | Refills: 0 | Status: SHIPPED | OUTPATIENT
Start: 2017-08-18 | End: 2017-11-23 | Stop reason: SDUPTHER

## 2017-08-18 RX ORDER — MELOXICAM 15 MG/1
1 TABLET ORAL DAILY PRN
COMMUNITY
Start: 2017-06-09 | End: 2017-08-18 | Stop reason: SDUPTHER

## 2017-08-18 RX ORDER — TRAMADOL HYDROCHLORIDE 50 MG/1
1 TABLET ORAL DAILY PRN
COMMUNITY
Start: 2017-08-13 | End: 2018-05-30 | Stop reason: SDUPTHER

## 2017-08-18 RX ORDER — METHOCARBAMOL 750 MG/1
750 TABLET, FILM COATED ORAL 3 TIMES DAILY
Qty: 40 TABLET | Refills: 0
Start: 2017-08-18 | End: 2017-08-28

## 2017-08-18 NOTE — PROGRESS NOTES
The patient is a 51 y.o. old female who presents to the office for a physical.  Review of labs reveals a depressed vitamin D and microscopic hematuria.    PAST MEDICAL HISTORY  Past Medical History:   Diagnosis Date    Deep vein thrombosis     occurred after ankle fracture    GERD (gastroesophageal reflux disease)     Joint pain     Morbid obesity     Pulmonary embolism     occurred after ankle fracture       SURGICAL HISTORY:  Past Surgical History:   Procedure Laterality Date    ankle surgery      back surgery      Lumbar spinal fusion with shaving of a disk     SECTION      HYSTERECTOMY      LAPAROSCOPIC GASTRIC BANDING      MYOMECTOMY      PILONIDAL CYST DRAINAGE      removed    ROTATOR CUFF REPAIR      Left         MEDS:  Medcard reviewed and updated    ALLERGIES: Allergy Card reviewed and updated    SOCIAL HISTORY:   The patient is a nonsmoker, denies alcohol or illicit drug use.    ROS:  GENERAL: No fever, chills or weight loss.  Positive fatigue.  SKIN: No rashes.  HEAD: No headaches or recent head trauma.  EYES: No photophobia, ocular pain or diplopia.  EARS: Denies ear pain, discharge or vertigo.  NOSE: No epistaxis or postnasal drip.  MOUTH & THROAT: No hoarseness or change in voice.   NODES: Intermittent swollen glands.  CHEST: Denies shortness of breath, wheezing, cough and sputum production.  CARDIOVASCULAR: Occasional sharp chest pain.  Denies palpitations.  ABDOMEN: Appetite fine. Denies abdominal pain, constipation or blood in stool.  Occasional diarrhea.  URINARY: No dysuria or hematuria.  MUSCULOSKELETAL: No joint stiffness or swelling. Positive back pain.  NEUROLOGIC: No history of seizures.  ENDOCRINE: Denies polyuria or polydipsia.  PSYCHIATRIC: Denies mood swings, depression, anxiety, homicidal or suicidal thoughts.    SCREENINGS:  Last cholesterol: 2017  Last colonoscopy: none  Last mammogram: 2016  Last Gyn exam:   Last tetanus:   Last Pneumovax:  none  Last eye exam: July 2016  Last bone density: none  Last menstrual period: hysterectomy    PE:   Vitals:  Vitals:    08/18/17 1310   BP: 108/60   Pulse: 68   Resp: 16   Temp: 98.6 °F (37 °C)       APPEARANCE: Well nourished, well developed, in no acute distress.    EYES: Sclerae anicteric. PERRL. EOMI.      EARS: TM's intact. No retraction or perforation.    NOSE: Mucosa pink. Airway clear.  MOUTH & THROAT: No tonsillar enlargement. No pharyngeal erythema or exudate. No stridor.  NECK: Supple, no thyromegaly.  CHEST: Lungs clear to auscultation with unlabored respirations.  CARDIOVASCULAR: Normal S1, S2. No murmurs. No carotid bruits. No pedal edema.  ABDOMEN: Bowel sounds normal. Not distended. Soft. No tenderness or masses.   MUSCULOSKELETAL:  Normal gait, no cyanosis or clubbing.   SKIN: Normal skin turgor, warm and dry.  NEUROLOGIC: Cranial Nerves: Intact.  PSYCHIATRIC: The patient is oriented to person, place, and time and has a pleasant affect.        ASSESSMENT/PLAN:  Paloma was seen today for annual exam.    Diagnoses and all orders for this visit:    Routine general medical examination at a health care facility  -     Urinalysis; Future  -     Urine culture; Future    Colon cancer screening  -     Case request GI: COLONOSCOPY    Other orders  -     losartan (COZAAR) 50 MG tablet; Take 1 tablet (50 mg total) by mouth once daily.  -     meloxicam (MOBIC) 15 MG tablet; Take 1 tablet (15 mg total) by mouth daily as needed.  -     ergocalciferol (ERGOCALCIFEROL) 50,000 unit Cap; Take 1 capsule (50,000 Units total) by mouth every 7 days.  -     methocarbamol (ROBAXIN) 750 MG Tab; Take 1 tablet (750 mg total) by mouth 3 (three) times daily.

## 2017-08-20 LAB — BACTERIA UR CULT: NO GROWTH

## 2017-08-21 ENCOUNTER — PATIENT MESSAGE (OUTPATIENT)
Dept: INTERNAL MEDICINE | Facility: CLINIC | Age: 52
End: 2017-08-21

## 2017-08-21 ENCOUNTER — CLINICAL SUPPORT (OUTPATIENT)
Dept: REHABILITATION | Facility: HOSPITAL | Age: 52
End: 2017-08-21
Attending: INTERNAL MEDICINE
Payer: COMMERCIAL

## 2017-08-21 DIAGNOSIS — M54.41 BILATERAL LOW BACK PAIN WITH RIGHT-SIDED SCIATICA, UNSPECIFIED CHRONICITY: Primary | ICD-10-CM

## 2017-08-21 PROCEDURE — 97113 AQUATIC THERAPY/EXERCISES: CPT

## 2017-08-21 NOTE — PROGRESS NOTES
Time In: 1745  Time Out: 1900     Subjective  Pt reports: pain across low back but does not currently have any pain extending down LLE.  Pt also reported that pain did extend down LLE after standing for an extended period of time, but LLE pain subsided since then.       Pt pain level: 3/10        Objective    Treatment: Pt was instructed in and performed therapeutic exercises to develop  for 60 minutes. Patient performed therapeutic exercises consisting of the following exercises:    Warm-up Laps 2 x each  fwd/bkw/lat     LE Stretch: 2 x 20 sec  HS     LE exs: 30x each   Mini Squats with QS  Heel Raise with GS  HS curl/Hip ext (c/o LBP post exercise)  Hip flex/LAQ  Hip abd/add  Step ups    Core:  DKTC in // bars 2x10   Bicycle in // bars 2 min    UE exs: 30x each  Shld flex/ext apo   Shld abd/add apo  Lat pull ytb  Horiz Row rtc     Endurance:   Marching 2 min np    Cool down laps 1 x each  fwd/bkw/lat      Patient was not issued HEP for pool.      Assessment  Pt reported a slight increase in pain post hip extension exercise. Pt was instructed to observe whether or not pain is flexion based or extension based. Tx focused on BLE flexibility/strengthening and hip/core stabilization.  Will progress as tolerated.  Patient will continue to benefit from skilled PT intervention.    Paloma Bang is making good progress towards established goals.    Medical necessity is demonstrated by the following IMPAIRMENTS/PROBLEM LIST:   1)Increase in pain level limiting function   2)Decreased range of motion limiting function   3)Decreased strength limiting function   4)Impaired posture   5)Lack of HEP    GOALS: Short Term Goals: 3 weeks  1. Report decreased low back pain  <   / =  2  /10  to increase tolerance for completion of ADLs  2. Pt to increase B popliteal angle by > or = 10 degrees in order to improve flexibility and posture.   3. Increased MMT  for  hip extension to 4+/5 bilaterally to increase tolerance for ADL  and work activities.  4. Pt to achieve 60 degrees of active lumbar flexion in order to demonstrate improvements in mobility.   5. Pt to tolerate HEP to improve ROM and independence with ADL's    Long Term Goals: 6 weeks  1. Report decreased low back pain  <   / =  1 /10  to increase tolerance for completion of ADLs  2. Pt to increase B popliteal angle by > or = 20 degrees in order to improve flexibility and posture.   3. Increased MMT  for  hip extension to 5/5 bilaterally to increase tolerance for ADL and work activities.  4. Pt will report at CK level (40-60% impaired) on FOTO score for low back pain disability to demonstrate decrease in disability and improvement in back pain.  5. Pt to be Independent with HEP to improve ROM and independence with ADL's  6. Pt to demonstrate negative Bridge Test in order to show improved core strength for lumbar stabilization.       Plan  Pt will be treated by physical therapy 1-3 times a week for 6 weeks for Pt Education, HEP, therapeutic exercises, neuromuscular re-education, joint mobilizations, modalities prn to achieve established goals. Paloma may at times be seen by a PTA as part of the Rehab Team.

## 2017-08-24 ENCOUNTER — TELEPHONE (OUTPATIENT)
Dept: INTERNAL MEDICINE | Facility: CLINIC | Age: 52
End: 2017-08-24

## 2017-08-28 ENCOUNTER — HOSPITAL ENCOUNTER (OUTPATIENT)
Dept: RADIOLOGY | Facility: OTHER | Age: 52
Discharge: HOME | End: 2017-08-28
Attending: OBSTETRICS & GYNECOLOGY
Payer: COMMERCIAL

## 2017-08-28 DIAGNOSIS — Z12.31 ENCOUNTER FOR SCREENING MAMMOGRAM FOR BREAST CANCER: ICD-10-CM

## 2017-08-28 PROCEDURE — 77063 BREAST TOMOSYNTHESIS BI: CPT | Mod: 26,,, | Performed by: RADIOLOGY

## 2017-08-28 PROCEDURE — 77067 SCR MAMMO BI INCL CAD: CPT | Mod: 26,,, | Performed by: RADIOLOGY

## 2017-08-28 PROCEDURE — 77067 SCR MAMMO BI INCL CAD: CPT | Mod: TC

## 2017-08-30 ENCOUNTER — CLINICAL SUPPORT (OUTPATIENT)
Dept: REHABILITATION | Facility: HOSPITAL | Age: 52
End: 2017-08-30
Attending: INTERNAL MEDICINE
Payer: COMMERCIAL

## 2017-08-30 DIAGNOSIS — M54.41 BILATERAL LOW BACK PAIN WITH RIGHT-SIDED SCIATICA, UNSPECIFIED CHRONICITY: Primary | ICD-10-CM

## 2017-08-30 PROCEDURE — 97113 AQUATIC THERAPY/EXERCISES: CPT

## 2017-08-30 NOTE — PROGRESS NOTES
Time In: 1735  Time Out: 1830     Subjective  Pt reports: pain across low back/hip region but does not currently have any pain extending down LLE; also reports that pain is worst in the morning after laying on side for the whole night.    Pt pain level: 4/10       Objective    Treatment: Pt was instructed in and performed therapeutic exercises to develop  for 60 minutes. Patient performed therapeutic exercises consisting of the following exercises:    Warm-up Laps 2 x each  fwd/bkw/lat     LE Stretch: 3 x 20 sec  HS  Hip flexor stretch     LE exs: 30x each   Mini Squats with QS  Heel Raise with GS  HS curl/Hip ext (c/o LBP post exercise)  Hip flex/LAQ  Hip abd/add  Step ups     Core:  DKTC in // bars 2x10   Bicycle in // bars 2 min    UE exs: 30x each  Shld flex/ext apo   Shld abd/add apo  Lat pull otb  Horiz Row rtc     Endurance:   Marching 2 min np    Cool down laps 1 x each  fwd/bkw/lat      Patient was not issued HEP for pool.      Assessment    Pt appears to alleviate pain with flexion based movement patterns. Pt could benefit from strengthening glut max & core and stretching hip flexors/low back musculature in order to decrease lordosis and alleviate low back symptoms. Tx focused on BLE flexibility/strengthening and hip/core stabilization.  Will progress as tolerated.  Patient will continue to benefit from skilled PT intervention.    Paloma Bang is making good progress towards established goals.    Medical necessity is demonstrated by the following IMPAIRMENTS/PROBLEM LIST:   1)Increase in pain level limiting function   2)Decreased range of motion limiting function   3)Decreased strength limiting function   4)Impaired posture   5)Lack of HEP    GOALS: Short Term Goals: 3 weeks  1. Report decreased low back pain  <   / =  2  /10  to increase tolerance for completion of ADLs  2. Pt to increase B popliteal angle by > or = 10 degrees in order to improve flexibility and posture.   3. Increased MMT  for   hip extension to 4+/5 bilaterally to increase tolerance for ADL and work activities.  4. Pt to achieve 60 degrees of active lumbar flexion in order to demonstrate improvements in mobility.   5. Pt to tolerate HEP to improve ROM and independence with ADL's    Long Term Goals: 6 weeks  1. Report decreased low back pain  <   / =  1 /10  to increase tolerance for completion of ADLs  2. Pt to increase B popliteal angle by > or = 20 degrees in order to improve flexibility and posture.   3. Increased MMT  for  hip extension to 5/5 bilaterally to increase tolerance for ADL and work activities.  4. Pt will report at CK level (40-60% impaired) on FOTO score for low back pain disability to demonstrate decrease in disability and improvement in back pain.  5. Pt to be Independent with HEP to improve ROM and independence with ADL's  6. Pt to demonstrate negative Bridge Test in order to show improved core strength for lumbar stabilization.       Plan  Pt will be treated by physical therapy 1-3 times a week for 6 weeks for Pt Education, HEP, therapeutic exercises, neuromuscular re-education, joint mobilizations, modalities prn to achieve established goals. Paloma may at times be seen by a PTA as part of the Rehab Team.

## 2017-09-12 ENCOUNTER — PATIENT MESSAGE (OUTPATIENT)
Dept: INTERNAL MEDICINE | Facility: CLINIC | Age: 52
End: 2017-09-12

## 2017-09-13 ENCOUNTER — CLINICAL SUPPORT (OUTPATIENT)
Dept: REHABILITATION | Facility: HOSPITAL | Age: 52
End: 2017-09-13
Attending: INTERNAL MEDICINE
Payer: COMMERCIAL

## 2017-09-13 DIAGNOSIS — M54.41 BILATERAL LOW BACK PAIN WITH RIGHT-SIDED SCIATICA, UNSPECIFIED CHRONICITY: Primary | ICD-10-CM

## 2017-09-13 PROCEDURE — 97110 THERAPEUTIC EXERCISES: CPT

## 2017-09-13 PROCEDURE — 97530 THERAPEUTIC ACTIVITIES: CPT

## 2017-09-13 NOTE — PROGRESS NOTES
Physical Therapy Pool Reassessment    Name: Paloma Bang  Clinic Number: [id number]      Diagnosis:   Encounter Diagnosis   Name Primary?    Bilateral low back pain with right-sided sciatica, unspecified chronicity Yes     Physician: Anshu Cheung, *  Treatment Orders: PT Eval and Treat    Past Medical History:   Diagnosis Date    Deep vein thrombosis     occurred after ankle fracture    GERD (gastroesophageal reflux disease)     Joint pain     Morbid obesity     Pulmonary embolism     occurred after ankle fracture     Current Outpatient Prescriptions   Medication Sig    amlodipine (NORVASC) 5 MG tablet Take by mouth once daily.     ergocalciferol (ERGOCALCIFEROL) 50,000 unit Cap Take 1 capsule (50,000 Units total) by mouth every 7 days.    ERGOCALCIFEROL, VITAMIN D2, (VITAMIN D ORAL) Take 1,000 mg by mouth once daily.    fluticasone (FLONASE) 50 mcg/actuation nasal spray     losartan (COZAAR) 50 MG tablet Take 1 tablet (50 mg total) by mouth once daily.    meloxicam (MOBIC) 15 MG tablet Take 1 tablet (15 mg total) by mouth daily as needed.    multivitamin with minerals tablet Take 1 tablet by mouth once daily.    tramadol (ULTRAM) 50 mg tablet Take 1 tablet by mouth daily as needed.     No current facility-administered medications for this visit.      Review of patient's allergies indicates:  No Known Allergies  Precautions: none    Evaluation Date: 09/13/2017  Visit # authorized: 6/20  Authorization expiration: 12/31/2017  Plan of Care Expiration: 9/7/2017    Subjective   Onset/LINDSEY: gradual    Primary concern/ Chief complaints:    Paloma is a 51 y.o. female with a PMH of lumbar surgery (repaired and fused L4-5, shaved off bulging disc in 2009) and morbid obesity that presents to [facility] outpatient physical therapy secondary to back pain that has worsened over time.     Pt had some difficulty initially in the pool, but they were adjusted and her difficulty has decreased. Still thinks  her problem is because of her feet. Reports feeling about the same with transitional movements.     Pt has continued problems in the morninig, even with simple things like rolling over in bed and walking. 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 Is still limiting her lifting at work. Pt reports intermittent numbness and tingling in both hamstring distributions to the knee, but less than she used to get on her first day. No cultural, environmental, or spiritual barriers identified to treatment or learning.    Pain Scale: Chentejeremiethomas rates pain on a scale of 0-10 to be 10 at worst; 2 currently; 2 at best .    Patient Goals: Pt would like to decrease pain and increase function so she can return to pain-free completion of all normal daily activities.    Objective     Observation: ambulates independently, slowed gait    Posture:  Increased lordosis    Lumbar Range of Motion:    Degrees Pain   Flexion 60 degrees  (60 is norm) No pin        Extension 20 degrees  (25 is norm) Pain at end range, achey        Left Side Bending 20 degrees  (25 is norm) Pulling in R QL        Right Side Bending 15 degrees  (25 is norm) Pulling in L glutes        Left Rotation   100% No pain        Right Rotation   100% No pain           Lower Extremity Strength  Right LE  Left LE    Knee extension: 4+/5 Knee extension: 4+/5   Knee flexion: 4/5 Knee flexion:  Twinge in R low back 4/5   Hip flexion: 4/5 Hip flexion: 4/5   Hip extension:  4-/5 Hip extension: 4-/5   Hip abduction: 4-/5 Hip abduction: 4-/5   Hip adduction: 4+/5 Hip adduction 4+/5   Ankle dorsiflexion: 5/5 Ankle dorsiflexion: 5/5   Ankle plantarflexion: 5/5 Ankle plantarflexion: 5/5       Special Tests:  -SLR Test: + BLE  -Bridge Test: +    Joint Mobility: WNL throughout Lx with exception of L4-5 to PAs secondary to Lx fusion    Palpation: ttp to bilateral lumbar  paraspinals    Sensation: mildly decreased R sural distribution    Flexibility: WNL    Outcome measures:    FOTO back: patient score = 38% impairment    G-code mobility current:  CJ (indicating 20-40% impaired)    G-code mobility goal:  CK (indicating 40-60% impaired)    PT reviewed FOTO scores for Paloma Bang on 09/13/2017.   FOTO scores were entered into [employer] - see media section.    TREATMENT:  Paloma received therapeutic exercises to develop strength and endurance, flexibility for 15 minutes including:  LTR 2x15, hooklying clamshells with PTB 3x10     Instructed pt. Regarding: Proper technique with all exercises. Pt demo good understanding of the education provided. Paloma demonstrated good return demonstration of activities.    Assessment     This is a 51 y.o. female referred to outpatient physical therapy and presents with a medical diagnosis of low back pain and demonstrates limitations as described in the problem list. Pt will benefit from physical therapy services in order to maximize pain free functional independence. The following goals were discussed with the patient and patient is in agreement with them as to be addressed in the treatment plan. Pt was given a HEP consisting of lower trunk rotations and increased today to include hooklying clamshells with PTB. Pt verbally understood the instructions as they were given and demonstrated proper form and technique during therapy. Pt was advised to perform these exercises free of pain, and to stop performing them if pain occurs.     Pt presents with limitations in lumbar AROM in all planes of motion except for rotation and flexion and presented with weak core musculature indicating decreased support of the lumbar spine with completion of ADLs. In order to decrease stresses on the lumbar spine with completion of functional movements, Paloma Bang  would greatly benefit from stretching of bilateral hamstrings and hip flexors  as well as strengthening of core and hip musculature in order to increase dynamic stability at the lumbar spine and improve biomechanics throughout the lumbar spine.    As pt's tolerance for land therapy continues to be significantly decreased based on lack of tolerance for positional changes, pt would benefit from continued aquatic therapy with a goal of progression to land therapy as soon as possible in order to continue progression of strengthening and facilitate return to pain-free completion of ADLs. PT is progressing well and has met 3/5 short term goals and 1/6 long term goals. Pt to continue as tolerated.       Patient History Examination Clinical Presentation Clinical Decision Making   Comorbidities:  Morbid obesity    Personal Factors:  none       Activity and Participation Restriction:  Mobility  General tasks and demands    Body Systems:  Musculoskeletal      Body Regions:  Lumbar Stable and uncomplicated     Low              Medical necessity is demonstrated by the following IMPAIRMENTS/PROBLEM LIST:   1)Increase in pain level limiting function   2)Decreased range of motion limiting function   3)Decreased strength limiting function   4)Impaired posture   5)Lack of HEP    GOALS: Short Term Goals: 3 weeks  1. Report decreased low back pain  <   / =  2  /10  to increase tolerance for completion of ADLs MET:9/13/2017  2. Pt to increase B popliteal angle by > or = 10 degrees in order to improve flexibility and posture.   3. Increased MMT  for  hip extension to 4+/5 bilaterally to increase tolerance for ADL and work activities.  4. Pt to achieve 60 degrees of active lumbar flexion in order to demonstrate improvements in mobility.  MET:9/13/2017  5. Pt to tolerate HEP to improve ROM and independence with ADL's  MET:9/13/2017    Long Term Goals: 6 weeks  1. Report decreased low back pain  <   / =  1 /10  to increase tolerance for completion of ADLs  2. Pt to increase B popliteal angle by > or = 20 degrees in  order to improve flexibility and posture.   3. Increased MMT  for  hip extension to 5/5 bilaterally to increase tolerance for ADL and work activities.  4. Pt will report at CK level (40-60% impaired) on FOTO score for low back pain disability to demonstrate decrease in disability and improvement in back pain.  MET:9/13/2017  5. Pt to be Independent with HEP to improve ROM and independence with ADL's  6. Pt to demonstrate negative Bridge Test in order to show improved core strength for lumbar stabilization.     Plan     Pt will be treated by physical therapy 1-3 times a week for 6 weeks for Pt Education, HEP, therapeutic exercises, neuromuscular re-education, joint mobilizations, modalities prn to achieve established goals. Paloma may at times be seen by a PTA as part of the Rehab Team.     Cont PT for 6 weeks.     Olivia Pitts, DPT  09/13/2017.     I certify the need for these services furnished under this plan of treatment and while under my care.    ______________________________ Physician/Referring Practitioner  Date of Signature

## 2017-09-15 ENCOUNTER — TELEPHONE (OUTPATIENT)
Dept: INTERNAL MEDICINE | Facility: CLINIC | Age: 52
End: 2017-09-15

## 2017-09-19 ENCOUNTER — PATIENT MESSAGE (OUTPATIENT)
Dept: INTERNAL MEDICINE | Facility: CLINIC | Age: 52
End: 2017-09-19

## 2017-09-19 NOTE — TELEPHONE ENCOUNTER
Try calling the patient to advise the forms where ready and have been faxed (FMLA) sent a message to the portal as well

## 2017-09-25 ENCOUNTER — CLINICAL SUPPORT (OUTPATIENT)
Dept: REHABILITATION | Facility: HOSPITAL | Age: 52
End: 2017-09-25
Attending: INTERNAL MEDICINE
Payer: COMMERCIAL

## 2017-09-25 DIAGNOSIS — M54.41 BILATERAL LOW BACK PAIN WITH RIGHT-SIDED SCIATICA, UNSPECIFIED CHRONICITY: Primary | ICD-10-CM

## 2017-09-25 PROCEDURE — 97113 AQUATIC THERAPY/EXERCISES: CPT

## 2017-09-25 NOTE — PROGRESS NOTES
Time In: 1745  Time Out: 1850      Subjective  Pt reports: pain across low back/hip region extending posteriorly down L thigh.    Pt pain level: 3/10       Objective    Treatment: Pt was instructed in and performed therapeutic exercises to develop  for 60 minutes. Patient performed therapeutic exercises consisting of the following exercises:    Warm-up Laps 2 x each  fwd/bkw/lat     LE Stretch: 3 x 20 sec  HS  Hip flexor stretch     LE exs: 30x each   Mini Squats with QS  Heel Raise with GS  Hip flex/LAQ  Hip ext  Hip abd/add  Step ups performed 30x with RLE but only 15x with LLE secondary to fatigue and LBP    Core:  DKTC in // bars 2x10 np  Bicycle in // bars 2 min np    UE exs: 30x each  Shld flex/ext apo   Shld abd/add apo  Lat pull otb  Horiz Row rtc     Endurance:   Marching 2 min np    Cool down laps 1 x each  fwd/bkw/lat      Patient was not issued HEP for pool.      Assessment  Pt demonstrated hip flexor/HS tightness and hip extensor weakness.  Pt could benefit from strengthening glut max & core and stretching hip flexors/low back musculature in order to decrease lordosis and alleviate low back symptoms. Tx focused on BLE flexibility/strengthening and hip/core stabilization.  Will progress as tolerated.  Patient will continue to benefit from skilled PT intervention.    [employer] [facility] Bang is making good progress towards established goals.    Medical necessity is demonstrated by the following IMPAIRMENTS/PROBLEM LIST:   1)Increase in pain level limiting function   2)Decreased range of motion limiting function   3)Decreased strength limiting function   4)Impaired posture   5)Lack of HEP    GOALS: Short Term Goals: 3 weeks  1. Report decreased low back pain  <   / =  2  /10  to increase tolerance for completion of ADLs  2. Pt to increase B popliteal angle by > or = 10 degrees in order to improve flexibility and posture.   3. Increased MMT  for  hip extension to 4+/5 bilaterally to increase tolerance for ADL  and work activities.  4. Pt to achieve 60 degrees of active lumbar flexion in order to demonstrate improvements in mobility.   5. Pt to tolerate HEP to improve ROM and independence with ADL's    Long Term Goals: 6 weeks  1. Report decreased low back pain  <   / =  1 /10  to increase tolerance for completion of ADLs  2. Pt to increase B popliteal angle by > or = 20 degrees in order to improve flexibility and posture.   3. Increased MMT  for  hip extension to 5/5 bilaterally to increase tolerance for ADL and work activities.  4. Pt will report at CK level (40-60% impaired) on FOTO score for low back pain disability to demonstrate decrease in disability and improvement in back pain.  5. Pt to be Independent with HEP to improve ROM and independence with ADL's  6. Pt to demonstrate negative Bridge Test in order to show improved core strength for lumbar stabilization.       Plan  Pt will be treated by physical therapy 1-3 times a week for 6 weeks for Pt Education, HEP, therapeutic exercises, neuromuscular re-education, joint mobilizations, modalities prn to achieve established goals. Paloma may at times be seen by a PTA as part of the Rehab Team.

## 2017-10-12 ENCOUNTER — CLINICAL SUPPORT (OUTPATIENT)
Dept: REHABILITATION | Facility: HOSPITAL | Age: 52
End: 2017-10-12
Attending: INTERNAL MEDICINE
Payer: COMMERCIAL

## 2017-10-12 DIAGNOSIS — M54.41 BILATERAL LOW BACK PAIN WITH RIGHT-SIDED SCIATICA, UNSPECIFIED CHRONICITY: Primary | ICD-10-CM

## 2017-10-12 PROCEDURE — 97150 GROUP THERAPEUTIC PROCEDURES: CPT

## 2017-10-12 PROCEDURE — 97110 THERAPEUTIC EXERCISES: CPT

## 2017-10-12 NOTE — PROGRESS NOTES
Physical Therapy Pool Reassessment    Name: Paloma Bang  Clinic Number: [id number]      Diagnosis:   Encounter Diagnosis   Name Primary?    Bilateral low back pain with right-sided sciatica, unspecified chronicity Yes     Physician: Anshu Cheung, *  Treatment Orders: PT Eval and Treat    Past Medical History:   Diagnosis Date    Deep vein thrombosis     occurred after ankle fracture    GERD (gastroesophageal reflux disease)     Joint pain     Morbid obesity     Pulmonary embolism     occurred after ankle fracture     Current Outpatient Prescriptions   Medication Sig    amlodipine (NORVASC) 5 MG tablet Take by mouth once daily.     ergocalciferol (ERGOCALCIFEROL) 50,000 unit Cap Take 1 capsule (50,000 Units total) by mouth every 7 days.    ERGOCALCIFEROL, VITAMIN D2, (VITAMIN D ORAL) Take 1,000 mg by mouth once daily.    fluticasone (FLONASE) 50 mcg/actuation nasal spray     losartan (COZAAR) 50 MG tablet Take 1 tablet (50 mg total) by mouth once daily.    meloxicam (MOBIC) 15 MG tablet Take 1 tablet (15 mg total) by mouth daily as needed.    multivitamin with minerals tablet Take 1 tablet by mouth once daily.    tramadol (ULTRAM) 50 mg tablet Take 1 tablet by mouth daily as needed.     No current facility-administered medications for this visit.      Review of patient's allergies indicates:  No Known Allergies  Precautions: none    Evaluation Date: 10/12/2017  Visit # authorized: 8/20  Authorization expiration: 12/31/2017  Plan of Care Expiration: 9/7/2017    Subjective   Onset/LINDSEY: gradual    Primary concern/ Chief complaints:    Paloma is a 51 y.o. female with a PMH of lumbar surgery (repaired and fused L4-5, shaved off bulging disc in 2009) and morbid obesity that presents to [facility] outpatient physical therapy secondary to back pain that has worsened over time.     Reports that the pool has been fine, but that she has been having trouble at home which sometimes prevents her from  coming in. Pt reports that she had increased back pain and was unable to complete PT. Still working on orthotics on her shoes. She has been sleeping on her sofa instead of her bed and for some reason, this has been helping. Reports less pain upon waking, and thinks that her sofa may be more firm and may be supporting her better. Continues to have back and hip tightness and pain and it is a strain to stand up straight and lean bacck. Has been forcing herself to stand up straight but she will get numbness/tingling with this and first thing in the morning. Reports that her numbness and tingling is still only going to her knees. Been consistently participating in her HEP with no problems.     Pain Scale: Paloma rates pain on a scale of 0-10 to be 10 at worst; 3 currently; 2 at best .    Patient Goals: Pt would like to decrease pain and increase function so she can return to pain-free completion of all normal daily activities.    Objective     Observation: ambulates independently    Posture:  Mild forward lean    Lumbar Range of Motion:    Degrees Pain   Flexion 60 degrees  (60 is norm) No pain, but reports a strain        Extension 15 degrees  (25 is norm) Pain at end range, achey        Left Side Bending 20 degrees  (25 is norm) -        Right Side Bending 25 degrees  (25 is norm) -        Left Rotation   100% No pain        Right Rotation   100% No pain           Lower Extremity Strength  Right LE  Left LE    Knee extension: 4+/5 Knee extension: 4+/5   Knee flexion: 4/5 Knee flexion: 4/5   Hip flexion: 4/5 Hip flexion: 4/5   Hip extension:  4-/5 Hip extension: 4-/5   Hip abduction: 4/5 Hip abduction: 4/5   Hip adduction: 4+/5 Hip adduction 4+/5   Ankle dorsiflexion: 5/5 Ankle dorsiflexion: 5/5   Ankle plantarflexion: 5/5 Ankle plantarflexion: 5/5       Special Tests:  -SLR Test: + BLE  -Bridge Test: -    Joint Mobility: WNL throughout Lx with exception of L4-5 to PAs secondary to Lx fusion    Palpation: ttp to  bilateral lumbar paraspinals    Sensation: mildly decreased R sural distribution    Flexibility: WNL    Outcome measures:    FOTO back: patient score = 44% impairment    G-code mobility current:  CK (indicating 40-60% impaired)    G-code mobility goal:  CK (indicating 40-60% impaired)    PT reviewed FOTO scores for Paloma Bang on 10/12/2017.   FOTO scores were entered into [employer] - see media section.    TREATMENT:  Paloma received therapeutic exercises to develop strength and endurance, flexibility for 15 minutes including:  Bridging 1x10, seated sciatic nerve glides 1x10 BLE     Instructed pt. Regarding: Proper technique with all exercises. Pt demo good understanding of the education provided. Paloma demonstrated good return demonstration of activities.    Assessment     This is a 51 y.o. female referred to outpatient physical therapy and presents with a medical diagnosis of low back pain and demonstrates limitations as described in the problem list. Pt will benefit from physical therapy services in order to maximize pain free functional independence. The following goals were discussed with the patient and patient is in agreement with them as to be addressed in the treatment plan. Pt was given a HEP consisting of lower trunk rotations and hooklying clamshells with PTB and increased today to include bridging. Pt verbally understood the instructions as they were given and demonstrated proper form and technique during therapy. Pt was advised to perform these exercises free of pain, and to stop performing them if pain occurs.     Pt presents with limitations in lumbar AROM in all planes of motion except for rotation and flexion and presented with weak core musculature indicating decreased support of the lumbar spine with completion of ADLs. Extension range of motion was less than last visit, but bridging test was negative, so pt would benefit from increased bridging at home in order to  encourage further lumbar extension and to increase hip strengthening. In order to decrease stresses on the lumbar spine with completion of functional movements, Paloma Bang  would greatly benefit from stretching of bilateral hamstrings and hip flexors as well as strengthening of core and hip musculature in order to increase dynamic stability at the lumbar spine and improve biomechanics throughout the lumbar spine.    As pt's tolerance for land therapy continues to be significantly decreased based on lack of tolerance for positional changes and mat exercises, pt would benefit from continued aquatic therapy with a goal of progression to land therapy as soon as possible in order to continue progression of strengthening and facilitate return to pain-free completion of ADLs. PT is progressing fairly and has met 3/5 short term goals and 3/6 long term goals. Pt to continue as tolerated.       Patient History Examination Clinical Presentation Clinical Decision Making   Comorbidities:  Morbid obesity    Personal Factors:  none       Activity and Participation Restriction:  Mobility  General tasks and demands    Body Systems:  Musculoskeletal      Body Regions:  Lumbar Stable and uncomplicated     Low              Medical necessity is demonstrated by the following IMPAIRMENTS/PROBLEM LIST:   1)Increase in pain level limiting function   2)Decreased range of motion limiting function   3)Decreased strength limiting function   4)Impaired posture   5)Lack of HEP    GOALS: Short Term Goals: 3 weeks  1. Report decreased low back pain  <   / =  2  /10  to increase tolerance for completion of ADLs MET:9/13/2017  2. Pt to increase B popliteal angle by > or = 10 degrees in order to improve flexibility and posture.   3. Increased MMT  for  hip extension to 4+/5 bilaterally to increase tolerance for ADL and work activities.  4. Pt to achieve 60 degrees of active lumbar flexion in order to demonstrate improvements in mobility.   MET:9/13/2017  5. Pt to tolerate HEP to improve ROM and independence with ADL's  MET:9/13/2017    Long Term Goals: 6 weeks  1. Report decreased low back pain  <   / =  1 /10  to increase tolerance for completion of ADLs  2. Pt to increase B popliteal angle by > or = 20 degrees in order to improve flexibility and posture.   3. Increased MMT  for  hip extension to 5/5 bilaterally to increase tolerance for ADL and work activities.  4. Pt will report at CK level (40-60% impaired) on FOTO score for low back pain disability to demonstrate decrease in disability and improvement in back pain.  MET:9/13/2017  5. Pt to be Independent with HEP to improve ROM and independence with ADL's  MET:10/12/2017  6. Pt to demonstrate negative Bridge Test in order to show improved core strength for lumbar stabilization. MET:10/12/2017    Plan     Pt will be treated by physical therapy 1-3 times a week for 6 weeks for Pt Education, HEP, therapeutic exercises, neuromuscular re-education, joint mobilizations, modalities prn to achieve established goals. Paloma may at times be seen by a PTA as part of the Rehab Team.     Cont PT for 6 weeks.     Olivia Pitts, ANASTASIYA  10/12/2017.     I certify the need for these services furnished under this plan of treatment and while under my care.    ______________________________ Physician/Referring Practitioner  Date of Signature

## 2017-10-18 ENCOUNTER — CLINICAL SUPPORT (OUTPATIENT)
Dept: REHABILITATION | Facility: HOSPITAL | Age: 52
End: 2017-10-18
Attending: INTERNAL MEDICINE
Payer: COMMERCIAL

## 2017-10-18 ENCOUNTER — TELEPHONE (OUTPATIENT)
Dept: INTERNAL MEDICINE | Facility: CLINIC | Age: 52
End: 2017-10-18

## 2017-10-18 DIAGNOSIS — M54.41 BILATERAL LOW BACK PAIN WITH RIGHT-SIDED SCIATICA, UNSPECIFIED CHRONICITY: Primary | ICD-10-CM

## 2017-10-18 PROCEDURE — 97113 AQUATIC THERAPY/EXERCISES: CPT

## 2017-10-18 NOTE — PROGRESS NOTES
Time In: 1700  Time Out: 1730      Subjective  Pt reports: pain across low back/hip region extending posteriorly down L thigh to knee.    Pt pain level: 8/10 upon arrival       Objective    Treatment: Pt was instructed in and performed therapeutic exercises to develop  for 60 minutes. Patient performed therapeutic exercises consisting of the following exercises:      Warm-up Laps 3 x each  fwd/bkw    DKTC in // bars 2x5   Static BLE extensions with TA activation in // bars with UE support 4x3 min     Cool down laps 2x   bkw      Not Performed:  LE Stretch: 3 x 20 sec  HS  Hip flexor stretch   LE exs: 30x each   Mini Squats with QS  Heel Raise with GS  Hip flex/LAQ  Hip ext  Hip abd/add  Step ups performed 30x with RLE but only 15x with LLE secondary to fatigue and LBP  Core:  DKTC in // bars 2x10 np  Bicycle in // bars 2 min np  UE exs: 30x each  Shld flex/ext apo   Shld abd/add apo  Lat pull otb  Horiz Row rtc  Endurance:   Marching 2 min np    Patient was not issued HEP for pool.      Assessment  Pt arrived with substantial amount of low back pain with radicular symptoms extending posteriorly on LLE.  Pt was only able to perform gentle ambulation and isometric exercises during today's tx session. Pt benefited from today's tx session as pt's gait drastically improved post aquatic tx session with reduced pain/symtoms. Pt could benefit from strengthening glut max & core and stretching hip flexors/low back musculature in order to decrease lordosis, protect spine and alleviate low back symptoms. Will progress as tolerated.  Patient will continue to benefit from skilled PT intervention.    Paloma Tomasludwin Bang is making good progress towards established goals.    Medical necessity is demonstrated by the following IMPAIRMENTS/PROBLEM LIST:   1)Increase in pain level limiting function   2)Decreased range of motion limiting function   3)Decreased strength limiting function   4)Impaired posture   5)Lack of HEP    GOALS:  Short Term Goals: 3 weeks  1. Report decreased low back pain  <   / =  2  /10  to increase tolerance for completion of ADLs  2. Pt to increase B popliteal angle by > or = 10 degrees in order to improve flexibility and posture.   3. Increased MMT  for  hip extension to 4+/5 bilaterally to increase tolerance for ADL and work activities.  4. Pt to achieve 60 degrees of active lumbar flexion in order to demonstrate improvements in mobility.   5. Pt to tolerate HEP to improve ROM and independence with ADL's    Long Term Goals: 6 weeks  1. Report decreased low back pain  <   / =  1 /10  to increase tolerance for completion of ADLs  2. Pt to increase B popliteal angle by > or = 20 degrees in order to improve flexibility and posture.   3. Increased MMT  for  hip extension to 5/5 bilaterally to increase tolerance for ADL and work activities.  4. Pt will report at CK level (40-60% impaired) on FOTO score for low back pain disability to demonstrate decrease in disability and improvement in back pain.  5. Pt to be Independent with HEP to improve ROM and independence with ADL's  6. Pt to demonstrate negative Bridge Test in order to show improved core strength for lumbar stabilization.       Plan  Pt will be treated by physical therapy 1-3 times a week for 6 weeks for Pt Education, HEP, therapeutic exercises, neuromuscular re-education, joint mobilizations, modalities prn to achieve established goals. Paloma may at times be seen by a PTA as part of the Rehab Team.

## 2017-10-18 NOTE — TELEPHONE ENCOUNTER
----- Message from Emilie Emerson sent at 10/18/2017  8:24 AM CDT -----  Contact: Self [phone]  Pt believes her medications are causing an allergic reaction. She has been itchy all over when she started to take the estrovin. Please advise    Would also like referral for pain management.

## 2017-10-25 ENCOUNTER — CLINICAL SUPPORT (OUTPATIENT)
Dept: REHABILITATION | Facility: HOSPITAL | Age: 52
End: 2017-10-25
Attending: INTERNAL MEDICINE
Payer: COMMERCIAL

## 2017-10-25 DIAGNOSIS — M54.41 BILATERAL LOW BACK PAIN WITH RIGHT-SIDED SCIATICA, UNSPECIFIED CHRONICITY: Primary | ICD-10-CM

## 2017-10-25 PROCEDURE — 97113 AQUATIC THERAPY/EXERCISES: CPT

## 2017-10-25 NOTE — PROGRESS NOTES
Time In: 1655  Time Out: 1800      Subjective  Pt reports: only moderate pain across low back/hip region extending posteriorly down B thighs to knees.    Pt pain level: 5/10 upon arrival       Objective    Treatment: Pt was instructed in and performed therapeutic exercises to develop  for 60 minutes. Patient performed therapeutic exercises consisting of the following exercises:    Warm-up Laps 2 x each  fwd/bkw    LE Stretch: 2 x 30 sec  HS  Hip flexor stretch    LE exs:    Mini Squats with QS 12x (c/o increased LBP after 12 reps)  Heel Raise with GS 15x  Hip flex/LAQ np  Hip ext np  Hip abd/add 15x  Step ups performed 7x RLE only (c/o increased LBP after 7 reps)    Core:  DKTC in // bars 2x10   Bicycle in // bars 2 min     UE exs:   Shld flex/ext apo   Shld abd/add apo  Lat pull otb np  Horiz Row rtc np    Endurance:   Marching 2 min np    Cool-down Laps 1x each   Fwd/bkw/lat    Patient was not issued HEP for pool.      Assessment  Pt arrived with decreased low back pain and BLE radicular symptoms when compared to the previous tx visit.  Pt's aquatic exercises were limited secondary to low back and radicular pain. Pt could benefit from strengthening glut max & core and stretching hip flexors/low back musculature in order to decrease lordosis, protect spine and alleviate low back symptoms. Will progress as tolerated.  Patient will continue to benefit from skilled PT intervention.    Paloma Bang is making good progress towards established goals.    Medical necessity is demonstrated by the following IMPAIRMENTS/PROBLEM LIST:   1)Increase in pain level limiting function   2)Decreased range of motion limiting function   3)Decreased strength limiting function   4)Impaired posture   5)Lack of HEP    GOALS: Short Term Goals: 3 weeks  1. Report decreased low back pain  <   / =  2  /10  to increase tolerance for completion of ADLs  2. Pt to increase B popliteal angle by > or = 10 degrees in order to improve  flexibility and posture.   3. Increased MMT  for  hip extension to 4+/5 bilaterally to increase tolerance for ADL and work activities.  4. Pt to achieve 60 degrees of active lumbar flexion in order to demonstrate improvements in mobility.   5. Pt to tolerate HEP to improve ROM and independence with ADL's    Long Term Goals: 6 weeks  1. Report decreased low back pain  <   / =  1 /10  to increase tolerance for completion of ADLs  2. Pt to increase B popliteal angle by > or = 20 degrees in order to improve flexibility and posture.   3. Increased MMT  for  hip extension to 5/5 bilaterally to increase tolerance for ADL and work activities.  4. Pt will report at CK level (40-60% impaired) on FOTO score for low back pain disability to demonstrate decrease in disability and improvement in back pain.  5. Pt to be Independent with HEP to improve ROM and independence with ADL's  6. Pt to demonstrate negative Bridge Test in order to show improved core strength for lumbar stabilization.       Plan  Pt will be treated by physical therapy 1-3 times a week for 6 weeks for Pt Education, HEP, therapeutic exercises, neuromuscular re-education, joint mobilizations, modalities prn to achieve established goals. Paloma may at times be seen by a PTA as part of the Rehab Team.

## 2017-10-27 ENCOUNTER — TELEPHONE (OUTPATIENT)
Dept: INTERNAL MEDICINE | Facility: CLINIC | Age: 52
End: 2017-10-27

## 2017-10-27 ENCOUNTER — TELEPHONE (OUTPATIENT)
Dept: PAIN MEDICINE | Facility: CLINIC | Age: 52
End: 2017-10-27

## 2017-10-27 DIAGNOSIS — M54.9 BACK PAIN, UNSPECIFIED BACK LOCATION, UNSPECIFIED BACK PAIN LATERALITY, UNSPECIFIED CHRONICITY: Primary | ICD-10-CM

## 2017-11-01 ENCOUNTER — TELEPHONE (OUTPATIENT)
Dept: PAIN MEDICINE | Facility: OTHER | Age: 52
End: 2017-11-01

## 2017-11-02 ENCOUNTER — CLINICAL SUPPORT (OUTPATIENT)
Dept: REHABILITATION | Facility: HOSPITAL | Age: 52
End: 2017-11-02
Attending: INTERNAL MEDICINE
Payer: COMMERCIAL

## 2017-11-02 DIAGNOSIS — M54.41 BILATERAL LOW BACK PAIN WITH RIGHT-SIDED SCIATICA, UNSPECIFIED CHRONICITY: Primary | ICD-10-CM

## 2017-11-02 PROCEDURE — G8980 MOBILITY D/C STATUS: HCPCS | Mod: CK

## 2017-11-02 PROCEDURE — 97110 THERAPEUTIC EXERCISES: CPT

## 2017-11-02 PROCEDURE — G8979 MOBILITY GOAL STATUS: HCPCS | Mod: CK

## 2017-11-02 NOTE — PROGRESS NOTES
Physical Therapy Pool Reassessment    Name: Paloma Bang  Clinic Number: [id number]      Diagnosis:   Encounter Diagnosis   Name Primary?    Bilateral low back pain with right-sided sciatica, unspecified chronicity Yes     Physician: Anshu Cheung, *  Treatment Orders: PT Eval and Treat    Past Medical History:   Diagnosis Date    Deep vein thrombosis     occurred after ankle fracture    GERD (gastroesophageal reflux disease)     Joint pain     Morbid obesity     Pulmonary embolism     occurred after ankle fracture     Current Outpatient Prescriptions   Medication Sig    amlodipine (NORVASC) 5 MG tablet Take by mouth once daily.     ergocalciferol (ERGOCALCIFEROL) 50,000 unit Cap Take 1 capsule (50,000 Units total) by mouth every 7 days.    ERGOCALCIFEROL, VITAMIN D2, (VITAMIN D ORAL) Take 1,000 mg by mouth once daily.    fluticasone (FLONASE) 50 mcg/actuation nasal spray     losartan (COZAAR) 50 MG tablet Take 1 tablet (50 mg total) by mouth once daily.    meloxicam (MOBIC) 15 MG tablet Take 1 tablet (15 mg total) by mouth daily as needed.    multivitamin with minerals tablet Take 1 tablet by mouth once daily.    tramadol (ULTRAM) 50 mg tablet Take 1 tablet by mouth daily as needed.     No current facility-administered medications for this visit.      Review of patient's allergies indicates:  No Known Allergies  Precautions: none    Evaluation Date: 11/02/2017  Visit # authorized: 11/20  Authorization expiration: 12/31/2017  Plan of Care Expiration: 9/7/2017    Subjective   Onset/LINDSEY: gradual    Primary concern/ Chief complaints:    Paloma is a 52 y.o. female with a PMH of lumbar surgery (repaired and fused L4-5, shaved off bulging disc in 2009) and morbid obesity that presents to [facility] outpatient physical therapy secondary to back pain that has worsened over time.     10/12/2017: Reports that the pool has been fine, but that she has been having trouble at home which sometimes  prevents her from coming in. Pt reports that she had increased back pain and was unable to complete PT. Still working on orthotics on her shoes. She has been sleeping on her sofa instead of her bed and for some reason, this has been helping. Reports less pain upon waking, and thinks that her sofa may be more firm and may be supporting her better. Continues to have back and hip tightness and pain and it is a strain to stand up straight and lean back. Has been forcing herself to stand up straight but she will get numbness/tingling with this and first thing in the morning. Reports that her numbness and tingling is still only going to her knees. Been consistently participating in her HEP with no problems.     11/2/2017: Has an appointment with her MD for further evaluation of her back, and she is to see a pain management MD. Wants to know what is causing her pain to get worse, especially on her left side. Reports that her pain will leave her so stiff that she will start to lean forward and feel like her back is getting locked out. Will try to to back bends, but reports more strain in the back of her thighs with this. Pt wants to lose weight, but would like to decrease her pain so she can participate in more exercise.    Pain Scale: Paloma rates pain on a scale of 0-10 to be 10 at worst; 3 currently; 2 at best .    Patient Goals: Pt would like to decrease pain and increase function so she can return to pain-free completion of all normal daily activities.    Objective     Observation: ambulates independently    Posture:  Mild forward lean    Lumbar Range of Motion:    Degrees Pain   Flexion 60 degrees  (60 is norm) No pain, and only felt a strain at long-term down      Extension 10 degrees  (25 is norm) Pain at end range, unable to go farther        Left Side Bending 20 degrees  (25 is norm) -        Right Side Bending 25 degrees  (25 is norm) -        Left Rotation   100% No pain        Right Rotation   100% No pain            Lower Extremity Strength  Right LE  Left LE    Knee extension: 4+/5 Knee extension: 4+/5   Knee flexion: 4/5 Knee flexion: 4/5   Hip flexion: 4/5 Hip flexion: 4/5   Hip extension:  4-/5 Hip extension: 4-/5   Hip abduction: 4/5 Hip abduction: 4/5   Hip adduction: 4+/5 Hip adduction 4+/5   Ankle dorsiflexion: 5/5 Ankle dorsiflexion: 5/5   Ankle plantarflexion: 5/5 Ankle plantarflexion: 5/5       Special Tests:  -SLR Test: + BLE  -Bridge Test: -    Joint Mobility: WNL throughout Lx with exception of L4-5 to PAs secondary to Lx fusion    Palpation: ttp to bilateral lumbar paraspinals    Sensation: mildly decreased R sural distribution    Flexibility: WNL    Outcome measures:    FOTO back: patient score = 44% impairment    G-code mobility current:  CK (indicating 40-60% impaired)    G-code mobility goal:  CK (indicating 40-60% impaired)    PT reviewed FOTO scores for Paloma Bang on 11/02/2017.   FOTO scores were entered into [employer] - see media section.    TREATMENT:  Paloma received therapeutic exercises to develop strength and endurance, flexibility for 15 minutes including:  Bridging 1x10, seated sciatic nerve glides 1x10 BLE     Instructed pt. Regarding: Proper technique with all exercises. Pt demo good understanding of the education provided. Paloma demonstrated good return demonstration of activities.    Assessment     This is a 52 y.o. female referred to outpatient physical therapy and presents with a medical diagnosis of low back pain and demonstrates limitations as described in the problem list. Pt will benefit from physical therapy services in order to maximize pain free functional independence. The following goals were discussed with the patient and patient is in agreement with them as to be addressed in the treatment plan. Pt was given a HEP consisting of lower trunk rotations and hooklying clamshells with PTB and increased today to include bridging. Pt verbally understood the  instructions as they were given and demonstrated proper form and technique during therapy. Pt was advised to perform these exercises free of pain, and to stop performing them if pain occurs.     Pt presents with limitations in lumbar AROM most notably in extension and presented with weak core musculature indicating decreased support of the lumbar spine with completion of ADLs. Pt has been performing well in aqua therapy but has had decreased progress due to decreased participation among other factors including high pain levels. PT is progressing fairly and has met 3/5 short term goals and 3/6 long term goals. Due to decreased progress and difficulty attending her visits, pt is being discharged and is to continue aqua therapy at [facility].      Patient History Examination Clinical Presentation Clinical Decision Making   Comorbidities:  Morbid obesity    Personal Factors:  none       Activity and Participation Restriction:  Mobility  General tasks and demands    Body Systems:  Musculoskeletal      Body Regions:  Lumbar Stable and uncomplicated     Low              Medical necessity is demonstrated by the following IMPAIRMENTS/PROBLEM LIST:   1)Increase in pain level limiting function   2)Decreased range of motion limiting function   3)Decreased strength limiting function   4)Impaired posture   5)Lack of HEP    GOALS: Short Term Goals: 3 weeks  1. Report decreased low back pain  <   / =  2  /10  to increase tolerance for completion of ADLs MET:9/13/2017  2. Pt to increase B popliteal angle by > or = 10 degrees in order to improve flexibility and posture.   3. Increased MMT  for  hip extension to 4+/5 bilaterally to increase tolerance for ADL and work activities.  4. Pt to achieve 60 degrees of active lumbar flexion in order to demonstrate improvements in mobility.  MET:9/13/2017  5. Pt to tolerate HEP to improve ROM and independence with ADL's  MET:9/13/2017    Long Term Goals: 6 weeks  1. Report decreased  low back pain  <   / =  1 /10  to increase tolerance for completion of ADLs  2. Pt to increase B popliteal angle by > or = 20 degrees in order to improve flexibility and posture.   3. Increased MMT  for  hip extension to 5/5 bilaterally to increase tolerance for ADL and work activities.  4. Pt will report at CK level (40-60% impaired) on FOTO score for low back pain disability to demonstrate decrease in disability and improvement in back pain.  MET:9/13/2017  5. Pt to be Independent with HEP to improve ROM and independence with ADL's  MET:10/12/2017  6. Pt to demonstrate negative Bridge Test in order to show improved core strength for lumbar stabilization. MET:10/12/2017    Plan     Pt is discharged with aquatic HEP for continued progression towards remaining goals.    Olivia Pitts DPT  11/02/2017.

## 2017-11-03 ENCOUNTER — OFFICE VISIT (OUTPATIENT)
Dept: PAIN MEDICINE | Facility: CLINIC | Age: 52
End: 2017-11-03
Attending: ANESTHESIOLOGY
Payer: COMMERCIAL

## 2017-11-03 VITALS
RESPIRATION RATE: 18 BRPM | HEIGHT: 63 IN | WEIGHT: 273.38 LBS | TEMPERATURE: 98 F | OXYGEN SATURATION: 99 % | BODY MASS INDEX: 48.44 KG/M2

## 2017-11-03 DIAGNOSIS — G89.29 CHRONIC RIGHT SHOULDER PAIN: ICD-10-CM

## 2017-11-03 DIAGNOSIS — M54.16 LUMBAR RADICULOPATHY, CHRONIC: ICD-10-CM

## 2017-11-03 DIAGNOSIS — M25.511 CHRONIC RIGHT SHOULDER PAIN: ICD-10-CM

## 2017-11-03 DIAGNOSIS — M47.816 FACET ARTHRITIS OF LUMBAR REGION: Primary | ICD-10-CM

## 2017-11-03 DIAGNOSIS — M51.36 DDD (DEGENERATIVE DISC DISEASE), LUMBAR: ICD-10-CM

## 2017-11-03 DIAGNOSIS — M79.10 MYALGIA: ICD-10-CM

## 2017-11-03 PROCEDURE — 99244 OFF/OP CNSLTJ NEW/EST MOD 40: CPT | Mod: S$GLB,,, | Performed by: ANESTHESIOLOGY

## 2017-11-03 PROCEDURE — 99999 PR PBB SHADOW E&M-EST. PATIENT-LVL III: CPT | Mod: PBBFAC,,, | Performed by: ANESTHESIOLOGY

## 2017-11-03 RX ORDER — ETODOLAC 400 MG/1
400 TABLET, FILM COATED ORAL 2 TIMES DAILY PRN
Qty: 60 TABLET | Refills: 5 | Status: SHIPPED | OUTPATIENT
Start: 2017-11-03 | End: 2018-04-02

## 2017-11-03 RX ORDER — METHOCARBAMOL 500 MG/1
500 TABLET, FILM COATED ORAL 3 TIMES DAILY PRN
Qty: 90 TABLET | Refills: 5 | Status: SHIPPED | OUTPATIENT
Start: 2017-11-03 | End: 2017-12-03

## 2017-11-03 NOTE — PROGRESS NOTES
"Subjective:     Patient ID: Paloma Bang is a 52 y.o. female.    Chief Complaint: Pain    Consulted by: Dr. Mayorga     Disclaimer: This note was generated using voice recognition software.  There may be a typographical errors that were missed during proofreading.      HPI:    Paloma Bang is a 52 y.o. female who presents today with low back pain that started in 7/2016 when she fell out of a tub at a hotel.  Since that time, she has had low back pain that is her low back that is equal bilaterally and radiates into the posterior aspect of her left > right leg to the knee.  It used to go past her knee, but this has resolved. She has some ongoing numbness and tingling in her right leg that has been present since prior to her lumbar surgery in 2009.   This pain is described in detail below.    Physical Therapy: She just finished a course of PT.  She is doing her HEP    Non-pharmacologic Treatment:     · Ice/Heat: Heat helps  · TENS: Not tried  · Massage: Not tried  · Chiropractic care: Not tried  · Acupuncture: Not tried  · Other: She wants to get orthotics         Pain Medications:         · Currently taking: tramadol 50 mg once or twice a month (puts her to sleep), meloxicam every other day,     · Has tried in the past:  Naproxen, flexeril, topical oil from a holistic provider    · Has not tried: Tylenol, other muscle relaxants, TCAs, SNRIs, anticonvulsants, topical creams    Blood thinners: None    Interventional Therapies: None    Relevant Surgeries: Lumbar L4/5 surgery 2009 for "sciatic issue"    Affecting sleep? Yes    Affecting daily activities? Yes    Depressive symptoms? No          · SI/HI? 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    Pain Scales  Best:/10  Worst:  Usually:  Today: 5/10    Low-back Pain   This is a chronic problem. The " current episode started more than 1 year ago. The problem occurs constantly.       No flowsheet data found.    Review of Systems   Musculoskeletal: Positive for back pain, joint pain, myalgias and stiffness.   Psychiatric/Behavioral: Negative for altered mental status and substance abuse.   All other systems reviewed and are negative.            Past Medical History:   Diagnosis Date    Deep vein thrombosis     occurred after ankle fracture    GERD (gastroesophageal reflux disease)     Joint pain     Morbid obesity     Pulmonary embolism     occurred after ankle fracture       Past Surgical History:   Procedure Laterality Date    ankle surgery      back surgery      Lumbar spinal fusion with shaving of a disk     SECTION      HYSTERECTOMY      2007    LAPAROSCOPIC GASTRIC BANDING      MYOMECTOMY      PILONIDAL CYST DRAINAGE      removed    ROTATOR CUFF REPAIR      Left       Review of patient's allergies indicates:  No Known Allergies    Current Outpatient Prescriptions   Medication Sig Dispense Refill    amlodipine (NORVASC) 5 MG tablet Take by mouth once daily.       ergocalciferol (ERGOCALCIFEROL) 50,000 unit Cap Take 1 capsule (50,000 Units total) by mouth every 7 days. 12 capsule 0    fluticasone (FLONASE) 50 mcg/actuation nasal spray       losartan (COZAAR) 50 MG tablet Take 1 tablet (50 mg total) by mouth once daily. 90 tablet 3    meloxicam (MOBIC) 15 MG tablet Take 1 tablet (15 mg total) by mouth daily as needed. 90 tablet 1    multivitamin with minerals tablet Take 1 tablet by mouth once daily.      tramadol (ULTRAM) 50 mg tablet Take 1 tablet by mouth daily as needed.       No current facility-administered medications for this visit.        Family History   Problem Relation Age of Onset    Diabetes Father     Hypertension Father     Diabetes Mother     Hypertension Mother     Breast cancer Neg Hx     Colon cancer Neg Hx     Ovarian cancer Neg Hx     Acne Neg Hx      "Eczema Neg Hx     Lupus Neg Hx     Psoriasis Neg Hx     Melanoma Neg Hx        Social History     Social History    Marital status: Single     Spouse name: N/A    Number of children: N/A    Years of education: N/A     Occupational History    Not on file.     Social History Main Topics    Smoking status: Never Smoker    Smokeless tobacco: Never Used    Alcohol use Yes      Comment: occasionally/ not weekly    Drug use: No    Sexual activity: Not Currently     Birth control/ protection: Surgical     Other Topics Concern    Not on file     Social History Narrative    No narrative on file       Objective:     Vitals:    11/03/17 0801   Resp: 18   SpO2: 99%   Weight: 124 kg (273 lb 5.9 oz)   Height: 5' 3" (1.6 m)   PainSc:   5   PainLoc: Back       GEN:  Well developed, well nourished.  No acute distress. No pain behavior.  HEENT:  No trauma.  Mucous membranes moist.  Nares patent bilaterally.  PSYCH: Normal affect. Thought content appropriate.  CHEST:  Breathing symmetric.  No audible wheezing.  ABD: Soft, non-tender, non-distended.  SKIN:  Warm, pink, dry.  No rash on exposed areas.    EXT:  No cyanosis, clubbing, or edema.  No color change or changes in nail or hair growth.  NEURO/MUSCULOSKELETAL:  Fully alert, oriented, and appropriate. Speech normal zeynep. No cranial nerve deficits.   Gait: Antalgic.  Present trendelenburg sign bilaterally.   Motor Strength: 5/5 motor strength throughout lower extremities.   Sensory: Decreased sensation in a right L5 sensory deficit in the lower extremities.   Reflexes:  2+ and symmetric throughout.  Downgoing Babinski's bilaterally.  No clonus or spasticity.  L-Spine:  Decreased ROM with pain on flexion, extension, lateral rotation. Positive facet loading bilaterally.  Negative SLR bilaterally.    SI Joint/Hip: Negative IVETTE bilaterally.  Negative FADIR bilaterally.  TTP over lumbar paraspinals, bilateral SI joints      Imaging:        The imaging studies listed " below were independently reviewed by me, and I agree with the findings as documented below.     Narrative     History: Back pain    Comparison: 11/20/15    Result: 3 view of the lumbar spine.      Severe facet arthropathy is seen from L3/4 through L5/S1. Grade 1 anterolisthesis of L3 in relation to L4 is appreciated, with alignment at other levels appearing essentially unremarkable.  Vertebral body heights are normally maintained, without compression deformity at any level.  There is no marked disc narrowing at any level.  Minor marginal vertebral endplate spurring at a few lumbar levels is seen.  Lab band again noted. Mild constipation. In comparison to the prior study, there is no adverse interval changes.   Impression      No adverse interval change         Assessment:     Encounter Diagnoses   Name Primary?    Facet arthritis of lumbar region Yes    DDD (degenerative disc disease), lumbar     Lumbar radiculopathy, chronic     Myalgia     Chronic right shoulder pain        Plan:     Paloma was seen today for low-back pain.    Diagnoses and all orders for this visit:    Facet arthritis of lumbar region  -     MRI Lumbar Spine Without Contrast; Future  -     methocarbamol (ROBAXIN) 500 MG Tab; Take 1 tablet (500 mg total) by mouth 3 (three) times daily as needed (muscle spasms).  -     etodolac (LODINE) 400 MG tablet; Take 1 tablet (400 mg total) by mouth 2 (two) times daily as needed.    DDD (degenerative disc disease), lumbar  -     MRI Lumbar Spine Without Contrast; Future  -     methocarbamol (ROBAXIN) 500 MG Tab; Take 1 tablet (500 mg total) by mouth 3 (three) times daily as needed (muscle spasms).  -     etodolac (LODINE) 400 MG tablet; Take 1 tablet (400 mg total) by mouth 2 (two) times daily as needed.    Lumbar radiculopathy, chronic  -     MRI Lumbar Spine Without Contrast; Future  -     methocarbamol (ROBAXIN) 500 MG Tab; Take 1 tablet (500 mg total) by mouth 3 (three) times daily as needed  (muscle spasms).  -     etodolac (LODINE) 400 MG tablet; Take 1 tablet (400 mg total) by mouth 2 (two) times daily as needed.    Myalgia  -     MRI Lumbar Spine Without Contrast; Future  -     methocarbamol (ROBAXIN) 500 MG Tab; Take 1 tablet (500 mg total) by mouth 3 (three) times daily as needed (muscle spasms).  -     etodolac (LODINE) 400 MG tablet; Take 1 tablet (400 mg total) by mouth 2 (two) times daily as needed.    Chronic right shoulder pain       Her pain is consistent with the above.    We discussed the assessment and recommendations.  All available images were reviewed. We discussed the disease process, prognosis, treatment plan, and risks and benefits. The patient is aware of the risks and benefits of the medications being prescribed, common side effects, and proper usage. The following is the plan we agreed on:     1. Schedule for left facet joint injections, L3-4, L4-5, and L5-S1  2. Schedule for MRI lumbar spine to evaluate for any disc pathology give past history of lumbar surgery and finding consistent with a chronic right L5 radiculopathy  3. Trial Etodolac 400 mg BID PRN  4. Trial Methocarbamol 500 mg TID PRN (start at night)  5. She also asked about FRP.  She may be a candidate for this in the future, but we need to start basic treatment first  6. RTC in 3-6 weeks or sooner if needed.    Thank you for allowing me to participate in the care of this patient.   Please do not hesitate to call me at [phone] with any questions or concerns.    Greater than 25 minutes spent in total in todays visit with the patient, with more than half that time direct face to face counseling and education with the patient today. We discussed the disease process, prognosis, treatment plan, and risks and benefits.    The above plan and management options were discussed at length with patient. Patient is in agreement with the above and verbalized understanding. It will be communicated with the referring physician  via electronic record, fax, or mail.

## 2017-11-03 NOTE — PATIENT INSTRUCTIONS
We are starting two new medications today:    Robaxim (methocarbamol) is a muscle relaxant.  You may use this up to three times daily.  This medication might make you sleepy, so do not drive until you know how this medication will affect you.    Etodolac 400 mg up to twice daily as needed.  Take medication with meals.  If you experience any upset stomach, nausea, or vomiting, stop taking this medication.

## 2017-11-03 NOTE — LETTER
November 6, 2017      Jenn Mayorga MD  [address] [facility] [place] [address]           [facility]  [address]  [facility] [address]  Phone: [phone]  Fax: [phone]          Patient: Paloma Bang   MR Number: [id number]   YOB: 1965   Date of Visit: 11/3/2017       Dear Dr. Jenn Mayorga:    Thank you for referring Paloma Bang to me for evaluation. Attached you will find relevant portions of my assessment and plan of care.    If you have questions, please do not hesitate to call me. I look forward to following Paloma Bang along with you.    Sincerely,    Jia Yang MD    Enclosure  CC:  No Recipients    If you would like to receive this communication electronically, please contact [email or web address] or [phone] to request more information on [facility] [employer] [facility] Link access.    For providers and/or their staff who would like to refer a patient to [facility], please contact us through our one-stop-shop provider referral line, [facility], at [phone].    If you feel you have received this communication in error or would no longer like to receive these types of communications, please e-mail [email or web address]

## 2017-11-07 ENCOUNTER — SURGERY (OUTPATIENT)
Age: 52
End: 2017-11-07

## 2017-11-07 ENCOUNTER — HOSPITAL ENCOUNTER (OUTPATIENT)
Facility: OTHER | Age: 52
Discharge: HOME | End: 2017-11-07
Attending: ANESTHESIOLOGY | Admitting: ANESTHESIOLOGY
Payer: COMMERCIAL

## 2017-11-07 VITALS
RESPIRATION RATE: 18 BRPM | SYSTOLIC BLOOD PRESSURE: 143 MMHG | BODY MASS INDEX: 48.37 KG/M2 | DIASTOLIC BLOOD PRESSURE: 79 MMHG | WEIGHT: 273 LBS | HEART RATE: 7 BPM | TEMPERATURE: 98 F | OXYGEN SATURATION: 100 % | HEIGHT: 63 IN

## 2017-11-07 DIAGNOSIS — G89.29 CHRONIC PAIN: ICD-10-CM

## 2017-11-07 DIAGNOSIS — M47.816 SPONDYLOSIS OF LUMBAR REGION WITHOUT MYELOPATHY OR RADICULOPATHY: Primary | ICD-10-CM

## 2017-11-07 PROCEDURE — 64493 INJ PARAVERT F JNT L/S 1 LEV: CPT | Mod: LT,,, | Performed by: ANESTHESIOLOGY

## 2017-11-07 PROCEDURE — 64495 INJ PARAVERT F JNT L/S 3 LEV: CPT | Performed by: ANESTHESIOLOGY

## 2017-11-07 PROCEDURE — 64493 INJ PARAVERT F JNT L/S 1 LEV: CPT | Performed by: ANESTHESIOLOGY

## 2017-11-07 PROCEDURE — 25500020 PHARM REV CODE 255: Performed by: PHYSICAL MEDICINE & REHABILITATION

## 2017-11-07 PROCEDURE — 25000003 PHARM REV CODE 250: Performed by: PHYSICAL MEDICINE & REHABILITATION

## 2017-11-07 PROCEDURE — 64495 INJ PARAVERT F JNT L/S 3 LEV: CPT | Mod: LT,,, | Performed by: ANESTHESIOLOGY

## 2017-11-07 PROCEDURE — 63600175 PHARM REV CODE 636 W HCPCS: Performed by: PHYSICAL MEDICINE & REHABILITATION

## 2017-11-07 PROCEDURE — 64494 INJ PARAVERT F JNT L/S 2 LEV: CPT | Performed by: ANESTHESIOLOGY

## 2017-11-07 PROCEDURE — S0020 INJECTION, BUPIVICAINE HYDRO: HCPCS | Performed by: PHYSICAL MEDICINE & REHABILITATION

## 2017-11-07 PROCEDURE — 64494 INJ PARAVERT F JNT L/S 2 LEV: CPT | Mod: LT,,, | Performed by: ANESTHESIOLOGY

## 2017-11-07 RX ORDER — SODIUM CHLORIDE 9 MG/ML
500 INJECTION, SOLUTION INTRAVENOUS CONTINUOUS
Status: DISCONTINUED | OUTPATIENT
Start: 2017-11-07 | End: 2017-11-07 | Stop reason: HOSPADM

## 2017-11-07 RX ORDER — METHYLPREDNISOLONE ACETATE 40 MG/ML
40 INJECTION, SUSPENSION INTRA-ARTICULAR; INTRALESIONAL; INTRAMUSCULAR; SOFT TISSUE ONCE
Status: COMPLETED | OUTPATIENT
Start: 2017-11-07 | End: 2017-11-07

## 2017-11-07 RX ORDER — BUPIVACAINE HYDROCHLORIDE 5 MG/ML
10 INJECTION, SOLUTION EPIDURAL; INTRACAUDAL ONCE
Status: COMPLETED | OUTPATIENT
Start: 2017-11-07 | End: 2017-11-07

## 2017-11-07 RX ORDER — LIDOCAINE HYDROCHLORIDE 10 MG/ML
1 INJECTION, SOLUTION EPIDURAL; INFILTRATION; INTRACAUDAL; PERINEURAL ONCE
Status: COMPLETED | OUTPATIENT
Start: 2017-11-07 | End: 2017-11-07

## 2017-11-07 RX ADMIN — IOHEXOL 5 ML: 300 INJECTION, SOLUTION INTRAVENOUS at 11:11

## 2017-11-07 RX ADMIN — LIDOCAINE HYDROCHLORIDE 10 ML: 10 INJECTION, SOLUTION EPIDURAL; INFILTRATION; INTRACAUDAL; PERINEURAL at 11:11

## 2017-11-07 RX ADMIN — METHYLPREDNISOLONE ACETATE 40 MG: 40 INJECTION, SUSPENSION INTRA-ARTICULAR; INTRALESIONAL; INTRAMUSCULAR; SOFT TISSUE at 11:11

## 2017-11-07 RX ADMIN — BUPIVACAINE HYDROCHLORIDE 10 ML: 5 INJECTION, SOLUTION EPIDURAL; INTRACAUDAL; PERINEURAL at 11:11

## 2017-11-07 NOTE — PLAN OF CARE
PATIENT TOLERATED PROCEDURE WELL. PT COMPLAINS OF  3/10 PAIN. ASSISTED PATIENT UP FOR FIRST TIME. STEADY ON FEET AND DISCHARGE INSTRUCTIONS GIVEN.

## 2017-11-07 NOTE — DISCHARGE INSTRUCTIONS

## 2017-11-07 NOTE — OP NOTE
"Date of procedure: 11/07/2017    Procedure: Left L3-4, L4-5, and L5-S1 Facet joint injection     Pre-op diagnosis: Facet arthritis of lumbar region [M46.96]  DDD (degenerative disc disease), lumbar [M51.36]     Post-op diagnosis: Facet arthritis of lumbar region [M46.96]  DDD (degenerative disc disease), lumbar [M51.36]     Physician: Dr. Jia Yang     Assistant: Dr. Gerber    Anesthestia: local/niravam    EBL: None    Specimens: None    All medications, allergies, and relevant histories were reviewed. No recent antibiotics or infections. A time-out was taken to verify the correct patient, procedure, laterality, and appropriate medications/allergies.     Fluoroscopically-Guided, Contrast-Controlled Lumbar Facet Joint Injection:     Following denial of allergy and review of potential side effects and complications including but not necessarily limited to infection, allergic reaction, local tissue breakdown, nerve injury, paresis, paralysis, spinal cord injury, and seizure, the patient indicated they understood and agreed to proceed.     Patient was placed in prone position. Lumbar area was prepped and draped in sterile manner. The level was determined under fluoroscopic guidance. Using a 25 gauge 1.5" needle, bicarbonated Xylocaine 1% was given subcutaneously at the level L3-4 on the left. The 3.5in 22-gauge needle was introduced into the left L3-4 facet joint. Following negative aspiration for blood and CSF and confirming the absence of paresthesias, injection of approximately 1 cc of Omnipaque 300 demonstrated intra-articular spread without vascular or intrathecal uptake. At this point, 1cc of a mixture of 3 cc of 0.25% marcaine with 80 mg of Depo-Medrol was injected without complication. The same procedure was performed in an identical fashion on the left L4-5, and left L5-S1 sequentially.     Patient tolerated the procedure well without any side effects. No noted complications.     The patient was followed post " procedure and discharged under their own power in excellent condition.     Future Management:   If helpful, can repeat as needed.   Follow up in 6 weeks or as needed.    I certify that I provided the above services.  I was present for the entire procedure, which was performed by the fellow physician under my supervision.  There were no parts of the procedure that were performed not by myself or without my direct supervision.

## 2017-11-15 ENCOUNTER — HOSPITAL ENCOUNTER (OUTPATIENT)
Dept: RADIOLOGY | Facility: OTHER | Age: 52
Discharge: HOME | End: 2017-11-15
Attending: ANESTHESIOLOGY
Payer: COMMERCIAL

## 2017-11-15 DIAGNOSIS — M47.816 FACET ARTHRITIS OF LUMBAR REGION: ICD-10-CM

## 2017-11-15 DIAGNOSIS — M54.16 LUMBAR RADICULOPATHY, CHRONIC: ICD-10-CM

## 2017-11-15 DIAGNOSIS — M79.10 MYALGIA: ICD-10-CM

## 2017-11-15 DIAGNOSIS — M51.36 DDD (DEGENERATIVE DISC DISEASE), LUMBAR: ICD-10-CM

## 2017-11-15 PROCEDURE — 72148 MRI LUMBAR SPINE W/O DYE: CPT | Mod: 26,,, | Performed by: RADIOLOGY

## 2017-11-15 PROCEDURE — 72148 MRI LUMBAR SPINE W/O DYE: CPT | Mod: TC

## 2017-11-24 RX ORDER — ERGOCALCIFEROL 1.25 MG/1
50000 CAPSULE ORAL
Qty: 12 CAPSULE | Refills: 0 | Status: SHIPPED | OUTPATIENT
Start: 2017-11-24 | End: 2018-03-01 | Stop reason: SDUPTHER

## 2017-11-29 ENCOUNTER — OFFICE VISIT (OUTPATIENT)
Dept: PAIN MEDICINE | Facility: CLINIC | Age: 52
End: 2017-11-29
Payer: COMMERCIAL

## 2017-11-29 VITALS — HEIGHT: 63 IN | RESPIRATION RATE: 16 BRPM | TEMPERATURE: 98 F | WEIGHT: 273.38 LBS | BODY MASS INDEX: 48.44 KG/M2

## 2017-11-29 DIAGNOSIS — M47.816 SPONDYLOSIS OF LUMBAR REGION WITHOUT MYELOPATHY OR RADICULOPATHY: Primary | ICD-10-CM

## 2017-11-29 DIAGNOSIS — M51.36 DDD (DEGENERATIVE DISC DISEASE), LUMBAR: ICD-10-CM

## 2017-11-29 DIAGNOSIS — M47.816 FACET ARTHRITIS OF LUMBAR REGION: ICD-10-CM

## 2017-11-29 DIAGNOSIS — M54.16 LUMBAR RADICULOPATHY, CHRONIC: ICD-10-CM

## 2017-11-29 DIAGNOSIS — M79.10 MYALGIA: ICD-10-CM

## 2017-11-29 PROCEDURE — 99999 PR PBB SHADOW E&M-EST. PATIENT-LVL III: CPT | Mod: PBBFAC,,, | Performed by: NURSE PRACTITIONER

## 2017-11-29 PROCEDURE — 99213 OFFICE O/P EST LOW 20 MIN: CPT | Mod: S$GLB,,, | Performed by: NURSE PRACTITIONER

## 2017-11-29 NOTE — PROGRESS NOTES
Subjective:     Patient ID: Paloma Bang is a 52 y.o. female.    Chief Complaint: Pain    Consulted by: Dr. Mayorga     Disclaimer: This note was generated using voice recognition software.  There may be a typographical errors that were missed during proofreading.      HPI:    Paloma Bang is a 52 y.o. female who presents today with low back pain that started in 7/2016 when she fell out of a tub at a hotel.  Since that time, she has had low back pain that is her low back that is equal bilaterally and radiates into the posterior aspect of her left > right leg to the knee.  It used to go past her knee, but this has resolved. She has some ongoing numbness and tingling in her right leg that has been present since prior to her lumbar surgery in 2009.   This pain is described in detail below.    Interval History (11/29/2017):  The patient returns to clinic today for follow up. She is s/p left L3-4, L4-5, and L5-S1 facet joint injections on 11/7/2017. She reports 90% relief of her low back pain. She reports intermittent pain in the left buttock and posterior thigh that is aching in nature. She reports that this pain is tolerable at this time.     Physical Therapy: She just finished a course of PT.  She is doing her HEP    Non-pharmacologic Treatment:     · Ice/Heat: Heat helps  · TENS: Not tried  · Massage: Not tried  · Chiropractic care: Not tried  · Acupuncture: Not tried  · Other: She wants to get orthotics         Pain Medications:         · Currently taking: tramadol 50 mg once or twice a month (puts her to sleep), meloxicam every other day,     · Has tried in the past:  Naproxen, flexeril, topical oil from a holistic provider    · Has not tried: Tylenol, other muscle relaxants, TCAs, SNRIs, anticonvulsants, topical creams    Blood thinners: None    Interventional Therapies:   · 11/7/2017: Left L3-4, L4-5, and L5-S1 facet joint injections- significant relief    Relevant Surgeries: Lumbar L4/5  "surgery 2009 for "sciatic issue"    Affecting sleep? Yes    Affecting daily activities? Yes    Depressive symptoms? No          · SI/HI? 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    Pain Scales  Best:/10  Worst:  Usually:  Today: 10    Low-back Pain   This is a chronic problem. The current episode started more than 1 year ago. The problem occurs constantly.   Back Pain         Last 3 PDI Scores 2017   Pain Disability Index (PDI) 30       Review of Systems   Musculoskeletal: Positive for back pain, joint pain, myalgias and stiffness.   Psychiatric/Behavioral: Negative for altered mental status and substance abuse.   All other systems reviewed and are negative.            Past Medical History:   Diagnosis Date    Deep vein thrombosis     occurred after ankle fracture    GERD (gastroesophageal reflux disease)     Joint pain     Morbid obesity     Pulmonary embolism     occurred after ankle fracture       Past Surgical History:   Procedure Laterality Date    ankle surgery      back surgery      Lumbar spinal fusion with shaving of a disk     SECTION      HYSTERECTOMY      2007    LAPAROSCOPIC GASTRIC BANDING      MYOMECTOMY      PILONIDAL CYST DRAINAGE      removed    ROTATOR CUFF REPAIR      Left       Review of patient's allergies indicates:  No Known Allergies    Current Outpatient Prescriptions   Medication Sig Dispense Refill    etodolac (LODINE) 400 MG tablet Take 1 tablet (400 mg total) by mouth 2 (two) times daily as needed. 60 tablet 5    fluticasone (FLONASE) 50 mcg/actuation nasal spray       losartan (COZAAR) 50 MG tablet Take 1 tablet (50 mg total) by mouth once daily. 90 tablet 3    methocarbamol (ROBAXIN) 500 MG Tab Take 1 tablet (500 mg total) by mouth 3 (three) times daily as needed (muscle spasms). 90 tablet 5    " "multivitamin with minerals tablet Take 1 tablet by mouth once daily.      tramadol (ULTRAM) 50 mg tablet Take 1 tablet by mouth daily as needed.      VITAMIN D2 50,000 unit capsule TAKE 1 CAPSULE (50,000 UNITS TOTAL) BY MOUTH EVERY 7 DAYS. 12 capsule 0     No current facility-administered medications for this visit.        Family History   Problem Relation Age of Onset    Diabetes Father     Hypertension Father     Diabetes Mother     Hypertension Mother     Breast cancer Neg Hx     Colon cancer Neg Hx     Ovarian cancer Neg Hx     Acne Neg Hx     Eczema Neg Hx     Lupus Neg Hx     Psoriasis Neg Hx     Melanoma Neg Hx        Social History     Social History    Marital status: Single     Spouse name: N/A    Number of children: N/A    Years of education: N/A     Occupational History    Not on file.     Social History Main Topics    Smoking status: Never Smoker    Smokeless tobacco: Never Used    Alcohol use Yes      Comment: occasionally/ not weekly    Drug use: No    Sexual activity: Not Currently     Birth control/ protection: Surgical     Other Topics Concern    Not on file     Social History Narrative    No narrative on file       Objective:     Vitals:    11/29/17 1459 11/29/17 1504   Resp: 16    Temp: 98.2 °F (36.8 °C)    TempSrc: Oral    Weight: 124 kg (273 lb 5.9 oz)    Height: 5' 3" (1.6 m)    PainSc:   1   1   PainLoc: Back        GEN:  Well developed, well nourished.  No acute distress. No pain behavior.  HEENT:  No trauma.  Mucous membranes moist.  Nares patent bilaterally.  PSYCH: Normal affect. Thought content appropriate.  CHEST:  Breathing symmetric.  No audible wheezing.  ABD: Soft, non-tender, non-distended.  SKIN:  Warm, pink, dry.  No rash on exposed areas.    EXT:  No cyanosis, clubbing, or edema.  No color change or changes in nail or hair growth.  NEURO/MUSCULOSKELETAL:  Fully alert, oriented, and appropriate. Speech normal zeynep. No cranial nerve deficits.   Gait: " Antalgic.  Present trendelenburg sign bilaterally.   Motor Strength: 5/5 motor strength throughout lower extremities.   Sensory: Decreased sensation in a right L5 sensory deficit in the lower extremities.   Reflexes:  2+ and symmetric throughout.  Downgoing Babinski's bilaterally.  No clonus or spasticity.  L-Spine:  Decreased ROM with mild pain on extension. Negative facet loading bilaterally.  Negative SLR bilaterally.    SI Joint/Hip: Negative IVETTE bilaterally.  Negative FADIR bilaterally.  TTP over lumbar paraspinals.      Imaging:        The imaging studies listed below were independently reviewed by me, and I agree with the findings as documented below.     Narrative     History: Back pain    Comparison: 11/20/15    Result: 3 view of the lumbar spine.      Severe facet arthropathy is seen from L3/4 through L5/S1. Grade 1 anterolisthesis of L3 in relation to L4 is appreciated, with alignment at other levels appearing essentially unremarkable.  Vertebral body heights are normally maintained, without compression deformity at any level.  There is no marked disc narrowing at any level.  Minor marginal vertebral endplate spurring at a few lumbar levels is seen.  Lab band again noted. Mild constipation. In comparison to the prior study, there is no adverse interval changes.   Impression      No adverse interval change     MRI Lumbar Spine 11/15/2017:  Findings:     The vertebral body heights are well maintained, with no fracture.  No marrow signal abnormality suspicious for an infiltrative process.      The conus is normal in appearance, and terminates at the L1-L2 level.  The adjacent soft tissue structures show no significant abnormalities.      There are findings of multi-level  lumbar spondylosis, as below.    L1-L2: There is no focal disc herniation. No significant central canal or neural foraminal narrowing.    L2-L3: Broad-based disc bulge and mild facet arthropathy and ligamentum flavum thickening. There is  moderate left neuroforaminal narrowing. Mild canal stenosis.    L3-L4: Broad-based disc bulge with facet arthropathy and ligamentum flavum thickening. This results in moderate bilateral neuroforaminal narrowing and moderate canal stenosis.    L4-L5: Broad-based disc and facet arthropathy with ligamentum flavum thickening. This results in severe bilateral neuroforaminal narrowing and severe canal stenosis.     L5-S1: Intervertebral disc height loss with endplate sclerosis and broad-based disc bulge. There is facet arthropathy as well. This results in severe bilateral neuroforaminal narrowing and moderate canal stenosis.   Impression          Multilevel degenerative changes, worse at L4-5 and L5-S1.         Assessment:     Encounter Diagnoses   Name Primary?    Spondylosis of lumbar region without myelopathy or radiculopathy Yes    Facet arthritis of lumbar region     DDD (degenerative disc disease), lumbar     Lumbar radiculopathy, chronic     Myalgia        Plan:     Paloma was seen today for back pain.    Diagnoses and all orders for this visit:    Spondylosis of lumbar region without myelopathy or radiculopathy    Facet arthritis of lumbar region    DDD (degenerative disc disease), lumbar    Lumbar radiculopathy, chronic    Myalgia       Her pain is consistent with the above.    We discussed the assessment and recommendations.  All available images were reviewed. We discussed the disease process, prognosis, treatment plan, and risks and benefits. The patient is aware of the risks and benefits of the medications being prescribed, common side effects, and proper usage. The following is the plan we agreed on:     1. She is s/p lumbar facet joint injections with significant relief. We can repeat this as needed.   2. Continue Etodolac 400 mg BID PRN  3. Continue Methocarbamol 500 mg TID PRN (start at night)  4. We discussed the importance of exercise. She continues to perform a HEP.  5. RTC in 3 months or  sooner if needed.     - Dr. Yang was consulted on the patient and agrees with this plan.    The above plan and management options were discussed at length with patient. Patient is in agreement with the above and verbalized understanding.     Ele Knight NP  11/29/2017

## 2017-12-19 ENCOUNTER — TELEPHONE (OUTPATIENT)
Dept: INTERNAL MEDICINE | Facility: CLINIC | Age: 52
End: 2017-12-19

## 2017-12-19 NOTE — TELEPHONE ENCOUNTER
----- Message from Chary Hahn sent at 12/18/2017 12:57 PM CST -----  Contact: [employer] [phone]  [employer] would like to speak with the nurse regarding the pts FMLA ,please call

## 2018-01-12 ENCOUNTER — TELEPHONE (OUTPATIENT)
Dept: INTERNAL MEDICINE | Facility: CLINIC | Age: 53
End: 2018-01-12

## 2018-01-12 NOTE — TELEPHONE ENCOUNTER
Called [employer] [phone] to discuss her concerns for the patients [facility] for a rtc.

## 2018-01-12 NOTE — TELEPHONE ENCOUNTER
----- Message from Chary Hahn sent at 1/10/2018  4:20 PM CST -----  Contact: [employer] [phone]   [employer] would like to speak with the nurse regarding the pts FMLA ,please call

## 2018-01-12 NOTE — TELEPHONE ENCOUNTER
[employer] [phone] called and she states she will call and advise the patient to update status. Termed the call

## 2018-03-01 DIAGNOSIS — Z00.00 ROUTINE GENERAL MEDICAL EXAMINATION AT A HEALTH CARE FACILITY: Primary | ICD-10-CM

## 2018-03-01 RX ORDER — ERGOCALCIFEROL 1.25 MG/1
50000 CAPSULE ORAL
Qty: 12 CAPSULE | Refills: 0 | Status: SHIPPED | OUTPATIENT
Start: 2018-03-01 | End: 2018-06-02 | Stop reason: SDUPTHER

## 2018-03-02 ENCOUNTER — PATIENT MESSAGE (OUTPATIENT)
Dept: INTERNAL MEDICINE | Facility: CLINIC | Age: 53
End: 2018-03-02

## 2018-03-05 ENCOUNTER — OFFICE VISIT (OUTPATIENT)
Dept: INTERNAL MEDICINE | Facility: CLINIC | Age: 53
End: 2018-03-05
Payer: COMMERCIAL

## 2018-03-05 VITALS
HEART RATE: 76 BPM | BODY MASS INDEX: 50.18 KG/M2 | DIASTOLIC BLOOD PRESSURE: 80 MMHG | WEIGHT: 272.69 LBS | SYSTOLIC BLOOD PRESSURE: 116 MMHG | TEMPERATURE: 99 F | HEIGHT: 62 IN

## 2018-03-05 DIAGNOSIS — M54.42 CHRONIC BILATERAL LOW BACK PAIN WITH LEFT-SIDED SCIATICA: Primary | ICD-10-CM

## 2018-03-05 DIAGNOSIS — G89.29 CHRONIC BILATERAL LOW BACK PAIN WITH LEFT-SIDED SCIATICA: Primary | ICD-10-CM

## 2018-03-05 PROCEDURE — 3079F DIAST BP 80-89 MM HG: CPT | Mod: S$GLB,,, | Performed by: INTERNAL MEDICINE

## 2018-03-05 PROCEDURE — 3074F SYST BP LT 130 MM HG: CPT | Mod: S$GLB,,, | Performed by: INTERNAL MEDICINE

## 2018-03-05 PROCEDURE — 99213 OFFICE O/P EST LOW 20 MIN: CPT | Mod: S$GLB,,, | Performed by: INTERNAL MEDICINE

## 2018-03-05 PROCEDURE — 99999 PR PBB SHADOW E&M-EST. PATIENT-LVL III: CPT | Mod: PBBFAC,,, | Performed by: INTERNAL MEDICINE

## 2018-03-05 NOTE — PROGRESS NOTES
Subjective:       Patient ID: Paloma Bang is a 52 y.o. female.    Chief Complaint: Follow-up    HPI   Pt with OA of lumbar spine is here requesting orders to continue PT. She had an SAMEERA back in November which helped some. She has continued on Lodine/Robaxin and Tramadol PRN.   Review of Systems   Constitutional: Negative for activity change, appetite change, chills, diaphoresis, fatigue, fever and unexpected weight change.   HENT: Negative for postnasal drip, rhinorrhea, sinus pressure, sneezing, sore throat, trouble swallowing and voice change.    Respiratory: Negative for cough, shortness of breath and wheezing.    Cardiovascular: Negative for chest pain, palpitations and leg swelling.   Gastrointestinal: Negative for abdominal pain, blood in stool, constipation, diarrhea, nausea and vomiting.   Genitourinary: Negative for dysuria.   Musculoskeletal: Positive for arthralgias and back pain. Negative for myalgias.   Skin: Negative for rash and wound.   Allergic/Immunologic: Negative for environmental allergies and food allergies.   Hematological: Negative for adenopathy. Does not bruise/bleed easily.       Objective:      Physical Exam   Constitutional: She is oriented to person, place, and time. She appears well-developed and well-nourished. No distress.   HENT:   Head: Normocephalic and atraumatic.   Eyes: Conjunctivae and EOM are normal. Pupils are equal, round, and reactive to light. Right eye exhibits no discharge. Left eye exhibits no discharge. No scleral icterus.   Neck: Neck supple. No JVD present.   Cardiovascular: Normal rate, regular rhythm, normal heart sounds and intact distal pulses.    Pulmonary/Chest: Effort normal and breath sounds normal. No respiratory distress. She has no wheezes. She has no rales.   Musculoskeletal: She exhibits no edema.        Lumbar back: She exhibits tenderness and pain.   Lymphadenopathy:     She has no cervical adenopathy.   Neurological: She is alert and  oriented to person, place, and time.   Skin: Skin is warm and dry. No rash noted. She is not diaphoretic. No pallor.       Assessment:       1. Chronic bilateral low back pain with left-sided sciatica        Plan:    1. Referral back to PT       CPT

## 2018-03-27 ENCOUNTER — PATIENT MESSAGE (OUTPATIENT)
Dept: INTERNAL MEDICINE | Facility: CLINIC | Age: 53
End: 2018-03-27

## 2018-04-02 ENCOUNTER — OFFICE VISIT (OUTPATIENT)
Dept: INTERNAL MEDICINE | Facility: CLINIC | Age: 53
End: 2018-04-02
Payer: COMMERCIAL

## 2018-04-02 ENCOUNTER — NURSE TRIAGE (OUTPATIENT)
Dept: ADMINISTRATIVE | Facility: CLINIC | Age: 53
End: 2018-04-02

## 2018-04-02 VITALS
DIASTOLIC BLOOD PRESSURE: 83 MMHG | BODY MASS INDEX: 50.23 KG/M2 | HEIGHT: 62 IN | RESPIRATION RATE: 16 BRPM | SYSTOLIC BLOOD PRESSURE: 142 MMHG | WEIGHT: 272.94 LBS | TEMPERATURE: 99 F | HEART RATE: 74 BPM

## 2018-04-02 DIAGNOSIS — T78.40XA HYPERSENSITIVITY REACTION, INITIAL ENCOUNTER: Primary | ICD-10-CM

## 2018-04-02 DIAGNOSIS — B35.9 DERMATOPHYTOSES: ICD-10-CM

## 2018-04-02 PROCEDURE — 99999 PR PBB SHADOW E&M-EST. PATIENT-LVL III: CPT | Mod: PBBFAC,,, | Performed by: FAMILY MEDICINE

## 2018-04-02 PROCEDURE — 3077F SYST BP >= 140 MM HG: CPT | Mod: CPTII,S$GLB,, | Performed by: FAMILY MEDICINE

## 2018-04-02 PROCEDURE — 96372 THER/PROPH/DIAG INJ SC/IM: CPT | Mod: S$GLB,,, | Performed by: FAMILY MEDICINE

## 2018-04-02 PROCEDURE — 99214 OFFICE O/P EST MOD 30 MIN: CPT | Mod: 25,S$GLB,, | Performed by: FAMILY MEDICINE

## 2018-04-02 PROCEDURE — 3079F DIAST BP 80-89 MM HG: CPT | Mod: CPTII,S$GLB,, | Performed by: FAMILY MEDICINE

## 2018-04-02 RX ORDER — KETOCONAZOLE 200 MG/1
200 TABLET ORAL DAILY
Qty: 2 TABLET | Refills: 0 | Status: SHIPPED | OUTPATIENT
Start: 2018-04-02 | End: 2018-05-02

## 2018-04-02 RX ORDER — KETOCONAZOLE 20 MG/G
CREAM TOPICAL DAILY
Qty: 60 G | Refills: 5 | Status: SHIPPED | OUTPATIENT
Start: 2018-04-02 | End: 2018-05-30

## 2018-04-02 RX ORDER — TRIAMCINOLONE ACETONIDE 40 MG/ML
40 INJECTION, SUSPENSION INTRA-ARTICULAR; INTRAMUSCULAR
Status: COMPLETED | OUTPATIENT
Start: 2018-04-02 | End: 2018-04-02

## 2018-04-02 RX ORDER — KETOCONAZOLE 20 MG/ML
SHAMPOO, SUSPENSION TOPICAL
Qty: 120 ML | Refills: 2 | Status: SHIPPED | OUTPATIENT
Start: 2018-04-02 | End: 2018-05-30

## 2018-04-02 RX ADMIN — TRIAMCINOLONE ACETONIDE 40 MG: 40 INJECTION, SUSPENSION INTRA-ARTICULAR; INTRAMUSCULAR at 03:04

## 2018-04-03 NOTE — PROGRESS NOTES
Subjective:   Patient ID: Paloma Bang is a 52 y.o. female.    Chief Complaint: Rash      HPI  53 yo female here for rash on arm. Has had problems before with hypersensitivity like reactions. Daughter has similar problems. Pt has taken Rx NSAID recently and that is only new med/food/ etc that can be noted although she has taken etodolac before. No sensation of pharyngeal swelling or dyspnea. Rash is improving a bit. Also has a longstanding rash below breasts that is different in nature and cw tinea and chronic--comes and goes    Patient queried and denies any further complaints.        ALLERGIES AND MEDICATIONS: updated and reviewed.  Review of patient's allergies indicates:  No Known Allergies    Current Outpatient Prescriptions:     ergocalciferol (VITAMIN D2) 50,000 unit Cap, Take 1 capsule (50,000 Units total) by mouth every 7 days., Disp: 12 capsule, Rfl: 0    fluticasone (FLONASE) 50 mcg/actuation nasal spray, , Disp: , Rfl:     losartan (COZAAR) 50 MG tablet, Take 1 tablet (50 mg total) by mouth once daily., Disp: 90 tablet, Rfl: 3    multivitamin with minerals tablet, Take 1 tablet by mouth once daily., Disp: , Rfl:     tramadol (ULTRAM) 50 mg tablet, Take 1 tablet by mouth daily as needed., Disp: , Rfl:     ketoconazole (NIZORAL) 2 % cream, Apply topically once daily., Disp: 60 g, Rfl: 5    ketoconazole (NIZORAL) 2 % shampoo, Shampoo head to toe and leave on for 5 min for 10-14 days, Disp: 120 mL, Rfl: 2    ketoconazole (NIZORAL) 200 mg Tab, Take 1 tablet (200 mg total) by mouth once daily. For 2 days starting April 6, Disp: 2 tablet, Rfl: 0  No current facility-administered medications for this visit.     Review of Systems   Constitutional: Negative for activity change, appetite change, chills, diaphoresis, fatigue, fever and unexpected weight change.   HENT: Negative for congestion, ear discharge, ear pain, facial swelling, hearing loss, nosebleeds, postnasal drip, rhinorrhea, sinus  "pressure, sneezing, sore throat, tinnitus, trouble swallowing and voice change.    Eyes: Negative for photophobia, pain, discharge, redness, itching and visual disturbance.   Respiratory: Negative for cough, chest tightness, shortness of breath and wheezing.    Cardiovascular: Negative for chest pain, palpitations and leg swelling.   Gastrointestinal: Negative for abdominal distention, abdominal pain, anal bleeding, blood in stool, constipation, diarrhea, nausea, rectal pain and vomiting.   Endocrine: Negative for cold intolerance, heat intolerance, polydipsia, polyphagia and polyuria.   Genitourinary: Negative for difficulty urinating, dysuria and flank pain.   Musculoskeletal: Negative for arthralgias, back pain, joint swelling, myalgias and neck pain.   Skin: Negative for rash.   Neurological: Negative for dizziness, tremors, seizures, syncope, speech difficulty, weakness, light-headedness, numbness and headaches.   Psychiatric/Behavioral: Negative for behavioral problems, confusion, decreased concentration, dysphoric mood, sleep disturbance and suicidal ideas. The patient is not nervous/anxious and is not hyperactive.        Objective:     Vitals:    04/02/18 1501   BP: (!) 142/83   Pulse: 74   Resp: 16   Temp: 99.1 °F (37.3 °C)   TempSrc: Oral   Weight: 123.8 kg (272 lb 14.9 oz)   Height: 5' 2" (1.575 m)   PainSc: 0-No pain     Body mass index is 49.92 kg/m².    Physical Exam   Constitutional: She is oriented to person, place, and time. She appears well-developed and well-nourished. She is cooperative. She does not have a sickly appearance. No distress.   HENT:   Head: Normocephalic and atraumatic.   Right Ear: Hearing, tympanic membrane, external ear and ear canal normal. No tenderness.   Left Ear: Hearing, tympanic membrane, external ear and ear canal normal. No tenderness.   Nose: Nose normal.   Mouth/Throat: Oropharynx is clear and moist. Normal dentition. No oropharyngeal exudate, posterior oropharyngeal " edema or posterior oropharyngeal erythema.   Eyes: Conjunctivae and lids are normal. Right eye exhibits no discharge. Left eye exhibits no discharge. Right conjunctiva is not injected. Left conjunctiva is not injected. No scleral icterus. Right eye exhibits normal extraocular motion. Left eye exhibits normal extraocular motion.   Neck: Normal range of motion. Neck supple. No JVD present. Carotid bruit is not present. No tracheal deviation and no edema present. No thyromegaly present.   Cardiovascular: Normal rate, regular rhythm, normal heart sounds and normal pulses.  Exam reveals no friction rub.    No murmur heard.  Pulmonary/Chest: Effort normal and breath sounds normal. No accessory muscle usage. No respiratory distress. She has no wheezes. She has no rhonchi. She has no rales.   Musculoskeletal: She exhibits no edema.   Lymphadenopathy:        Head (right side): No submandibular adenopathy present.        Head (left side): No submandibular adenopathy present.     She has no cervical adenopathy.   Neurological: She is alert and oriented to person, place, and time.   Skin: Skin is warm and dry. She is not diaphoretic.   Psychiatric: Her speech is normal and behavior is normal. Thought content normal. Her mood appears not anxious. Her affect is not angry, not labile and not inappropriate. She does not exhibit a depressed mood.       Assessment and Plan:   Paloma was seen today for rash.    Diagnoses and all orders for this visit:    Hypersensitivity reaction, initial encounter  -     Ambulatory consult to Allergy    Dermatophytoses    Other orders  -     triamcinolone acetonide injection 40 mg; Inject 1 mL (40 mg total) into the muscle one time.  -     ketoconazole (NIZORAL) 2 % shampoo; Shampoo head to toe and leave on for 5 min for 10-14 days  -     ketoconazole (NIZORAL) 2 % cream; Apply topically once daily.  -     ketoconazole (NIZORAL) 200 mg Tab; Take 1 tablet (200 mg total) by mouth once daily. For 2  days starting April 6    two different problems. For the #1, cont zyrtec 10mg daily and benadryl 12.5 - 50mg po qhs. She is aware the corticosteroid injectio could worsen the tinea at waistline and bilat below breasts. May need stronger corticosteroids but the hypersens rxn is improving and we will try above for now. Fu with derm and allergy referral as above.    Time spent in the evaluation and management of this patient exceeded 45min and greater than 50% of this time was in face-to-face education regarding diagnoses, medications, plan, and follow-up.      Follow-up in about 2 weeks (around 4/16/2018), or if symptoms worsen or fail to improve.    THIS NOTE WILL BE SHARED WITH THE PATIENT.

## 2018-04-04 ENCOUNTER — OFFICE VISIT (OUTPATIENT)
Dept: ALLERGY | Facility: CLINIC | Age: 53
End: 2018-04-04
Payer: COMMERCIAL

## 2018-04-04 VITALS
HEIGHT: 63 IN | SYSTOLIC BLOOD PRESSURE: 118 MMHG | BODY MASS INDEX: 48.52 KG/M2 | DIASTOLIC BLOOD PRESSURE: 80 MMHG | WEIGHT: 273.81 LBS

## 2018-04-04 DIAGNOSIS — L29.9 PRURITUS: Primary | ICD-10-CM

## 2018-04-04 DIAGNOSIS — R21 PAPULAR RASH: ICD-10-CM

## 2018-04-04 PROCEDURE — 99204 OFFICE O/P NEW MOD 45 MIN: CPT | Mod: S$GLB,,, | Performed by: ALLERGY & IMMUNOLOGY

## 2018-04-04 PROCEDURE — 99999 PR PBB SHADOW E&M-EST. PATIENT-LVL III: CPT | Mod: PBBFAC,,, | Performed by: ALLERGY & IMMUNOLOGY

## 2018-04-04 PROCEDURE — 3074F SYST BP LT 130 MM HG: CPT | Mod: CPTII,S$GLB,, | Performed by: ALLERGY & IMMUNOLOGY

## 2018-04-04 PROCEDURE — 3079F DIAST BP 80-89 MM HG: CPT | Mod: CPTII,S$GLB,, | Performed by: ALLERGY & IMMUNOLOGY

## 2018-04-04 RX ORDER — TRIAMCINOLONE ACETONIDE 0.25 MG/G
CREAM TOPICAL 2 TIMES DAILY
Qty: 15 G | Refills: 1 | Status: SHIPPED | OUTPATIENT
Start: 2018-04-04 | End: 2018-05-30

## 2018-04-04 NOTE — PROGRESS NOTES
ALLERGY & IMMUNOLOGY CLINIC - INITIAL CONSULTATION      HISTORY OF PRESENT ILLNESS     Patient ID: Paloma Bang is a 52 y.o. female    CC: rash    HPI: On Monday began with papular, pruritic rash.  Seen by PCP and given steroid shot, nizoral shampoo and cream for rash.       She says that her rash has improved though still there.     She is also complaining of having pruritic areas on scalp, behind her knees, lower back and thighs since 11/2017.  She was also told to stop methocarbamol and etodolac (she had been taking both for back pain) as it may be contributing to rash.  Stopping the medication has not made a difference to her pruritis.  She says that she has also been seen by dermatology before in past who recommended taking cetirizine in am and benadryl in evening.  She says that both medications cause drowsiness.      Thyroid: dry skin, weight gain.  Denies constipation and says she has diarrhea often.    Anemia: complaining of fatigue, no history of anemia.    Gi: no history of abdominal pain though has been having diarrhea     REVIEW OF SYSTEMS     Review of Systems   Constitutional: Negative for fever, malaise/fatigue and weight loss.   HENT: Negative for congestion and sore throat.    Eyes: Negative for discharge and redness.   Respiratory: Negative for cough, shortness of breath and wheezing.    Cardiovascular: Negative for chest pain.   Gastrointestinal: Negative for abdominal pain, diarrhea, nausea and vomiting.   Genitourinary: Negative.    Musculoskeletal: Negative.    Skin: Positive for itching and rash.   Neurological: Negative.  Negative for headaches.   Endo/Heme/Allergies: Negative.    Psychiatric/Behavioral: Negative.       MEDICAL HISTORY   Atopy: No history of asthma, rhinitis, eczema, immune deficiency, food allergies.   Past Medical History:   Diagnosis Date    Deep vein thrombosis     occurred after ankle fracture    GERD (gastroesophageal reflux disease)     Joint pain      "Morbid obesity     Pulmonary embolism     occurred after ankle fracture     Past Surgical History:   Procedure Laterality Date    ankle surgery      back surgery      Lumbar spinal fusion with shaving of a disk     SECTION      HYSTERECTOMY      2007    LAPAROSCOPIC GASTRIC BANDING      MYOMECTOMY      PILONIDAL CYST DRAINAGE      removed    ROTATOR CUFF REPAIR      Left     Medication List with Changes/Refills   New Medications    TRIAMCINOLONE ACETONIDE 0.025% (KENALOG) 0.025 % CREAM    Apply topically 2 (two) times daily.   Current Medications    ERGOCALCIFEROL (VITAMIN D2) 50,000 UNIT CAP    Take 1 capsule (50,000 Units total) by mouth every 7 days.    FLUTICASONE (FLONASE) 50 MCG/ACTUATION NASAL SPRAY        KETOCONAZOLE (NIZORAL) 2 % CREAM    Apply topically once daily.    KETOCONAZOLE (NIZORAL) 2 % SHAMPOO    Shampoo head to toe and leave on for 5 min for 10-14 days    KETOCONAZOLE (NIZORAL) 200 MG TAB    Take 1 tablet (200 mg total) by mouth once daily. For 2 days starting     LOSARTAN (COZAAR) 50 MG TABLET    Take 1 tablet (50 mg total) by mouth once daily.    MULTIVITAMIN WITH MINERALS TABLET    Take 1 tablet by mouth once daily.    TRAMADOL (ULTRAM) 50 MG TABLET    Take 1 tablet by mouth daily as needed.     Review of patient's allergies indicates:  No Known Allergies  Family History: cousins with eczema, mom with "some sort of asthma".    PHYSICAL EXAM   /80 (BP Location: Left arm, Patient Position: Sitting)   Ht 5' 2.5" (1.588 m)   Wt 124.2 kg (273 lb 13 oz)   LMP  (LMP Unknown)   BMI 49.28 kg/m²   Physical Exam   Constitutional: She is oriented to person, place, and time. She appears well-developed and well-nourished. No distress.   HENT:   Head: Normocephalic and atraumatic.   Right Ear: External ear normal.   Left Ear: External ear normal.   Mouth/Throat: Oropharynx is clear and moist. No oropharyngeal exudate.   Eyes: Conjunctivae are normal. Right eye exhibits no " discharge. Left eye exhibits no discharge. No scleral icterus.   Neck: Normal range of motion. Neck supple.   Cardiovascular: Normal rate, regular rhythm and normal heart sounds.    No murmur heard.  Pulmonary/Chest: Effort normal and breath sounds normal. No respiratory distress. She has no wheezes.   Abdominal: Soft.   Musculoskeletal: Normal range of motion.   Lymphadenopathy:     She has no cervical adenopathy.   Neurological: She is alert and oriented to person, place, and time.   Skin: Skin is warm. No rash noted.   Scant erythematous papular lesions along left elbow.  Otherwise no rashes   Psychiatric: She has a normal mood and affect.        CHART REVIEW     Previous PCP notes, pertinent labs     ASSESSMENT & PLAN     Paloma Bang is a 52 y.o. female with     Pruritus: History and physical do not point towards allergic disease.  Do not believe pruritis related to drugs as she is still having pruritis despite cessation of those two drugs.  Sometimes other underlying medical conditions may cause pruritis such as anemia, hypothyroid, and liver/gall bladder issues.  Normally would order CBC, TSH/T4, CMP, however patient is already having them done next month as part of follow up visit.  Patient would like earlier date for labs, rescheduling them for 4/6/18.  Has appointment with derm next month   - CBC   - TSH   - CMP    Papular rash: Unclear etiology though do not believe to be allergic.  Resolving.    -     triamcinolone acetonide 0.025% (KENALOG) 0.025 % cream; Apply topically 2 (two) times daily.  Dispense: 15 g; Refill: 1        Follow up: Will follow labs peripherally, otherwise patient to follow up with PCP and derm     Nancie Godwin MD  Allergy Fellow

## 2018-04-06 ENCOUNTER — LAB VISIT (OUTPATIENT)
Dept: LAB | Facility: HOSPITAL | Age: 53
End: 2018-04-06
Attending: INTERNAL MEDICINE
Payer: COMMERCIAL

## 2018-04-06 DIAGNOSIS — Z00.00 ROUTINE GENERAL MEDICAL EXAMINATION AT A HEALTH CARE FACILITY: ICD-10-CM

## 2018-04-06 LAB
25(OH)D3+25(OH)D2 SERPL-MCNC: 18 NG/ML
ALBUMIN SERPL BCP-MCNC: 3.4 G/DL
ALP SERPL-CCNC: 72 U/L
ALT SERPL W/O P-5'-P-CCNC: 13 U/L
ANION GAP SERPL CALC-SCNC: 7 MMOL/L
AST SERPL-CCNC: 16 U/L
BASOPHILS # BLD AUTO: 0.06 K/UL
BASOPHILS NFR BLD: 0.6 %
BILIRUB SERPL-MCNC: 0.7 MG/DL
BUN SERPL-MCNC: 14 MG/DL
CALCIUM SERPL-MCNC: 9.4 MG/DL
CHLORIDE SERPL-SCNC: 103 MMOL/L
CHOLEST SERPL-MCNC: 159 MG/DL
CHOLEST/HDLC SERPL: 2.8 {RATIO}
CO2 SERPL-SCNC: 27 MMOL/L
CREAT SERPL-MCNC: 0.9 MG/DL
DIFFERENTIAL METHOD: ABNORMAL
EOSINOPHIL # BLD AUTO: 0.1 K/UL
EOSINOPHIL NFR BLD: 1.1 %
ERYTHROCYTE [DISTWIDTH] IN BLOOD BY AUTOMATED COUNT: 15.3 %
EST. GFR  (AFRICAN AMERICAN): >60 ML/MIN/1.73 M^2
EST. GFR  (NON AFRICAN AMERICAN): >60 ML/MIN/1.73 M^2
ESTIMATED AVG GLUCOSE: 126 MG/DL
GLUCOSE SERPL-MCNC: 119 MG/DL
HBA1C MFR BLD HPLC: 6 %
HCT VFR BLD AUTO: 41.9 %
HDLC SERPL-MCNC: 57 MG/DL
HDLC SERPL: 35.8 %
HGB BLD-MCNC: 13.6 G/DL
IMM GRANULOCYTES # BLD AUTO: 0.03 K/UL
IMM GRANULOCYTES NFR BLD AUTO: 0.3 %
LDLC SERPL CALC-MCNC: 90.6 MG/DL
LYMPHOCYTES # BLD AUTO: 3 K/UL
LYMPHOCYTES NFR BLD: 28 %
MCH RBC QN AUTO: 29.2 PG
MCHC RBC AUTO-ENTMCNC: 32.5 G/DL
MCV RBC AUTO: 90 FL
MONOCYTES # BLD AUTO: 0.9 K/UL
MONOCYTES NFR BLD: 8.2 %
NEUTROPHILS # BLD AUTO: 6.6 K/UL
NEUTROPHILS NFR BLD: 61.8 %
NONHDLC SERPL-MCNC: 102 MG/DL
NRBC BLD-RTO: 0 /100 WBC
PLATELET # BLD AUTO: 262 K/UL
PMV BLD AUTO: 11.5 FL
POTASSIUM SERPL-SCNC: 4.3 MMOL/L
PROT SERPL-MCNC: 7 G/DL
RBC # BLD AUTO: 4.65 M/UL
SODIUM SERPL-SCNC: 137 MMOL/L
TRIGL SERPL-MCNC: 57 MG/DL
TSH SERPL DL<=0.005 MIU/L-ACNC: 2.31 UIU/ML
WBC # BLD AUTO: 10.7 K/UL

## 2018-04-06 PROCEDURE — 83036 HEMOGLOBIN GLYCOSYLATED A1C: CPT

## 2018-04-06 PROCEDURE — 84443 ASSAY THYROID STIM HORMONE: CPT

## 2018-04-06 PROCEDURE — 85025 COMPLETE CBC W/AUTO DIFF WBC: CPT

## 2018-04-06 PROCEDURE — 80053 COMPREHEN METABOLIC PANEL: CPT

## 2018-04-06 PROCEDURE — 80061 LIPID PANEL: CPT

## 2018-04-06 PROCEDURE — 82306 VITAMIN D 25 HYDROXY: CPT

## 2018-04-06 PROCEDURE — 36415 COLL VENOUS BLD VENIPUNCTURE: CPT | Mod: PO

## 2018-04-09 ENCOUNTER — TELEPHONE (OUTPATIENT)
Dept: DERMATOLOGY | Facility: CLINIC | Age: 53
End: 2018-04-09

## 2018-04-09 NOTE — TELEPHONE ENCOUNTER
I called the patient to offer her an appointment for tomorrow with Dr. Prater per her Wait list status and she had to decline. I explained that I would leave her in place in case anything else becomes available. She thanked me for the call.

## 2018-05-08 ENCOUNTER — TELEPHONE (OUTPATIENT)
Dept: INTERNAL MEDICINE | Facility: CLINIC | Age: 53
End: 2018-05-08

## 2018-05-08 NOTE — TELEPHONE ENCOUNTER
Called and spoke with the patient and advised the labs she completed will be fine for the annual appointment, She understood and termed the call

## 2018-05-08 NOTE — TELEPHONE ENCOUNTER
----- Message from Nelsy May sent at 5/8/2018  2:31 PM CDT -----  Contact: Pt [phone]  Pt would like a call back from the nurse to see if she needs to do her labs again before she sees Dr Mayorga or if she can use the ones form 4/6

## 2018-05-14 ENCOUNTER — LAB VISIT (OUTPATIENT)
Dept: LAB | Facility: HOSPITAL | Age: 53
End: 2018-05-14
Attending: INTERNAL MEDICINE
Payer: COMMERCIAL

## 2018-05-14 DIAGNOSIS — Z00.00 ROUTINE GENERAL MEDICAL EXAMINATION AT A HEALTH CARE FACILITY: ICD-10-CM

## 2018-05-14 PROCEDURE — 81003 URINALYSIS AUTO W/O SCOPE: CPT

## 2018-05-16 LAB
BILIRUB UR QL STRIP: NEGATIVE
CLARITY UR REFRACT.AUTO: CLEAR
COLOR UR AUTO: YELLOW
GLUCOSE UR QL STRIP: NEGATIVE
HGB UR QL STRIP: NEGATIVE
KETONES UR QL STRIP: NEGATIVE
LEUKOCYTE ESTERASE UR QL STRIP: NEGATIVE
NITRITE UR QL STRIP: NEGATIVE
PH UR STRIP: 6 [PH] (ref 5–8)
PROT UR QL STRIP: NEGATIVE
SP GR UR STRIP: 1.02 (ref 1–1.03)
URN SPEC COLLECT METH UR: ABNORMAL
UROBILINOGEN UR STRIP-ACNC: ABNORMAL EU/DL

## 2018-05-30 ENCOUNTER — OFFICE VISIT (OUTPATIENT)
Dept: INTERNAL MEDICINE | Facility: CLINIC | Age: 53
End: 2018-05-30
Payer: COMMERCIAL

## 2018-05-30 VITALS
DIASTOLIC BLOOD PRESSURE: 78 MMHG | HEART RATE: 80 BPM | HEIGHT: 62 IN | WEIGHT: 271.63 LBS | BODY MASS INDEX: 49.99 KG/M2 | SYSTOLIC BLOOD PRESSURE: 122 MMHG | TEMPERATURE: 99 F

## 2018-05-30 DIAGNOSIS — Z12.11 COLON CANCER SCREENING: ICD-10-CM

## 2018-05-30 DIAGNOSIS — Z12.31 VISIT FOR SCREENING MAMMOGRAM: ICD-10-CM

## 2018-05-30 DIAGNOSIS — E66.01 MORBID OBESITY WITH BODY MASS INDEX OF 45.0-49.9 IN ADULT: Primary | ICD-10-CM

## 2018-05-30 DIAGNOSIS — Z00.00 ROUTINE GENERAL MEDICAL EXAMINATION AT A HEALTH CARE FACILITY: Primary | ICD-10-CM

## 2018-05-30 LAB
AMORPH CRY UR QL COMP ASSIST: ABNORMAL
BACTERIA #/AREA URNS AUTO: ABNORMAL /HPF
BILIRUB UR QL STRIP: NEGATIVE
CLARITY UR REFRACT.AUTO: ABNORMAL
COLOR UR AUTO: YELLOW
GLUCOSE UR QL STRIP: ABNORMAL
HGB UR QL STRIP: ABNORMAL
KETONES UR QL STRIP: ABNORMAL
LEUKOCYTE ESTERASE UR QL STRIP: NEGATIVE
MICROSCOPIC COMMENT: ABNORMAL
NITRITE UR QL STRIP: NEGATIVE
PH UR STRIP: 5 [PH] (ref 5–8)
PROT UR QL STRIP: NEGATIVE
RBC #/AREA URNS AUTO: 0 /HPF (ref 0–4)
SP GR UR STRIP: 1.03 (ref 1–1.03)
URN SPEC COLLECT METH UR: ABNORMAL
UROBILINOGEN UR STRIP-ACNC: 2 EU/DL
WBC #/AREA URNS AUTO: 0 /HPF (ref 0–5)
YEAST UR QL AUTO: ABNORMAL

## 2018-05-30 PROCEDURE — 3078F DIAST BP <80 MM HG: CPT | Mod: CPTII,S$GLB,, | Performed by: INTERNAL MEDICINE

## 2018-05-30 PROCEDURE — 99396 PREV VISIT EST AGE 40-64: CPT | Mod: S$GLB,,, | Performed by: INTERNAL MEDICINE

## 2018-05-30 PROCEDURE — 99999 PR PBB SHADOW E&M-EST. PATIENT-LVL IV: CPT | Mod: PBBFAC,,, | Performed by: INTERNAL MEDICINE

## 2018-05-30 PROCEDURE — 3074F SYST BP LT 130 MM HG: CPT | Mod: CPTII,S$GLB,, | Performed by: INTERNAL MEDICINE

## 2018-05-30 PROCEDURE — 81001 URINALYSIS AUTO W/SCOPE: CPT

## 2018-05-30 RX ORDER — TRAMADOL HYDROCHLORIDE 50 MG/1
50 TABLET ORAL EVERY 8 HOURS PRN
Qty: 60 TABLET | Refills: 1 | Status: SHIPPED | OUTPATIENT
Start: 2018-05-30 | End: 2019-05-06 | Stop reason: SDUPTHER

## 2018-05-30 RX ORDER — LOSARTAN POTASSIUM 50 MG/1
50 TABLET ORAL DAILY
Qty: 90 TABLET | Refills: 3 | Status: SHIPPED | OUTPATIENT
Start: 2018-05-30 | End: 2019-04-17

## 2018-05-30 NOTE — PROGRESS NOTES
The patient is a 52 y.o. old female who presents to the office for a physical.  Review of labs reveals an elevated glucose and depressed vitamin D.    PAST MEDICAL HISTORY  Past Medical History:   Diagnosis Date    Deep vein thrombosis     occurred after ankle fracture    GERD (gastroesophageal reflux disease)     Joint pain     Morbid obesity     Pulmonary embolism     occurred after ankle fracture       SURGICAL HISTORY:  Past Surgical History:   Procedure Laterality Date    ankle surgery      back surgery      Lumbar spinal fusion with shaving of a disk     SECTION      HYSTERECTOMY      2007    LAPAROSCOPIC GASTRIC BANDING      MYOMECTOMY      PILONIDAL CYST DRAINAGE      removed    ROTATOR CUFF REPAIR      Left         MEDS:  Medcard reviewed and updated    ALLERGIES: Allergy Card reviewed and updated    SOCIAL HISTORY:   The patient is a nonsmoker, denies alcohol or illicit drug use.    ROS:  GENERAL: No fever, chills or weight loss.  Positive fatigue.  SKIN: No rashes.  HEAD: No headaches or recent head trauma.  EYES: No photophobia or diplopia.  Positive eye pain bilateral  EARS: Denies ear pain, discharge or vertigo.  NOSE: No epistaxis.  Positive postnasal drip.  MOUTH & THROAT: Positive hoarseness or change in voice.   NODES: Denies swollen glands.  CHEST: Denies shortness of breath or wheezing.  Positive cough and sputum production.  CARDIOVASCULAR: Denies chest pain or palpitations.  ABDOMEN: Appetite fine. Positive diarrhea, abdominal pain, constipation or blood in stool.  URINARY: No dysuria or hematuria.  MUSCULOSKELETAL: No joint stiffness or swelling. Positive back pain radiating down right leg.  NEUROLOGIC: No history of seizures.  ENDOCRINE: Denies polyuria or polydipsia.  PSYCHIATRIC: Denies mood swings, depression, anxiety, homicidal or suicidal thoughts.    SCREENINGS:  Last cholesterol:   Last colonoscopy: none  Last mammogram: 2017  Last Pap smear: several  years ago  Last tetanus: 2016  Last Pneumovax: none  Last eye exam: 2017  Last bone density: none  Last menstrual period: Hysterectomy    PE:   Vitals:  Vitals:    05/30/18 1552   BP: (!) 140/86   Pulse: 80   Temp: 98.8 °F (37.1 °C)       APPEARANCE: Obese, in no acute distress.    EYES: Sclerae anicteric. PERRL. EOMI.      EARS: TM's intact. No retraction or perforation.    NOSE: Mucosa pink. Airway clear.  MOUTH & THROAT: No tonsillar enlargement. No pharyngeal erythema or exudate. No stridor.  NECK: Supple, no thyromegaly.  CHEST: Lungs clear to auscultation with unlabored respirations.  CARDIOVASCULAR: Normal S1, S2. No murmurs. No carotid bruits. No pedal edema.  ABDOMEN: Bowel sounds normal. Not distended. Soft. No tenderness or masses.   MUSCULOSKELETAL:  Normal gait, no cyanosis or clubbing.   SKIN: Normal skin turgor, warm and dry.  NEUROLOGIC: Cranial Nerves: Intact.  PSYCHIATRIC: The patient is oriented to person, place, and time and has a pleasant affect.        ASSESSMENT/PLAN:  Paloma was seen today for annual exam.    Diagnoses and all orders for this visit:    Routine general medical examination at a health care facility  -     Urinalysis  -     Labs reviewed  -     Replace vitamin D    Colon cancer screening  -     Case request GI: COLONOSCOPY    Visit for screening mammogram  -     Mammo Digital Screening Bilat with CAD; Future    Other orders  -     losartan (COZAAR) 50 MG tablet; Take 1 tablet (50 mg total) by mouth once daily.  -     traMADol (ULTRAM) 50 mg tablet; Take 1 tablet (50 mg total) by mouth every 8 (eight) hours as needed.

## 2018-06-01 DIAGNOSIS — Z12.11 SPECIAL SCREENING FOR MALIGNANT NEOPLASMS, COLON: Primary | ICD-10-CM

## 2018-06-01 RX ORDER — POLYETHYLENE GLYCOL 3350, SODIUM SULFATE ANHYDROUS, SODIUM BICARBONATE, SODIUM CHLORIDE, POTASSIUM CHLORIDE 236; 22.74; 6.74; 5.86; 2.97 G/4L; G/4L; G/4L; G/4L; G/4L
4 POWDER, FOR SOLUTION ORAL ONCE
Qty: 4000 ML | Refills: 0 | Status: SHIPPED | OUTPATIENT
Start: 2018-06-01 | End: 2018-06-01

## 2018-06-02 RX ORDER — ERGOCALCIFEROL 1.25 MG/1
50000 CAPSULE ORAL
Qty: 12 CAPSULE | Refills: 0 | Status: SHIPPED | OUTPATIENT
Start: 2018-06-02 | End: 2019-05-03 | Stop reason: SDUPTHER

## 2018-06-07 ENCOUNTER — INITIAL CONSULT (OUTPATIENT)
Dept: BARIATRICS | Facility: CLINIC | Age: 53
End: 2018-06-07
Payer: COMMERCIAL

## 2018-06-07 VITALS
HEART RATE: 82 BPM | HEIGHT: 64 IN | DIASTOLIC BLOOD PRESSURE: 84 MMHG | BODY MASS INDEX: 46.26 KG/M2 | WEIGHT: 270.94 LBS | SYSTOLIC BLOOD PRESSURE: 132 MMHG

## 2018-06-07 DIAGNOSIS — E66.01 MORBID OBESITY WITH BMI OF 45.0-49.9, ADULT: ICD-10-CM

## 2018-06-07 DIAGNOSIS — I10 ESSENTIAL HYPERTENSION: ICD-10-CM

## 2018-06-07 PROCEDURE — 99244 OFF/OP CNSLTJ NEW/EST MOD 40: CPT | Mod: S$GLB,,, | Performed by: INTERNAL MEDICINE

## 2018-06-07 PROCEDURE — 99999 PR PBB SHADOW E&M-EST. PATIENT-LVL III: CPT | Mod: PBBFAC,,, | Performed by: INTERNAL MEDICINE

## 2018-06-07 RX ORDER — DIETHYLPROPION HYDROCHLORIDE 75 MG/1
75 TABLET, EXTENDED RELEASE ORAL DAILY
Qty: 30 TABLET | Refills: 2 | Status: SHIPPED | OUTPATIENT
Start: 2018-06-07 | End: 2019-04-17

## 2018-06-07 NOTE — PROGRESS NOTES
Subjective:       Patient ID: Paloma Bang is a 52 y.o. female.    Chief Complaint: Consult    CC:    Current attempts at weight loss: New pt to me, referred by Jenn [facility]orga [facility]  [address] [facility] [place] [address] , with Patient Active Problem List:     Morbid obesity with body mass index of 45.0-49.9 in adult     Gastric banding status     Vitamin D deficiency     Helicobacter pylori ab+     Bilateral shoulder pain     Bilateral leg weakness     Rash, vesicular     GERD (gastroesophageal reflux disease)     Bilateral low back pain with left-sided sciatica     Right foot pain     S/P lumbar fusion     Bilateral low back pain without sciatica     Sprain of right shoulder     Neck pain     Bilateral low back pain with right-sided sciatica     Chronic pain     S/p lap band 12 years ago. Last adjustment 2015 with [facility]. Last seen 2016 w/o adjustment. Has gained 7 lbs since then. Does not feel a ot of restriction now. Can eat a whole hamburger. Limiting portions and pork.  Does walk for exercise. Not consistent.          History of medication for loss: Took Phen- fen. OTC supplements.     Heaviest weight: 270# Was 241#    Lightest weight after band# 200    Goal weight: under 200#      Last eye exam:    Last year. No glaucoma per pt.     Provider:    Typical eating patterns:   Breakfast: Boiled egg and sausage. Prot oatmeal  Lunch: salad    Dinner: Protein and veg.     Snacks: String cheese. Chips. popcorn    Beverages:Water, sweet tea. Coffee- with creamer. Rare glass of wine.     Willingness to change:     EKG:EKG Conclusions:    1. The EKG portion of this study is negative for ischemia at a moderate workload, and peak heart rate of 176 bpm (109% of predicted).   2. Exercise capacity is average.   3. Blood pressure response to exercise was normal (Presenting BP: 101/74 Peak BP: 175/78).   4. No significant arrhythmias were present.   5. There were no symptoms of chest discomfort or  "significant dyspnea throughout the protocol.       BMR: 1812      Review of Systems   Constitutional: Negative for chills and fever.   Respiratory: Negative for chest tightness and shortness of breath.    Cardiovascular: Negative for chest pain and leg swelling.   Genitourinary: Negative for difficulty urinating and dysuria.   Musculoskeletal: Positive for arthralgias and back pain.   Neurological: Negative for dizziness and light-headedness.   Psychiatric/Behavioral: Negative for dysphoric mood. The patient is not nervous/anxious.        Objective:     /84   Pulse 82   Ht 5' 3.5" (1.613 m)   Wt 122.9 kg (270 lb 15.1 oz)   LMP  (LMP Unknown)   BMI 47.24 kg/m²     Physical Exam   Constitutional: She is oriented to person, place, and time. She appears well-developed. No distress.   Morbidly obese       HENT:   Head: Normocephalic and atraumatic.   Eyes: EOM are normal. Pupils are equal, round, and reactive to light. No scleral icterus.   Neck: Normal range of motion. Neck supple. No thyromegaly present.   Cardiovascular: Normal rate and normal heart sounds.  Exam reveals no gallop and no friction rub.    No murmur heard.  Pulmonary/Chest: Effort normal and breath sounds normal. No respiratory distress. She has no wheezes.   Abdominal: Soft. Bowel sounds are normal. She exhibits no distension. There is no tenderness.   Musculoskeletal: Normal range of motion. She exhibits no edema.   Neurological: She is alert and oriented to person, place, and time. No cranial nerve deficit.   Skin: Skin is warm and dry. No erythema.   Psychiatric: She has a normal mood and affect. Her behavior is normal. Judgment normal.   Vitals reviewed.      Assessment:       1. Morbid obesity with BMI of 45.0-49.9, adult        Plan:         1. Morbid obesity with BMI of 45.0-49.9, adult    - diethylpropion 75 mg TbSR; Take 75 mg by mouth once daily.  Dispense: 30 tablet; Refill: 2  Patient warned of common side effects of " diethylpropion including anxiety, insomnia, palpitations and increased blood pressure. It was also explained that it is for short-term usage along with diet and exercise, and that stopping the medication without making lifestyle changes will result in regain of weight. Patient states understanding.    Weight loss medications are controlled substances.  They require routine follow up. Prescription or pills that are lost or destroyed will not be replaced.         Patient counseled in strategies for long term weight loss and maintenance: Keeping a food diary, exercise for 1 hour a day and eating breakfast everyday.      - To lose weight you want to cut 100% starchy carbohydrates out of your diet (bread, rice, pasta, potatoes, granola, flour, corn, peas, oatmeal, grits, tortillas, crackers, chips) and get  grams of protein.  Aim for 100 grams of protein and 1000 calories daily.    - No soda, sweet tea, juices, sports drinks or lemonade     -Exercise daily    2. Essential hypertension    The current medical regimen is effective;  continue present plan and medications. Expect improvement with weight loss.

## 2018-06-07 NOTE — LETTER
June 7, 2018      Jenn Mayorga MD  [address] [facility] [place] [address]           [facility]  [address]  [place] [address]  Phone: [phone]  Fax: [phone]          Patient: Paloma Bang   MR Number: [id number]   YOB: 1965   Date of Visit: 6/7/2018       Dear Dr. Jenn Mayorga:    Thank you for referring Paloma Bang to me for evaluation. Attached you will find relevant portions of my assessment and plan of care.    If you have questions, please do not hesitate to call me. I look forward to following Paloma Bang along with you.    Sincerely,    Nelsy Baires MD    Enclosure  CC:  No Recipients    If you would like to receive this communication electronically, please contact [email or web address] or [phone] to request more information on [facility] access.    For providers and/or their staff who would like to refer a patient to [facility], please contact us through our one-stop-shop provider referral line, [facility], [place], at [phone].    If you feel you have received this communication in error or would no longer like to receive these types of communications, please e-mail [email or web address]

## 2018-06-07 NOTE — PATIENT INSTRUCTIONS
Patient warned of common side effects of diethylpropion including anxiety, insomnia, palpitations and increased blood pressure. It was also explained that it is for short-term usage along with diet and exercise, and that stopping the medication without making lifestyle changes will result in regain of weight. Patient states understanding.    Weight loss medications are controlled substances.  They require routine follow up. Prescription or pills that are lost or destroyed will not be replaced.         Patient counseled in strategies for long term weight loss and maintenance: Keeping a food diary, exercise for 1 hour a day and eating breakfast everyday.      - To lose weight you want to cut 100% starchy carbohydrates out of your diet (bread, rice, pasta, potatoes, granola, flour, corn, peas, oatmeal, grits, tortillas, crackers, chips) and get  grams of protein.  Aim for 100 grams of protein and 1000 calories daily.    - No soda, sweet tea, juices, sports drinks or lemonade     -Exercise daily    - Premier Protein (Chocolate, Bananas & Cream, Strawberries & Cream, Vanilla) Imani or [employer]    - Syntrax [place] from [employer], www.bariatricadvantage.com, www.bariatricchoice.com. (LACTOSE FREE)    - [address] - Amazon.com (LACTOSE FREE)    - Veggetti Pro from [employer], [employer], [employer] Beyond    - www.pinterest.com (cauliflower, cloud bread, quest bar cookies, eggplant, zucchini, zucchini noodles, crustless quiche, no carb meals, taco lettuce boats)    - http://theworldaccosukiingtoartur.Smeet.com/    Fruits and Vegetables       Include 1-2 servings of fruit daily.      1 serving of fruit includes ½ cup unsweetened applesauce, ½ medium banana, tennis ball size piece of fruit, 17 grapes, 1 cup melon, 1 cup strawberries, ¼ cup dried fruit     Include 2-3 servings of vegetables daily. 1 serving is 1 cup raw or ½ cup cooked.     Non-starchy vegetables include artichoke, asparagus, baby corn, bamboo shoots,  beans: green/Italian/wax, bean sprouts, beets, broccoli, [place] sprouts, cabbage, carrots, cauliflower, celery, cucumber, eggplant, green onions or scallions, greens, jicama, leeks, mushrooms, okra, onions, pea pods, peppers, radishes, spinach, summer squash, tomatoes and salsa, turnips, vegetable juice cocktail, water chestnuts, zucchini        Sample meal plan  80-120g protein; 8874-6602 calories  Protein drinks and bars: 0-4 grams sugar  Drink lots of water throughout the day and exercise!  MENU # 1  Breakfast: 2 eggs, 1 turkey sausage bernardo  Lunch: 2-3 roll-ups (sliced turkey, low-fat slice cheese), baby carrots dipped in 1 Tbsp natural peanut butter  Mid-Day Snack: ¼ cup unsalted almonds, ½ cup fruit  Supper: 1 chicken thigh simmered in tomato sauce + 2 Tbsp mozzarella cheese, ½ cup black beans, 1/2 cup steamed veggies  Evening Snack: 1/2 cup grapes with 4 cubes low-fat cheddar cheese   ___________________________________________________  MENU # 2  Breakfast: protein drink  Mid-morning snack : ¼ cup unsalted nuts  Lunch: 1 cup tuna or chicken salad made with light felder, over salad.   Supper: hamburger bernardo, slice of cheese, 1 cup steamed veggies.   Snack: light yogurt      Menu #3  Breakfast: 6oz plain Greek yogurt + fruit (½ banana, ½ cup fruit - pineapple, blueberries, strawberries, peach), may add Splenda to shirley.  Lunch: ½  chicken breast w/ slice pepper alton cheese, 1/2 cup steamed veggies and small salad with Salad Spritzer.    Mid-Day snack: protein drink   Supper: 4oz Grilled fish, grilled veggie kabob ( mushrooms, onion, bell pepper, yellow squash, zucchini, cherry tomatoes)  Evening Snack: fudgsicle no-sugar-added    Menu # 4  Breakfast: vanilla iced coffee: 1 scoop vanilla protein powder + 4oz skim milk + 4oz coffee   Snack: protein bar  Lunch: 2 Lettuce tacos: ¼ cup seasoned ground turkey wrapped in a Blayne lettuce leaf with 1 Tbsp shredded cheese and dollop low-fat sour cream  Dinner:  Shrimp omelet: 2 eggs, ½ cup shrimp, green onions, and shredded cheese        Menu #5  Breakfast: 1 cup low-fat cottage cheese, ½ cup peaches (no sugar added)  Lunch: 2 oz baked pork chop, 1 cup okra and tomato stew  Snack: 1 cup black beans + salsa + dollop sour cream  Dinner: Caprese chicken salad: 2 oz chicken, 1oz fresh mozzarella, sliced tomato, 1 Tbsp olive oil, basil  Snack: sugar-free pudding cup      Menu #6  Breakfast: ½ cup part-skim ricotta cheese, 2 Tbsp sugar-free strawberry preserves, ¼ cup slivered almonds  Lunch: 2 oz canned chicken, 1oz shredded cheddar cheese, ½ cup black beans, 2 Tbsp salsa  Snack: Protein drink  Dinner: 4 oz grilled salmon steak, over mixed green salad with light dressing      Meal Ideas for Regular Bariatric Diet  *Recipes and products available at www.bariatriceating.com      Breakfast: (15-20g protein)    - Egg white omelet: 2 egg whites or ½ cup Egg Beaters. (Optional proteins: cheese, shrimp, black beans, chicken, sliced turkey) (Optional veggies: tomatoes, salsa, spinach, mushrooms, onions, green peppers, or small slice avocado)     - Egg and sausage: 1 egg or ¼ cup Egg Beaters (any variety), with 1 bernardo or 2 links of Turkey sausage or Veggie breakfast sausage (Ben Farms or CloudOne)    - Crust-less breakfast quiche: To make a glass pie dish, mix 4oz part skim Ricotta, 1 cup skim milk, and 2 eggs as your base. Add protein: shredded cheese, sliced lean ham or turkey, turkey rosas/sausage. Add veggies: tomato, onion, green onion, mushroom, green pepper, spinach, etc.    - Yogurt parfait: Mix 1 - 6oz container Dannon Light N Fit vanilla yogurt, with ¼ cup Kashi Go Lean cereal    - Cottage cheese and fruit: ½ cup part-skim cottage cheese or ricotta cheese topped with fresh fruit or sugar free preserves     - Linda Jones's Vanilla Egg custard* (add 2 Tbsp instant coffee granules to make Cappuccino Custard*)    - Hi-Protein café latte (skim milk, decaf coffee, 1 scoop  protein powder). Optional to add Sugar free syrup or extract flavoring.    Lunch: (20-30g protein)    - ½ cup Black bean soup (Homemade or Progresso), with ¼ cup shredded low-fat cheese. Top with chopped tomato or fresh salsa.     - Lean deli turkey breast and low-fat sliced cheese, mustard or light felder to moisten, rolled up together, or wrapped in a Blayne lettuce leaf    - Chicken salad made from dinner leftovers, moisten with low-fat salad dressing or light felder. Also try leftover salmon, shrimp, tuna or boiled eggs. Serve ½ cup over dark green salad    - Fat-free canned refried beans, topped with ¼ cup shredded low-fat cheese. Top with chopped tomato or fresh salsa.     - Greek salad: Top mixed greens with 1-2oz grilled chicken, tomatoes, red onions, 2-3 kalamata olives, and sprinkle lightly with feta cheese. Spritz with Balsamic vinegar to taste.     - Crust-less lunch quiche: To make a glass pie dish, mix 4oz part skim Ricotta, 1 cup skim milk, and 2 eggs as your base. Add protein: shredded cheese, sliced lean ham or turkey, shrimp, chicken. Add veggies: tomato, onion, green onion, mushroom, green pepper, spinach, artichoke, broccoli, etc.    - Pizza bake: tomato sauce, low-fat shredded mozzarella and turkey pepperoni or Greenlandic rosas. Add any veggies.    - Cucumber crab bites: Spread ¼ cup crab dip (lump crabmeat + light cream cheese and green onions) over sliced cucumber.     - Chicken with light spinach and artichoke dip*: Puree in : 6oz cooked and drained spinach, 2 cloves garlic, 1 can cannelloni beans, ½ cup chopped green onions, 1 can drained artichoke hearts (not marinated in oil), lemon juice and basil. Mix in 2oz chopped up chicken.    Supper: (20-30g protein)    - Serve grilled fish over dark green salad tossed with low-fat dressing, served with grilled asparagus goodwin     - Rotisserie chicken salad: served with sliced strawberries, walnuts, fat-free feta cheese crumbles and 1 tbsp  Kam Own Light Raspberry [place] Vinaigrette    - Shrimp cocktail: Dip cold boiled shrimp in homemade low-sugar cocktail sauce (1/2 cup Bharath One Carb ketchup, 2 tbsp horseradish, 1/4 tsp hot sauce, 1 tsp Worcestershire sauce, 1 tbsp freshly-squeezed lemon juice). Serve with dark green salad, walnuts, and crumbled blue cheese drizzled with olive oil and Balsamic vinegar    - Tuna Melt: Spread tuna salad onto 2 thick slices of tomato. Top with low-fat cheese and broil until cheese is melted. May also be made with chicken salad of shrimp salad. [place] with different types of cheeses.    - Homemade low-fat Chili using extra lean ground beef or ground turkey. Top with shredded cheese and salsa as desired. May add dollop fat-free sour cream if desired    - Dinner Omelet with shrimp or chicken and onion, green peppers and chives.    - No noodle lasagna: Use sliced zucchini or eggplant in place of noodles.  Layer with part skim ricotta cheese and low sugar meat sauce (use very lean ground beef or ground turkey).    - Mexican chicken bake: Bake chunks of chicken breast or thigh with taco seasoning, Pace brand enchilada sauce, green onions and low-fat cheese. Serve with ¼ cup black beans or fat free refried beans topped with chopped tomatoes or salsa.    - Krystal frozen meatballs, simmered in Classico Marinara sauce. Different flavors of salsa or spaghetti sauce create different dishes! Sprinkle with parmesan cheese. Serve with grilled or steamed veggies, or a dark green salad.    - Simmer boneless skinless chicken thigh chunks in Classico Marinara sauce or roasted salsa until tender with chopped onion, bell pepper, garlic, mushrooms, spinach, etc.     - Hamburger, without the bun, dressed the way you like. Served with grilled or steamed veggies.    - Eggplant parmesan: Bake slices of eggplant at 350 degrees for 15 minutes. Layer tomato sauce, sliced eggplant and low-fat mozzarella cheese in a baking dish and  cover with foil. Bake 30-40 more minutes or until bubbly. Uncover and bake at 400 degrees for about 15 more minutes, or until top is slightly crisp.    - Fish tacos: grilled/baked white fish, wrapped in Blayne lettuce leaf, topped with salsa, shredded low-fat cheese, and light coleslaw.    Snacks: (100-200 calories; >5g protein)    - 1 low-fat cheese stick with 8 cherry tomatoes or 1 serving fresh fruit  - 4 thin slices fat-free turkey breast and 1 slice low-fat cheese  - 4 thin slices fat-free honey ham with wedge of melon  - 1/4 cup unsalted nuts with ½ cup fruit  - 6-oz container Dannon Light n Fit vanilla yogurt, topped with 1oz unsalted nuts         - apple, celery or baby carrots spread with 2 Tbsp natural peanut butter or almond butter   - apple slices with 1 oz slice low-fat cheese  - celery, cucumber, bell pepper or baby carrots dipped in ¼ cup hummus bean spread or light spinach and artichoke dip (*recipe in lunch section)  - 100 calorie bag microwave light popcorn with 3 tbsp grated parmesan cheese  - Justen Links Beef Steak - 14g protein! (similar to beef jerky)  - 2 wedges [place] - Light Herb & Garlic Cheese with sliced cucumber or green bell pepper  - 1/2 cup low-fat cottage cheese with ¼ cup fruit or ¼ cup salsa  - RTD Protein drinks: Atkins, Low Carb Slim Fast, EAS light, Muscle Milk Light, etc.  - Homemade Protein drinks: [facility] Soy95, Isopure, Nectar, UNJURY, Whey Gourmet, etc. Mix 1 scoop powder with 8oz skim/1% milk or light soymilk.  - Protein bars: Atkins, EAS, Pure Protein, Think Thin, Detour, etc. Must have 0-4 grams sugar - Read the label.    Takeout Options: No more than twice/week  Deli - Salads (no pasta or rice), meats, cheeses. Roasted chicken. Lox (salmon)    Mexican - Platters which don't include tortillas, chips, or rice. Go easy on the beans. Example: Fajitas without the tortillas. Ask the  not to bring chips to the table if they are too tempting.    Greek - Meat or fish  "and vegetable, but no bread or rice. Including hummus, baba ganoush, etc, is OK. Most sit-down Greek restaurants can provide you with cucumber slices for dipping instead of malcom bread.    Fast Food (Avoid as much as possible) - Salads (no croutons and limit salad dressing to 2 tbsp), grilled chicken sandwich without the bun and ask for no felder. Denisses low fat chili or Taco Bell pintos and cheese.    BBQ - The meats are fine if you ask for sauces on the side, but most of the traditional side dishes are loaded with carbs. Irwin slaw, baked beans and BBQ sauce are typically made with sugar.    Chinese - Nothing deep-fried, no rice or noodles. Many Chinese sauces have starch and sugar in them, so you'll have to use your judgement. If you find that these sauces trigger cravings, or cause Dumping, you can ask for the sauce to be made without sugar or just use soy sauce.        Spring Recipes:    Charlo Shake:     Ingredients  ½ cup low fat cottage cheese  ¼ cup vanilla protein powder  1/8 tsp mint extract  2-3 packets of stevia or artificial sweetener of choice  5-10 ice cubes (more or less depending on how thick you would like it)  4 oz of water  2-3 drops of green food coloring OR a small handful of spinach to make it green    Put all ingredients in  and  until desired consistency.      "Unstuffed" Cabbage Rolls:    Ingredients:  1 1/2 to 2 pounds lean ground beef or turkey  1 large onion, chopped  1 clove garlic, minced  1 small cabbage, chopped  2 cans (14.5 ounces each) diced tomatoes  1 can (8 ounces) tomato sauce  ¼ - ½ cup water depending on desired consistency  1 teaspoon ground black pepper, salt to taste    Spray large skillet with nonstick cookspray. Heat pan on medium heat. Sauté the onions until tender, and then add the ground beef (or turkey) and cook until the meat is browned.    Add the garlic, cook an additional minute before adding the remaining ingredients. Cover, reduce the heat and " simmer about 25 minutes (or until the cabbage is  fork tender)        Not so Brandt's Pie:    Ingredients  2 (or more) pounds of lean ground beef, or turkey  2 heads of cauliflower or 3 bags of frozen cauliflower, chopped  1 bag of frozen mixed veggies          (NO Corn, Peas or Potatoes!)  1-2 onions  1 egg  1 teaspoon each of basil, garlic powder, pepper, oregano and a little cayenne  4-5 sprays of I cant believe its not butter spray  4 ounces of fat free cream cheese (optional)  Low fat Cheese to top (optional)    Roast cauliflower in oven on 350* F for about 15-20 minutes, until browned and fork tender. (begin watters meat while cauliflower is cooking). Place cooked cauliflower in . Add 4 ounces of fat free cream cheese, 4-5 sprays of I cant believe its not butter SPRAY, and spices and puree until creamy.     While cauliflower is roasting, brown meat in large skillet and season to taste. Set aside. Sauté diced onion in skillet until somewhat soft. Set aside with meat. Pour mixed veggies in the skillet to heat on low heat.     Mix the meat, onions, mixed veggies, raw egg and any additional seasonings and put in bottom of 9x13 baking dish. Spread mashed cauliflower mixture over it until smooth.    Bake at 350 for approximately 30 minutes. Add cheese and bake 5 additional minutes (optional). Serve warm (or reheat later).    Crock Pot Balsamic Pork Tenderloin:    Ingredients:  1 tsp dried thyme  1 tsp ground pepper  ½ tsp salt  3 tbsp dried minced onion  2 tsp dried basil  ¼ cup low sodium broth  ½ cup balsamic vinegar  2 cloves garlic, minced  2 lbs Pork Tenderloin    Mix first 5 ingredients together. Rub pork tenderloin with dry seasoning mixture.  In a large sauté pan, heat ¼ cup balsamic vinegar and garlic on medium heat. Add pork to sauté gamez and sear, watters all sides. Add low sodium broth, 1 tbsp at a time to keep tenderloin from sticking or use nonstick cookspray.     Transfer meat to [facility]  pot. Pour remaining balsamic vinegar into sauté pan and continue  to stir for a few minutes to deglaze the pan. Pour juices over the top of the tenderloin in crockpot. Cook on high for 3 hours or until meat thermometer says 170*. Let rest 5-10 minutes and then slice.       Side Dish: Steamed carrots w/ garlic and ran    Ingredients:  2 garlic cloves, minced  1 lb baby carrots  5-6 sprays of I cant believe its not butter SPRAY  1 tsp minced peeled fresh ran  1 tbsp chopped fresh cilantro  ½ tsp grated lime rind  1 tbsp fresh lime juice   ¼ tsp salt    Prepare garlic; let stand 10 minutes. Steam carrots, covered in pot, about 10 minutes or until tender.     In a nonstick skillet over medium heat, spray pan w/ I cant believe its not butter SPRAY. Add garlic and ran and cook 1 minute, stirring constantly. Remove from  heat; stir in carrots, cilantro and remaining ingredients.         Side Dish: Green Bean Almondine    Ingredients:  1 pound fresh green beans, trimmed  1 tbsp jeff vinegar  1 ½ tsp water  1 tsp Galen Mustard  ¼ tsp salt  ¼ tsp freshly ground black pepper  1 tbsp sliced almonds, toasted    Cook green beans in boiling water for 4 minutes or until crisp-tender. Drain and plunge beans into ice water. Drain well. Pat beans dry w/ paper towel.     Combine vinegar, water, mustard, salt and pepper in a medium bowl. Add beans to vinegar mixture; toss to coat well. Sprinkle with toasted almonds.      How to Cook the Perfect Hard Boiled Egg:    Steam in water: Fill a large pot with 1 inch of water. Place steamer insert inside, cover, and bring to a boil over high heat. Add eggs to steamer basket, cover, and continue cooking 12 minute. If serving cold, immediately place eggs in a bowl of ice water and allow to cool for at least 15 minutes before peeling under cool running water. Store in the refrigerator for up to 5 days.    OR    Purchase Dash Go Rapid Egg Boiler: available @ Amazon, Bed Bath and  [employer], [employer], [employer] for ~$15-$20      Classic Egg Salad Recipe:    Ingredients:  8 hard cooked eggs, peeled and coarsely chopped  4-6 tbsp of low fat or fat free felder  2 tbsp celery, chopped  2 tsp momo mustard  Dash of cayenne pepper (for spice)  Black pepper, salt to taste    In a medium bowl, combine felder, celery, mustard and cayenne pepper with chopped eggs. Season w/ pepper and salt. Serve over Lettuce leaves.     Get Creative! Add chopped turkey rosas and tomato and serve on lettuce leaves for an egg-cellent BLT egg salad or mix lean diced ham, low fat cheddar and tomatoes for  egg salad    Tuna Deviled Eggs:    Ingredients:  12 hard boiled eggs  1 can of tuna packed in water  1 rib celery, diced  ¼ cup low fat felder  1 tsp mustard  Garlic powder, black pepper to taste  Dash of paprika (for finish)    Cut eggs in half lengthwise. Remove yolk and mash in bowl. In separate bowl, mix tuna, celery, low fat felder, mustard and seasonings.  Stir in egg yolks until blended. Stuff eggs and sprinkle dash of paprika      Rosas Jalapeno Deviled Eggs:    Ingredients:  12 hard boiled eggs, peeled  ½ cup low fat felder  1 ½ tsp rice vinegar  ¾ tsp ground mustard  ½ packet splenda  2 jalapenos, seeded and chopped, 6 pieces turkey rosas, cooked crisp and crumbled  Dash of paprika (for finish)    Cut eggs in half lengthwise. Remove yolk and mash in bowl. Add felder, vinegar, spices and Splenda until well combined. Mix in jalapenos and rosas. Stuff eggs and sprinkle w/ dash of paprika      Sugar-Free High Protein Brownie Recipe:    Ingredients:  2 cups of pureed zucchini  4 oz fat free cream cheese  1 large egg  2 scoops chocolate protein powder  1 tbsp pure vanilla extract  1/3 cup unsweetened cocoa powder    ¼ tsp salt  2 tbsp chopped nuts  *8-9 packets of splenda or artificial sweetener of choice    Preheat oven to 350* F.     Line an 8x8 pan w/ parchment paper or spray with nonstick cookspray.     In a medium bowl,  blend the fat free cream cheese with egg until creamy. Add chocolate protein powder and cocoa powder until creamy. Add vanilla extract. Stir in pureed zucchini and salt.     The batter will be thick, but not dry. If batter seems dry, add 1-2 tbsp water. Fold in Nuts. Pour batter in prepared pan.     Bake for 30 minutes. Allow to cool completely. Cut into squares and chill to semi-set.     *best if eaten within 2 days but may last 3-4 days in fridge.         Lemon Whip:    Ingredients:  Small box of sugar-free vanilla instant pudding mix  1 small packet of crystal light lemonade mix  2 cups skim milk or fat free fairlife milk  Sugar-free, fat free cool whip     Make sugar-free pudding using 2 cups skim milk according to package. Stir in 1 small packet of crystal light lemonade mix.  Fold in a dollop of sugar-free, fat free cool whip and stir to combine.     Home-made Sugar-free Pudding Pops:    Ingredients:  1 small pkg of sugar-free instant pudding mix (flavor of choice!)  2 cups fat free milk     Beat ingredients with whisk for 2 minutes. Pour into 6 paper or plastic cups or into a popsicle mold. Insert wooden pop stick in center of each cup.     Freeze for 5 hours or until firm. Peel off paper or pull out of popsicle mold.     Variations: add sugar-free syrups to change up the flavor, such as sugar-free chocolate pudding + sugar free raspberry syrup = chocolate raspberry fudgesicle.

## 2018-06-07 NOTE — LETTER
[facility]  [address]  [facility] [address]  Phone: [phone]  Fax: [phone] June 13, 2018      Jenn Mayorga MD  [address] [facility] [place] [address]    Patient: Paloma Bang   MR Number: [id number]   YOB: 1965   Date of Visit: 6/7/2018     Dear Dr. Mayorga:    Thank you for referring Paloma Bang to me for evaluation. Below are the relevant portions of my assessment and plan of care.    ASSESSMENT:  1. Morbid obesity with BMI of 45.0-49.9, adult      PLAN:   1. Morbid obesity with BMI of 45.0-49.9, adult - Diethylpropion 75 mg TbSR; Take 75 mg by mouth once daily.  Dispense: 30 tablet; Refill: 2    Patient warned of common side effects of diethylpropion including anxiety, insomnia, palpitations and increased blood pressure. It was also explained that it is for short-term usage along with diet and exercise, and that stopping the medication without making lifestyle changes will result in regain of weight. Patient states understanding.     Weight loss medications are controlled substances.  They require routine follow up. Prescription or pills that are lost or destroyed will not be replaced.      Patient counseled in strategies for long term weight loss and maintenance: Keeping a food diary, exercise for 1 hour a day and eating breakfast everyday.    - To lose weight you want to cut 100% starchy carbohydrates out of your diet (bread, rice, pasta, potatoes, granola, flour, corn, peas, oatmeal, grits, tortillas, crackers, chips) and get  grams of protein.  Aim for 100 grams of protein and 1000 calories daily.     - No soda, sweet tea, juices, sports drinks or lemonade      -Exercise daily     2. Essential hypertension -  The current medical regimen is effective;  continue present plan and medications. Expect improvement with weight loss.     If you have questions, please do not hesitate to call me. I look forward to following  Paloma along with you.    Sincerely,      Nelsy Baires MD   Medical Weight Loss   [facility]     AF/radha

## 2018-06-08 ENCOUNTER — PATIENT MESSAGE (OUTPATIENT)
Dept: PAIN MEDICINE | Facility: CLINIC | Age: 53
End: 2018-06-08

## 2018-06-08 NOTE — TELEPHONE ENCOUNTER
Contacted patient regarding message.    Patient was informed she needs to be seen in clinic in order to discuss back and hip pain and any treatment options.    Patient agreed and an appointment was scheduled for 06/14/18 at 3:20pm with Ele Knight NP.    Patient acknowledged information given and expressed understanding.

## 2018-06-12 ENCOUNTER — NURSE TRIAGE (OUTPATIENT)
Dept: ADMINISTRATIVE | Facility: CLINIC | Age: 53
End: 2018-06-12

## 2018-06-13 ENCOUNTER — SURGERY (OUTPATIENT)
Age: 53
End: 2018-06-13

## 2018-06-13 ENCOUNTER — HOSPITAL ENCOUNTER (OUTPATIENT)
Facility: HOSPITAL | Age: 53
Discharge: HOME | End: 2018-06-13
Attending: COLON & RECTAL SURGERY | Admitting: COLON & RECTAL SURGERY
Payer: COMMERCIAL

## 2018-06-13 ENCOUNTER — ANESTHESIA (OUTPATIENT)
Dept: ENDOSCOPY | Facility: HOSPITAL | Age: 53
End: 2018-06-13
Payer: COMMERCIAL

## 2018-06-13 ENCOUNTER — ANESTHESIA EVENT (OUTPATIENT)
Dept: ENDOSCOPY | Facility: HOSPITAL | Age: 53
End: 2018-06-13
Payer: COMMERCIAL

## 2018-06-13 VITALS
DIASTOLIC BLOOD PRESSURE: 96 MMHG | SYSTOLIC BLOOD PRESSURE: 148 MMHG | OXYGEN SATURATION: 98 % | RESPIRATION RATE: 16 BRPM | BODY MASS INDEX: 46.1 KG/M2 | HEIGHT: 64 IN | WEIGHT: 270 LBS | TEMPERATURE: 98 F | HEART RATE: 88 BPM

## 2018-06-13 DIAGNOSIS — E66.01 MORBID OBESITY WITH BODY MASS INDEX OF 45.0-49.9 IN ADULT: Primary | ICD-10-CM

## 2018-06-13 DIAGNOSIS — Z12.11 SCREENING FOR COLON CANCER: ICD-10-CM

## 2018-06-13 DIAGNOSIS — K21.9 GASTROESOPHAGEAL REFLUX DISEASE, ESOPHAGITIS PRESENCE NOT SPECIFIED: ICD-10-CM

## 2018-06-13 PROCEDURE — 88365 INSITU HYBRIDIZATION (FISH): CPT | Mod: 26,,, | Performed by: PATHOLOGY

## 2018-06-13 PROCEDURE — 88342 IMHCHEM/IMCYTCHM 1ST ANTB: CPT | Performed by: PATHOLOGY

## 2018-06-13 PROCEDURE — 88342 IMHCHEM/IMCYTCHM 1ST ANTB: CPT | Mod: 26,,, | Performed by: PATHOLOGY

## 2018-06-13 PROCEDURE — 27201012 HC FORCEPS, HOT/COLD, DISP: Performed by: COLON & RECTAL SURGERY

## 2018-06-13 PROCEDURE — 37000008 HC ANESTHESIA 1ST 15 MINUTES: Performed by: COLON & RECTAL SURGERY

## 2018-06-13 PROCEDURE — 88341 IMHCHEM/IMCYTCHM EA ADD ANTB: CPT | Mod: 26,,, | Performed by: PATHOLOGY

## 2018-06-13 PROCEDURE — 45380 COLONOSCOPY AND BIOPSY: CPT | Performed by: COLON & RECTAL SURGERY

## 2018-06-13 PROCEDURE — 63600175 PHARM REV CODE 636 W HCPCS: Performed by: NURSE ANESTHETIST, CERTIFIED REGISTERED

## 2018-06-13 PROCEDURE — 88364 INSITU HYBRIDIZATION (FISH): CPT | Mod: 26,,, | Performed by: PATHOLOGY

## 2018-06-13 PROCEDURE — 88341 IMHCHEM/IMCYTCHM EA ADD ANTB: CPT | Performed by: PATHOLOGY

## 2018-06-13 PROCEDURE — 88305 TISSUE EXAM BY PATHOLOGIST: CPT | Mod: 26,,, | Performed by: PATHOLOGY

## 2018-06-13 PROCEDURE — E9220 PRA ENDO ANESTHESIA: HCPCS | Mod: 33,,, | Performed by: NURSE ANESTHETIST, CERTIFIED REGISTERED

## 2018-06-13 PROCEDURE — 45380 COLONOSCOPY AND BIOPSY: CPT | Mod: ,,, | Performed by: COLON & RECTAL SURGERY

## 2018-06-13 PROCEDURE — 88305 TISSUE EXAM BY PATHOLOGIST: CPT | Performed by: PATHOLOGY

## 2018-06-13 PROCEDURE — 25000003 PHARM REV CODE 250: Performed by: NURSE PRACTITIONER

## 2018-06-13 PROCEDURE — 37000009 HC ANESTHESIA EA ADD 15 MINS: Performed by: COLON & RECTAL SURGERY

## 2018-06-13 RX ORDER — LIDOCAINE HCL/PF 100 MG/5ML
SYRINGE (ML) INTRAVENOUS
Status: DISCONTINUED | OUTPATIENT
Start: 2018-06-13 | End: 2018-06-13

## 2018-06-13 RX ORDER — PROPOFOL 10 MG/ML
VIAL (ML) INTRAVENOUS
Status: DISCONTINUED | OUTPATIENT
Start: 2018-06-13 | End: 2018-06-13

## 2018-06-13 RX ORDER — LIDOCAINE HYDROCHLORIDE 10 MG/ML
1 INJECTION, SOLUTION EPIDURAL; INFILTRATION; INTRACAUDAL; PERINEURAL ONCE
Status: DISCONTINUED | OUTPATIENT
Start: 2018-06-13 | End: 2018-06-13 | Stop reason: HOSPADM

## 2018-06-13 RX ORDER — PROPOFOL 10 MG/ML
VIAL (ML) INTRAVENOUS CONTINUOUS PRN
Status: DISCONTINUED | OUTPATIENT
Start: 2018-06-13 | End: 2018-06-13

## 2018-06-13 RX ORDER — SODIUM CHLORIDE 9 MG/ML
INJECTION, SOLUTION INTRAVENOUS CONTINUOUS
Status: DISCONTINUED | OUTPATIENT
Start: 2018-06-13 | End: 2018-06-13 | Stop reason: HOSPADM

## 2018-06-13 RX ADMIN — SODIUM CHLORIDE: 0.9 INJECTION, SOLUTION INTRAVENOUS at 08:06

## 2018-06-13 RX ADMIN — LIDOCAINE HYDROCHLORIDE 50 MG: 20 INJECTION, SOLUTION INTRAVENOUS at 09:06

## 2018-06-13 RX ADMIN — PROPOFOL 80 MG: 10 INJECTION, EMULSION INTRAVENOUS at 09:06

## 2018-06-13 RX ADMIN — PROPOFOL 200 MCG/KG/MIN: 10 INJECTION, EMULSION INTRAVENOUS at 09:06

## 2018-06-13 NOTE — [employer] PATIENT INSTRUCTIONS
Discharge Summary/Instructions after an Endoscopic Procedure  Patient Name: Paloma Bang  Patient MRN: [id number]  Patient YOB: 1965  Wednesday, June 13, 2018  Bob Posada MD  RESTRICTIONS:  During your procedure today, you received medications for sedation.  These   medications may affect your judgment, balance and coordination.  Therefore,   for 24 hours, you have the following restrictions:   - DO NOT drive a car, operate machinery, make legal/financial decisions,   sign important papers or drink alcohol.    ACTIVITY:  Today: no heavy lifting, straining or running due to procedural   sedation/anesthesia.  The following day: return to full activity including work.  DIET:  Eat and drink normally unless instructed otherwise.     TREATMENT FOR COMMON SIDE EFFECTS:  - Mild abdominal pain, nausea, belching, bloating or excessive gas:  rest,   eat lightly and use a heating pad.  - Sore Throat: treat with throat lozenges and/or gargle with warm salt   water.  - Because air was used during the procedure, expelling large amounts of air   from your rectum or belching is normal.  - If a bowel prep was taken, you may not have a bowel movement for 1-3 days.    This is normal.  SYMPTOMS TO WATCH FOR AND REPORT TO YOUR PHYSICIAN:  1. Abdominal pain or bloating, other than gas cramps.  2. Chest pain.  3. Back pain.  4. Signs of infection such as: chills or fever occurring within 24 hours   after the procedure.  5. Rectal bleeding, which would show as bright red, maroon, or black stools.   (A tablespoon of blood from the rectum is not serious, especially if   hemorrhoids are present.)  6. Vomiting.  7. Weakness or dizziness.  GO DIRECTLY TO THE NEAREST EMERGENCY ROOM IF YOU HAVE ANY OF THE FOLLOWING:      Difficulty breathing              Chills and/or fever over 101 F   Persistent vomiting and/or vomiting blood   Severe abdominal pain   Severe chest pain   Black, tarry stools   Bleeding- more than one  tablespoon   Any other symptom or condition that you feel may need urgent attention  Your doctor recommends these additional instructions:  If any biopsies were taken, your doctors clinic will contact you in 1 to 2   weeks with any results.  - Discharge patient to home (ambulatory).   - Await pathology results.   - Repeat colonoscopy is recommended.  The colonoscopy date will be   determined after pathology results from today's exam become available for   review.  For questions, problems or results please call your physician - Bob Posada MD at Work:  [phone].  [facility], EMERGENCY ROOM PHONE NUMBER: [phone]  IF A COMPLICATION OR EMERGENCY SITUATION ARISES AND YOU ARE UNABLE TO REACH   YOUR PHYSICIAN - GO DIRECTLY TO THE EMERGENCY ROOM.  Bob Posada MD  6/13/2018 9:19:00 AM  This report has been verified and signed electronically.  [employer]

## 2018-06-13 NOTE — TELEPHONE ENCOUNTER
"  Reason for Disposition   Health Information question, no triage required and triager able to answer question    Answer Assessment - Initial Assessment Questions  1. REASON FOR CALL or QUESTION: "What is your reason for calling today?" or "How can I best help you?" or "What question do you have that I can help answer?"      Pt prepping for colonoscopy tomorrow. Has questions about how much of the prep she should drink tonight. Stated for past hr she has just passed yellow liquid.    Protocols used: ST INFORMATION ONLY CALL-A-AH    "

## 2018-06-13 NOTE — H&P
Endoscopy H&P    Procedure : Colonoscopy      asymptomatic screening exam      Past Medical History:   Diagnosis Date    Deep vein thrombosis     occurred after ankle fracture    GERD (gastroesophageal reflux disease)     Joint pain     Morbid obesity     Pulmonary embolism     occurred after ankle fracture             Review of Systems -ROS:  GENERAL: No fever, chills, fatigability or weight loss.  CHEST: Denies KAHN, cyanosis, wheezing, cough and sputum production.  CARDIOVASCULAR: Denies chest pain, PND, orthopnea or reduced exercise tolerance.   Musculoskeletal ROS: negative for - gait disturbance or joint pain  Neurological ROS: negative for - confusion or memory loss        Physical Exam:  General: well developed, well nourished, no distress  Head: normocephalic  Neck: supple, symmetrical, trachea midline  Lungs:  clear to auscultation bilaterally and normal respiratory effort  Heart: regular rate and rhythm, S1, S2 normal, no murmur, rub or gallop and regular rate and rhythm  Abdomen: soft, non-tender non-distented; bowel sounds normal; no masses,  no organomegaly  Extremities: no cyanosis or edema, or clubbing       Moderate Sedation (choice): Mallampati Score 1    ASA : II    IMP: asymptomatic screening exam    Plan: Colonoscopy with Moderate sedation.  I have explained the procedure including indications, alternatives, expected outcomes and potential complications. The patient appears to understand and gives informed consent. The patient is medically ready for surgery.

## 2018-06-13 NOTE — DISCHARGE INSTRUCTIONS
Understanding Colon and Rectal Polyps    The colon (also called the large intestine) is a muscular tube that forms the last part of the digestive tract. It absorbs water and stores food waste. The colon is about 4 to 6 feet long. The rectum is the last 6 inches of the colon. The colon and rectum have a smooth lining composed of millions of cells. Changes in these cells can lead to growths in the colon that can become cancerous and should be removed. Multiple tests are available to screen for colon cancer, but the colonoscopy is the most recommended test. During colonoscopy, these polyps can be removed. How often you need this test depends on many things including your condition, your family history, symptoms, and what the findings were at the previous colonoscopy.   When the colon lining changes  Changes that happen in the cells that line the colon or rectum can lead to growths called polyps. Over a period of years, polyps can turn cancerous. Removing polyps early may prevent cancer from ever forming.  Polyps  Polyps are fleshy clumps of tissue that form on the lining of the colon or rectum. Small polyps are usually benign (not cancerous). However, over time, cells in a polyp can change and become cancerous. Certain types of polyps known as adenomatous polyps are premalignant. The risk for invasive cancer increases with the size of the polyp and certain cell and gene features. This means that they can become cancerous if they're not removed. Hyperplastic polyps are benign. They can grow quite large and not turn cancerous.   Cancer  Almost all colorectal cancers start when polyp cells begin growing abnormally. As a cancerous tumor grows, it may involve more and more of the colon or rectum. In time, cancer can also grow beyond the colon or rectum and spread to nearby organs or to glands called lymph nodes. The cells can also travel to other parts of the body. This is known as metastasis. The earlier a cancerous  tumor is removed, the better the chance of preventing its spread.    Date Last Reviewed: 8/1/2016  © 2008-4721 [employer]. [address] [place] [address], [place] [address]. All rights reserved. This information is not intended as a substitute for professional medical care. Always follow your healthcare professional's instructions.        Colonoscopy     A camera attached to a flexible tube with a viewing lens is used to take video pictures.     Colonoscopy is a test to view the inside of your lower digestive tract (colon and rectum). Sometimes it can show the last part of the small intestine (ileum). During the test, small pieces of tissue may be removed for testing. This is called a biopsy. Small growths, such as polyps, may also be removed.   Why is colonoscopy done?  The test is done to help look for colon cancer. And it can help find the source of abdominal pain, bleeding, and changes in bowel habits. It may be needed once a year, depending on factors such as your:  · Age  · Health history  · Family health history  · Symptoms  · Results from any prior colonoscopy  Risks and possible complications  These include:  · Bleeding               · A puncture or tear in the colon   · Risks of anesthesia  · A cancer lesion not being seen  Getting ready   To prepare for the test:  · Talk with your healthcare provider about the risks of the test (see below). Also ask your healthcare provider about alternatives to the test.  · Tell your healthcare provider about any medicines you take. Also tell him or her about any health conditions you may have.  · Make sure your rectum and colon are empty for the test. Follow the diet and bowel prep instructions exactly. If you dont, the test may need to be rescheduled.  · Plan for a friend or family member to drive you home after the test.     Colonoscopy provides an inside view of the entire colon.     You may discuss the results with your doctor right away or at a future  visit.  During the test   The test is usually done in the hospital on an outpatient basis. This means you go home the same day. The procedure takes about 30 minutes. During that time:  · You are given relaxing (sedating) medicine through an IV line. You may be drowsy, or fully asleep.  · The healthcare provider will first give you a physical exam to check for anal and rectal problems.  · Then the anus is lubricated and the scope inserted.  · If you are awake, you may have a feeling similar to needing to have a bowel movement. You may also feel pressure as air is pumped into the colon. Its OK to pass gas during the procedure.  · Biopsy, polyp removal, or other treatments may be done during the test.  After the test   You may have gas right after the test. It can help to try to pass it to help prevent later bloating. Your healthcare provider may discuss the results with you right away. Or you may need to schedule a follow-up visit to talk about the results. After the test, you can go back to your normal eating and other activities. You may be tired from the sedation and need to rest for a few hours.  Date Last Reviewed: 11/1/2016  © 2933-7233 [employer]. [address] [place] [address], [place] [address]. All rights reserved. This information is not intended as a substitute for professional medical care. Always follow your healthcare professional's instructions.

## 2018-06-13 NOTE — TRANSFER OF CARE
"Anesthesia Transfer of Care Note    Patient: Paloma Bang    Procedure(s) Performed: Procedure(s) (LRB):  COLONOSCOPY (N/A)    Patient location: PACU    Anesthesia Type: general    Transport from OR: Transported from OR on room air with adequate spontaneous ventilation    Post pain: adequate analgesia    Post assessment: no apparent anesthetic complications    Post vital signs: stable    Level of consciousness: awake    Nausea/Vomiting: no nausea/vomiting    Complications: none    Transfer of care protocol was followed      Last vitals:   Visit Vitals  /68 (BP Location: Left arm, Patient Position: Lying)   Pulse 91   Temp 36.6 °C (97.9 °F) (Temporal)   Resp 17   Ht 5' 3.5" (1.613 m)   Wt 122.5 kg (270 lb)   LMP  (LMP Unknown)   SpO2 98%   Breastfeeding? No   BMI 47.08 kg/m²     "

## 2018-06-13 NOTE — ANESTHESIA PREPROCEDURE EVALUATION
06/13/2018  Paloma Bang is a 52 y.o., female.    Anesthesia Evaluation    I have reviewed the Patient Summary Reports.     I have reviewed the Medications.     Review of Systems  Anesthesia Hx:  No problems with previous Anesthesia Denies Hx of Anesthetic complications   Denies Personal Hx of Anesthesia complications.   Hematology/Oncology:  Hematology Normal   Oncology Normal     EENT/Dental:EENT/Dental Normal   Cardiovascular:  Cardiovascular Normal Exercise tolerance: good     Pulmonary:  Pulmonary Normal    Renal/:  Renal/ Normal     Hepatic/GI:   Bowel Prep.    Musculoskeletal:  Musculoskeletal Normal    Neurological:   Neuromuscular Disease,    Endocrine:  Endocrine Normal    Dermatological:  Skin Normal    Psych:  Psychiatric Normal           Physical Exam  General:  Well nourished, Obesity    Airway/Jaw/Neck:  Airway Findings: Mouth Opening: Normal Tongue: Normal  Mallampati: I  Improves to I with phonation.  TM Distance: Normal, at least 6 cm  Jaw/Neck Findings:  Neck ROM: Normal ROM     Eyes/Ears/Nose:  EYES/EARS/NOSE FINDINGS: Normal   Dental:  DENTAL FINDINGS: Normal   Chest/Lungs:  Chest/Lungs Clear    Heart/Vascular:  Heart Findings: Normal Heart murmur: negative Vascular Findings: Normal    Abdomen:  Abdomen Findings: Normal    Musculoskeletal:  Musculoskeletal Findings: Normal   Skin:  Skin Findings: Normal    Mental Status:  Mental Status Findings: Normal        Anesthesia Plan  Type of Anesthesia, risks & benefits discussed:  Anesthesia Type:  general  Patient's Preference: General  Intra-op Monitoring Plan: standard ASA monitors  Intra-op Monitoring Plan Comments:   Post Op Pain Control Plan:   Post Op Pain Control Plan Comments:   Induction:   IV  Beta Blocker:  Patient is not currently on a Beta-Blocker (No further documentation required).       Informed Consent: Patient  understands risks and agrees with Anesthesia plan.  Questions answered. Anesthesia consent signed with patient.  ASA Score: 2     Day of Surgery Review of History & Physical:    H&P update referred to the surgeon.         Ready For Surgery From Anesthesia Perspective.

## 2018-06-14 ENCOUNTER — OFFICE VISIT (OUTPATIENT)
Dept: PAIN MEDICINE | Facility: CLINIC | Age: 53
End: 2018-06-14
Payer: COMMERCIAL

## 2018-06-14 VITALS
SYSTOLIC BLOOD PRESSURE: 154 MMHG | HEIGHT: 64 IN | WEIGHT: 268.94 LBS | BODY MASS INDEX: 45.91 KG/M2 | DIASTOLIC BLOOD PRESSURE: 97 MMHG | HEART RATE: 90 BPM | TEMPERATURE: 99 F

## 2018-06-14 DIAGNOSIS — M70.62 GREATER TROCHANTERIC BURSITIS OF LEFT HIP: Primary | ICD-10-CM

## 2018-06-14 DIAGNOSIS — M47.816 SPONDYLOSIS OF LUMBAR REGION WITHOUT MYELOPATHY OR RADICULOPATHY: ICD-10-CM

## 2018-06-14 DIAGNOSIS — M51.36 DDD (DEGENERATIVE DISC DISEASE), LUMBAR: ICD-10-CM

## 2018-06-14 DIAGNOSIS — M47.816 FACET ARTHRITIS OF LUMBAR REGION: ICD-10-CM

## 2018-06-14 PROCEDURE — 99213 OFFICE O/P EST LOW 20 MIN: CPT | Mod: S$GLB,,, | Performed by: NURSE PRACTITIONER

## 2018-06-14 PROCEDURE — 3077F SYST BP >= 140 MM HG: CPT | Mod: CPTII,S$GLB,, | Performed by: NURSE PRACTITIONER

## 2018-06-14 PROCEDURE — 3008F BODY MASS INDEX DOCD: CPT | Mod: CPTII,S$GLB,, | Performed by: NURSE PRACTITIONER

## 2018-06-14 PROCEDURE — 99999 PR PBB SHADOW E&M-EST. PATIENT-LVL III: CPT | Mod: PBBFAC,,, | Performed by: NURSE PRACTITIONER

## 2018-06-14 PROCEDURE — 3080F DIAST BP >= 90 MM HG: CPT | Mod: CPTII,S$GLB,, | Performed by: NURSE PRACTITIONER

## 2018-06-14 NOTE — PROGRESS NOTES
Subjective:     Patient ID: Paloma Bang is a 52 y.o. female.    Chief Complaint: Pain    Consulted by: Dr. Mayorga     Disclaimer: This note was generated using voice recognition software.  There may be a typographical errors that were missed during proofreading.      HPI:    Paloma Bang is a 52 y.o. female who presents today with low back pain that started in 7/2016 when she fell out of a tub at a hotel.  Since that time, she has had low back pain that is her low back that is equal bilaterally and radiates into the posterior aspect of her left > right leg to the knee.  It used to go past her knee, but this has resolved. She has some ongoing numbness and tingling in her right leg that has been present since prior to her lumbar surgery in 2009.   This pain is described in detail below.    Interval History (11/29/2017):  The patient returns to clinic today for follow up. She is s/p left L3-4, L4-5, and L5-S1 facet joint injections on 11/7/2017. She reports 90% relief of her low back pain. She reports intermittent pain in the left buttock and posterior thigh that is aching in nature. She reports that this pain is tolerable at this time.     Interval History (6/14/2018):  The patient returns to clinic today for follow up. She reports increased low back and left leg pain that began approximately a month ago. She localizes her pain over her left hip. She describes this pain as constant and aching. This pain is worse with laying on her left side and with prolonged walking. She also reports aching pain across her lower back. She denies any radicular leg pain. She denies any other health changes.     Physical Therapy: She just finished a course of PT.  She is doing her HEP    Non-pharmacologic Treatment:     · Ice/Heat: Heat helps  · TENS: Not tried  · Massage: Not tried  · Chiropractic care: Not tried  · Acupuncture: Not tried  · Other: She wants to get orthotics         Pain Medications:        "  · Currently taking: tramadol 50 mg once or twice a month (puts her to sleep), meloxicam every other day,     · Has tried in the past:  Naproxen, flexeril, topical oil from a holistic provider    · Has not tried: Tylenol, other muscle relaxants, TCAs, SNRIs, anticonvulsants, topical creams    Blood thinners: None    Interventional Therapies:   · 2017: Left L3-4, L4-5, and L5-S1 facet joint injections- significant relief    Relevant Surgeries: Lumbar L4/5 surgery  for "sciatic issue"    Affecting sleep? Yes    Affecting daily activities? Yes    Depressive symptoms? No          · SI/HI? 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    Pain Scales  Best:/10  Worst:  Usually:  Today: 1/10    Back Pain     Low-back Pain   This is a chronic problem. The current episode started more than 1 year ago. The problem occurs constantly.       Last 3 PDI Scores 2017   Pain Disability Index (PDI) 14 30       Review of Systems   Musculoskeletal: Positive for back pain, joint pain, myalgias and stiffness.   Psychiatric/Behavioral: Negative for altered mental status and substance abuse.   All other systems reviewed and are negative.            Past Medical History:   Diagnosis Date    Deep vein thrombosis     occurred after ankle fracture    GERD (gastroesophageal reflux disease)     Joint pain     Morbid obesity     Pulmonary embolism     occurred after ankle fracture       Past Surgical History:   Procedure Laterality Date    ankle surgery      back surgery      Lumbar spinal fusion with shaving of a disk     SECTION      HYSTERECTOMY      2007    LAPAROSCOPIC GASTRIC BANDING      MYOMECTOMY      PILONIDAL CYST DRAINAGE      removed    ROTATOR CUFF REPAIR      Left       Review of patient's allergies indicates:  No Known " "Allergies    Current Outpatient Prescriptions   Medication Sig Dispense Refill    diethylpropion 75 mg TbSR Take 75 mg by mouth once daily. 30 tablet 2    fluticasone (FLONASE) 50 mcg/actuation nasal spray       losartan (COZAAR) 50 MG tablet Take 1 tablet (50 mg total) by mouth once daily. 90 tablet 3    multivitamin with minerals tablet Take 1 tablet by mouth once daily.      traMADol (ULTRAM) 50 mg tablet Take 1 tablet (50 mg total) by mouth every 8 (eight) hours as needed. 60 tablet 1    VITAMIN D2 50,000 unit capsule TAKE 1 CAPSULE (50,000 UNITS TOTAL) BY MOUTH EVERY 7 DAYS. 12 capsule 0     No current facility-administered medications for this visit.        Family History   Problem Relation Age of Onset    Diabetes Father     Hypertension Father     Diabetes Mother     Hypertension Mother     Breast cancer Neg Hx     Colon cancer Neg Hx     Ovarian cancer Neg Hx     Acne Neg Hx     Eczema Neg Hx     Lupus Neg Hx     Psoriasis Neg Hx     Melanoma Neg Hx        Social History     Social History    Marital status: Single     Spouse name: N/A    Number of children: N/A    Years of education: N/A     Occupational History    Not on file.     Social History Main Topics    Smoking status: Never Smoker    Smokeless tobacco: Never Used    Alcohol use No      Comment: occasionally/ not weekly    Drug use: No    Sexual activity: Not Currently     Birth control/ protection: Surgical     Other Topics Concern    Not on file     Social History Narrative    No narrative on file       Objective:     Vitals:    06/14/18 1537   BP: (!) 154/97   Pulse: 90   Temp: 98.7 °F (37.1 °C)   Weight: 122 kg (268 lb 15.4 oz)   Height: 5' 3.5" (1.613 m)   PainSc:   6   PainLoc: Back       GEN:  Well developed, well nourished.  No acute distress. No pain behavior.  HEENT:  No trauma.  Mucous membranes moist.  Nares patent bilaterally.  PSYCH: Normal affect. Thought content appropriate.  CHEST:  Breathing symmetric. "  No audible wheezing.  ABD: Soft, non-tender, non-distended.  SKIN:  Warm, pink, dry.  No rash on exposed areas.    EXT:  No cyanosis, clubbing, or edema.  No color change or changes in nail or hair growth.  NEURO/MUSCULOSKELETAL:  Fully alert, oriented, and appropriate. Speech normal zeynep. No cranial nerve deficits.   Gait: Antalgic.  Present trendelenburg sign bilaterally.   Motor Strength: 5/5 motor strength throughout lower extremities.   Sensory: Decreased sensation in a right L5 sensory deficit in the lower extremities.   Reflexes:  2+ and symmetric throughout.  Downgoing Babinski's bilaterally.  No clonus or spasticity.  L-Spine:  Decreased ROM with pain on extension. Positive facet loading bilaterally.  Negative SLR bilaterally.    SI Joint/Hip: Negative IVETTE bilaterally.  Negative FADIR bilaterally.  TTP over lumbar spine and left GT bursa.       Imaging:        The imaging studies listed below were independently reviewed by me, and I agree with the findings as documented below.     Narrative     History: Back pain    Comparison: 11/20/15    Result: 3 view of the lumbar spine.      Severe facet arthropathy is seen from L3/4 through L5/S1. Grade 1 anterolisthesis of L3 in relation to L4 is appreciated, with alignment at other levels appearing essentially unremarkable.  Vertebral body heights are normally maintained, without compression deformity at any level.  There is no marked disc narrowing at any level.  Minor marginal vertebral endplate spurring at a few lumbar levels is seen.  Lab band again noted. Mild constipation. In comparison to the prior study, there is no adverse interval changes.   Impression      No adverse interval change     MRI Lumbar Spine 11/15/2017:  Findings:     The vertebral body heights are well maintained, with no fracture.  No marrow signal abnormality suspicious for an infiltrative process.      The conus is normal in appearance, and terminates at the L1-L2 level.  The  adjacent soft tissue structures show no significant abnormalities.      There are findings of multi-level  lumbar spondylosis, as below.    L1-L2: There is no focal disc herniation. No significant central canal or neural foraminal narrowing.    L2-L3: Broad-based disc bulge and mild facet arthropathy and ligamentum flavum thickening. There is moderate left neuroforaminal narrowing. Mild canal stenosis.    L3-L4: Broad-based disc bulge with facet arthropathy and ligamentum flavum thickening. This results in moderate bilateral neuroforaminal narrowing and moderate canal stenosis.    L4-L5: Broad-based disc and facet arthropathy with ligamentum flavum thickening. This results in severe bilateral neuroforaminal narrowing and severe canal stenosis.     L5-S1: Intervertebral disc height loss with endplate sclerosis and broad-based disc bulge. There is facet arthropathy as well. This results in severe bilateral neuroforaminal narrowing and moderate canal stenosis.   Impression          Multilevel degenerative changes, worse at L4-5 and L5-S1.         Assessment:     Encounter Diagnoses   Name Primary?    Greater trochanteric bursitis of left hip Yes    Spondylosis of lumbar region without myelopathy or radiculopathy     Facet arthritis of lumbar region     DDD (degenerative disc disease), lumbar        Plan:     Paloma was seen today for follow-up.    Diagnoses and all orders for this visit:    Greater trochanteric bursitis of left hip  -     diclofenac sodium (PENNSAID) 2 % SoPk; Apply 40 mg topically 2 (two) times daily.    Spondylosis of lumbar region without myelopathy or radiculopathy  -     diclofenac sodium (PENNSAID) 2 % SoPk; Apply 40 mg topically 2 (two) times daily.    Facet arthritis of lumbar region  -     diclofenac sodium (PENNSAID) 2 % SoPk; Apply 40 mg topically 2 (two) times daily.    DDD (degenerative disc disease), lumbar  -     diclofenac sodium (PENNSAID) 2 % SoPk; Apply 40 mg topically 2 (two)  times daily.       Her pain is consistent with the above.    We discussed the assessment and recommendations.  All available images were reviewed. We discussed the disease process, prognosis, treatment plan, and risks and benefits. The patient is aware of the risks and benefits of the medications being prescribed, common side effects, and proper usage. The following is the plan we agreed on:     1. Schedule for left GT bursa injection in office under ultrasound. The procedure, risks, benefits and options were discussed with patient. There are no contraindications to the procedure. The patient expressed understanding and agreed to proceed.  Consent obtained today.  2. She is s/p lumbar facet joint injections with significant relief. We can repeat this as needed.   3. Trial Pennsaid to apply topical over painful area.  4. We discussed the importance of exercise. She continues to perform a HEP.  5. RTC 2 weeks after above procedure.     - Dr. Yang was consulted on the patient and agrees with this plan.    The above plan and management options were discussed at length with patient. Patient is in agreement with the above and verbalized understanding.     Ele Knight NP  06/14/2018

## 2018-06-15 NOTE — ANESTHESIA POSTPROCEDURE EVALUATION
"Anesthesia Post Evaluation    Patient: Paloma Bang    Procedure(s) Performed: Procedure(s) (LRB):  COLONOSCOPY (N/A)    Final Anesthesia Type: general  Patient location during evaluation: GI PACU  Patient participation: Yes- Able to Participate  Level of consciousness: awake and alert and oriented  Post-procedure vital signs: reviewed and stable  Pain management: adequate  Airway patency: patent  PONV status at discharge: No PONV  Anesthetic complications: no      Cardiovascular status: stable  Respiratory status: unassisted, spontaneous ventilation and room air  Hydration status: euvolemic  Follow-up not needed.        Visit Vitals  BP (!) 148/96   Pulse 88   Temp 36.4 °C (97.5 °F)   Resp 16   Ht 5' 3.5" (1.613 m)   Wt 122.5 kg (270 lb)   LMP  (LMP Unknown)   SpO2 98%   Breastfeeding? No   BMI 47.08 kg/m²       Pain/Alina Score: No Data Recorded      "

## 2018-06-20 ENCOUNTER — TELEPHONE (OUTPATIENT)
Dept: ENDOSCOPY | Facility: HOSPITAL | Age: 53
End: 2018-06-20

## 2018-06-25 ENCOUNTER — PROCEDURE VISIT (OUTPATIENT)
Dept: PAIN MEDICINE | Facility: CLINIC | Age: 53
End: 2018-06-25
Attending: ANESTHESIOLOGY
Payer: COMMERCIAL

## 2018-06-25 VITALS
DIASTOLIC BLOOD PRESSURE: 99 MMHG | HEIGHT: 63 IN | BODY MASS INDEX: 47.27 KG/M2 | WEIGHT: 266.75 LBS | HEART RATE: 77 BPM | TEMPERATURE: 99 F | SYSTOLIC BLOOD PRESSURE: 156 MMHG | RESPIRATION RATE: 18 BRPM

## 2018-06-25 DIAGNOSIS — M70.62 GREATER TROCHANTERIC BURSITIS OF LEFT HIP: Primary | ICD-10-CM

## 2018-06-25 DIAGNOSIS — E66.01 MORBID OBESITY WITH BMI OF 45.0-49.9, ADULT: ICD-10-CM

## 2018-06-25 DIAGNOSIS — G89.4 CHRONIC PAIN DISORDER: ICD-10-CM

## 2018-06-25 PROCEDURE — 20611 DRAIN/INJ JOINT/BURSA W/US: CPT | Mod: 22,LT,S$GLB, | Performed by: ANESTHESIOLOGY

## 2018-06-25 RX ORDER — BETAMETHASONE SODIUM PHOSPHATE AND BETAMETHASONE ACETATE 3; 3 MG/ML; MG/ML
6 INJECTION, SUSPENSION INTRA-ARTICULAR; INTRALESIONAL; INTRAMUSCULAR; SOFT TISSUE
Status: COMPLETED | OUTPATIENT
Start: 2018-06-25 | End: 2018-06-25

## 2018-06-25 RX ORDER — LIDOCAINE HYDROCHLORIDE 10 MG/ML
5 INJECTION INFILTRATION; PERINEURAL
Status: COMPLETED | OUTPATIENT
Start: 2018-06-25 | End: 2018-06-25

## 2018-06-25 RX ORDER — BUPIVACAINE HYDROCHLORIDE 2.5 MG/ML
6 INJECTION, SOLUTION EPIDURAL; INFILTRATION; INTRACAUDAL ONCE
Status: COMPLETED | OUTPATIENT
Start: 2018-06-25 | End: 2018-06-25

## 2018-06-25 RX ADMIN — BUPIVACAINE HYDROCHLORIDE 15 MG: 2.5 INJECTION, SOLUTION EPIDURAL; INFILTRATION; INTRACAUDAL at 03:06

## 2018-06-25 RX ADMIN — LIDOCAINE HYDROCHLORIDE 5 ML: 10 INJECTION INFILTRATION; PERINEURAL at 03:06

## 2018-06-25 RX ADMIN — BETAMETHASONE SODIUM PHOSPHATE AND BETAMETHASONE ACETATE 6 MG: 3; 3 INJECTION, SUSPENSION INTRA-ARTICULAR; INTRALESIONAL; INTRAMUSCULAR; SOFT TISSUE at 03:06

## 2018-06-25 NOTE — PROCEDURES
Procedures     Date of Procedure: 06/25/2018    Procedure: Left greater trochanteric bursa injection under ultrasound guidance    Pre-op diagnosis: Left greater trochanteric bursitis; morbid obesity    Post-op diagnosis: Same     Physician: Dr. Jia Yang     Assistant: None    Anesthestia: Local    EBL: None    Specimens: None    All medications, allergies, and relevant histories were reviewed. No recent antibiotics or infections.  A time-out was taken to verify the correct patient, procedure, laterality, and appropriate medications/allergies.    Left Greater Trochanteric Bursa Injection-  After informed consent was taken patient is placed in right lateral decubitus position and the hip area exposed. Patient was prepped in sterile fashion. Ultrasound guidance was used to locate the left greater trochanter and then using a 27-gauge 1.5 inch needle local anesthetic was injected to make a skin wheal. Then a 22-gauge 3.5 inch spinal needle was introduced into the greater trochanteric bursa under ultrasound guidance. After negative aspiration, and no paresthesia, a 7 ml solution containing 0.25% bupivacaine and 6 mg of betamethasone was intermittently injected. Ultrasound showed good spread of local anesthetic.     Special equipment and extra time required due to obesity.    Patient tolerated the procedure well and was discharged in excellent condition.    Future Management:   If helpful, can repeat as needed.    Follow up with my clinic in 3 weeks or sooner if needed

## 2018-06-25 NOTE — PATIENT INSTRUCTIONS
"Type "foam roller for it band" into "[employer] A video will come up on how to use a foam roller to stretch your IT band. This will help with your bursitis. Also, on the right, links will appear where you can order a foam roller to use. Recommend purchasing one around $20 that is smooth (no lumps or grooves). I recommend the [facility] Muscle Therapy Foam Roller, 18"    "

## 2018-07-09 ENCOUNTER — OFFICE VISIT (OUTPATIENT)
Dept: PAIN MEDICINE | Facility: CLINIC | Age: 53
End: 2018-07-09
Payer: COMMERCIAL

## 2018-07-09 VITALS
HEIGHT: 63 IN | WEIGHT: 262.38 LBS | RESPIRATION RATE: 18 BRPM | BODY MASS INDEX: 46.49 KG/M2 | HEART RATE: 71 BPM | DIASTOLIC BLOOD PRESSURE: 78 MMHG | TEMPERATURE: 97 F | SYSTOLIC BLOOD PRESSURE: 131 MMHG

## 2018-07-09 DIAGNOSIS — M47.816 SPONDYLOSIS OF LUMBAR REGION WITHOUT MYELOPATHY OR RADICULOPATHY: Primary | ICD-10-CM

## 2018-07-09 DIAGNOSIS — M51.36 DDD (DEGENERATIVE DISC DISEASE), LUMBAR: ICD-10-CM

## 2018-07-09 DIAGNOSIS — M47.816 FACET ARTHRITIS OF LUMBAR REGION: ICD-10-CM

## 2018-07-09 DIAGNOSIS — M79.10 MYALGIA: ICD-10-CM

## 2018-07-09 PROCEDURE — 99999 PR PBB SHADOW E&M-EST. PATIENT-LVL III: CPT | Mod: PBBFAC,,, | Performed by: NURSE PRACTITIONER

## 2018-07-09 PROCEDURE — 99213 OFFICE O/P EST LOW 20 MIN: CPT | Mod: S$GLB,,, | Performed by: NURSE PRACTITIONER

## 2018-07-09 PROCEDURE — 3078F DIAST BP <80 MM HG: CPT | Mod: CPTII,S$GLB,, | Performed by: NURSE PRACTITIONER

## 2018-07-09 PROCEDURE — 3008F BODY MASS INDEX DOCD: CPT | Mod: CPTII,S$GLB,, | Performed by: NURSE PRACTITIONER

## 2018-07-09 PROCEDURE — 3075F SYST BP GE 130 - 139MM HG: CPT | Mod: CPTII,S$GLB,, | Performed by: NURSE PRACTITIONER

## 2018-07-09 NOTE — PROGRESS NOTES
Subjective:     Patient ID: Paloma Bang is a 52 y.o. female.    Chief Complaint: Pain    Consulted by: Dr. Mayorga     Disclaimer: This note was generated using voice recognition software.  There may be a typographical errors that were missed during proofreading.      HPI:    Paloma Bang is a 52 y.o. female who presents today with low back pain that started in 7/2016 when she fell out of a tub at a hotel.  Since that time, she has had low back pain that is her low back that is equal bilaterally and radiates into the posterior aspect of her left > right leg to the knee.  It used to go past her knee, but this has resolved. She has some ongoing numbness and tingling in her right leg that has been present since prior to her lumbar surgery in 2009.   This pain is described in detail below.    Interval History (11/29/2017):  The patient returns to clinic today for follow up. She is s/p left L3-4, L4-5, and L5-S1 facet joint injections on 11/7/2017. She reports 90% relief of her low back pain. She reports intermittent pain in the left buttock and posterior thigh that is aching in nature. She reports that this pain is tolerable at this time.     Interval History (6/14/2018):  The patient returns to clinic today for follow up. She reports increased low back and left leg pain that began approximately a month ago. She localizes her pain over her left hip. She describes this pain as constant and aching. This pain is worse with laying on her left side and with prolonged walking. She also reports aching pain across her lower back. She denies any radicular leg pain. She denies any other health changes.     Interval History (7/9/2018):  The patient returns to clinic today for follow up. She is s/p left GT bursa injection on 6/25/2018. She reports no significant relief of her pain. She reports increased left sided low back pain that is constant and aching in nature. She denies any radicular leg pain. She  "reports aching pain in the posterior aspect of her left thigh. Her pain is worse with prolonged standing and walking. She reports that she went to [employer] this past weekend which increased her low back pain. She is no longer taking Etodolac as it caused itching. She continues to take Robaxin as needed with benefit. She was unable to get the Pennsaid due to cost.     Physical Therapy: She just finished a course of PT.  She is doing her HEP    Non-pharmacologic Treatment:     · Ice/Heat: Heat helps  · TENS: Not tried  · Massage: Not tried  · Chiropractic care: Not tried  · Acupuncture: Not tried  · Other: She wants to get orthotics         Pain Medications:         · Currently taking: tramadol 50 mg once or twice a month (puts her to sleep), Robaxin PRN    · Has tried in the past:  Naproxen, flexeril, topical oil from a holistic provider    · Has not tried: Tylenol, other muscle relaxants, TCAs, SNRIs, anticonvulsants, topical creams    Blood thinners: None    Interventional Therapies:   · 11/7/2017: Left L3-4, L4-5, and L5-S1 facet joint injections- significant relief  · 6/25/2018- Left GT bursa injection- no relief    Relevant Surgeries: Lumbar L4/5 surgery 2009 for "sciatic issue"    Affecting sleep? Yes    Affecting daily activities? Yes    Depressive symptoms? No          · SI/HI? 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    Pain Scales  Best: 2/10  Worst: 9/10  Usually: 5/10  Today: 6/10    Back Pain     Low-back Pain   This is a chronic problem. The current episode started more than 1 year ago. The problem occurs constantly.       Last 3 PDI Scores 7/9/2018 6/25/2018 6/14/2018   Pain Disability Index (PDI) 27 15 14       Review of Systems   Musculoskeletal: Positive for back pain, joint pain, myalgias and stiffness.   Psychiatric/Behavioral: Negative for " altered mental status and substance abuse.   All other systems reviewed and are negative.            Past Medical History:   Diagnosis Date    Deep vein thrombosis     occurred after ankle fracture    GERD (gastroesophageal reflux disease)     Joint pain     Morbid obesity     Pulmonary embolism     occurred after ankle fracture       Past Surgical History:   Procedure Laterality Date    ankle surgery      back surgery      Lumbar spinal fusion with shaving of a disk     SECTION      COLONOSCOPY N/A 2018    Procedure: COLONOSCOPY;  Surgeon: Bob Posada MD;  Location: [facility] ([address] [place] [address]);  Service: Endoscopy;  Laterality: N/A;  18-BMI 49.68-MS    HYSTERECTOMY      2007    LAPAROSCOPIC GASTRIC BANDING      MYOMECTOMY      PILONIDAL CYST DRAINAGE      removed    ROTATOR CUFF REPAIR      Left       Review of patient's allergies indicates:  No Known Allergies    Current Outpatient Prescriptions   Medication Sig Dispense Refill    diclofenac sodium (PENNSAID) 2 % SoPk Apply 40 mg topically 2 (two) times daily. 1 packet 3    diethylpropion 75 mg TbSR Take 75 mg by mouth once daily. 30 tablet 2    fluticasone (FLONASE) 50 mcg/actuation nasal spray       losartan (COZAAR) 50 MG tablet Take 1 tablet (50 mg total) by mouth once daily. 90 tablet 3    multivitamin with minerals tablet Take 1 tablet by mouth once daily.      traMADol (ULTRAM) 50 mg tablet Take 1 tablet (50 mg total) by mouth every 8 (eight) hours as needed. 60 tablet 1    VITAMIN D2 50,000 unit capsule TAKE 1 CAPSULE (50,000 UNITS TOTAL) BY MOUTH EVERY 7 DAYS. 12 capsule 0     No current facility-administered medications for this visit.        Family History   Problem Relation Age of Onset    Diabetes Father     Hypertension Father     Diabetes Mother     Hypertension Mother     Breast cancer Neg Hx     Colon cancer Neg Hx     Ovarian cancer Neg Hx     Acne Neg Hx     Eczema Neg Hx     Lupus Neg Hx     Psoriasis  "Neg Hx     Melanoma Neg Hx        Social History     Social History    Marital status: Single     Spouse name: N/A    Number of children: N/A    Years of education: N/A     Occupational History    Not on file.     Social History Main Topics    Smoking status: Never Smoker    Smokeless tobacco: Never Used    Alcohol use No      Comment: occasionally/ not weekly    Drug use: No    Sexual activity: Not Currently     Birth control/ protection: Surgical     Other Topics Concern    Not on file     Social History Narrative    No narrative on file       Objective:     Vitals:    07/09/18 0804   BP: 131/78   Pulse: 71   Resp: 18   Temp: 97.4 °F (36.3 °C)   Weight: 119 kg (262 lb 5.6 oz)   Height: 5' 3" (1.6 m)   PainSc:   6       GEN:  Well developed, well nourished.  No acute distress. No pain behavior.  HEENT:  No trauma.  Mucous membranes moist.  Nares patent bilaterally.  PSYCH: Normal affect. Thought content appropriate.  CHEST:  Breathing symmetric.  No audible wheezing.  ABD: Soft, non-tender, non-distended.  SKIN:  Warm, pink, dry.  No rash on exposed areas.    EXT:  No cyanosis, clubbing, or edema.  No color change or changes in nail or hair growth.  NEURO/MUSCULOSKELETAL:  Fully alert, oriented, and appropriate. Speech normal zeynep. No cranial nerve deficits.   Gait: Antalgic.  Present trendelenburg sign bilaterally.   Motor Strength: 5/5 motor strength throughout lower extremities.   Sensory: Decreased sensation in a right L5 sensory deficit in the lower extremities.   Reflexes:  2+ and symmetric throughout.  Downgoing Babinski's bilaterally.  No clonus or spasticity.  L-Spine:  Decreased ROM with pain on flexion and extension. Positive facet loading bilaterally, L > R.  Negative SLR bilaterally.    SI Joint/Hip: Negative IVETTE bilaterally.  Negative FADIR bilaterally.  TTP over lumbar spine.      Imaging:        The imaging studies listed below were independently reviewed by me, and I agree with the " findings as documented below.     Narrative     History: Back pain    Comparison: 11/20/15    Result: 3 view of the lumbar spine.      Severe facet arthropathy is seen from L3/4 through L5/S1. Grade 1 anterolisthesis of L3 in relation to L4 is appreciated, with alignment at other levels appearing essentially unremarkable.  Vertebral body heights are normally maintained, without compression deformity at any level.  There is no marked disc narrowing at any level.  Minor marginal vertebral endplate spurring at a few lumbar levels is seen.  Lab band again noted. Mild constipation. In comparison to the prior study, there is no adverse interval changes.   Impression      No adverse interval change     MRI Lumbar Spine 11/15/2017:  Findings:     The vertebral body heights are well maintained, with no fracture.  No marrow signal abnormality suspicious for an infiltrative process.      The conus is normal in appearance, and terminates at the L1-L2 level.  The adjacent soft tissue structures show no significant abnormalities.      There are findings of multi-level  lumbar spondylosis, as below.    L1-L2: There is no focal disc herniation. No significant central canal or neural foraminal narrowing.    L2-L3: Broad-based disc bulge and mild facet arthropathy and ligamentum flavum thickening. There is moderate left neuroforaminal narrowing. Mild canal stenosis.    L3-L4: Broad-based disc bulge with facet arthropathy and ligamentum flavum thickening. This results in moderate bilateral neuroforaminal narrowing and moderate canal stenosis.    L4-L5: Broad-based disc and facet arthropathy with ligamentum flavum thickening. This results in severe bilateral neuroforaminal narrowing and severe canal stenosis.     L5-S1: Intervertebral disc height loss with endplate sclerosis and broad-based disc bulge. There is facet arthropathy as well. This results in severe bilateral neuroforaminal narrowing and moderate canal stenosis.   Impression           Multilevel degenerative changes, worse at L4-5 and L5-S1.         Assessment:     Encounter Diagnoses   Name Primary?    Spondylosis of lumbar region without myelopathy or radiculopathy Yes    Facet arthritis of lumbar region     DDD (degenerative disc disease), lumbar     Myalgia        Plan:     Paloma was seen today for follow-up and low-back pain.    Diagnoses and all orders for this visit:    Spondylosis of lumbar region without myelopathy or radiculopathy    Facet arthritis of lumbar region    DDD (degenerative disc disease), lumbar    Myalgia       Her pain is consistent with the above.    We discussed the assessment and recommendations.  All available images were reviewed. We discussed the disease process, prognosis, treatment plan, and risks and benefits. The patient is aware of the risks and benefits of the medications being prescribed, common side effects, and proper usage. The following is the plan we agreed on:     1. Schedule for left L3-4, L4-5, L5-S1 facet joint injections. The procedure, risks, benefits and options were discussed with patient. There are no contraindications to the procedure. The patient expressed understanding and agreed to proceed.    2. If short term relief, we can consider lumbar MBB to precede RFA.   3. Continue Robaxin PRN.   4. We discussed the importance of exercise. She continues to perform a HEP.  5. RTC 2 weeks after above procedure.     - Dr. Yang was consulted on the patient and agrees with this plan.    The above plan and management options were discussed at length with patient. Patient is in agreement with the above and verbalized understanding.     Ele Knight NP  07/09/2018

## 2018-07-12 ENCOUNTER — PATIENT MESSAGE (OUTPATIENT)
Dept: ALLERGY | Facility: CLINIC | Age: 53
End: 2018-07-12

## 2018-07-13 ENCOUNTER — SURGERY (OUTPATIENT)
Age: 53
End: 2018-07-13

## 2018-07-13 ENCOUNTER — HOSPITAL ENCOUNTER (OUTPATIENT)
Facility: OTHER | Age: 53
Discharge: HOME | End: 2018-07-13
Attending: ANESTHESIOLOGY | Admitting: ANESTHESIOLOGY
Payer: COMMERCIAL

## 2018-07-13 VITALS
HEART RATE: 63 BPM | RESPIRATION RATE: 18 BRPM | OXYGEN SATURATION: 99 % | DIASTOLIC BLOOD PRESSURE: 93 MMHG | BODY MASS INDEX: 44.73 KG/M2 | SYSTOLIC BLOOD PRESSURE: 152 MMHG | WEIGHT: 262 LBS | HEIGHT: 64 IN | TEMPERATURE: 99 F

## 2018-07-13 DIAGNOSIS — M47.817 FACET ARTHRITIS OF LUMBOSACRAL REGION: ICD-10-CM

## 2018-07-13 DIAGNOSIS — M47.816 LUMBAR SPONDYLOSIS: Primary | ICD-10-CM

## 2018-07-13 DIAGNOSIS — M47.816 FACET ARTHRITIS OF LUMBAR REGION: ICD-10-CM

## 2018-07-13 PROCEDURE — 64494 INJ PARAVERT F JNT L/S 2 LEV: CPT | Performed by: ANESTHESIOLOGY

## 2018-07-13 PROCEDURE — 64493 INJ PARAVERT F JNT L/S 1 LEV: CPT | Mod: 22,LT,, | Performed by: ANESTHESIOLOGY

## 2018-07-13 PROCEDURE — 64495 INJ PARAVERT F JNT L/S 3 LEV: CPT | Performed by: ANESTHESIOLOGY

## 2018-07-13 PROCEDURE — 25500020 PHARM REV CODE 255: Performed by: ANESTHESIOLOGY

## 2018-07-13 PROCEDURE — 64493 INJ PARAVERT F JNT L/S 1 LEV: CPT | Performed by: ANESTHESIOLOGY

## 2018-07-13 PROCEDURE — S0020 INJECTION, BUPIVICAINE HYDRO: HCPCS | Performed by: ANESTHESIOLOGY

## 2018-07-13 PROCEDURE — 25000003 PHARM REV CODE 250: Performed by: ANESTHESIOLOGY

## 2018-07-13 PROCEDURE — 64494 INJ PARAVERT F JNT L/S 2 LEV: CPT | Mod: LT,,, | Performed by: ANESTHESIOLOGY

## 2018-07-13 PROCEDURE — 64495 INJ PARAVERT F JNT L/S 3 LEV: CPT | Mod: LT,,, | Performed by: ANESTHESIOLOGY

## 2018-07-13 PROCEDURE — 63600175 PHARM REV CODE 636 W HCPCS: Performed by: ANESTHESIOLOGY

## 2018-07-13 RX ORDER — LIDOCAINE HYDROCHLORIDE 10 MG/ML
INJECTION INFILTRATION; PERINEURAL
Status: DISCONTINUED | OUTPATIENT
Start: 2018-07-13 | End: 2018-07-13 | Stop reason: HOSPADM

## 2018-07-13 RX ORDER — BUPIVACAINE HYDROCHLORIDE 5 MG/ML
INJECTION, SOLUTION EPIDURAL; INTRACAUDAL
Status: DISCONTINUED | OUTPATIENT
Start: 2018-07-13 | End: 2018-07-13 | Stop reason: HOSPADM

## 2018-07-13 RX ORDER — METHYLPREDNISOLONE ACETATE 80 MG/ML
INJECTION, SUSPENSION INTRA-ARTICULAR; INTRALESIONAL; INTRAMUSCULAR; SOFT TISSUE
Status: DISCONTINUED | OUTPATIENT
Start: 2018-07-13 | End: 2018-07-13 | Stop reason: HOSPADM

## 2018-07-13 RX ADMIN — METHYLPREDNISOLONE ACETATE 80 MG: 80 INJECTION, SUSPENSION INTRA-ARTICULAR; INTRALESIONAL; INTRAMUSCULAR; SOFT TISSUE at 09:07

## 2018-07-13 RX ADMIN — IOHEXOL 3 ML: 300 INJECTION, SOLUTION INTRAVENOUS at 09:07

## 2018-07-13 RX ADMIN — SODIUM BICARBONATE 5 ML: 0.2 INJECTION, SOLUTION INTRAVENOUS at 09:07

## 2018-07-13 RX ADMIN — BUPIVACAINE HYDROCHLORIDE 10 ML: 5 INJECTION, SOLUTION EPIDURAL; INTRACAUDAL; PERINEURAL at 09:07

## 2018-07-13 RX ADMIN — LIDOCAINE HYDROCHLORIDE 10 ML: 10 INJECTION, SOLUTION INFILTRATION; PERINEURAL at 09:07

## 2018-07-13 NOTE — DISCHARGE INSTRUCTIONS
Home Care Instructions Pain Management:    1. DIET:   You may resume your normal diet today.   2. BATHING:   You may shower with luke warm water.  3. DRESSING:   You may remove your bandage today.   4. ACTIVITY LEVEL:   You may resume your normal activities 24 hrs after your procedure.  5. MEDICATIONS:   You may resume your normal medications today.   6. SPECIAL INSTRUCTIONS:   No heat to the injection site for 24 hrs including, bath or shower, heating pad, moist heat, or hot tubs.    Use ice pack to injection site for any pain or discomfort.  Apply ice packs for 20 minute intervals as needed.   If you have received any sedatives by mouth today you may not drive for 12 hours.    If you have received any sedation through your IV, you may not drive for 24 hrs.     PLEASE CALL YOUR DOCTOR IF:  1. Redness or swelling around the injection site.  2. Fever of 101 degrees  3. Drainage (pus) from the injection site.  4. For any continuous bleeding (some dried blood over the incision is normal.)    FOR EMERGENCIES:   If any unusual problems or difficulties occur during clinic hours, call [phone] or [phone].     Thank you for allowing us to care for you today. You may receive a survey about the care we provided. Your feedback is valuable and helps us provide excellent care throughout the community.

## 2018-07-13 NOTE — PLAN OF CARE
PATIENT TOLERATED PROCEDURE WELL. PT COMPLAINS OF 02 /10 PAIN. ASSISTED PATIENT UP FOR FIRST TIME. STEADY ON FEET AND DISCHARGE INSTRUCTIONS GIVEN.

## 2018-07-13 NOTE — OP NOTE
"Date of procedure: 07/13/2018    Procedure: Left L3-4, L4-5, and L5-S1 Facet joint injection     Pre-op diagnosis: Lumbar spondylosis [M47.816]; morbid obesity    Post-op diagnosis: Lumbar spondylosis [M47.816]; morbid obesity    Physician: Dr. Jia Yang     Assistant: Dr. Herzog    Anesthestia: local    EBL: None    Specimens: None    All medications, allergies, and relevant histories were reviewed. No recent antibiotics or infections. A time-out was taken to verify the correct patient, procedure, laterality, and appropriate medications/allergies.     Fluoroscopically-Guided, Contrast-Controlled Lumbar Facet Joint Injection:     Following denial of allergy and review of potential side effects and complications including but not necessarily limited to infection, allergic reaction, local tissue breakdown, nerve injury, paresis, paralysis, spinal cord injury, and seizure, the patient indicated they understood and agreed to proceed.     Patient was placed in prone position. Lumbar area was prepped and draped in sterile manner. The level was determined under fluoroscopic guidance. Using a 25 gauge 1.5" needle, bicarbonated Xylocaine 1% was given subcutaneously at the level L3-4 on the left. The 5 in 22-gauge needle was introduced into the left L3-4 facet joint. Following negative aspiration for blood and CSF and confirming the absence of paresthesias, injection of approximately 1 cc of Omnipaque 300 demonstrated intra-articular spread without vascular or intrathecal uptake. At this point, 1cc of a mixture of 3 cc of 0.5% marcaine with 80 mg of Depo-Medrol was injected without complication. The same procedure was performed in an identical fashion on the left L4-5, and left L5-S1 joints sequentially.     Special equipment and extra time required due to obesity.    Patient tolerated the procedure well without any side effects. No noted complications.     The patient was followed post procedure and discharged under " Patient is scheduled to see Dr. Negron tomorrow, Tuesday 11/7/17 at 9:40 am.   their own power in excellent condition.     Future Management:   If helpful, can repeat as needed.   Follow up in 6 weeks or as needed.    I certify that I provided the above services.  I was present for the entire procedure, which was performed by the fellow physician under my supervision.  There were no parts of the procedure that were performed not by myself or without my direct supervision.

## 2018-07-31 ENCOUNTER — PATIENT MESSAGE (OUTPATIENT)
Dept: DERMATOLOGY | Facility: CLINIC | Age: 53
End: 2018-07-31

## 2018-11-06 ENCOUNTER — PATIENT MESSAGE (OUTPATIENT)
Dept: BARIATRICS | Facility: CLINIC | Age: 53
End: 2018-11-06

## 2018-11-27 RX ORDER — ERGOCALCIFEROL 1.25 MG/1
50000 CAPSULE ORAL
Qty: 12 CAPSULE | Refills: 0 | Status: SHIPPED | OUTPATIENT
Start: 2018-11-27 | End: 2019-06-09 | Stop reason: SDUPTHER

## 2019-04-01 ENCOUNTER — TELEPHONE (OUTPATIENT)
Dept: INTERNAL MEDICINE | Facility: CLINIC | Age: 54
End: 2019-04-01

## 2019-04-01 DIAGNOSIS — Z00.00 ROUTINE GENERAL MEDICAL EXAMINATION AT A HEALTH CARE FACILITY: Primary | ICD-10-CM

## 2019-04-01 NOTE — TELEPHONE ENCOUNTER
----- Message from Carleen Read sent at 4/1/2019 12:03 PM CDT -----  Contact: Patient [phone]  Pt states that she is calling to speak with someone in regards to scheduling her wellness visit. Please call back and advise.      Thanks

## 2019-04-02 ENCOUNTER — TELEPHONE (OUTPATIENT)
Dept: INTERNAL MEDICINE | Facility: CLINIC | Age: 54
End: 2019-04-02

## 2019-04-02 NOTE — TELEPHONE ENCOUNTER
----- Message from Debby Marx sent at 4/2/2019 11:43 AM CDT -----  Contact: pt [phone]  Doctor appointment and lab have been scheduled.  Please link lab orders to the lab appointment.  Date of doctor appointment:  6/12  Date of lab appointment:  6/5  Physical or EP: physical   Comments:

## 2019-04-04 ENCOUNTER — PATIENT MESSAGE (OUTPATIENT)
Dept: INTERNAL MEDICINE | Facility: CLINIC | Age: 54
End: 2019-04-04

## 2019-04-05 ENCOUNTER — NURSE TRIAGE (OUTPATIENT)
Dept: ADMINISTRATIVE | Facility: CLINIC | Age: 54
End: 2019-04-05

## 2019-04-05 ENCOUNTER — PATIENT MESSAGE (OUTPATIENT)
Dept: INTERNAL MEDICINE | Facility: CLINIC | Age: 54
End: 2019-04-05

## 2019-04-05 NOTE — TELEPHONE ENCOUNTER
"    Reason for Disposition   Systolic BP >= 160 OR Diastolic >= 100, and any cardiac or neurologic symptoms (e.g., chest pain, difficulty breathing, unsteady gait, blurred vision)    Protocols used: HIGH BLOOD PRESSURE-A-OH    Paloma has not been seen in primary care clinic for about 10 months, as she became unemployed and lost her insurance at the same time.  She has recently gained health insurance, and is calling with concerns about her blood pressure.  Today was 182/109, pulse 79, and 169/117, pulse 73.  She is experiencing chest heaviness, shortness of breath, and is lightheaded.  She says she feels unsteady on her feet and has a mild headache. Has been taking losartan 50 mg "most nights".  [facility] protocols, recommend ED now for evaluation.  Message to Jenn Mayorga MD, her pcp.  Paloma has an appointment with Dr Mayorga for annual visit 06/12/2019, labs the week before. Please contact caller directly with any additional care advice.    CALL BACK IF:  * Headache, blurred vision, difficulty talking, or difficulty walking occurs  * Chest pain or difficulty breathing occurs  * You want to go in to the office for a blood pressure check  * You become worse  "

## 2019-04-15 ENCOUNTER — PATIENT MESSAGE (OUTPATIENT)
Dept: INTERNAL MEDICINE | Facility: CLINIC | Age: 54
End: 2019-04-15

## 2019-04-17 ENCOUNTER — HOSPITAL ENCOUNTER (OUTPATIENT)
Dept: RADIOLOGY | Facility: HOSPITAL | Age: 54
Discharge: HOME | End: 2019-04-17
Attending: INTERNAL MEDICINE
Payer: COMMERCIAL

## 2019-04-17 ENCOUNTER — OFFICE VISIT (OUTPATIENT)
Dept: INTERNAL MEDICINE | Facility: CLINIC | Age: 54
End: 2019-04-17
Payer: COMMERCIAL

## 2019-04-17 VITALS
HEIGHT: 64 IN | TEMPERATURE: 99 F | WEIGHT: 260.13 LBS | DIASTOLIC BLOOD PRESSURE: 108 MMHG | HEART RATE: 68 BPM | BODY MASS INDEX: 44.41 KG/M2 | SYSTOLIC BLOOD PRESSURE: 162 MMHG

## 2019-04-17 DIAGNOSIS — M25.552 LEFT HIP PAIN: ICD-10-CM

## 2019-04-17 DIAGNOSIS — R19.7 DIARRHEA, UNSPECIFIED TYPE: Primary | ICD-10-CM

## 2019-04-17 DIAGNOSIS — I10 ESSENTIAL HYPERTENSION: ICD-10-CM

## 2019-04-17 PROCEDURE — 73502 XR HIP 2 VIEW LEFT: ICD-10-PCS | Mod: 26,LT,, | Performed by: RADIOLOGY

## 2019-04-17 PROCEDURE — 99214 OFFICE O/P EST MOD 30 MIN: CPT | Mod: PBBFAC,25,PO | Performed by: INTERNAL MEDICINE

## 2019-04-17 PROCEDURE — 99214 OFFICE O/P EST MOD 30 MIN: CPT | Mod: S$GLB,,, | Performed by: INTERNAL MEDICINE

## 2019-04-17 PROCEDURE — 99999 PR PBB SHADOW E&M-EST. PATIENT-LVL IV: ICD-10-PCS | Mod: PBBFAC,,, | Performed by: INTERNAL MEDICINE

## 2019-04-17 PROCEDURE — 99999 PR PBB SHADOW E&M-EST. PATIENT-LVL IV: CPT | Mod: PBBFAC,,, | Performed by: INTERNAL MEDICINE

## 2019-04-17 PROCEDURE — 73502 X-RAY EXAM HIP UNI 2-3 VIEWS: CPT | Mod: 26,LT,, | Performed by: RADIOLOGY

## 2019-04-17 PROCEDURE — 99214 PR OFFICE/OUTPT VISIT, EST, LEVL IV, 30-39 MIN: ICD-10-PCS | Mod: S$GLB,,, | Performed by: INTERNAL MEDICINE

## 2019-04-17 PROCEDURE — 73502 X-RAY EXAM HIP UNI 2-3 VIEWS: CPT | Mod: TC,PO,LT

## 2019-04-17 RX ORDER — LOSARTAN POTASSIUM 100 MG/1
100 TABLET ORAL DAILY
Qty: 90 TABLET | Refills: 3 | Status: SHIPPED | OUTPATIENT
Start: 2019-04-17 | End: 2020-07-20

## 2019-04-17 NOTE — PROGRESS NOTES
CC: followup of hypertension  HPI:  The patient is a 53 y.o. year old female who presents to the office for followup of hypertension.  The patient denies any chest pain, shortness of breath, blurred vision, nausea or vomiting, but complains of headache and fatigue.  She complains of left hip pain.  Pain is constant and has been present for about 1-1/2 months.  She denies any radiation.  The patient also complains of diarrhea.    PAST MEDICAL HISTORY:  Past Medical History:   Diagnosis Date    Deep vein thrombosis     occurred after ankle fracture    GERD (gastroesophageal reflux disease)     Joint pain     Morbid obesity     Pulmonary embolism     occurred after ankle fracture       SURGICAL HISTORY:  Past Surgical History:   Procedure Laterality Date    ankle surgery      back surgery      Lumbar spinal fusion with shaving of a disk     SECTION      COLONOSCOPY N/A 2018    [facility] ([facility] F[facility])    EGD (ESOPHAGOGASTRODUODENOSCOPY) N/A 2014    [facility]aud [facility] at [facility] (4TH FLR)    ESOPHAGOGASTRODUODENOSCOPY (EGD) N/A 2015    [facility] by [facility]ion [facility]anan [facility] at [facility] (4TH FLR)    HYSTERECTOMY      2007    INJECTION, FACET JOINT Left 2018    [facility]    INJECTION-FACET Left 2017    [facility] at [facility]    LAPAROSCOPIC GASTRIC BANDING      MYOMECTOMY      PILONIDAL CYST DRAINAGE      removed    ROTATOR CUFF REPAIR      Left       MEDS:  Medcard reviewed and updated    ALLERGIES: Allergy Card reviewed and updated    SOCIAL HISTORY:   The patient is a nonsmoker.    PE:   APPEARANCE: Well nourished, well developed, in no acute distress.    CHEST: Lungs clear to auscultation with unlabored respirations.  CARDIOVASCULAR: Normal S1, S2. No murmurs. No carotid bruits. No pedal edema.  ABDOMEN: Bowel sounds normal. Not distended. Soft. No tenderness or  masses.   PSYCHIATRIC: The patient is oriented to person, place, and time and has a pleasant affect.        ASSESSMENT/PLAN:  Paloma was seen today for hypertension, headache and low-back pain.    Diagnoses and all orders for this visit:    Diarrhea, unspecified type  -     Clostridium difficile EIA; Future  -     Occult blood x 1, stool; Future  -     Stool Exam-Ova,Cysts,Parasites; Future  -     Giardia / Cryptosporidum, EIA; Future  -     Stool culture; Future  -     Ambulatory Referral to Gastroenterology    Essential hypertension  -     blood pressure is elevated, increase losartan to 100 mg daily    Left hip pain  -     X-Ray Hip 2 View Left; Future    Other orders  -     losartan (COZAAR) 100 MG tablet; Take 1 tablet (100 mg total) by mouth once daily.

## 2019-04-25 ENCOUNTER — PATIENT MESSAGE (OUTPATIENT)
Dept: INTERNAL MEDICINE | Facility: CLINIC | Age: 54
End: 2019-04-25

## 2019-04-29 ENCOUNTER — TELEPHONE (OUTPATIENT)
Dept: INTERNAL MEDICINE | Facility: CLINIC | Age: 54
End: 2019-04-29

## 2019-05-03 ENCOUNTER — TELEPHONE (OUTPATIENT)
Dept: INTERNAL MEDICINE | Facility: CLINIC | Age: 54
End: 2019-05-03

## 2019-05-03 ENCOUNTER — OFFICE VISIT (OUTPATIENT)
Dept: GASTROENTEROLOGY | Facility: CLINIC | Age: 54
End: 2019-05-03
Payer: COMMERCIAL

## 2019-05-03 VITALS
HEIGHT: 63 IN | DIASTOLIC BLOOD PRESSURE: 93 MMHG | SYSTOLIC BLOOD PRESSURE: 147 MMHG | BODY MASS INDEX: 46.09 KG/M2 | WEIGHT: 260.13 LBS | HEART RATE: 83 BPM

## 2019-05-03 DIAGNOSIS — M79.18 MUSCULOSKELETAL PAIN: ICD-10-CM

## 2019-05-03 DIAGNOSIS — K52.9 CHRONIC DIARRHEA: Primary | ICD-10-CM

## 2019-05-03 DIAGNOSIS — M25.559 ARTHRALGIA OF HIP, UNSPECIFIED LATERALITY: Primary | ICD-10-CM

## 2019-05-03 DIAGNOSIS — G89.29 OTHER CHRONIC PAIN: ICD-10-CM

## 2019-05-03 DIAGNOSIS — Z98.84 GASTRIC BANDING STATUS: ICD-10-CM

## 2019-05-03 PROBLEM — Z12.11 SCREENING FOR COLON CANCER: Status: RESOLVED | Noted: 2018-06-13 | Resolved: 2019-05-03

## 2019-05-03 PROCEDURE — 99999 PR PBB SHADOW E&M-EST. PATIENT-LVL III: ICD-10-PCS | Mod: PBBFAC,,, | Performed by: INTERNAL MEDICINE

## 2019-05-03 PROCEDURE — 99204 OFFICE O/P NEW MOD 45 MIN: CPT | Mod: S$GLB,,, | Performed by: INTERNAL MEDICINE

## 2019-05-03 PROCEDURE — 3077F SYST BP >= 140 MM HG: CPT | Mod: CPTII,S$GLB,, | Performed by: INTERNAL MEDICINE

## 2019-05-03 PROCEDURE — 3008F BODY MASS INDEX DOCD: CPT | Mod: CPTII,S$GLB,, | Performed by: INTERNAL MEDICINE

## 2019-05-03 PROCEDURE — 3080F DIAST BP >= 90 MM HG: CPT | Mod: CPTII,S$GLB,, | Performed by: INTERNAL MEDICINE

## 2019-05-03 PROCEDURE — 3077F PR MOST RECENT SYSTOLIC BLOOD PRESSURE >= 140 MM HG: ICD-10-PCS | Mod: CPTII,S$GLB,, | Performed by: INTERNAL MEDICINE

## 2019-05-03 PROCEDURE — 3080F PR MOST RECENT DIASTOLIC BLOOD PRESSURE >= 90 MM HG: ICD-10-PCS | Mod: CPTII,S$GLB,, | Performed by: INTERNAL MEDICINE

## 2019-05-03 PROCEDURE — 99999 PR PBB SHADOW E&M-EST. PATIENT-LVL III: CPT | Mod: PBBFAC,,, | Performed by: INTERNAL MEDICINE

## 2019-05-03 PROCEDURE — 99204 PR OFFICE/OUTPT VISIT, NEW, LEVL IV, 45-59 MIN: ICD-10-PCS | Mod: S$GLB,,, | Performed by: INTERNAL MEDICINE

## 2019-05-03 PROCEDURE — 3008F PR BODY MASS INDEX (BMI) DOCUMENTED: ICD-10-PCS | Mod: CPTII,S$GLB,, | Performed by: INTERNAL MEDICINE

## 2019-05-03 RX ORDER — METHOCARBAMOL 500 MG/1
500 TABLET, FILM COATED ORAL 3 TIMES DAILY PRN
COMMUNITY
End: 2019-05-06 | Stop reason: SDUPTHER

## 2019-05-03 NOTE — LETTER
May 14, 2019      Jenn Mayorga MD  [address] [place] [address] [place] [address]           [facility] Gastroenterology  [address]  [place] [address]  Phone: [phone]  Fax: [phone]          Patient: Paloma Bang   MR Number: [id number]   YOB: 1965   Date of Visit: 5/3/2019       Dear Dr. Jenn Mayorga:    Thank you for referring Paloma Bang to me for evaluation. Attached you will find relevant portions of my assessment and plan of care.    If you have questions, please do not hesitate to call me. I look forward to following Paloma Bang along with you.    Sincerely,    Davion Buchanan MD    Enclosure  CC:  No Recipients    If you would like to receive this communication electronically, please contact [email or web address] or [phone] to request more information on [employer] access.    For providers and/or their staff who would like to refer a patient to [facility], please contact us through our one-stop-shop provider referral line, [facility], at [phone].    If you feel you have received this communication in error or would no longer like to receive these types of communications, please e-mail [email or web address]

## 2019-05-03 NOTE — TELEPHONE ENCOUNTER
----- Message from Juanita Sivakumar sent at 5/3/2019  9:30 AM CDT -----  Contact: Self Call [phone]  Patient would like to get a referral.  Referral to what specialty:  Pain Managment  Does the patient want the referral with a specific physician:  N/a  Is the specialist an [facility] or [facility]:  [facility]  Reason (be specific):  Constant Left side pain raditates down to foot  Does the patient already have the specialty clinic appointment scheduled:  No  If yes, what date is the appointment scheduled:   N/a  Is the insurance listed in Epic correct? (this is important for a referral):  Yes  Comments:

## 2019-05-03 NOTE — PATIENT INSTRUCTIONS
Try to avoid artificial sweeteners.    Take the probiotic Align, which is available over-the-counter.  Take as the box recommends.

## 2019-05-03 NOTE — PROGRESS NOTES
[facility] Consultation Note    Reason for Consult:    Chief Complaint   Patient presents with    Diarrhea    Low-back Pain     radiates down leg    Abdominal Cramping       PCP:   Jenn Mayorga    Referring MD:  Jenn Mayorga Md  [address] [facility]  [place] [address]    HPI:  Paloma Bang is a 53 y.o. female here for evaluation of diarrhea that has been present for 3 months.  She is new to our clinic.  I had seen her for an EGD in 2015 to assess for abdominal pain.  She continues to have left sided abdominal pain that has been present for years and is unchanged.  She reports having BM 2-3 times per day that start clumpy then turn watery.  Mostly occurs during the day.  She thinks it may be food related.  She has tried adjusting her diet without improvement.  She believes she is lactose intolerant, so she cut out dairy.  She has decreased stools with fasting, but it doesn't resolve.  She has associated gas and bloating with abdominal distention.  Symptoms are not relieved with BM.  She also has cramping pain in the epigastric area with soreness.  She denies blood in the stools.  She has intermittent nausea without emesis.  She denies sick contacts.  She traveled to the [facility] in November.      She was seen by her PCP for blood pressure problems and mentioned diarrhea.  Stool tests were ordered, but she hasn't completed them because it happens when she is at work and has not been able to easily collect a sample at work.      Colonoscopy 6/13/18 with Dr. Posada for screening.  To the cecum with good prep.  Hyperplastic polyp removed from the rectum and a 10 yr follow-up was recommended.      EGD 2/23/15 with me for LUQ pain, dysphagia, and heartburn.  Restriction from lap band seen.  Otherwise normal exam with biopsies of the stomach showing chronic gastritis (HP negative).         ROS:  Constitutional: No fevers, chills, No weight loss, normal appetite  ENT: No  congestion, rhinorrhea, or chronic sinus problems  CV: No chest pain or palpitations  Pulm: No cough, No shortness of breath  Ophtho: No vision changes or pain  GI: see HPI  Derm: No rash or lesions  Heme: No lymphadenopathy, No bruising  MSK: No arthritis or joint swelling  : No dysuria, No frequent urination  Endo: No hot or cold intolerance        Medical History:  has a past medical history of Deep vein thrombosis, GERD (gastroesophageal reflux disease), Joint pain, Morbid obesity, and Pulmonary embolism.    Surgical History:  has a past surgical history that includes  section; ankle surgery; Myomectomy; Laparoscopic gastric banding; Pilonidal cyst drainage; back surgery; Rotator cuff repair; Hysterectomy; Colonoscopy (N/A, 2018); and Injection of facet joint (Left, 2018).    Family History: family history includes Diabetes in her father and mother; Hypertension in her father and mother.    Social History:  reports that she has never smoked. She has never used smokeless tobacco. She reports that she does not drink alcohol or use drugs.    Review of patient's allergies indicates:   Allergen Reactions    Latex, natural rubber      breakout       Prior to Admission medications    Medication Sig Start Date End Date Taking? Authorizing Provider   fluticasone (FLONASE) 50 mcg/actuation nasal spray  17  Yes Historical Provider, MD   losartan (COZAAR) 100 MG tablet Take 1 tablet (100 mg total) by mouth once daily. 19 Yes Jenn Mayorga MD   methocarbamol (ROBAXIN) 500 MG Tab Take 500 mg by mouth 3 (three) times daily as needed.   Yes Historical Provider, MD   multivitamin with minerals tablet Take 1 tablet by mouth once daily.   Yes Historical Provider, MD   traMADol (ULTRAM) 50 mg tablet Take 1 tablet (50 mg total) by mouth every 8 (eight) hours as needed. 18  Yes Jenn Mayorga MD   VITAMIN D2 50,000 unit capsule TAKE 1 CAPSULE (50,000 UNITS TOTAL) BY MOUTH EVERY 7  "DAYS. 11/27/18  Yes Jenn Mayorga MD   VITAMIN D2 50,000 unit capsule TAKE 1 CAPSULE (50,000 UNITS TOTAL) BY MOUTH EVERY 7 DAYS. 6/2/18 5/3/19  Jenn Mayorga MD       Objective Findings:  Vital Signs:  BP (!) 147/93   Pulse 83   Ht 5' 3" (1.6 m)   Wt 118 kg (260 lb 2.3 oz)   LMP  (LMP Unknown)   BMI 46.08 kg/m²   Body mass index is 46.08 kg/m².    Physical Exam:  General Appearance:  Well appearing in no acute distress, appears stated age  Head:  Normocephalic, atraumatic  Eyes:  No scleral icterus or pallor, EOMI  ENT:  Neck supple, moist mucosa; OP clear  Lungs:  CTA bilaterally in anterior and posterior fields, no wheezes, no crackles; no dullness  Heart:  Regular rate and rhythm, S1, S2 normal, no murmurs heard  Abdomen:  Soft, tenderness in the subcostal area that appears musculoskeletal; also tenderness noted over the band port site, non distended with positive bowel sounds in all four quadrants. No hepatosplenomegaly, ascites, or mass  Extremities:  No clubbing, cyanosis, or edema        Labs:  Lab Results   Component Value Date    WBC 10.70 04/06/2018    HGB 13.6 04/06/2018    HCT 41.9 04/06/2018    MCV 90 04/06/2018    RDW 15.3 (H) 04/06/2018     04/06/2018    GRAN 6.6 04/06/2018    GRAN 61.8 04/06/2018    LYMPH 3.0 04/06/2018    LYMPH 28.0 04/06/2018    MONO 0.9 04/06/2018    MONO 8.2 04/06/2018    EOS 0.1 04/06/2018    BASO 0.06 04/06/2018     Lab Results   Component Value Date     04/06/2018    K 4.3 04/06/2018     04/06/2018    CO2 27 04/06/2018     (H) 04/06/2018    BUN 14 04/06/2018    CREATININE 0.9 04/06/2018    CALCIUM 9.4 04/06/2018    PROT 7.0 04/06/2018    ALBUMIN 3.4 (L) 04/06/2018    BILITOT 0.7 04/06/2018    ALKPHOS 72 04/06/2018    AST 16 04/06/2018    ALT 13 04/06/2018                 Assessment:  Paloma Bang is a 53 y.o. female with:  1. Chronic diarrhea    2. Musculoskeletal pain    3. Other chronic pain    4. Gastric banding status  "         Recommendations/Plan:  1. Stool tests as noted below.  2. Avoid artificial sweeteners   3. Try Align      Follow-up pending evaluation      Order summary:  Orders Placed This Encounter    Pancreatic elastase, fecal    Fecal fat, qualitative    Lactoferrin, fecal, quantitative         Thank you so much for allowing me to participate in the care of Paloma Tomasludwin Buchanan MD

## 2019-05-04 ENCOUNTER — NURSE TRIAGE (OUTPATIENT)
Dept: ADMINISTRATIVE | Facility: CLINIC | Age: 54
End: 2019-05-04

## 2019-05-06 ENCOUNTER — OFFICE VISIT (OUTPATIENT)
Dept: PAIN MEDICINE | Facility: CLINIC | Age: 54
End: 2019-05-06
Payer: COMMERCIAL

## 2019-05-06 ENCOUNTER — PATIENT MESSAGE (OUTPATIENT)
Dept: INTERNAL MEDICINE | Facility: CLINIC | Age: 54
End: 2019-05-06

## 2019-05-06 VITALS
SYSTOLIC BLOOD PRESSURE: 143 MMHG | DIASTOLIC BLOOD PRESSURE: 92 MMHG | HEART RATE: 73 BPM | BODY MASS INDEX: 46.09 KG/M2 | HEIGHT: 63 IN | WEIGHT: 260.13 LBS | TEMPERATURE: 99 F

## 2019-05-06 DIAGNOSIS — M47.816 FACET ARTHRITIS OF LUMBAR REGION: ICD-10-CM

## 2019-05-06 DIAGNOSIS — M51.36 DDD (DEGENERATIVE DISC DISEASE), LUMBAR: ICD-10-CM

## 2019-05-06 DIAGNOSIS — M47.816 SPONDYLOSIS OF LUMBAR REGION WITHOUT MYELOPATHY OR RADICULOPATHY: ICD-10-CM

## 2019-05-06 DIAGNOSIS — M53.3 SACROILIAC JOINT PAIN: Primary | ICD-10-CM

## 2019-05-06 PROCEDURE — 3080F DIAST BP >= 90 MM HG: CPT | Mod: CPTII,S$GLB,, | Performed by: NURSE PRACTITIONER

## 2019-05-06 PROCEDURE — 3077F SYST BP >= 140 MM HG: CPT | Mod: CPTII,S$GLB,, | Performed by: NURSE PRACTITIONER

## 2019-05-06 PROCEDURE — 3080F PR MOST RECENT DIASTOLIC BLOOD PRESSURE >= 90 MM HG: ICD-10-PCS | Mod: CPTII,S$GLB,, | Performed by: NURSE PRACTITIONER

## 2019-05-06 PROCEDURE — 99999 PR PBB SHADOW E&M-EST. PATIENT-LVL III: ICD-10-PCS | Mod: PBBFAC,,, | Performed by: NURSE PRACTITIONER

## 2019-05-06 PROCEDURE — 99999 PR PBB SHADOW E&M-EST. PATIENT-LVL III: CPT | Mod: PBBFAC,,, | Performed by: NURSE PRACTITIONER

## 2019-05-06 PROCEDURE — 3077F PR MOST RECENT SYSTOLIC BLOOD PRESSURE >= 140 MM HG: ICD-10-PCS | Mod: CPTII,S$GLB,, | Performed by: NURSE PRACTITIONER

## 2019-05-06 PROCEDURE — 3008F PR BODY MASS INDEX (BMI) DOCUMENTED: ICD-10-PCS | Mod: CPTII,S$GLB,, | Performed by: NURSE PRACTITIONER

## 2019-05-06 PROCEDURE — 3008F BODY MASS INDEX DOCD: CPT | Mod: CPTII,S$GLB,, | Performed by: NURSE PRACTITIONER

## 2019-05-06 PROCEDURE — 99213 PR OFFICE/OUTPT VISIT, EST, LEVL III, 20-29 MIN: ICD-10-PCS | Mod: S$GLB,,, | Performed by: NURSE PRACTITIONER

## 2019-05-06 PROCEDURE — 99213 OFFICE O/P EST LOW 20 MIN: CPT | Mod: S$GLB,,, | Performed by: NURSE PRACTITIONER

## 2019-05-06 RX ORDER — TRAMADOL HYDROCHLORIDE 50 MG/1
50 TABLET ORAL EVERY 8 HOURS PRN
Qty: 60 TABLET | Refills: 1 | Status: SHIPPED | OUTPATIENT
Start: 2019-05-06 | End: 2020-03-05 | Stop reason: SDUPTHER

## 2019-05-06 RX ORDER — METHOCARBAMOL 500 MG/1
500 TABLET, FILM COATED ORAL 3 TIMES DAILY PRN
Qty: 90 TABLET | Refills: 2 | Status: SHIPPED | OUTPATIENT
Start: 2019-05-06 | End: 2020-03-05

## 2019-05-06 NOTE — PROGRESS NOTES
Subjective:     Patient ID: Paloma Bang is a 53 y.o. female.    Chief Complaint: Pain    Consulted by: Dr. Mayorga     Disclaimer: This note was generated using voice recognition software.  There may be a typographical errors that were missed during proofreading.      HPI:    Paloma Bang is a 53 y.o. female who presents today with low back pain that started in 7/2016 when she fell out of a tub at a hotel.  Since that time, she has had low back pain that is her low back that is equal bilaterally and radiates into the posterior aspect of her left > right leg to the knee.  It used to go past her knee, but this has resolved. She has some ongoing numbness and tingling in her right leg that has been present since prior to her lumbar surgery in 2009.   This pain is described in detail below.    Interval History (11/29/2017):  The patient returns to clinic today for follow up. She is s/p left L3-4, L4-5, and L5-S1 facet joint injections on 11/7/2017. She reports 90% relief of her low back pain. She reports intermittent pain in the left buttock and posterior thigh that is aching in nature. She reports that this pain is tolerable at this time.     Interval History (6/14/2018):  The patient returns to clinic today for follow up. She reports increased low back and left leg pain that began approximately a month ago. She localizes her pain over her left hip. She describes this pain as constant and aching. This pain is worse with laying on her left side and with prolonged walking. She also reports aching pain across her lower back. She denies any radicular leg pain. She denies any other health changes.     Interval History (7/9/2018):  The patient returns to clinic today for follow up. She is s/p left GT bursa injection on 6/25/2018. She reports no significant relief of her pain. She reports increased left sided low back pain that is constant and aching in nature. She denies any radicular leg pain. She  reports aching pain in the posterior aspect of her left thigh. Her pain is worse with prolonged standing and walking. She reports that she went to [facility] this past weekend which increased her low back pain. She is no longer taking Etodolac as it caused itching. She continues to take Robaxin as needed with benefit. She was unable to get the Pennsaid due to cost.    Interval History (5/6/2019):  The patient returns to clinic today. She has not been seen in several months. She reports that her low back pain returned approximately 2 months ago. She does report low back pain that is constant and aching in nature. She reports left buttock pain that is sharp in nature. She does report intermittent radiating pain down the side of her left leg to her ankle. She does report intermittent numbness to the top of her left foot. She denies any right leg pain. Her pain is worse with prolonged sitting and standing. She also reports increased pain at night. She continues to take Robaxin as needed with benefit. She denies any other health changes. Her pain today is 9/10.    Physical Therapy: She just finished a course of PT.  She is doing her HEP    Non-pharmacologic Treatment:     · Ice/Heat: Heat helps  · TENS: Not tried  · Massage: Not tried  · Chiropractic care: Not tried  · Acupuncture: Not tried  · Other: She wants to get orthotics         Pain Medications:         · Currently taking: tramadol 50 mg once or twice a month (puts her to sleep), Robaxin PRN    · Has tried in the past:  Naproxen, flexeril, topical oil from a holistic provider    · Has not tried: Tylenol, other muscle relaxants, TCAs, SNRIs, anticonvulsants, topical creams    Blood thinners: None    Interventional Therapies:   · 11/7/2017: Left L3-4, L4-5, and L5-S1 facet joint injections- significant relief  · 6/25/2018- Left GT bursa injection- no relief  · 7/13/2018- Left L3-4, L4-5, and L5-S1 facet joint injections- significant relief    Relevant Surgeries:  "Lumbar L4/5 surgery  for "sciatic issue"    Affecting sleep? Yes    Affecting daily activities? Yes    Depressive symptoms? No          · SI/HI? 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    Pain Scales  Best: 2/10  Worst: 9/10  Usually: 5/10  Today: 9/10    Back Pain     Low-back Pain   This is a chronic problem. The current episode started more than 1 year ago. The problem occurs constantly.       Last 3 PDI Scores 2019   Pain Disability Index (PDI) 47 [phone]       Review of Systems   Musculoskeletal: Positive for back pain, joint pain, myalgias and stiffness.   Psychiatric/Behavioral: Negative for altered mental status and substance abuse.   All other systems reviewed and are negative.            Past Medical History:   Diagnosis Date    Deep vein thrombosis     occurred after ankle fracture    GERD (gastroesophageal reflux disease)     Joint pain     Morbid obesity     Pulmonary embolism     occurred after ankle fracture       Past Surgical History:   Procedure Laterality Date    ankle surgery      back surgery      Lumbar spinal fusion with shaving of a disk     SECTION      COLONOSCOPY N/A 2018    [facility] at [facility] ([facility])    EGD (ESOPHAGOGASTRODUODENOSCOPY) N/A 2014    Per[facility] by Charles Persaud MD at [facility] ([facility] FLR)    ESOPHAGOGASTRODUODENOSCOPY (EGD) N/A 2015    Performed by Davion Buchanan MD at [facility] ([facility] FLR)    HYSTERECTOMY      2007    INJECTION, FACET JOINT Left 2018    [facility] Jia Yang [facility] at [facility]    INJECTION-FACET Left 2017    [facility]    LAPAROSCOPIC GASTRIC BANDING      MYOMECTOMY      PILONIDAL CYST DRAINAGE      removed    ROTATOR CUFF REPAIR      Left       Review of " patient's allergies indicates:  No Known Allergies    Current Outpatient Medications   Medication Sig Dispense Refill    fluticasone (FLONASE) 50 mcg/actuation nasal spray       losartan (COZAAR) 100 MG tablet Take 1 tablet (100 mg total) by mouth once daily. 90 tablet 3    methocarbamol (ROBAXIN) 500 MG Tab Take 1 tablet (500 mg total) by mouth 3 (three) times daily as needed. 90 tablet 2    multivitamin with minerals tablet Take 1 tablet by mouth once daily.      traMADol (ULTRAM) 50 mg tablet Take 1 tablet (50 mg total) by mouth every 8 (eight) hours as needed. 60 tablet 1    VITAMIN D2 50,000 unit capsule TAKE 1 CAPSULE (50,000 UNITS TOTAL) BY MOUTH EVERY 7 DAYS. 12 capsule 0     No current facility-administered medications for this visit.        Family History   Problem Relation Age of Onset    Diabetes Father     Hypertension Father     Diabetes Mother     Hypertension Mother     Breast cancer Neg Hx     Colon cancer Neg Hx     Ovarian cancer Neg Hx     Acne Neg Hx     Eczema Neg Hx     Lupus Neg Hx     Psoriasis Neg Hx     Melanoma Neg Hx        Social History     Socioeconomic History    Marital status: Single     Spouse name: Not on file    Number of children: Not on file    Years of education: Not on file    Highest education level: Not on file   Occupational History    Not on file   Social Needs    Financial resource strain: Not on file    Food insecurity:     Worry: Not on file     Inability: Not on file    Transportation needs:     Medical: Not on file     Non-medical: Not on file   Tobacco Use    Smoking status: Never Smoker    Smokeless tobacco: Never Used   Substance and Sexual Activity    Alcohol use: No     Comment: occasionally/ not weekly    Drug use: No    Sexual activity: Not Currently     Birth control/protection: Surgical   Lifestyle    Physical activity:     Days per week: Not on file     Minutes per session: Not on file    Stress: Not on file   Relationships  "   Social connections:     Talks on phone: Not on file     Gets together: Not on file     Attends Caoda[employer] service: Not on file     Active member of club or organization: Not on file     Attends meetings of clubs or organizations: Not on file     Relationship status: Not on file   Other Topics Concern    Are you pregnant or think you may be? Not Asked    Breast-feeding Not Asked   Social History Narrative    Not on file       Objective:     Vitals:    05/06/19 1343   BP: (!) 143/92   Pulse: 73   Temp: 99.4 °F (37.4 °C)   Weight: 118 kg (260 lb 2.3 oz)   Height: 5' 3" (1.6 m)   PainSc:   9   PainLoc: Back       GEN:  Well developed, well nourished.  No acute distress. No pain behavior.  HEENT:  No trauma.  Mucous membranes moist.  Nares patent bilaterally.  PSYCH: Normal affect. Thought content appropriate.  CHEST:  Breathing symmetric.  No audible wheezing.  ABD: Soft, non-tender, non-distended.  SKIN:  Warm, pink, dry.  No rash on exposed areas.    EXT:  No cyanosis, clubbing, or edema.  No color change or changes in nail or hair growth.  NEURO/MUSCULOSKELETAL:  Fully alert, oriented, and appropriate. Speech normal zeynep. No cranial nerve deficits.   Gait: Antalgic- ambulates without assistance.  Present trendelenburg sign bilaterally.   Motor Strength: 5/5 motor strength throughout lower extremities.   Sensory: Decreased sensation in a right L5 sensory deficit in the lower extremities.   Reflexes:  2+ and symmetric throughout.  Downgoing Babinski's bilaterally.  No clonus or spasticity.  L-Spine:  Decreased ROM with pain on flexion and extension. Positive facet loading bilaterally, L > R.  Negative SLR bilaterally.    SI Joint/Hip: Positive IVETTE on the left, negative on the right. Positive SI distraction test.  Negative FADIR bilaterally.  TTP over lumbar spine and left SI joint.       Imaging:        The imaging studies listed below were independently reviewed by me, and I agree with the findings as " documented below.     Narrative     History: Back pain    Comparison: 11/20/15    Result: 3 view of the lumbar spine.      Severe facet arthropathy is seen from L3/4 through L5/S1. Grade 1 anterolisthesis of L3 in relation to L4 is appreciated, with alignment at other levels appearing essentially unremarkable.  Vertebral body heights are normally maintained, without compression deformity at any level.  There is no marked disc narrowing at any level.  Minor marginal vertebral endplate spurring at a few lumbar levels is seen.  Lab band again noted. Mild constipation. In comparison to the prior study, there is no adverse interval changes.   Impression      No adverse interval change     MRI Lumbar Spine 11/15/2017:  Findings:     The vertebral body heights are well maintained, with no fracture.  No marrow signal abnormality suspicious for an infiltrative process.      The conus is normal in appearance, and terminates at the L1-L2 level.  The adjacent soft tissue structures show no significant abnormalities.      There are findings of multi-level  lumbar spondylosis, as below.    L1-L2: There is no focal disc herniation. No significant central canal or neural foraminal narrowing.    L2-L3: Broad-based disc bulge and mild facet arthropathy and ligamentum flavum thickening. There is moderate left neuroforaminal narrowing. Mild canal stenosis.    L3-L4: Broad-based disc bulge with facet arthropathy and ligamentum flavum thickening. This results in moderate bilateral neuroforaminal narrowing and moderate canal stenosis.    L4-L5: Broad-based disc and facet arthropathy with ligamentum flavum thickening. This results in severe bilateral neuroforaminal narrowing and severe canal stenosis.     L5-S1: Intervertebral disc height loss with endplate sclerosis and broad-based disc bulge. There is facet arthropathy as well. This results in severe bilateral neuroforaminal narrowing and moderate canal stenosis.   Impression           Multilevel degenerative changes, worse at L4-5 and L5-S1.         Assessment:     Encounter Diagnoses   Name Primary?    Sacroiliac joint pain Yes    Spondylosis of lumbar region without myelopathy or radiculopathy     Facet arthritis of lumbar region     DDD (degenerative disc disease), lumbar        Plan:     Paloma was seen today for follow-up.    Diagnoses and all orders for this visit:    Sacroiliac joint pain    Spondylosis of lumbar region without myelopathy or radiculopathy    Facet arthritis of lumbar region    DDD (degenerative disc disease), lumbar       Her pain is consistent with the above.    We discussed the assessment and recommendations.  All available images were reviewed. We discussed the disease process, prognosis, treatment plan, and risks and benefits. The patient is aware of the risks and benefits of the medications being prescribed, common side effects, and proper usage. The following is the plan we agreed on:     1. Schedule for left SI joint injection. The procedure, risks, benefits and options were discussed with patient. There are no contraindications to the procedure. The patient expressed understanding and agreed to proceed.    2. If limited relief, we can repeat facet joint injections.  3. If her radicular pain worsens, consider left L4 and L5 TF SAMEERA.    4. Continue Robaxin PRN.   5. We discussed the importance of exercise. She continues to perform a HEP.  6. RTC 2 weeks after above procedure.       The above plan and management options were discussed at length with patient. Patient is in agreement with the above and verbalized understanding.     Ele Knight, SERGIO  05/06/2019

## 2019-05-10 ENCOUNTER — TELEPHONE (OUTPATIENT)
Dept: PAIN MEDICINE | Facility: CLINIC | Age: 54
End: 2019-05-10

## 2019-05-13 ENCOUNTER — HOSPITAL ENCOUNTER (EMERGENCY)
Facility: HOSPITAL | Age: 54
Discharge: HOME | End: 2019-05-13
Attending: EMERGENCY MEDICINE
Payer: COMMERCIAL

## 2019-05-13 VITALS
WEIGHT: 260 LBS | BODY MASS INDEX: 46.07 KG/M2 | OXYGEN SATURATION: 100 % | HEIGHT: 63 IN | SYSTOLIC BLOOD PRESSURE: 173 MMHG | DIASTOLIC BLOOD PRESSURE: 96 MMHG | HEART RATE: 74 BPM | TEMPERATURE: 98 F | RESPIRATION RATE: 18 BRPM

## 2019-05-13 DIAGNOSIS — M79.89 LEG SWELLING: ICD-10-CM

## 2019-05-13 DIAGNOSIS — M54.32 SCIATICA OF LEFT SIDE: ICD-10-CM

## 2019-05-13 DIAGNOSIS — M54.9 UPPER BACK PAIN ON LEFT SIDE: Primary | ICD-10-CM

## 2019-05-13 DIAGNOSIS — T14.8XXA MUSCULOSKELETAL STRAIN: ICD-10-CM

## 2019-05-13 LAB
BUN SERPL-MCNC: 10 MG/DL (ref 6–30)
CHLORIDE SERPL-SCNC: 100 MMOL/L (ref 95–110)
CREAT SERPL-MCNC: 0.8 MG/DL (ref 0.5–1.4)
D DIMER PPP IA.FEU-MCNC: 0.71 MG/L FEU
GLUCOSE SERPL-MCNC: 109 MG/DL (ref 70–110)
HCT VFR BLD CALC: 44 %PCV (ref 36–54)
POC IONIZED CALCIUM: 1.11 MMOL/L (ref 1.06–1.42)
POC TCO2 (MEASURED): 27 MMOL/L (ref 23–29)
POTASSIUM BLD-SCNC: 4.1 MMOL/L (ref 3.5–5.1)
SAMPLE: NORMAL
SODIUM BLD-SCNC: 138 MMOL/L (ref 136–145)
TROPONIN I SERPL DL<=0.01 NG/ML-MCNC: <0.006 NG/ML (ref 0–0.03)

## 2019-05-13 PROCEDURE — 93005 ELECTROCARDIOGRAM TRACING: CPT

## 2019-05-13 PROCEDURE — 82962 GLUCOSE BLOOD TEST: CPT

## 2019-05-13 PROCEDURE — 25000003 PHARM REV CODE 250: Performed by: PHYSICIAN ASSISTANT

## 2019-05-13 PROCEDURE — 84484 ASSAY OF TROPONIN QUANT: CPT

## 2019-05-13 PROCEDURE — 99284 PR EMERGENCY DEPT VISIT,LEVEL IV: ICD-10-PCS | Mod: ,,, | Performed by: PHYSICIAN ASSISTANT

## 2019-05-13 PROCEDURE — 25500020 PHARM REV CODE 255: Performed by: EMERGENCY MEDICINE

## 2019-05-13 PROCEDURE — 99284 EMERGENCY DEPT VISIT MOD MDM: CPT | Mod: ,,, | Performed by: PHYSICIAN ASSISTANT

## 2019-05-13 PROCEDURE — 93010 ELECTROCARDIOGRAM REPORT: CPT | Mod: ,,, | Performed by: INTERNAL MEDICINE

## 2019-05-13 PROCEDURE — 93010 EKG 12-LEAD: ICD-10-PCS | Mod: ,,, | Performed by: INTERNAL MEDICINE

## 2019-05-13 PROCEDURE — 85379 FIBRIN DEGRADATION QUANT: CPT

## 2019-05-13 PROCEDURE — 99285 EMERGENCY DEPT VISIT HI MDM: CPT | Mod: 25

## 2019-05-13 RX ORDER — LIDOCAINE 50 MG/G
1 PATCH TOPICAL
Status: DISCONTINUED | OUTPATIENT
Start: 2019-05-13 | End: 2019-05-13 | Stop reason: HOSPADM

## 2019-05-13 RX ORDER — ACETAMINOPHEN 500 MG
1000 TABLET ORAL
Status: COMPLETED | OUTPATIENT
Start: 2019-05-13 | End: 2019-05-13

## 2019-05-13 RX ADMIN — IOHEXOL 100 ML: 350 INJECTION, SOLUTION INTRAVENOUS at 11:05

## 2019-05-13 RX ADMIN — ACETAMINOPHEN 1000 MG: 500 TABLET ORAL at 10:05

## 2019-05-13 RX ADMIN — LIDOCAINE 1 PATCH: 50 PATCH TOPICAL at 10:05

## 2019-05-13 NOTE — DISCHARGE INSTRUCTIONS
Follow-up with pain management as indicated.  Maximize Tylenol and Advil therapy over the next couple days.  Take tramadol as needed for severe pain and Robaxin for muscle spasm.  Return to the ED immediately if you develops new numbness or weakness to extremities.    Our goal in the emergency department is to always give you outstanding care and exceptional service. You may receive a survey by mail or e-mail in the next week regarding your experience in our ED. We would greatly appreciate your completing and returning the survey. Your feedback provides us with a way to recognize our staff who give very good care and it helps us learn how to improve when your experience was below our aspiration of excellence.

## 2019-05-13 NOTE — ED PROVIDER NOTES
Encounter Date: 5/13/2019       History     Chief Complaint   Patient presents with    Neck Pain     pain to L side of neck goes down my L arm and to my toes on L for 2-3 months, neck hurt with movement    Back Pain     Ms Bang is a 53yoF who presents for continued LBP and new left upper back pain; pertinent PMHx left-sided sciatica s/p remote lumbar fusion (sees pain management, last appointment 05/06), history DVT/PE, GERD, obesity.  Patient reports the ED for continuation of low back pain with associated intermittent left lateral foot numbness and buttock pain.  She is followed by pain management and has an upcoming epidural injection later this month.  She recently had her tramadol and Robaxin refilled, though has not had a chance to pick these up.  She is not taking any medication for symptoms, only heating pad use.  She endorses atraumatic onset of left upper back pain located parascapular.  Radiates to left breast and worsens with deep inspiration and movement.  No history of prior symptoms. This pain has occurred intermittently for several days.  She also endorses centralized chest pain for several days, that comes and goes, and is associated with increase in pain scale of low back.  She has experienced this before  Not associated with SOB.  No cough, fever/chills, nausea/vomiting, abdominal pain, dizziness, saddle anesthesia, dysuria, hematuria, urinary incontinence.    The patients available PMH, PSH, Social History, medications, allergies, and triage vital signs were reviewed just prior to their medical evaluation.  A ten point review of systems was completed and is negative except as documented above.  Patient denies any other acute medical complaint.\          Review of patient's allergies indicates:   Allergen Reactions    Latex, natural rubber      breakout     Past Medical History:   Diagnosis Date    Deep vein thrombosis     occurred after ankle fracture    GERD (gastroesophageal reflux  disease)     Joint pain     Morbid obesity     Pulmonary embolism     occurred after ankle fracture     Past Surgical History:   Procedure Laterality Date    ankle surgery      back surgery      Lumbar spinal fusion with shaving of a disk     SECTION      COLONOSCOPY N/A 2018    Performed by [facility]a MD at [facility] (4TH FLR)    EGD (ESOPHAGOGASTRODUODENOSCOPY) N/A 2014    Performed by Charles Persaud MD at [facility] ENDO (4TH FLR)    ESOPHAGOGASTRODUODENOSCOPY (EGD) N/A 2015    Performed by Davion Buchanan MD at [facility] ENDO (4TH FLR)    HYSTERECTOMY      2007    INJECTION, FACET JOINT Left 2018    [facility] at [facility]    INJECTION-FACET Left 2017    [facility] by [facility] at [facility]    LAPAROSCOPIC GASTRIC BANDING      MYOMECTOMY      PILONIDAL CYST DRAINAGE      removed    ROTATOR CUFF REPAIR      Left     Family History   Problem Relation Age of Onset    Diabetes Father     Hypertension Father     Diabetes Mother     Hypertension Mother     Breast cancer Neg Hx     Colon cancer Neg Hx     Ovarian cancer Neg Hx     Acne Neg Hx     Eczema Neg Hx     Lupus Neg Hx     Psoriasis Neg Hx     Melanoma Neg Hx      Social History     Tobacco Use    Smoking status: Never Smoker    Smokeless tobacco: Never Used   Substance Use Topics    Alcohol use: No     Comment: occasionally/ not weekly    Drug use: No     Review of Systems   Constitutional: Negative for chills and fever.   HENT: Negative for congestion, rhinorrhea, sinus pressure, sore throat and trouble swallowing.    Eyes: Negative for visual disturbance.   Respiratory: Negative for cough, shortness of breath and wheezing.    Cardiovascular: Positive for chest pain and leg swelling.   Gastrointestinal: Negative for abdominal pain, constipation, diarrhea, nausea and vomiting.   Genitourinary: Negative for decreased urine volume, dysuria, flank pain, frequency,  hematuria and pelvic pain.   Musculoskeletal: Positive for back pain and myalgias. Negative for arthralgias, neck pain and neck stiffness. Gait problem:  baseline.   Skin: Negative for pallor, rash and wound.   Neurological: Negative for seizures and weakness. Numbness:  intermittent dorsal surface left foot, baseline.   Psychiatric/Behavioral: Negative for agitation and confusion.       Physical Exam     Initial Vitals [05/13/19 0833]   BP Pulse Resp Temp SpO2   (!) 173/96 74 18 98.3 °F (36.8 °C) 100 %      MAP       --         Physical Exam    Vitals reviewed.  Constitutional: She appears well-developed and well-nourished. She is not diaphoretic. No distress.   Well-appearing female in NAD, VSS, afebrile, 99% on RA.     HENT:   Head: Normocephalic and atraumatic.   Right Ear: External ear normal.   Left Ear: External ear normal.   Nose: Nose normal.   Mouth/Throat: Oropharynx is clear and moist.   Eyes: Conjunctivae and EOM are normal. Pupils are equal, round, and reactive to light. No scleral icterus.   Neck: Normal range of motion. Neck supple.   Cardiovascular: Normal rate, regular rhythm, normal heart sounds and intact distal pulses.   Radial pulses intact bilaterally   Pulmonary/Chest: Breath sounds normal. No respiratory distress. She exhibits no tenderness.   Abdominal: Soft. There is no tenderness.   Musculoskeletal: Normal range of motion. She exhibits tenderness.   Tender over left gluteus left SI joint, consistent with prior exams.  No TTP over L-spine.  4/5 left toe raise, consistent with prior exams.  Remainder of LLE exam unremarkable  TTP over left paraspinal subscapular and periscapular muscular distribution.  Rotator cuff exam with minimal exacerbation of pain. No tenderness over C or T-spine  Mild swelling of left calf, muscle compartments are soft.  Dorsiflexion exacerbates pain. No overlying skin changes.   Neurological: She is alert and oriented to person, place, and time. She has normal  strength. No cranial nerve deficit or sensory deficit.   Strength 5/5 BUE   Skin: Skin is warm and dry. Capillary refill takes less than 2 seconds. No rash noted. No erythema. No pallor.   Bilateral hyperpigmentation under inferior breast folds worsens with heat and sweat exposure.  No malodor, erythema, flaking, biofilm.  No vesicular eruption   Psychiatric: She has a normal mood and affect. Her behavior is normal. Judgment and thought content normal.         ED Course   Procedures  Labs Reviewed   D DIMER, QUANTITATIVE - Abnormal; Notable for the following components:       Result Value    D-Dimer 0.71 (*)     All other components within normal limits   TROPONIN I   POCT GLUCOSE   ISTAT CHEM8        ECG Results          EKG 12-lead (Final result)  Result time 05/13/19 11:22:15    Final result by Interface, Lab In [facility] (05/13/19 11:22:15)                 Narrative:    Test Reason : M54.9,    Vent. Rate : 064 BPM     Atrial Rate : 064 BPM     P-R Int : 142 ms          QRS Dur : 068 ms      QT Int : 382 ms       P-R-T Axes : 054 024 039 degrees     QTc Int : 394 ms    Normal sinus rhythm  Nonspecific T wave abnormality  Abnormal ECG  When compared with ECG of 04-MAY-2017 15:23,  Previous ECG has undetermined rhythm, needs review  Nonspecific T wave abnormality now evident in Inferior leads  Nonspecific T wave abnormality now evident in Anterior-lateral leads  Confirmed by Akhil BENTON MD, Jay WEBER ([id number]) on 5/13/2019 11:22:03 AM    Referred By: [email or web address]   SELF           Confirmed By:Jay Landaverde III, MD                            Imaging Results          US Lower Extremity Veins Left (Final result)  Result time 05/13/19 11:47:24    Final result by Isac Villarreal MD (05/13/19 11:47:24)                 Impression:      No evidence of deep venous thrombosis in the left lower extremity.    Electronically signed by resident: Ko Ledesma  Date:    05/13/2019  Time:    11:39    Electronically signed by: Isac  MD Phil  Date:    05/13/2019  Time:    11:47             Narrative:    EXAMINATION:  US LOWER EXTREMITY VEINS LEFT    CLINICAL HISTORY:  Other specified soft tissue disorders    TECHNIQUE:  Duplex and color flow Doppler evaluation and graded compression of the left lower extremity veins was performed.    COMPARISON:  None    FINDINGS:  Left thigh veins: The common femoral, femoral, popliteal, upper greater saphenous, and deep femoral veins are patent and free of thrombus. The veins are normally compressible and have normal phasic flow and augmentation response.    Left calf veins: The visualized calf veins are patent.    Contralateral CFV: The contralateral (right) common femoral vein is patent and free of thrombus.    Miscellaneous: None                               CTA Chest Non-Coronary (PE Study) (Final result)  Result time 05/13/19 12:12:46    Final result by Chandler Snyder MD (05/13/19 12:12:46)                 Impression:      No acute cardiopulmonary process identified, specifically no evidence of filling defects in the pulmonary arteries to suggest pulmonary thromboembolism.    Additional findings of subsegmental atelectasis versus scarring, age-appropriate DJD, and   gastric lap band.    Electronically signed by resident: Adam Huizar MD  Date:    05/13/2019  Time:    11:28    Electronically signed by: Chandler Snyder MD  Date:    05/13/2019  Time:    12:12             Narrative:    EXAMINATION:  CTA CHEST NON CORONARY    CLINICAL HISTORY:  Chest pain, acute, pulmonary origin;pos D dimer, Hx PE;    TECHNIQUE:  Low dose axial images, sagittal and coronal reformations were obtained from the thoracic inlet to the lung bases following the IV administration of 100 mL of Omnipaque 350.  Contrast timing was optimized to evaluate the pulmonary arteries.  Multiplanar reconstructions including MIP images were post processed for vessel analysis.    COMPARISON:  CT abdomen pelvis with contrast  10/30/2015    FINDINGS:  The vascular and soft tissues structures at the base of the neck are unremarkable.    There is a left-sided aortic arch with 3 branch vessels. The aorta is normal in caliber, contour, and course without significant calcific atherosclerosis. Timing of images results in very little enhancement of the aorta and systemic arteries.    There is no cardiac enlargement or pericardial fluid.  There is mild calcific atherosclerosis of the coronary arteries.    There is no axillary, hilar, or mediastinal lymphadenopathy.    The pulmonary arteries distribute normally.   This study is adequate for the evaluation of pulmonary thromboembolism. There is no filling defect of the pulmonary arteries to suggest pulmonary thromboembolism.    The trachea is midline and the proximal airways are patent.  The lungs are symetrically expanded without mass or pneumothorax. Linear opacification in the medial segment of the right middle lobe additional smaller linear opacity in the lingula likely reflect subsegmental atelectasis versus scarring.  There is bibasilar dependent atelectasis. Several calcified nodules in the right lateral lung base, likely granulomas.  There is no pleural thickening or pleural fluid.    The esophagus is normal in caliber and contour.  The imaged intra-abdominal structures are unremarkable except partially visualized gastric lap band in typical position.    The osseous structures demonstrate mild degenerative changes without acute fracture or bony destructive process.                                 Medical Decision Making:   History:   Old Medical Records: I decided to obtain old medical records.  Old Records Summarized: records from clinic visits.  Initial Assessment:   Patient presents for chronic low back pain and new upper left back pain periscapular distribution +pleuritic.  Neuro exam unremarkable.  VSS, afebrile  Differential Diagnosis:   DDx includes muscle spasm/MSK strain, sciatica,  sacroiliitis. Physical exam and history taking lower clinical suspicion for PE, ACS, aortic dissection, PNA, pleuritis, pneumothorax, cauda equina, acute abdomen, traumatic disc herniation, myelopathy, epidural abscess.  Clinical Tests:   Lab Tests: Ordered and Reviewed  Radiological Study: Ordered and Reviewed  Medical Tests: Ordered and Reviewed  ED Management:  Due to history DVT/PE, calf swelling in for chest pain will obtain D-dimer.  Troponin negative, i-STAT unremarkable. Given Tylenol and lidocaine patch for symptoms.  Update:  D-dimer mildly elevated at 0.71.  Will obtain CTA and venous ultrasound of LLE.  Update:  Ultrasound and CT a without acute findings.  Etiology of symptoms likely musculoskeletal.  Recommended maximizing conservative home measures and using Rx Robaxin and tramadol as needed. Patient agreed to plan of care and voiced understanding. Discharged in stable condition with strict ED return precautions.    Karen Coker PA-C  05/13/2019    I discussed the following case, diagnosis and plan of care with attending physician.                        Clinical Impression:       ICD-10-CM ICD-9-CM   1. Upper back pain on left side M54.9 724.5   2. Leg swelling M79.89 729.81   3. Musculoskeletal strain T14.8XXA 848.9   4. Sciatica of left side M54.32 724.3         Disposition:   Disposition: Discharged  Condition: Stable                        Karen Coker PA-C  05/13/19 [id number]

## 2019-05-13 NOTE — ED NOTES
LOC: The patient is awake, alert and aware of environment with an appropriate affect, the patient is oriented x 3 and speaking appropriately.  APPEARANCE: Patient resting comfortably and in no acute distress, patient is clean and well groomed, patient's clothing is properly fastened.  SKIN: The skin is warm and dry, color consistent with ethnicity, patient has normal skin turgor and moist mucus membranes, skin intact, no breakdown or bruising noted.  MUSCULOSKELETAL: Patient moving all extremities spontaneously, no obvious swelling or deformities noted.  RESPIRATORY: Airway is open and patent, respirations are spontaneous, patient has a normal effort and rate, no accessory muscle use noted  CARDIAC: Patient has a normal rate and regular rhythm, no periphreal edema noted, capillary refill < 3 seconds.  ABDOMEN: Soft and non tender to palpation, no distention noted        Patient states the last couple of months her sciatica in her back has been acting up, patient states her pain now starts at the base of her neck on the left side all the way down to her left ankle, patient states she now has numbness in her left leg and also troubing pain to her left ankle, patient states she also has pain in her left breast that gets worse when she takes a deep breath, patient rates pain 10/10 on pain scale, no other complains at this time, will continue to monitor

## 2019-05-24 ENCOUNTER — HOSPITAL ENCOUNTER (OUTPATIENT)
Facility: OTHER | Age: 54
Discharge: HOME | End: 2019-05-24
Attending: ANESTHESIOLOGY | Admitting: ANESTHESIOLOGY
Payer: COMMERCIAL

## 2019-05-24 VITALS
WEIGHT: 260 LBS | SYSTOLIC BLOOD PRESSURE: 156 MMHG | BODY MASS INDEX: 46.07 KG/M2 | RESPIRATION RATE: 18 BRPM | TEMPERATURE: 98 F | OXYGEN SATURATION: 95 % | DIASTOLIC BLOOD PRESSURE: 86 MMHG | HEART RATE: 75 BPM | HEIGHT: 63 IN

## 2019-05-24 DIAGNOSIS — E66.01 MORBID OBESITY WITH BODY MASS INDEX OF 45.0-49.9 IN ADULT: ICD-10-CM

## 2019-05-24 DIAGNOSIS — M53.3 SACROILIAC JOINT PAIN: Primary | ICD-10-CM

## 2019-05-24 PROCEDURE — 63600175 PHARM REV CODE 636 W HCPCS: Performed by: ANESTHESIOLOGY

## 2019-05-24 PROCEDURE — 27096 INJECT SACROILIAC JOINT: CPT | Mod: LT,,, | Performed by: ANESTHESIOLOGY

## 2019-05-24 PROCEDURE — 25500020 PHARM REV CODE 255: Performed by: ANESTHESIOLOGY

## 2019-05-24 PROCEDURE — 25000003 PHARM REV CODE 250: Performed by: ANESTHESIOLOGY

## 2019-05-24 PROCEDURE — 27096 INJECT SACROILIAC JOINT: CPT | Performed by: ANESTHESIOLOGY

## 2019-05-24 PROCEDURE — 27096 PR INJECTION,SACROILIAC JOINT: ICD-10-PCS | Mod: LT,,, | Performed by: ANESTHESIOLOGY

## 2019-05-24 PROCEDURE — S0020 INJECTION, BUPIVICAINE HYDRO: HCPCS | Performed by: ANESTHESIOLOGY

## 2019-05-24 RX ORDER — BUPIVACAINE HYDROCHLORIDE 5 MG/ML
INJECTION, SOLUTION EPIDURAL; INTRACAUDAL
Status: DISCONTINUED | OUTPATIENT
Start: 2019-05-24 | End: 2019-05-24 | Stop reason: HOSPADM

## 2019-05-24 RX ORDER — ALPRAZOLAM 0.5 MG/1
1 TABLET ORAL ONCE
Status: COMPLETED | OUTPATIENT
Start: 2019-05-24 | End: 2019-05-24

## 2019-05-24 RX ORDER — METHYLPREDNISOLONE ACETATE 40 MG/ML
INJECTION, SUSPENSION INTRA-ARTICULAR; INTRALESIONAL; INTRAMUSCULAR; SOFT TISSUE
Status: DISCONTINUED | OUTPATIENT
Start: 2019-05-24 | End: 2019-05-24 | Stop reason: HOSPADM

## 2019-05-24 RX ORDER — LIDOCAINE HYDROCHLORIDE 10 MG/ML
INJECTION INFILTRATION; PERINEURAL
Status: DISCONTINUED | OUTPATIENT
Start: 2019-05-24 | End: 2019-05-24 | Stop reason: HOSPADM

## 2019-05-24 RX ADMIN — ALPRAZOLAM 1 MG: 0.5 TABLET ORAL at 08:05

## 2019-05-24 NOTE — DISCHARGE INSTRUCTIONS
Thank you for allowing us to care for you today. You may receive a survey about the care we provided. Your feedback is valuable and helps us provide excellent care throughout the community.     Home Care Instructions for Pain Management:    1. DIET:   You may resume your normal diet today.   2. BATHING:   You may shower with luke warm water. No tub baths or anything that will soak injection sites under water for the next 24 hours.  3. DRESSING:   You may remove your bandage today.   4. ACTIVITY LEVEL:   You may resume your normal activities 24 hrs after your procedure. Nothing strenuous today.  5. MEDICATIONS:   You may resume your normal medications today. To restart blood thinners, ask your doctor.  6. DRIVING    If you have received any sedatives by mouth today, you may not drive for 12 hours.    If you have received any sedation through your IV, you may not drive for 24 hrs.   7. SPECIAL INSTRUCTIONS:   No heat to the injection site for 24 hrs including, hot bath or shower, heating pad, moist heat, or hot tubs.    Use ice pack to injection site for any pain or discomfort.  Apply ice packs for 20 minute intervals as needed.    IF you have diabetes, be sure to monitor your blood sugar more closely. IF your injection contained steroids your blood sugar levels may become higher than normal.    If you are still having pain upon discharge:  Your pain may improve over the next 48 hours. The anesthetic (numbing medication) works immediately to 48 hours. IF your injection contained a steroid (anti-inflammatory medication), it takes approximately 3 days to start feeling relief and 7-10 days to see your greatest results from the medication. It is possible you may need subsequent injections. This would be discussed at your follow up appointment with pain management or your referring doctor.      PLEASE CALL YOUR DOCTOR IF:  1. Redness or swelling around the injection site.  2. Fever of 101 degrees or more  3. Drainage  (pus) from the injection site.  4. For any continuous bleeding (some dried blood over the incision is normal.)    FOR EMERGENCIES:   If any unusual problems or difficulties occur during clinic hours, call [phone] or [phone].

## 2019-05-24 NOTE — OP NOTE
"Date of Procedure: 05/24/2019    Procedure: Left SI joint injection     Pre-op diagnosis: Sacroiliitis [M46.1]     Post-op diagnosis: Sacroiliitis [M46.1]     Physician: Dr. Jia Yang     Assistant: Dr. Blum    Anesthestia: local    All medications, allergies, and relevant histories were reviewed. No recent antibiotics or infections. A time-out was taken to verify the correct patient, procedure, laterality, and appropriate medications/allergies.     Fluoroscopically-Guided, Contrast-Controlled Left SI joint Steroid Injection:   The patient was positioned prone and prepped and draped in the usual sterile fashion. The Left SI joint was identified fluoroscopically via an oblique view. The skin was anesthetized via 25-gauge 1.5" needle with approximately 3 cc of bicarbonated 1% lidocaine. At this point, a 22-gauge 3.5" spinal needle was atraumatically introduced and advanced under fluoroscopic guidance to the inferior aspect of the SI joint. Following negative aspiration for blood, approximately 1 cc of Omnipaque 300 was injected without evidence of intravascular spread.  At this point, 3 cc of mixture of 4 cc of 0.5% bupivacaine with 40 mg of Depo-Medrol was injected without complication. Next, the needle was withdrawn to the posterior aspect of the joint space and the remaining 2 cc of the above mixture were injected after negative aspiration. The needle was flushed with 1% lidocaine and withdrawn.     The patient was followed post procedure and discharged under their own power in excellent condition in the company of a responsible adult.     Future Management:   If helpful, can repeat as needed.   Follow up with my clinic in 3 weeks or sooner if needed    I certify that I provided the above services.  I was present for the entire procedure, which was performed by myself with the assistance of the resident physician.  There were no parts of the procedure that were performed not by myself or without my direct " supervision.  .

## 2019-06-05 ENCOUNTER — TELEPHONE (OUTPATIENT)
Dept: PAIN MEDICINE | Facility: CLINIC | Age: 54
End: 2019-06-05

## 2019-06-05 ENCOUNTER — LAB VISIT (OUTPATIENT)
Dept: LAB | Facility: HOSPITAL | Age: 54
End: 2019-06-05
Attending: INTERNAL MEDICINE
Payer: COMMERCIAL

## 2019-06-05 DIAGNOSIS — Z00.00 ROUTINE GENERAL MEDICAL EXAMINATION AT A HEALTH CARE FACILITY: ICD-10-CM

## 2019-06-05 LAB
25(OH)D3+25(OH)D2 SERPL-MCNC: 20 NG/ML (ref 30–96)
ALBUMIN SERPL BCP-MCNC: 3.6 G/DL (ref 3.5–5.2)
ALP SERPL-CCNC: 77 U/L (ref 55–135)
ALT SERPL W/O P-5'-P-CCNC: 13 U/L (ref 10–44)
ANION GAP SERPL CALC-SCNC: 6 MMOL/L (ref 8–16)
AST SERPL-CCNC: 19 U/L (ref 10–40)
BASOPHILS # BLD AUTO: 0.05 K/UL (ref 0–0.2)
BASOPHILS NFR BLD: 0.6 % (ref 0–1.9)
BILIRUB SERPL-MCNC: 0.6 MG/DL (ref 0.1–1)
BUN SERPL-MCNC: 12 MG/DL (ref 6–20)
CALCIUM SERPL-MCNC: 9.8 MG/DL (ref 8.7–10.5)
CHLORIDE SERPL-SCNC: 104 MMOL/L (ref 95–110)
CHOLEST SERPL-MCNC: 159 MG/DL (ref 120–199)
CHOLEST/HDLC SERPL: 2.7 {RATIO} (ref 2–5)
CO2 SERPL-SCNC: 29 MMOL/L (ref 23–29)
CREAT SERPL-MCNC: 0.9 MG/DL (ref 0.5–1.4)
DIFFERENTIAL METHOD: ABNORMAL
EOSINOPHIL # BLD AUTO: 0.1 K/UL (ref 0–0.5)
EOSINOPHIL NFR BLD: 1.4 % (ref 0–8)
ERYTHROCYTE [DISTWIDTH] IN BLOOD BY AUTOMATED COUNT: 14.7 % (ref 11.5–14.5)
EST. GFR  (AFRICAN AMERICAN): >60 ML/MIN/1.73 M^2
EST. GFR  (NON AFRICAN AMERICAN): >60 ML/MIN/1.73 M^2
ESTIMATED AVG GLUCOSE: 137 MG/DL (ref 68–131)
GLUCOSE SERPL-MCNC: 114 MG/DL (ref 70–110)
HBA1C MFR BLD HPLC: 6.4 % (ref 4–5.6)
HCT VFR BLD AUTO: 43.7 % (ref 37–48.5)
HDLC SERPL-MCNC: 60 MG/DL (ref 40–75)
HDLC SERPL: 37.7 % (ref 20–50)
HGB BLD-MCNC: 14.3 G/DL (ref 12–16)
IMM GRANULOCYTES # BLD AUTO: 0.02 K/UL (ref 0–0.04)
IMM GRANULOCYTES NFR BLD AUTO: 0.2 % (ref 0–0.5)
LDLC SERPL CALC-MCNC: 88 MG/DL (ref 63–159)
LYMPHOCYTES # BLD AUTO: 3.2 K/UL (ref 1–4.8)
LYMPHOCYTES NFR BLD: 39.9 % (ref 18–48)
MCH RBC QN AUTO: 29.1 PG (ref 27–31)
MCHC RBC AUTO-ENTMCNC: 32.7 G/DL (ref 32–36)
MCV RBC AUTO: 89 FL (ref 82–98)
MONOCYTES # BLD AUTO: 0.8 K/UL (ref 0.3–1)
MONOCYTES NFR BLD: 10 % (ref 4–15)
NEUTROPHILS # BLD AUTO: 3.9 K/UL (ref 1.8–7.7)
NEUTROPHILS NFR BLD: 47.9 % (ref 38–73)
NONHDLC SERPL-MCNC: 99 MG/DL
NRBC BLD-RTO: 0 /100 WBC
PLATELET # BLD AUTO: 279 K/UL (ref 150–350)
PMV BLD AUTO: 10.7 FL (ref 9.2–12.9)
POTASSIUM SERPL-SCNC: 4.1 MMOL/L (ref 3.5–5.1)
PROT SERPL-MCNC: 7.3 G/DL (ref 6–8.4)
RBC # BLD AUTO: 4.91 M/UL (ref 4–5.4)
SODIUM SERPL-SCNC: 139 MMOL/L (ref 136–145)
TRIGL SERPL-MCNC: 55 MG/DL (ref 30–150)
TSH SERPL DL<=0.005 MIU/L-ACNC: 1.96 UIU/ML (ref 0.4–4)
WBC # BLD AUTO: 8.12 K/UL (ref 3.9–12.7)

## 2019-06-05 PROCEDURE — 84443 ASSAY THYROID STIM HORMONE: CPT

## 2019-06-05 PROCEDURE — 83036 HEMOGLOBIN GLYCOSYLATED A1C: CPT

## 2019-06-05 PROCEDURE — 85025 COMPLETE CBC W/AUTO DIFF WBC: CPT

## 2019-06-05 PROCEDURE — 80061 LIPID PANEL: CPT

## 2019-06-05 PROCEDURE — 80053 COMPREHEN METABOLIC PANEL: CPT

## 2019-06-05 PROCEDURE — 82306 VITAMIN D 25 HYDROXY: CPT

## 2019-06-05 PROCEDURE — 36415 COLL VENOUS BLD VENIPUNCTURE: CPT | Mod: PO

## 2019-06-05 NOTE — TELEPHONE ENCOUNTER
Staff left a voicemail for patient to give office a call back to about receiving a later appointment with LISSETH     Pt original appt is for 06.07.19 8:20a she would like something later in the day.

## 2019-06-05 NOTE — TELEPHONE ENCOUNTER
----- Message from Kamini Wilkins sent at 6/5/2019  3:37 PM CDT -----  Pt. Has an office visit 6/7/19 @ 8:40am pt. Will like a later time, please call pt.

## 2019-06-10 RX ORDER — ERGOCALCIFEROL 1.25 MG/1
50000 CAPSULE ORAL
Qty: 12 CAPSULE | Refills: 0 | Status: SHIPPED | OUTPATIENT
Start: 2019-06-10 | End: 2019-09-09 | Stop reason: SDUPTHER

## 2019-06-12 ENCOUNTER — OFFICE VISIT (OUTPATIENT)
Dept: INTERNAL MEDICINE | Facility: CLINIC | Age: 54
End: 2019-06-12
Payer: COMMERCIAL

## 2019-06-12 VITALS
HEART RATE: 70 BPM | WEIGHT: 259.69 LBS | BODY MASS INDEX: 46.01 KG/M2 | SYSTOLIC BLOOD PRESSURE: 144 MMHG | DIASTOLIC BLOOD PRESSURE: 88 MMHG | TEMPERATURE: 99 F | HEIGHT: 63 IN

## 2019-06-12 DIAGNOSIS — Z00.00 ROUTINE MEDICAL EXAM: Primary | ICD-10-CM

## 2019-06-12 DIAGNOSIS — Z12.31 VISIT FOR SCREENING MAMMOGRAM: ICD-10-CM

## 2019-06-12 PROCEDURE — 99999 PR PBB SHADOW E&M-EST. PATIENT-LVL IV: ICD-10-PCS | Mod: PBBFAC,,, | Performed by: INTERNAL MEDICINE

## 2019-06-12 PROCEDURE — 99396 PR PREVENTIVE VISIT,EST,40-64: ICD-10-PCS | Mod: S$GLB,,, | Performed by: INTERNAL MEDICINE

## 2019-06-12 PROCEDURE — 3079F PR MOST RECENT DIASTOLIC BLOOD PRESSURE 80-89 MM HG: ICD-10-PCS | Mod: CPTII,S$GLB,, | Performed by: INTERNAL MEDICINE

## 2019-06-12 PROCEDURE — 99999 PR PBB SHADOW E&M-EST. PATIENT-LVL IV: CPT | Mod: PBBFAC,,, | Performed by: INTERNAL MEDICINE

## 2019-06-12 PROCEDURE — 3077F PR MOST RECENT SYSTOLIC BLOOD PRESSURE >= 140 MM HG: ICD-10-PCS | Mod: CPTII,S$GLB,, | Performed by: INTERNAL MEDICINE

## 2019-06-12 PROCEDURE — 3077F SYST BP >= 140 MM HG: CPT | Mod: CPTII,S$GLB,, | Performed by: INTERNAL MEDICINE

## 2019-06-12 PROCEDURE — 99396 PREV VISIT EST AGE 40-64: CPT | Mod: S$GLB,,, | Performed by: INTERNAL MEDICINE

## 2019-06-12 PROCEDURE — 3079F DIAST BP 80-89 MM HG: CPT | Mod: CPTII,S$GLB,, | Performed by: INTERNAL MEDICINE

## 2019-06-12 RX ORDER — HYDROCHLOROTHIAZIDE 25 MG/1
25 TABLET ORAL DAILY
Qty: 30 TABLET | Refills: 3 | Status: SHIPPED | OUTPATIENT
Start: 2019-06-12 | End: 2020-04-24

## 2019-06-12 NOTE — PROGRESS NOTES
The patient is a 53 y.o. old female who presents to the office for a physical.  Review of labs reveals a low vitamin D.    PAST MEDICAL HISTORY  Past Medical History:   Diagnosis Date    Deep vein thrombosis     occurred after ankle fracture    GERD (gastroesophageal reflux disease)     Joint pain     Morbid obesity     Pulmonary embolism     occurred after ankle fracture       SURGICAL HISTORY:  Past Surgical History:   Procedure Laterality Date    ankle surgery      back surgery      Lumbar spinal fusion with shaving of a disk     SECTION      COLONOSCOPY N/A 2018    Performed by Bob Posada MD at [facility] ENDO (4TH FLR)    EGD (ESOPHAGOGASTRODUODENOSCOPY) N/A 2014    Performed by Charles Persaud MD at [facility] ENDO (4TH FLR)    ESOPHAGOGASTRODUODENOSCOPY (EGD) N/A 2015    Performed by Davion Buchanan MD at [facility] ENDO (4TH FLR)    HYSTERECTOMY      2007    INJECTION, FACET JOINT Left 2018    [facility] at [facility]IN MGT    INJECTION, JOINT, LEFT SI Left 2019    Performed by [facility] at [facility]GT    INJECTION-FACET Left 2017    Performed by [facility] at [facility]GT    LAPAROSCOPIC GASTRIC BANDING      MYOMECTOMY      PILONIDAL CYST DRAINAGE      removed    ROTATOR CUFF REPAIR      Left         MEDS:  Medcard reviewed and updated    ALLERGIES: Allergy Card reviewed and updated    SOCIAL HISTORY:   The patient is a nonsmoker, denies alcohol or illicit drug use.    ROS:  GENERAL: No fever, chills or weight loss.  Positive fatigue.  SKIN: No rashes.  HEAD: Positive headaches.  Denies recent head trauma.  EYES: No photophobia or diplopia.  Occasional sharp eye pain.  EARS: Denies ear pain, discharge or vertigo.  NOSE: No epistaxis or postnasal drip.  MOUTH & THROAT: No hoarseness or change in voice.   NODES: Denies swollen glands.  CHEST: Denies shortness of breath, wheezing, cough and sputum production.  CARDIOVASCULAR:  Denies chest pain or palpitations.  ABDOMEN: Appetite fine. Denies diarrhea, abdominal pain, constipation or blood in stool.  URINARY: No dysuria or hematuria.  MUSCULOSKELETAL: No joint stiffness or swelling. Positive back pain.  NEUROLOGIC: No history of seizures.  ENDOCRINE: Positive polyuria.  Denies polydipsia.  PSYCHIATRIC: Denies mood swings, depression, anxiety, homicidal or suicidal thoughts.    SCREENINGS:  Last cholesterol: 2019  Last colonoscopy: 2018  Last mammogram: 2017  Last Pap smear: 2017  Last tetanus: 2017  Last Pneumovax: none  Last eye exam: 2018  Last bone density: none  Last menstrual period: hysterectomy    PE:   Vitals:  Vitals:    06/12/19 1522   BP: (!) 147/105   Pulse: 70   Temp: 99.2 °F (37.3 °C)       APPEARANCE: Well nourished, well developed, in no acute distress.    EYES: Sclerae anicteric. PERRL. EOMI.      EARS: TM's intact. No retraction or perforation.    NOSE: Mucosa pink. Airway clear.  MOUTH & THROAT: No tonsillar enlargement. No pharyngeal erythema or exudate. No stridor.  NECK: Supple, no thyromegaly.  NODES: No cervical, axillary or inguinal lymph node enlargement.  CHEST: Lungs clear to auscultation with unlabored respirations.  CARDIOVASCULAR: Normal S1, S2. No murmurs. No carotid bruits. No pedal edema.  ABDOMEN: Bowel sounds normal. Not distended. Soft. No tenderness or masses. No organomegaly.  MUSCULOSKELETAL:  Normal gait, no cyanosis or clubbing. Stength 5//5 in all 4 extremities.   SKIN: Normal skin turgor, warm and dry.  NEUROLOGIC: Cranial Nerves: Intact.  PSYCHIATRIC: The patient is oriented to person, place, and time and has a pleasant affect.        ASSESSMENT/PLAN:  Paloma was seen today for annual exam.    Diagnoses and all orders for this visit:    Routine medical exam  -     labs reviewed  -     replace vitamin-D  -     add hydrochlorothiazide for blood pressure control and schedule nurse blood pressure check    Visit for screening mammogram  -      Mammo Digital Screening Bilat; Future    Other orders  -     hydroCHLOROthiazide (HYDRODIURIL) 25 MG tablet; Take 1 tablet (25 mg total) by mouth once daily.

## 2019-06-14 ENCOUNTER — TELEPHONE (OUTPATIENT)
Dept: PAIN MEDICINE | Facility: CLINIC | Age: 54
End: 2019-06-14

## 2019-06-14 NOTE — TELEPHONE ENCOUNTER
My name is Staff, I am contacting you from [facility] pain management regarding your appointment scheduled for 06.18.19, with LISSETH, just confirming you will be able to make it.    If you feel you need to reschedule or canceled please give our office a call so we can better assist you.      Staff requesting patient to arrive 15 mins ahead of schedule appointment time.    **Pt verbalized understanding and has confirmed appt.**

## 2019-06-18 ENCOUNTER — OFFICE VISIT (OUTPATIENT)
Dept: PAIN MEDICINE | Facility: CLINIC | Age: 54
End: 2019-06-18
Payer: COMMERCIAL

## 2019-06-18 VITALS
TEMPERATURE: 99 F | HEART RATE: 68 BPM | DIASTOLIC BLOOD PRESSURE: 84 MMHG | SYSTOLIC BLOOD PRESSURE: 144 MMHG | BODY MASS INDEX: 45.62 KG/M2 | WEIGHT: 257.5 LBS | HEIGHT: 63 IN

## 2019-06-18 DIAGNOSIS — M47.816 LUMBAR SPONDYLOSIS: ICD-10-CM

## 2019-06-18 DIAGNOSIS — M47.816 SPONDYLOSIS OF LUMBAR REGION WITHOUT MYELOPATHY OR RADICULOPATHY: Primary | ICD-10-CM

## 2019-06-18 DIAGNOSIS — M53.3 SACROILIAC JOINT PAIN: ICD-10-CM

## 2019-06-18 DIAGNOSIS — M79.10 MYALGIA: ICD-10-CM

## 2019-06-18 DIAGNOSIS — M47.816 FACET ARTHRITIS OF LUMBAR REGION: ICD-10-CM

## 2019-06-18 DIAGNOSIS — M47.817 FACET ARTHRITIS OF LUMBOSACRAL REGION: ICD-10-CM

## 2019-06-18 PROCEDURE — 20552 PR INJECT TRIGGER POINT, 1 OR 2: ICD-10-PCS | Mod: S$GLB,,, | Performed by: NURSE PRACTITIONER

## 2019-06-18 PROCEDURE — 99999 PR PBB SHADOW E&M-EST. PATIENT-LVL III: ICD-10-PCS | Mod: PBBFAC,,, | Performed by: NURSE PRACTITIONER

## 2019-06-18 PROCEDURE — 3079F DIAST BP 80-89 MM HG: CPT | Mod: CPTII,S$GLB,, | Performed by: NURSE PRACTITIONER

## 2019-06-18 PROCEDURE — 20552 NJX 1/MLT TRIGGER POINT 1/2: CPT | Mod: S$GLB,,, | Performed by: NURSE PRACTITIONER

## 2019-06-18 PROCEDURE — 99213 OFFICE O/P EST LOW 20 MIN: CPT | Mod: 25,S$GLB,, | Performed by: NURSE PRACTITIONER

## 2019-06-18 PROCEDURE — 3077F SYST BP >= 140 MM HG: CPT | Mod: CPTII,S$GLB,, | Performed by: NURSE PRACTITIONER

## 2019-06-18 PROCEDURE — 99213 PR OFFICE/OUTPT VISIT, EST, LEVL III, 20-29 MIN: ICD-10-PCS | Mod: 25,S$GLB,, | Performed by: NURSE PRACTITIONER

## 2019-06-18 PROCEDURE — 3008F PR BODY MASS INDEX (BMI) DOCUMENTED: ICD-10-PCS | Mod: CPTII,S$GLB,, | Performed by: NURSE PRACTITIONER

## 2019-06-18 PROCEDURE — 3008F BODY MASS INDEX DOCD: CPT | Mod: CPTII,S$GLB,, | Performed by: NURSE PRACTITIONER

## 2019-06-18 PROCEDURE — 99999 PR PBB SHADOW E&M-EST. PATIENT-LVL III: CPT | Mod: PBBFAC,,, | Performed by: NURSE PRACTITIONER

## 2019-06-18 PROCEDURE — 3077F PR MOST RECENT SYSTOLIC BLOOD PRESSURE >= 140 MM HG: ICD-10-PCS | Mod: CPTII,S$GLB,, | Performed by: NURSE PRACTITIONER

## 2019-06-18 PROCEDURE — 3079F PR MOST RECENT DIASTOLIC BLOOD PRESSURE 80-89 MM HG: ICD-10-PCS | Mod: CPTII,S$GLB,, | Performed by: NURSE PRACTITIONER

## 2019-06-18 RX ORDER — BETAMETHASONE SODIUM PHOSPHATE AND BETAMETHASONE ACETATE 3; 3 MG/ML; MG/ML
2 INJECTION, SUSPENSION INTRA-ARTICULAR; INTRALESIONAL; INTRAMUSCULAR; SOFT TISSUE
Status: COMPLETED | OUTPATIENT
Start: 2019-06-18 | End: 2019-06-18

## 2019-06-18 RX ORDER — BUPIVACAINE HYDROCHLORIDE 2.5 MG/ML
2 INJECTION, SOLUTION EPIDURAL; INFILTRATION; INTRACAUDAL ONCE
Status: COMPLETED | OUTPATIENT
Start: 2019-06-18 | End: 2019-06-18

## 2019-06-18 RX ADMIN — BUPIVACAINE HYDROCHLORIDE 5 MG: 2.5 INJECTION, SOLUTION EPIDURAL; INFILTRATION; INTRACAUDAL at 03:06

## 2019-06-18 RX ADMIN — BETAMETHASONE SODIUM PHOSPHATE AND BETAMETHASONE ACETATE 1.98 MG: 3; 3 INJECTION, SUSPENSION INTRA-ARTICULAR; INTRALESIONAL; INTRAMUSCULAR; SOFT TISSUE at 03:06

## 2019-06-18 NOTE — PROGRESS NOTES
Subjective:     Patient ID: Paloma Bang is a 53 y.o. female.    Chief Complaint: Pain    Consulted by: Dr. Mayorga     Disclaimer: This note was generated using voice recognition software.  There may be a typographical errors that were missed during proofreading.      HPI:    Paloma Bang is a 53 y.o. female who presents today with low back pain that started in 7/2016 when she fell out of a tub at a hotel.  Since that time, she has had low back pain that is her low back that is equal bilaterally and radiates into the posterior aspect of her left > right leg to the knee.  It used to go past her knee, but this has resolved. She has some ongoing numbness and tingling in her right leg that has been present since prior to her lumbar surgery in 2009.   This pain is described in detail below.    Interval History (11/29/2017):  The patient returns to clinic today for follow up. She is s/p left L3-4, L4-5, and L5-S1 facet joint injections on 11/7/2017. She reports 90% relief of her low back pain. She reports intermittent pain in the left buttock and posterior thigh that is aching in nature. She reports that this pain is tolerable at this time.     Interval History (6/14/2018):  The patient returns to clinic today for follow up. She reports increased low back and left leg pain that began approximately a month ago. She localizes her pain over her left hip. She describes this pain as constant and aching. This pain is worse with laying on her left side and with prolonged walking. She also reports aching pain across her lower back. She denies any radicular leg pain. She denies any other health changes.     Interval History (7/9/2018):  The patient returns to clinic today for follow up. She is s/p left GT bursa injection on 6/25/2018. She reports no significant relief of her pain. She reports increased left sided low back pain that is constant and aching in nature. She denies any radicular leg pain. She  reports aching pain in the posterior aspect of her left thigh. Her pain is worse with prolonged standing and walking. She reports that she went [facility] this past [facility] which increased her low back pain. She is no longer taking Etodolac as it caused itching. She continues to take Robaxin as needed with benefit. She was unable to get the Pennsaid due to cost.    Interval History (5/6/2019):  The patient returns to clinic today. She has not been seen in several months. She reports that her low back pain returned approximately 2 months ago. She does report low back pain that is constant and aching in nature. She reports left buttock pain that is sharp in nature. She does report intermittent radiating pain down the side of her left leg to her ankle. She does report intermittent numbness to the top of her left foot. She denies any right leg pain. Her pain is worse with prolonged sitting and standing. She also reports increased pain at night. She continues to take Robaxin as needed with benefit. She denies any other health changes. Her pain today is 9/10.    Interval History (6/18/2019):  The patient is here for follow up of left sided back pain.  She is s/p left SI joint injection on 5/24/19 with 75% relief of left buttock pain.  She also reports significant improvement in her radicular symptoms.  She has one spot to left lateral area which she would like me to inject today.  She says it feels like a sore muscle.  Otherwise, she is having increased aching pain across the back bilaterally.  She did have benefit with left sided lumbar facet injections about one year ago.  She had an annual check up with PCP last week.  Her pain today is 3/10.    Physical Therapy: yes in the past  She is doing her HEP    Non-pharmacologic Treatment:     · Ice/Heat: Heat helps  · TENS: Not tried  · Massage: Not tried  · Chiropractic care: Not tried  · Acupuncture: Not tried  · Other: She wants to get orthotics         Pain  "Medications:         · Currently taking: tramadol 50 mg once or twice a month (puts her to sleep), Robaxin PRN    · Has tried in the past:  Naproxen, flexeril, topical oil from a holistic provider    · Has not tried: Tylenol, other muscle relaxants, TCAs, SNRIs, anticonvulsants, topical creams    Blood thinners: None    Interventional Therapies:   · 2017: Left L3-4, L4-5, and L5-S1 facet joint injections- significant relief  · 2018- Left GT bursa injection- no relief  · 2018- Left L3-4, L4-5, and L5-S1 facet joint injections- significant relief  · 19- Left SI joint injection- 75% relief    Relevant Surgeries: Lumbar L4/5 surgery  for "sciatic issue"    Affecting sleep? Yes    Affecting daily activities? Yes    Depressive symptoms? No          · SI/HI? 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    Pain Scales  Best: 2/10  Worst: 9/10  Usually: 5/10  Today: 3/10    Back Pain     Low-back Pain   This is a chronic problem. The current episode started more than 1 year ago. The problem occurs constantly.       Last 3 PDI Scores 2019   Pain Disability Index (PDI) [phone]       Review of Systems   Musculoskeletal: Positive for back pain, joint pain, myalgias and stiffness.   Psychiatric/Behavioral: Negative for altered mental status and substance abuse.   All other systems reviewed and are negative.            Past Medical History:   Diagnosis Date    Deep vein thrombosis     occurred after ankle fracture    GERD (gastroesophageal reflux disease)     Joint pain     Morbid obesity     Pulmonary embolism     occurred after ankle fracture       Past Surgical History:   Procedure Laterality Date    ankle surgery      back surgery      Lumbar spinal fusion with shaving of a disk     SECTION      COLONOSCOPY N/A " 6/13/2018    [facility] ([facility] F[facility])    EGD (ESOPHAGOGASTRODUODENOSCOPY) N/A 2/14/2014    [facility]aud [facility] at [facility] ENDO (4TH FLR)    ESOPHAGOGASTRODUODENOSCOPY (EGD) N/A 2/23/2015    [facility] ([facility]    HYSTERECTOMY      2007    INJECTION, FACET JOINT Left 7/13/2018    [facility] at [facility]    INJECTION, JOINT, LEFT SI Left 5/24/2019    [facility] at [facility]    INJECTION-FACET Left 11/7/2017    [facility] at [facility]    LAPAROSCOPIC GASTRIC BANDING      MYOMECTOMY      PILONIDAL CYST DRAINAGE      removed    ROTATOR CUFF REPAIR      Left       Review of patient's allergies indicates:  No Known Allergies    Current Outpatient Medications   Medication Sig Dispense Refill    fluticasone (FLONASE) 50 mcg/actuation nasal spray       hydroCHLOROthiazide (HYDRODIURIL) 25 MG tablet Take 1 tablet (25 mg total) by mouth once daily. 30 tablet 3    losartan (COZAAR) 100 MG tablet Take 1 tablet (100 mg total) by mouth once daily. 90 tablet 3    methocarbamol (ROBAXIN) 500 MG Tab Take 1 tablet (500 mg total) by mouth 3 (three) times daily as needed. 90 tablet 2    multivitamin with minerals tablet Take 1 tablet by mouth once daily.      traMADol (ULTRAM) 50 mg tablet Take 1 tablet (50 mg total) by mouth every 8 (eight) hours as needed. 60 tablet 1    VITAMIN D2 50,000 unit capsule TAKE 1 CAPSULE (50,000 UNITS TOTAL) BY MOUTH EVERY 7 DAYS. 12 capsule 0     No current facility-administered medications for this visit.        Family History   Problem Relation Age of Onset    Diabetes Father     Hypertension Father     Diabetes Mother     Hypertension Mother     Breast cancer Neg Hx     Colon cancer Neg Hx     Ovarian cancer Neg Hx     Acne Neg Hx     Eczema Neg Hx     Lupus Neg Hx     Psoriasis Neg Hx     Melanoma Neg Hx        Social History  "    Socioeconomic History    Marital status: Single     Spouse name: Not on file    Number of children: Not on file    Years of education: Not on file    Highest education level: Not on file   Occupational History    Not on file   Social Needs    Financial resource strain: Not on file    Food insecurity:     Worry: Not on file     Inability: Not on file    Transportation needs:     Medical: Not on file     Non-medical: Not on file   Tobacco Use    Smoking status: Never Smoker    Smokeless tobacco: Never Used   Substance and Sexual Activity    Alcohol use: No     Comment: occasionally/ not weekly    Drug use: No    Sexual activity: Not Currently     Birth control/protection: Surgical   Lifestyle    Physical activity:     Days per week: Not on file     Minutes per session: Not on file    Stress: Not on file   Relationships    Social connections:     Talks on phone: Not on file     Gets together: Not on file     Attends Evangelical service: Not on file     Active member of club or organization: Not on file     Attends meetings of clubs or organizations: Not on file     Relationship status: Not on file   Other Topics Concern    Are you pregnant or think you may be? Not Asked    Breast-feeding Not Asked   Social History Narrative    Not on file       Objective:     Vitals:    06/18/19 1504   BP: (!) 144/84   Pulse: 68   Temp: 98.6 °F (37 °C)   Weight: 116.8 kg (257 lb 8 oz)   Height: 5' 3" (1.6 m)   PainSc:   3       GEN:  Well developed, well nourished.  No acute distress. No pain behavior.  HEENT:  No trauma.  Mucous membranes moist.  Nares patent bilaterally.  PSYCH: Normal affect. Thought content appropriate.  CHEST:  Breathing symmetric.  No audible wheezing.  ABD: Soft, non-tender, non-distended.  SKIN:  Warm, pink, dry.  No rash on exposed areas.    EXT:  No cyanosis, clubbing, or edema.  No color change or changes in nail or hair growth.  NEURO/MUSCULOSKELETAL:  Fully alert, oriented, and " appropriate. Speech normal zeynep. No cranial nerve deficits.   Gait: Antalgic- ambulates without assistance.  Present trendelenburg sign bilaterally.   Motor Strength: 5/5 motor strength throughout lower extremities.   Sensory: No loss of sensation.  Reflexes:  2+ and symmetric throughout.  Downgoing Babinski's bilaterally.  No clonus or spasticity.  L-Spine:  Decreased ROM with pain on extension. Positive facet loading bilaterally, L > R.  Negative SLR bilaterally.    SI Joint/Hip: Negative IVETTE bilaterally. Negative FADIR bilaterally.  TTP over lumbar facet joints bilaterally.  TTP over lumbar paraspinals and iliolumbar ligament injection on the left.      Imaging:        The imaging studies listed below were independently reviewed by me, and I agree with the findings as documented below.     Narrative     History: Back pain    Comparison: 11/20/15    Result: 3 view of the lumbar spine.      Severe facet arthropathy is seen from L3/4 through L5/S1. Grade 1 anterolisthesis of L3 in relation to L4 is appreciated, with alignment at other levels appearing essentially unremarkable.  Vertebral body heights are normally maintained, without compression deformity at any level.  There is no marked disc narrowing at any level.  Minor marginal vertebral endplate spurring at a few lumbar levels is seen.  Lab band again noted. Mild constipation. In comparison to the prior study, there is no adverse interval changes.   Impression      No adverse interval change     MRI Lumbar Spine 11/15/2017:  Findings:     The vertebral body heights are well maintained, with no fracture.  No marrow signal abnormality suspicious for an infiltrative process.      The conus is normal in appearance, and terminates at the L1-L2 level.  The adjacent soft tissue structures show no significant abnormalities.      There are findings of multi-level  lumbar spondylosis, as below.    L1-L2: There is no focal disc herniation. No significant central  canal or neural foraminal narrowing.    L2-L3: Broad-based disc bulge and mild facet arthropathy and ligamentum flavum thickening. There is moderate left neuroforaminal narrowing. Mild canal stenosis.    L3-L4: Broad-based disc bulge with facet arthropathy and ligamentum flavum thickening. This results in moderate bilateral neuroforaminal narrowing and moderate canal stenosis.    L4-L5: Broad-based disc and facet arthropathy with ligamentum flavum thickening. This results in severe bilateral neuroforaminal narrowing and severe canal stenosis.     L5-S1: Intervertebral disc height loss with endplate sclerosis and broad-based disc bulge. There is facet arthropathy as well. This results in severe bilateral neuroforaminal narrowing and moderate canal stenosis.   Impression          Multilevel degenerative changes, worse at L4-5 and L5-S1.         Assessment:     Encounter Diagnoses   Name Primary?   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    We discussed the assessment and recommendations.  All available images were reviewed. We discussed the disease process, prognosis, treatment plan, and risks and benefits. The patient is aware of the risks and benefits of the medications being prescribed, common side effects, and proper usage. The following is the plan we agreed on:     1. She is s/p left SI joint injection with benefit.  Today her pain is across the lower back consistent with bilateral facet arthropathy.  Will schedule for bilateral L4-5 and  L5-S1 facet joint injections.  2. TPI to left lumbar paraspinal x1 today.  3. Continue current medications.  4. The patient will continue a home exercise routine to help with pain and strengthening.    5. RTC 4 weeks after above procedure.       The above plan and management options were discussed at length with patient. Patient is in agreement with the above and verbalized understanding.     Arin Mina, GWYN  06/18/2019

## 2019-06-28 ENCOUNTER — HOSPITAL ENCOUNTER (OUTPATIENT)
Dept: RADIOLOGY | Facility: HOSPITAL | Age: 54
Discharge: HOME | End: 2019-06-28
Attending: INTERNAL MEDICINE
Payer: COMMERCIAL

## 2019-06-28 DIAGNOSIS — Z12.31 VISIT FOR SCREENING MAMMOGRAM: ICD-10-CM

## 2019-06-28 PROCEDURE — 77067 MAMMO DIGITAL SCREENING BILAT WITH TOMOSYNTHESIS_CAD: ICD-10-PCS | Mod: 26,,, | Performed by: RADIOLOGY

## 2019-06-28 PROCEDURE — 77063 MAMMO DIGITAL SCREENING BILAT WITH TOMOSYNTHESIS_CAD: ICD-10-PCS | Mod: 26,,, | Performed by: RADIOLOGY

## 2019-06-28 PROCEDURE — 77067 SCR MAMMO BI INCL CAD: CPT | Mod: 26,,, | Performed by: RADIOLOGY

## 2019-06-28 PROCEDURE — 77063 BREAST TOMOSYNTHESIS BI: CPT | Mod: 26,,, | Performed by: RADIOLOGY

## 2019-06-28 PROCEDURE — 77067 SCR MAMMO BI INCL CAD: CPT | Mod: TC,PO

## 2019-07-01 ENCOUNTER — TELEPHONE (OUTPATIENT)
Dept: INTERNAL MEDICINE | Facility: CLINIC | Age: 54
End: 2019-07-01

## 2019-08-09 ENCOUNTER — HOSPITAL ENCOUNTER (OUTPATIENT)
Facility: OTHER | Age: 54
Discharge: HOME | End: 2019-08-09
Attending: ANESTHESIOLOGY | Admitting: ANESTHESIOLOGY
Payer: COMMERCIAL

## 2019-08-09 VITALS
TEMPERATURE: 98 F | OXYGEN SATURATION: 99 % | HEART RATE: 65 BPM | DIASTOLIC BLOOD PRESSURE: 73 MMHG | RESPIRATION RATE: 16 BRPM | HEIGHT: 63 IN | WEIGHT: 260 LBS | SYSTOLIC BLOOD PRESSURE: 134 MMHG | BODY MASS INDEX: 46.07 KG/M2

## 2019-08-09 DIAGNOSIS — M47.816 LUMBAR SPONDYLOSIS: Primary | ICD-10-CM

## 2019-08-09 DIAGNOSIS — E66.01 MORBID OBESITY WITH BODY MASS INDEX OF 45.0-49.9 IN ADULT: ICD-10-CM

## 2019-08-09 DIAGNOSIS — M47.816 OSTEOARTHRITIS OF LUMBAR SPINE, UNSPECIFIED SPINAL OSTEOARTHRITIS COMPLICATION STATUS: ICD-10-CM

## 2019-08-09 PROCEDURE — 64493 INJ PARAVERT F JNT L/S 1 LEV: CPT | Mod: 50 | Performed by: ANESTHESIOLOGY

## 2019-08-09 PROCEDURE — 25500020 PHARM REV CODE 255: Performed by: ANESTHESIOLOGY

## 2019-08-09 PROCEDURE — 63600175 PHARM REV CODE 636 W HCPCS: Performed by: ANESTHESIOLOGY

## 2019-08-09 PROCEDURE — 64494 PR INJ DX/THER AGNT PARAVERT FACET JOINT,IMG GUIDE,LUMBAR/SAC, 2ND LEVEL: ICD-10-PCS | Mod: 50,,, | Performed by: ANESTHESIOLOGY

## 2019-08-09 PROCEDURE — 64494 INJ PARAVERT F JNT L/S 2 LEV: CPT | Mod: 50,,, | Performed by: ANESTHESIOLOGY

## 2019-08-09 PROCEDURE — 25000003 PHARM REV CODE 250: Performed by: ANESTHESIOLOGY

## 2019-08-09 PROCEDURE — 64494 INJ PARAVERT F JNT L/S 2 LEV: CPT | Mod: 50 | Performed by: ANESTHESIOLOGY

## 2019-08-09 PROCEDURE — 64493 PR INJ DX/THER AGNT PARAVERT FACET JOINT,IMG GUIDE,LUMBAR/SAC,1ST LVL: ICD-10-PCS | Mod: 50,,, | Performed by: ANESTHESIOLOGY

## 2019-08-09 PROCEDURE — 64493 INJ PARAVERT F JNT L/S 1 LEV: CPT | Mod: 50,,, | Performed by: ANESTHESIOLOGY

## 2019-08-09 PROCEDURE — S0020 INJECTION, BUPIVICAINE HYDRO: HCPCS | Performed by: ANESTHESIOLOGY

## 2019-08-09 RX ORDER — METHYLPREDNISOLONE ACETATE 80 MG/ML
INJECTION, SUSPENSION INTRA-ARTICULAR; INTRALESIONAL; INTRAMUSCULAR; SOFT TISSUE
Status: DISCONTINUED | OUTPATIENT
Start: 2019-08-09 | End: 2019-08-09 | Stop reason: HOSPADM

## 2019-08-09 RX ORDER — LIDOCAINE HYDROCHLORIDE 10 MG/ML
INJECTION INFILTRATION; PERINEURAL
Status: DISCONTINUED | OUTPATIENT
Start: 2019-08-09 | End: 2019-08-09 | Stop reason: HOSPADM

## 2019-08-09 RX ORDER — ALPRAZOLAM 0.5 MG/1
0.5 TABLET ORAL
Status: DISCONTINUED | OUTPATIENT
Start: 2019-08-09 | End: 2019-08-09 | Stop reason: HOSPADM

## 2019-08-09 RX ORDER — BUPIVACAINE HYDROCHLORIDE 5 MG/ML
INJECTION, SOLUTION EPIDURAL; INTRACAUDAL
Status: DISCONTINUED | OUTPATIENT
Start: 2019-08-09 | End: 2019-08-09 | Stop reason: HOSPADM

## 2019-08-09 NOTE — OP NOTE
"Date of procedure: 08/09/2019    Procedure: Bilateral L4-5 and L5-S1 Facet joint injection     Pre-op diagnosis: Lumbar spondylosis [M47.816]    Post-op diagnosis: Lumbar spondylosis [M47.816]    Physician: Dr. Jia Yang     Assistant: Dr. Beal    Anesthestia: local    EBL: None    Specimens: None    All medications, allergies, and relevant histories were reviewed. No recent antibiotics or infections. A time-out was taken to verify the correct patient, procedure, laterality, and appropriate medications/allergies.     Fluoroscopically-Guided, Contrast-Controlled Lumbar Facet Joint Injection:     Following denial of allergy and review of potential side effects and complications including but not necessarily limited to infection, allergic reaction, local tissue breakdown, nerve injury, paresis, paralysis, spinal cord injury, and seizure, the patient indicated they understood and agreed to proceed.     Patient was placed in prone position. Lumbar area was prepped and draped in sterile manner. The level was determined under fluoroscopic guidance. Using a 25 gauge 1.5" needle, bicarbonated Xylocaine 1% was given subcutaneously at the level L5-S1 on the right. The 3.5in 22-gauge needle was introduced into the right L5-S1 facet joint. Following negative aspiration for blood and CSF and confirming the absence of paresthesias, injection of approximately 1 cc of Omnipaque 300 demonstrated intra-articular spread without vascular or intrathecal uptake. At this point, 1cc of a mixture of 3 cc of 0.5% marcaine with 80 mg of Depo-Medrol was injected without complication. The same procedure was performed in an identical fashion on the right L4-5, left L4-5, and left L5-S1 joints sequentially.     Patient tolerated the procedure well without any side effects. No noted complications.     The patient was followed post procedure and discharged under their own power in excellent condition.     Future Management:   If helpful, can " repeat as needed.   Follow up in 6 weeks or as needed.    I certify that I provided the above services.  I was present for the entire procedure, which was performed by the fellow physician under my supervision.  There were no parts of the procedure that were performed not by myself or without my direct supervision.

## 2019-08-09 NOTE — DISCHARGE INSTRUCTIONS
Thank you for allowing us to care for you today. You may receive a survey about the care we provided. Your feedback is valuable and helps us provide excellent care throughout the community.     Home Care Instructions for Pain Management:    1. DIET:   You may resume your normal diet today.   2. BATHING:   You may shower with luke warm water. No tub baths or anything that will soak injection sites under water for the next 24 hours.  3. DRESSING:   You may remove your bandage today.   4. ACTIVITY LEVEL:   You may resume your normal activities 24 hrs after your procedure. Nothing strenuous today.  5. MEDICATIONS:   You may resume your normal medications today. To restart blood thinners, ask your doctor.  6. DRIVING    If you have received any sedatives by mouth today, you may not drive for 12 hours.    If you have received any sedation through your IV, you may not drive for 24 hrs.   7. SPECIAL INSTRUCTIONS:   No heat to the injection site for 24 hrs including, hot bath or shower, heating pad, moist heat, or hot tubs.    Use ice pack to injection site for any pain or discomfort.  Apply ice packs for 20 minute intervals as needed.    IF you have diabetes, be sure to monitor your blood sugar more closely. IF your injection contained steroids your blood sugar levels may become higher than normal.    If you are still having pain upon discharge:  Your pain may improve over the next 48 hours. The anesthetic (numbing medication) works immediately to 48 hours. IF your injection contained a steroid (anti-inflammatory medication), it takes approximately 3 days to start feeling relief and 7-10 days to see your greatest results from the medication. It is possible you may need subsequent injections. This would be discussed at your follow up appointment with pain management or your referring doctor.    Please call my office or pager at [phone] if experienced any weakness or loss of sensation, fever > 101.5, pain uncontrolled  with oral medications, persistent nausea/vomiting/or diarrhea, redness or drainage from the incisions, or any other worrisome concerns. If physician on call was not reached or could not communicate with our office for any reason please go to the nearest emergency department.

## 2019-08-09 NOTE — H&P
HPI  Patient presenting for Procedure(s) (LRB):  INJECTION, FACET JOINT, L4-L5,L5-S1 (Bilateral)     Patient on Anti-coagulation No    No health changes since previous encounter    Past Medical History:   Diagnosis Date    Deep vein thrombosis     occurred after ankle fracture    GERD (gastroesophageal reflux disease)     Joint pain     Morbid obesity     Pulmonary embolism     occurred after ankle fracture     Past Surgical History:   Procedure Laterality Date    ankle surgery      back surgery      Lumbar spinal fusion with shaving of a disk     SECTION      COLONOSCOPY N/A 2018    [facility] (4TH FLR)    EGD (ESOPHAGOGASTRODUODENOSCOPY) N/A 2014    [facility] (4TH FLR)    ESOPHAGOGASTRODUODENOSCOPY (EGD) N/A 2015    [facility] (4TH FLR)    HYSTERECTOMY      2007    INJECTION, FACET JOINT Left 2018    [facility] at [facility] PAIN MGT    INJECTION, JOINT, LEFT SI Left 2019    [facility] at [facility] PAIN MGT    INJECTION-FACET Left 2017    [facility] at [facility] PAIN MGT    LAPAROSCOPIC GASTRIC BANDING      MYOMECTOMY      PILONIDAL CYST DRAINAGE      removed    ROTATOR CUFF REPAIR      Left     Review of patient's allergies indicates:   Allergen Reactions    Latex, natural rubber      breakout      No current facility-administered medications for this encounter.        PMHx, PSHx, Allergies, Medications reviewed in epic    ROS negative except pain complaints in HPI    OBJECTIVE:    LMP  (LMP Unknown)     PHYSICAL EXAMINATION:    GENERAL: Well appearing, in no acute distress, alert and oriented x3.  PSYCH:  Mood and affect appropriate.  SKIN: Skin color, texture, turgor normal, no rashes or lesions which will impact the procedure.  CV: RRR with palpation of the radial artery.  PULM: No evidence of respiratory  difficulty, symmetric chest rise. Clear to auscultation.  NEURO: Cranial nerves grossly intact.    Plan:    Proceed with procedure as planned Procedure(s) (LRB):  INJECTION, FACET JOINT, L4-L5,L5-S1 (Bilateral)    Ayden Solorzano  08/09/2019      I have seen the patient with the resident physician.  We have come up with the above plan.  The patient is in agreement with our plan. I agree with the above note which I have edited where appropriate.

## 2019-09-09 RX ORDER — ERGOCALCIFEROL 1.25 MG/1
CAPSULE ORAL
Qty: 12 CAPSULE | Refills: 0 | Status: SHIPPED | OUTPATIENT
Start: 2019-09-09 | End: 2020-04-23 | Stop reason: SDUPTHER

## 2019-09-23 ENCOUNTER — TELEPHONE (OUTPATIENT)
Dept: PAIN MEDICINE | Facility: CLINIC | Age: 54
End: 2019-09-23

## 2019-09-23 NOTE — TELEPHONE ENCOUNTER
----- Message from Dayana Wallace sent at 9/20/2019  2:06 PM CDT -----  Contact: JOSEPH RANDLE [[id number]]  Name of Who is Calling: JOSEPH RANDLE [[id number]]    What is the request in detail:   Patient called requesting to schedule a follow-up visit with Dr. Yang but presently doesn't have any insurance. Patient states she plans to apply for financial assistance.  Please give a call back at your earliest convenience and further advise.     THANKS!       Can the clinic reply by [facility]: no      What Number to Call Back:  JOSEPH RANDLE / # [phone]

## 2019-09-23 NOTE — TELEPHONE ENCOUNTER
Spoke with patient regarding office visit, patient asked me to leave number for financial assistance on her voicemail, when I returned the call message stated patient voicemail not setup

## 2020-03-05 ENCOUNTER — OFFICE VISIT (OUTPATIENT)
Dept: INTERNAL MEDICINE | Facility: CLINIC | Age: 55
End: 2020-03-05
Payer: MEDICAID

## 2020-03-05 VITALS
HEART RATE: 76 BPM | BODY MASS INDEX: 48.05 KG/M2 | HEIGHT: 63 IN | WEIGHT: 271.19 LBS | SYSTOLIC BLOOD PRESSURE: 122 MMHG | RESPIRATION RATE: 18 BRPM | DIASTOLIC BLOOD PRESSURE: 86 MMHG | TEMPERATURE: 98 F

## 2020-03-05 DIAGNOSIS — M54.6 ACUTE LEFT-SIDED THORACIC BACK PAIN: Primary | ICD-10-CM

## 2020-03-05 PROCEDURE — 99999 PR PBB SHADOW E&M-EST. PATIENT-LVL III: ICD-10-PCS | Mod: PBBFAC,,, | Performed by: INTERNAL MEDICINE

## 2020-03-05 PROCEDURE — 99214 OFFICE O/P EST MOD 30 MIN: CPT | Mod: S$PBB,,, | Performed by: INTERNAL MEDICINE

## 2020-03-05 PROCEDURE — 99213 OFFICE O/P EST LOW 20 MIN: CPT | Mod: PBBFAC,PO | Performed by: INTERNAL MEDICINE

## 2020-03-05 PROCEDURE — 99214 PR OFFICE/OUTPT VISIT, EST, LEVL IV, 30-39 MIN: ICD-10-PCS | Mod: S$PBB,,, | Performed by: INTERNAL MEDICINE

## 2020-03-05 PROCEDURE — 99999 PR PBB SHADOW E&M-EST. PATIENT-LVL III: CPT | Mod: PBBFAC,,, | Performed by: INTERNAL MEDICINE

## 2020-03-05 RX ORDER — CYCLOBENZAPRINE HCL 10 MG
10 TABLET ORAL 3 TIMES DAILY PRN
Qty: 60 TABLET | Refills: 1 | Status: SHIPPED | OUTPATIENT
Start: 2020-03-05 | End: 2020-04-04

## 2020-03-05 RX ORDER — TRAMADOL HYDROCHLORIDE 50 MG/1
TABLET ORAL
Qty: 60 TABLET | Refills: 0 | Status: SHIPPED | OUTPATIENT
Start: 2020-03-05 | End: 2020-03-05 | Stop reason: SDUPTHER

## 2020-03-05 RX ORDER — TRAMADOL HYDROCHLORIDE 50 MG/1
TABLET ORAL
Qty: 60 TABLET | Refills: 0 | Status: SHIPPED | OUTPATIENT
Start: 2020-03-05 | End: 2021-05-26

## 2020-03-05 NOTE — PROGRESS NOTES
Subjective:       Patient ID: Paloma Bang is a 54 y.o. female.    Chief Complaint: Back Pain (left mid back, hurts to take deep breath); Joint Pain (right hand [place].thumb); and Medication Refill    HPI   Pt here for evaluation of 1 month of persistent left sided mid back pain described as sharp/stabbing with movements and a dull ache at rest.   Review of Systems   Constitutional: Negative for activity change, appetite change, chills, diaphoresis, fatigue, fever and unexpected weight change.   HENT: Negative for postnasal drip, rhinorrhea, sinus pressure, sneezing, sore throat, trouble swallowing and voice change.    Respiratory: Negative for cough, shortness of breath and wheezing.    Cardiovascular: Negative for chest pain, palpitations and leg swelling.   Gastrointestinal: Negative for abdominal pain, blood in stool, constipation, diarrhea, nausea and vomiting.   Genitourinary: Negative for dysuria.   Musculoskeletal: Positive for back pain. Negative for arthralgias and myalgias.   Skin: Negative for rash and wound.   Allergic/Immunologic: Negative for environmental allergies and food allergies.   Hematological: Negative for adenopathy. Does not bruise/bleed easily.       Objective:      Physical Exam   Constitutional: She is oriented to person, place, and time. She appears well-developed and well-nourished. No distress.   HENT:   Head: Normocephalic and atraumatic.   Eyes: Pupils are equal, round, and reactive to light. Conjunctivae and EOM are normal. Right eye exhibits no discharge. Left eye exhibits no discharge. No scleral icterus.   Neck: Neck supple. No JVD present.   Cardiovascular: Normal rate, regular rhythm, normal heart sounds and intact distal pulses.   Pulmonary/Chest: Effort normal and breath sounds normal. No respiratory distress. She has no wheezes. She has no rales.   Musculoskeletal: She exhibits no edema.        Lumbar back: She exhibits tenderness, pain and spasm.   Lymphadenopathy:      She has no cervical adenopathy.   Neurological: She is alert and oriented to person, place, and time.   Skin: Skin is warm and dry. No rash noted. She is not diaphoretic. No pallor.       Assessment:       1. Acute left-sided thoracic back pain        Plan:    1. Rx Flexeril/Tramadol PRN       Heat PRN       Referral to PT

## 2020-03-25 ENCOUNTER — NURSE TRIAGE (OUTPATIENT)
Dept: ADMINISTRATIVE | Facility: CLINIC | Age: 55
End: 2020-03-25

## 2020-03-25 NOTE — TELEPHONE ENCOUNTER
Reason for Disposition   [1] No COVID-19 EXPOSURE BUT [2] questions about    Protocols used: CORONAVIRUS (COVID-19) EXPOSURE-A-AH

## 2020-03-26 ENCOUNTER — NURSE TRIAGE (OUTPATIENT)
Dept: ADMINISTRATIVE | Facility: CLINIC | Age: 55
End: 2020-03-26

## 2020-03-27 ENCOUNTER — TELEPHONE (OUTPATIENT)
Dept: INTERNAL MEDICINE | Facility: CLINIC | Age: 55
End: 2020-03-27

## 2020-03-27 ENCOUNTER — OFFICE VISIT (OUTPATIENT)
Dept: INTERNAL MEDICINE | Facility: CLINIC | Age: 55
End: 2020-03-27
Payer: MEDICAID

## 2020-03-27 DIAGNOSIS — R50.9 FEVER, UNSPECIFIED FEVER CAUSE: Primary | ICD-10-CM

## 2020-03-27 PROCEDURE — 99213 OFFICE O/P EST LOW 20 MIN: CPT | Mod: 95,,, | Performed by: INTERNAL MEDICINE

## 2020-03-27 PROCEDURE — 99213 PR OFFICE/OUTPT VISIT, EST, LEVL III, 20-29 MIN: ICD-10-PCS | Mod: 95,,, | Performed by: INTERNAL MEDICINE

## 2020-03-27 NOTE — TELEPHONE ENCOUNTER
Fever started last weekend, fevers have been 99.0, 99.4. Today fever 100.4. Also states body aches.   Not aware of any exposure.      Reason for Disposition   [1] Fever AND [2] no signs of serious infection or localizing symptoms (all other triage questions negative)   [1] Intermittent fever > 100.0 F (37.8 C) AND [2] lasts > 3 weeks    Additional Information   Negative: Shock suspected (e.g., cold/pale/clammy skin, too weak to stand, low BP, rapid pulse)   Negative: Difficult to awaken or acting confused (e.g., disoriented, slurred speech)   Negative: [1] Difficulty breathing AND [2] bluish lips, tongue or face   Negative: New onset rash with multiple purple (or blood-colored) spots or dots   Negative: Sounds like a life-threatening emergency to the triager   Negative: Fever in a cancer patient who is currently (or recently) receiving chemotherapy or radiation therapy, or cancer patient who has metastatic or end-stage cancer and is receiving palliative care   Negative: Pregnant   Negative: Fever onset within 24 hours of receiving vaccine   Negative: [1] Fever AND [2] within 21 days of Ebola EXPOSURE   Negative: Other symptom is present, see that guideline  (e.g., symptoms of cough, runny nose, sore throat, earache, abdominal pain, diarrhea, vomiting)   Negative: [1] Headache AND [2] stiff neck (can't touch chin to chest)   Negative: Difficulty breathing   Negative: IV drug abuse   Negative: [1] Drinking very little AND [2] dehydration suspected (e.g., no urine > 12 hours, very dry mouth, very lightheaded)   Negative: Patient sounds very sick or weak to the triager  (Exception: mild weakness and hasn't taken fever medicine)   Negative: Fever > 104 F (40 C)   Negative: [1] Fever > 101 F (38.3 C) AND [2] age > 60   Negative: [1] Fever > 100.0 F (37.8 C) AND [2] bedridden (e.g., nursing home patient, CVA, chronic illness, recovering from surgery)   Negative: [1] Fever > 100.0 F (37.8 C) AND [2]  indwelling urinary catheter (e.g., [place], [place])   Negative: [1] Fever > 100.0 F (37.8 C) AND [2] has port (portacath), central line, or PICC line   Negative: [1] Fever > 100.0 F (37.8 C) AND [2] diabetes mellitus or weak immune system (e.g., HIV positive, cancer chemo, splenectomy, chronic steroids)   Negative: [1] Fever > 100.0 F (37.8 C) AND [2] surgery in the last month   Negative: Transplant patient (e.g., kidney, liver, lung, heart)   Negative: [1] Fever > 100.0 F (37.8 C) AND [2] foreign travel to a developing country in the last month   Negative: Fever present > 3 days (72 hours)     Fever 100.4 today    Protocols used: FEVER-A-AH

## 2020-03-27 NOTE — TELEPHONE ENCOUNTER
Please see if patient can schedule a video visit.  Inform patient that before we can test her for COVID-19, we have to rule her out for flu.

## 2020-03-30 ENCOUNTER — CLINICAL SUPPORT (OUTPATIENT)
Dept: INTERNAL MEDICINE | Facility: CLINIC | Age: 55
End: 2020-03-30
Payer: MEDICAID

## 2020-03-30 ENCOUNTER — TELEPHONE (OUTPATIENT)
Dept: INTERNAL MEDICINE | Facility: CLINIC | Age: 55
End: 2020-03-30

## 2020-03-30 DIAGNOSIS — R50.9 FEVER, UNSPECIFIED FEVER CAUSE: Primary | ICD-10-CM

## 2020-03-30 DIAGNOSIS — R50.9 FEVER, UNSPECIFIED FEVER CAUSE: ICD-10-CM

## 2020-03-30 LAB
INFLUENZA A, MOLECULAR: NEGATIVE
INFLUENZA B, MOLECULAR: NEGATIVE
SPECIMEN SOURCE: NORMAL

## 2020-03-30 PROCEDURE — 87502 INFLUENZA DNA AMP PROBE: CPT | Mod: PO

## 2020-03-30 PROCEDURE — 99999 PR PBB SHADOW E&M-EST. PATIENT-LVL I: CPT | Mod: PBBFAC,,,

## 2020-03-30 PROCEDURE — 99999 PR PBB SHADOW E&M-EST. PATIENT-LVL I: ICD-10-PCS | Mod: PBBFAC,,,

## 2020-03-30 PROCEDURE — 99211 OFF/OP EST MAY X REQ PHY/QHP: CPT | Mod: PBBFAC,PO

## 2020-03-30 PROCEDURE — 99211 OFF/OP EST MAY X REQ PHY/QHP: CPT | Mod: PBBFAC,27

## 2020-03-30 PROCEDURE — U0002 COVID-19 LAB TEST NON-CDC: HCPCS

## 2020-03-30 NOTE — PROGRESS NOTES
CC:  Fever  HPI:  The patient is a 54 y.o. year old female who presents to the office for fever.  Her symptoms started about a week ago and intermittent up to 100.4° F yesterday.  She reports no appetite for 1 week.  She denies any cough or shortness of breath, but does report body aches and fatigue.  The patient has been taken Tylenol for her symptoms.      PAST MEDICAL HISTORY:  Past Medical History:   Diagnosis Date    Deep vein thrombosis     occurred after ankle fracture    GERD (gastroesophageal reflux disease)     Joint pain     Morbid obesity     Pulmonary embolism     occurred after ankle fracture       SURGICAL HISTORY:  Past Surgical History:   Procedure Laterality Date    ankle surgery      back surgery      Lumbar spinal fusion with shaving of a disk     SECTION      COLONOSCOPY N/A 2018    Procedure: COLONOSCOPY;  Surgeon: Bob Posada MD;  Location: [address]);  Service: Endoscopy;  Laterality: N/A;  18-BMI 49.68-MS    HYSTERECTOMY          INJECTION OF FACET JOINT Left 2018    Procedure: INJECTION, FACET JOINT;  Surgeon: Jia Yang MD;  Location: [facility];  Service: Pain Management;  Laterality: Left;  @ L3-4, L4-5 and L5-S1    INJECTION OF FACET JOINT Bilateral 2019    Procedure: INJECTION, FACET JOINT, L4-L5,L5-S1;  Surgeon: Jia Yang MD;  Location: [facility];  Service: Pain Management;  Laterality: Bilateral;    INJECTION OF JOINT Left 2019    Procedure: INJECTION, JOINT, LEFT SI;  Surgeon: Jia Yang MD;  Location: [facility];  Service: Pain Management;  Laterality: Left;  INJECTION, JOINT, LEFT SI    LAPAROSCOPIC GASTRIC BANDING      MYOMECTOMY      PILONIDAL CYST DRAINAGE      removed    ROTATOR CUFF REPAIR      Left       MEDS:  Medcard reviewed and updated    ALLERGIES: Allergy Card reviewed and updated    SOCIAL HISTORY:   The patient is a nonsmoker.    PE:   APPEARANCE: Well nourished, well  developed, in no acute distress.    VIDEO VISIT  PSYCHIATRIC: The patient is oriented to person, place, and time and has a pleasant affect.        ASSESSMENT/PLAN:  Diagnoses and all orders for this visit:    Fever, unspecified fever cause  -     Influenza A & B by Molecular

## 2020-03-31 LAB — SARS-COV-2 RNA RESP QL NAA+PROBE: DETECTED

## 2020-04-01 ENCOUNTER — TELEPHONE (OUTPATIENT)
Dept: INTERNAL MEDICINE | Facility: CLINIC | Age: 55
End: 2020-04-01

## 2020-04-01 DIAGNOSIS — U07.1 COVID-19 VIRUS INFECTION: Primary | ICD-10-CM

## 2020-04-01 NOTE — TELEPHONE ENCOUNTER
Informed patient of positive COVID-19 test.  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  Also, enrolled patient and COVID-19 home monitoring.

## 2020-04-24 ENCOUNTER — PATIENT MESSAGE (OUTPATIENT)
Dept: INTERNAL MEDICINE | Facility: CLINIC | Age: 55
End: 2020-04-24

## 2020-04-24 RX ORDER — HYDROCHLOROTHIAZIDE 25 MG/1
TABLET ORAL
Qty: 90 TABLET | Refills: 1 | Status: SHIPPED | OUTPATIENT
Start: 2020-04-24 | End: 2020-11-10

## 2020-04-24 RX ORDER — ERGOCALCIFEROL 1.25 MG/1
CAPSULE ORAL
Qty: 12 CAPSULE | Refills: 0 | Status: SHIPPED | OUTPATIENT
Start: 2020-04-24 | End: 2020-07-24

## 2020-04-28 ENCOUNTER — PATIENT MESSAGE (OUTPATIENT)
Dept: BARIATRICS | Facility: CLINIC | Age: 55
End: 2020-04-28

## 2020-04-30 ENCOUNTER — OFFICE VISIT (OUTPATIENT)
Dept: INTERNAL MEDICINE | Facility: CLINIC | Age: 55
End: 2020-04-30
Payer: MEDICAID

## 2020-04-30 DIAGNOSIS — M25.539 PAIN IN WRIST, UNSPECIFIED LATERALITY: Primary | ICD-10-CM

## 2020-04-30 PROCEDURE — 99213 PR OFFICE/OUTPT VISIT, EST, LEVL III, 20-29 MIN: ICD-10-PCS | Mod: 95,,, | Performed by: INTERNAL MEDICINE

## 2020-04-30 PROCEDURE — 99213 OFFICE O/P EST LOW 20 MIN: CPT | Mod: 95,,, | Performed by: INTERNAL MEDICINE

## 2020-04-30 NOTE — PROGRESS NOTES
The patient location is:  home  The chief complaint leading to consultation is:  Shortness of breath  Visit type: audiovisual  Total time spent with patient:  12 min  Each patient to whom he or she provides medical services by telemedicine is:  (1) informed of the relationship between the physician and patient and the respective role of any other health care provider with respect to management of the patient; and (2) notified that he or she may decline to receive medical services by telemedicine and may withdraw from such care at any time.    Notes:   CC:  Shortness of breath  HPI:  The patient is a 54 y.o. year old female who presents complaining of shortness of breath with activity, joint pain, which is worse in the morning, especially her left hand for 2 weeks.  She denies any fever.  She also complains of swelling in her legs and ankles.  She is sedentary for tender 12 hr, working from home for 8 hr.      PAST MEDICAL HISTORY:  Past Medical History:   Diagnosis Date    Deep vein thrombosis     occurred after ankle fracture    GERD (gastroesophageal reflux disease)     Joint pain     Morbid obesity     Pulmonary embolism     occurred after ankle fracture       SURGICAL HISTORY:  Past Surgical History:   Procedure Laterality Date    ankle surgery      back surgery      Lumbar spinal fusion with shaving of a disk     SECTION      COLONOSCOPY N/A 2018    Procedure: COLONOSCOPY;  Surgeon: Bob Posada MD;  Location: [address]);  Service: Endoscopy;  Laterality: N/A;  18-BMI 49.68-MS    HYSTERECTOMY      2007    INJECTION OF FACET JOINT Left 2018    Procedure: INJECTION, FACET JOINT;  Surgeon: Jia Yang MD;  [facility];  Service: Pain Management;  Laterality: Left;  @ L3-4, L4-5 and L5-S1    INJECTION OF FACET JOINT Bilateral 2019    Procedure: INJECTION, FACET JOINT, L4-L5,L5-S1;  Surgeon: Jia Yang MD;  [facility];  Service:  Pain Management;  Laterality: Bilateral;    INJECTION OF JOINT Left 5/24/2019    Procedure: INJECTION, JOINT, LEFT SI;  Surgeon: Jia Yang MD;  Location: [facility];  Service: Pain Management;  Laterality: Left;  INJECTION, JOINT, LEFT SI    LAPAROSCOPIC GASTRIC BANDING      MYOMECTOMY      PILONIDAL CYST DRAINAGE      removed    ROTATOR CUFF REPAIR      Left       MEDS:  Medcard reviewed and updated    ALLERGIES: Allergy Card reviewed and updated    SOCIAL HISTORY:   The patient is a nonsmoker.    PE:   APPEARANCE: Well nourished, well developed, in no acute distress.    Video visit  PSYCHIATRIC: The patient is oriented to person, place, and time and has a pleasant affect.        ASSESSMENT/PLAN:  Diagnoses and all orders for this visit:    Pain in wrist, unspecified laterality  -     recommend wrist splint and Tylenol for pain

## 2020-05-04 ENCOUNTER — PATIENT MESSAGE (OUTPATIENT)
Dept: INTERNAL MEDICINE | Facility: CLINIC | Age: 55
End: 2020-05-04

## 2020-05-07 ENCOUNTER — TELEPHONE (OUTPATIENT)
Dept: INTERNAL MEDICINE | Facility: CLINIC | Age: 55
End: 2020-05-07

## 2020-05-07 NOTE — TELEPHONE ENCOUNTER
----- Message from Jaimie Evangelista sent at 5/7/2020  4:17 PM CDT -----  Contact: self/[phone]  Patient called in regards needing to talk with Dr Mayorga about when she can go back to work. Patient is been asked by her employer. Please call and advise. Thank you

## 2020-05-08 NOTE — TELEPHONE ENCOUNTER
Returned patient's phone call.  Left message.  Patient can return to work if it has been at least 7 days since onset of symptoms, and symptoms have improved.  As well as being afebrile for at least 72 hr without medication.

## 2020-05-11 ENCOUNTER — PATIENT MESSAGE (OUTPATIENT)
Dept: INTERNAL MEDICINE | Facility: CLINIC | Age: 55
End: 2020-05-11

## 2020-05-11 ENCOUNTER — TELEPHONE (OUTPATIENT)
Dept: INTERNAL MEDICINE | Facility: CLINIC | Age: 55
End: 2020-05-11

## 2020-05-11 NOTE — TELEPHONE ENCOUNTER
The patient is still having weakness and feeling fatigue. She is wanting to know when she can go back to work.   Do she need an appointment?

## 2020-05-11 NOTE — TELEPHONE ENCOUNTER
Please inform patient that if she feels physically able, she can return to work.  Please advise.  Once she does return to work, I recommend she wear a mask.

## 2020-05-11 NOTE — TELEPHONE ENCOUNTER
----- Message from Sameer Suarez sent at 5/11/2020  3:08 PM CDT -----  Contact: self    Patient called in regards to getting carroll to return to work patient states she also needs a dr's note does she need to make an glenn please advise

## 2020-05-12 ENCOUNTER — PATIENT MESSAGE (OUTPATIENT)
Dept: INTERNAL MEDICINE | Facility: CLINIC | Age: 55
End: 2020-05-12

## 2020-05-15 ENCOUNTER — PATIENT MESSAGE (OUTPATIENT)
Dept: INTERNAL MEDICINE | Facility: CLINIC | Age: 55
End: 2020-05-15

## 2020-05-15 ENCOUNTER — OFFICE VISIT (OUTPATIENT)
Dept: INTERNAL MEDICINE | Facility: CLINIC | Age: 55
End: 2020-05-15
Payer: MEDICAID

## 2020-05-15 DIAGNOSIS — U07.1 COVID-19 VIRUS INFECTION: Primary | ICD-10-CM

## 2020-05-15 PROCEDURE — 99213 OFFICE O/P EST LOW 20 MIN: CPT | Mod: 95,,, | Performed by: INTERNAL MEDICINE

## 2020-05-15 PROCEDURE — 99213 PR OFFICE/OUTPT VISIT, EST, LEVL III, 20-29 MIN: ICD-10-PCS | Mod: 95,,, | Performed by: INTERNAL MEDICINE

## 2020-05-15 NOTE — PROGRESS NOTES
The patient location is: [place]  The chief complaint leading to consultation is:  Follow-up COVID-19 infection  Visit type: audiovisual  Total time spent with patient:  11 min  Each patient to whom he or she provides medical services by telemedicine is:  (1) informed of the relationship between the physician and patient and the respective role of any other health care provider with respect to management of the patient; and (2) notified that he or she may decline to receive medical services by telemedicine and may withdraw from such care at any time.    CC: followup of COVID-19 infection  HPI:  The patient is a 54 y.o. year old female who presents for follow-up of COVID-19 infection.  She has been working from home.  She reports cough, sneezing and daily headaches, usually occurring mid day.  She also complains of joint pain, weakness in hand pain.  She denies any fever.      PAST MEDICAL HISTORY:  Past Medical History:   Diagnosis Date    Deep vein thrombosis     occurred after ankle fracture    GERD (gastroesophageal reflux disease)     Joint pain     Morbid obesity     Pulmonary embolism     occurred after ankle fracture       SURGICAL HISTORY:  Past Surgical History:   Procedure Laterality Date    ankle surgery      back surgery      Lumbar spinal fusion with shaving of a disk     SECTION      COLONOSCOPY N/A 2018    Procedure: COLONOSCOPY;  Surgeon: Bob Posada MD;  Location: [address]);  Service: Endoscopy;  Laterality: N/A;  18-BMI 49.68-MS    HYSTERECTOMY          INJECTION OF FACET JOINT Left 2018    Procedure: INJECTION, FACET JOINT;  Surgeon: Jia Yang MD;  [facility];  Service: Pain Management;  Laterality: Left;  @ L3-4, L4-5 and L5-S1    INJECTION OF FACET JOINT Bilateral 2019    Procedure: INJECTION, FACET JOINT, L4-L5,L5-S1;  Surgeon: Jia Yang MD;  [facility];  Service: Pain Management;  Laterality:  Bilateral;    INJECTION OF JOINT Left 5/24/2019    Procedure: INJECTION, JOINT, LEFT SI;  Surgeon: Jia Yang MD;  [facility];  Service: Pain Management;  Laterality: Left;  INJECTION, JOINT, LEFT SI    LAPAROSCOPIC GASTRIC BANDING      MYOMECTOMY      PILONIDAL CYST DRAINAGE      removed    ROTATOR CUFF REPAIR      Left       MEDS:  Medcard reviewed and updated    ALLERGIES: Allergy Card reviewed and updated    SOCIAL HISTORY:   The patient is a nonsmoker.    PE:   APPEARANCE: Well nourished, well developed, in no acute distress.    Video visit  PSYCHIATRIC: The patient is oriented to person, place, and time and has a pleasant affect.        ASSESSMENT/PLAN:  Diagnoses and all orders for this visit:    COVID-19 virus infection  -    continue to slowly increase activity  -     symptoms slowly improving  -     return to work in 1 month

## 2020-06-11 ENCOUNTER — PATIENT MESSAGE (OUTPATIENT)
Dept: INTERNAL MEDICINE | Facility: CLINIC | Age: 55
End: 2020-06-11

## 2020-07-01 ENCOUNTER — OFFICE VISIT (OUTPATIENT)
Dept: INTERNAL MEDICINE | Facility: CLINIC | Age: 55
End: 2020-07-01
Payer: MEDICAID

## 2020-07-01 DIAGNOSIS — R10.9 ABDOMINAL PAIN, UNSPECIFIED ABDOMINAL LOCATION: Primary | ICD-10-CM

## 2020-07-01 PROCEDURE — 99213 OFFICE O/P EST LOW 20 MIN: CPT | Mod: 95,,, | Performed by: INTERNAL MEDICINE

## 2020-07-01 PROCEDURE — 99213 PR OFFICE/OUTPT VISIT, EST, LEVL III, 20-29 MIN: ICD-10-PCS | Mod: 95,,, | Performed by: INTERNAL MEDICINE

## 2020-07-01 RX ORDER — DICYCLOMINE HYDROCHLORIDE 10 MG/1
10 CAPSULE ORAL 3 TIMES DAILY PRN
Qty: 60 CAPSULE | Refills: 0 | Status: SHIPPED | OUTPATIENT
Start: 2020-07-01 | End: 2020-07-31

## 2020-07-01 NOTE — PROGRESS NOTES
The patient location is: home  The chief complaint leading to consultation is: abdominal pain    Visit type: audiovisual    Face to Face time with patient: 12   minutes of total time spent on the encounter, which includes face to face time and non-face to face time preparing to see the patient (eg, review of tests), Obtaining and/or reviewing separately obtained history, Documenting clinical information in the electronic or other health record, Independently interpreting results (not separately reported) and communicating results to the patient/family/caregiver, or Care coordination (not separately reported).         Each patient to whom he or she provides medical services by telemedicine is:  (1) informed of the relationship between the physician and patient and the respective role of any other health care provider with respect to management of the patient; and (2) notified that he or she may decline to receive medical services by telemedicine and may withdraw from such care at any time.    Notes:   CC: abdiominal pain  HPI:  The patient is a 54 y.o. year old female who presents to the office for abdominal pain.  Her symptoms started about 3 weeks ago.  She complains of cramping abdominal pain, diarrhea and nausea.  She reports 2-3 diarrhea stools a day.  She denies any blood in stool or fever.  She complains of headaches.  The pain is usually in her left temple, aching sensation.  Headaches occur daily.   She has been drinking fluids, but reports decreased appetite.        PAST MEDICAL HISTORY:  Past Medical History:   Diagnosis Date    Deep vein thrombosis     occurred after ankle fracture    GERD (gastroesophageal reflux disease)     Joint pain     Morbid obesity     Pulmonary embolism     occurred after ankle fracture       SURGICAL HISTORY:  Past Surgical History:   Procedure Laterality Date    ankle surgery      back surgery      Lumbar spinal fusion with shaving of a disk     SECTION       COLONOSCOPY N/A 6/13/2018    Procedure: COLONOSCOPY;  Surgeon: Bob Posada MD;  Location: [facility] ENDO (4TH FLR);  Service: Endoscopy;  Laterality: N/A;  6/1/18-BMI 49.68-MS    HYSTERECTOMY      2007    INJECTION OF FACET JOINT Left 7/13/2018    Procedure: INJECTION, FACET JOINT;  Surgeon: Jia Yang MD;  Location: [facility];  Service: Pain Management;  Laterality: Left;  @ L3-4, L4-5 and L5-S1    INJECTION OF FACET JOINT Bilateral 8/9/2019    Procedure: INJECTION, FACET JOINT, L4-L5,L5-S1;  Surgeon: Jia Yang MD;  Location: [facility];  Service: Pain Management;  Laterality: Bilateral;    INJECTION OF JOINT Left 5/24/2019    Procedure: INJECTION, JOINT, LEFT SI;  Surgeon: Jia Yang MD;  Location: [facility];  Service: Pain Management;  Laterality: Left;  INJECTION, JOINT, LEFT SI    LAPAROSCOPIC GASTRIC BANDING      MYOMECTOMY      PILONIDAL CYST DRAINAGE      removed    ROTATOR CUFF REPAIR      Left       MEDS:  Medcard reviewed and updated    ALLERGIES: Allergy Card reviewed and updated    SOCIAL HISTORY:   The patient is a nonsmoker.    PE:   APPEARANCE: Well nourished, well developed, in no acute distress.    Video visit  PSYCHIATRIC: The patient is oriented to person, place, and time and has a pleasant affect.        ASSESSMENT/PLAN:  Diagnoses and all orders for this visit:    Abdominal pain, unspecified abdominal location  -     CBC auto differential; Future  -     Comprehensive metabolic panel; Future  -     US Abdomen Complete; Future    Other orders  -     dicyclomine (BENTYL) 10 MG capsule; Take 1 capsule (10 mg total) by mouth 3 (three) times daily as needed.

## 2020-07-09 ENCOUNTER — HOSPITAL ENCOUNTER (OUTPATIENT)
Dept: RADIOLOGY | Facility: HOSPITAL | Age: 55
Discharge: HOME | End: 2020-07-09
Attending: INTERNAL MEDICINE
Payer: MEDICAID

## 2020-07-09 DIAGNOSIS — R10.9 ABDOMINAL PAIN, UNSPECIFIED ABDOMINAL LOCATION: ICD-10-CM

## 2020-07-09 PROCEDURE — 76700 US EXAM ABDOM COMPLETE: CPT | Mod: TC

## 2020-07-09 PROCEDURE — 76700 US ABDOMEN COMPLETE: ICD-10-PCS | Mod: 26,,, | Performed by: RADIOLOGY

## 2020-07-09 PROCEDURE — 76700 US EXAM ABDOM COMPLETE: CPT | Mod: 26,,, | Performed by: RADIOLOGY

## 2020-07-10 ENCOUNTER — TELEPHONE (OUTPATIENT)
Dept: INTERNAL MEDICINE | Facility: CLINIC | Age: 55
End: 2020-07-10

## 2020-07-10 DIAGNOSIS — Z00.00 ROUTINE MEDICAL EXAM: Primary | ICD-10-CM

## 2020-07-10 NOTE — TELEPHONE ENCOUNTER
Please advise pt states her Glucose levels are elevated and wants to know what she should do. Please advise

## 2020-07-10 NOTE — TELEPHONE ENCOUNTER
Please inform patient that her labs are significant for pre diabetes.  Recommend healthy diet and regular exercise to promote weight loss.  Please schedule physical within the next 3 months and we will repeat fasting labs.

## 2020-07-10 NOTE — TELEPHONE ENCOUNTER
Please inform patient that abdominal ultrasound shows a mildly enlarged liver with likely fatty changes.  This is treated with weight loss.  Please advise if patient is still having symptoms.

## 2020-07-13 ENCOUNTER — TELEPHONE (OUTPATIENT)
Dept: INTERNAL MEDICINE | Facility: CLINIC | Age: 55
End: 2020-07-13

## 2020-07-14 NOTE — TELEPHONE ENCOUNTER
Please inform patient that overlying bowel gas is a normal finding.  Sometimes, it may obscure part of an organ.  Bowel gas is a normal finding.

## 2020-08-24 ENCOUNTER — OFFICE VISIT (OUTPATIENT)
Dept: INTERNAL MEDICINE | Facility: CLINIC | Age: 55
End: 2020-08-24
Payer: MEDICAID

## 2020-08-24 DIAGNOSIS — E66.9 OBESITY, UNSPECIFIED CLASSIFICATION, UNSPECIFIED OBESITY TYPE, UNSPECIFIED WHETHER SERIOUS COMORBIDITY PRESENT: Primary | ICD-10-CM

## 2020-08-24 PROCEDURE — 99213 PR OFFICE/OUTPT VISIT, EST, LEVL III, 20-29 MIN: ICD-10-PCS | Mod: 95,,, | Performed by: INTERNAL MEDICINE

## 2020-08-24 PROCEDURE — 99213 OFFICE O/P EST LOW 20 MIN: CPT | Mod: 95,,, | Performed by: INTERNAL MEDICINE

## 2020-08-24 NOTE — PROGRESS NOTES
The patient location is: [place]  The chief complaint leading to consultation is:  Obesity    Visit type: audiovisual    Face to Face time with patient: 5   minutes of total time spent on the encounter, which includes face to face time and non-face to face time preparing to see the patient (eg, review of tests), Obtaining and/or reviewing separately obtained history, Documenting clinical information in the electronic or other health record, Independently interpreting results (not separately reported) and communicating results to the patient/family/caregiver, or Care coordination (not separately reported).         Each patient to whom he or she provides medical services by telemedicine is:  (1) informed of the relationship between the physician and patient and the respective role of any other health care provider with respect to management of the patient; and (2) notified that he or she may decline to receive medical services by telemedicine and may withdraw from such care at any time.    Notes:   CC:  Obesity  HPI:  The patient is a 54 y.o. year old female who presents for obesity.  She reports difficulty losing weight.  The patient has had a lap band in the past, and would like to get it removed.  She also reports she has not been sleeping well, difficulty falling asleep.  She is currently trying to arrange to move her mother to [place].  She is currently in a nursing facility in [place].  The patient denies any chest pain, shortness of breath, blurred vision, excessive fatigue, nausea or vomiting, but does report almost daily headaches.      PAST MEDICAL HISTORY:  Past Medical History:   Diagnosis Date    Deep vein thrombosis     occurred after ankle fracture    GERD (gastroesophageal reflux disease)     Joint pain     Morbid obesity     Pulmonary embolism     occurred after ankle fracture       SURGICAL HISTORY:  Past Surgical History:   Procedure Laterality Date    ankle surgery      back surgery       Lumbar spinal fusion with shaving of a disk     SECTION      COLONOSCOPY N/A 2018    Procedure: COLONOSCOPY;  Surgeon: Bob Posada MD;  Location: [facility] ENDO ([address] [place] [address]);  Service: Endoscopy;  Laterality: N/A;  18-BMI 49.68-MS    HYSTERECTOMY          INJECTION OF FACET JOINT Left 2018    Procedure: INJECTION, FACET JOINT;  Surgeon: Jia Yang MD;  Location: [facility];  Service: Pain Management;  Laterality: Left;  @ L3-4, L4-5 and L5-S1    INJECTION OF FACET JOINT Bilateral 2019    Procedure: INJECTION, FACET JOINT, L4-L5,L5-S1;  Surgeon: Jia Yang MD;  Location: [facility];  Service: Pain Management;  Laterality: Bilateral;    INJECTION OF JOINT Left 2019    Procedure: INJECTION, JOINT, LEFT SI;  Surgeon: Jia Yang MD;  Location: [facility];  Service: Pain Management;  Laterality: Left;  INJECTION, JOINT, LEFT SI    LAPAROSCOPIC GASTRIC BANDING      MYOMECTOMY      PILONIDAL CYST DRAINAGE      removed    ROTATOR CUFF REPAIR      Left       MEDS:  Medcard reviewed and updated    ALLERGIES: Allergy Card reviewed and updated    SOCIAL HISTORY:   The patient is a nonsmoker.    PE:   APPEARANCE: Well nourished, well developed, in no acute distress.    Video visit  PSYCHIATRIC: The patient is oriented to person, place, and time and has a pleasant affect.        ASSESSMENT/PLAN:  Diagnoses and all orders for this visit:    Obesity, unspecified classification, unspecified obesity type, unspecified whether serious comorbidity present  -     Ambulatory referral/consult to Bariatric Surgery; Future

## 2020-09-26 ENCOUNTER — PATIENT MESSAGE (OUTPATIENT)
Dept: INTERNAL MEDICINE | Facility: CLINIC | Age: 55
End: 2020-09-26

## 2020-10-02 ENCOUNTER — LAB VISIT (OUTPATIENT)
Dept: LAB | Facility: HOSPITAL | Age: 55
End: 2020-10-02
Attending: INTERNAL MEDICINE
Payer: COMMERCIAL

## 2020-10-02 DIAGNOSIS — Z00.00 ROUTINE MEDICAL EXAM: ICD-10-CM

## 2020-10-02 LAB
ALBUMIN SERPL BCP-MCNC: 3.7 G/DL (ref 3.5–5.2)
ALP SERPL-CCNC: 77 U/L (ref 55–135)
ALT SERPL W/O P-5'-P-CCNC: 14 U/L (ref 10–44)
ANION GAP SERPL CALC-SCNC: 8 MMOL/L (ref 8–16)
AST SERPL-CCNC: 16 U/L (ref 10–40)
BASOPHILS # BLD AUTO: 0.05 K/UL (ref 0–0.2)
BASOPHILS NFR BLD: 0.5 % (ref 0–1.9)
BILIRUB SERPL-MCNC: 0.5 MG/DL (ref 0.1–1)
BUN SERPL-MCNC: 14 MG/DL (ref 6–20)
CALCIUM SERPL-MCNC: 9.1 MG/DL (ref 8.7–10.5)
CHLORIDE SERPL-SCNC: 103 MMOL/L (ref 95–110)
CHOLEST SERPL-MCNC: 176 MG/DL (ref 120–199)
CHOLEST/HDLC SERPL: 2.7 {RATIO} (ref 2–5)
CO2 SERPL-SCNC: 29 MMOL/L (ref 23–29)
CREAT SERPL-MCNC: 0.9 MG/DL (ref 0.5–1.4)
DIFFERENTIAL METHOD: NORMAL
EOSINOPHIL # BLD AUTO: 0.1 K/UL (ref 0–0.5)
EOSINOPHIL NFR BLD: 1.1 % (ref 0–8)
ERYTHROCYTE [DISTWIDTH] IN BLOOD BY AUTOMATED COUNT: 14.4 % (ref 11.5–14.5)
EST. GFR  (AFRICAN AMERICAN): >60 ML/MIN/1.73 M^2
EST. GFR  (NON AFRICAN AMERICAN): >60 ML/MIN/1.73 M^2
ESTIMATED AVG GLUCOSE: 140 MG/DL (ref 68–131)
GLUCOSE SERPL-MCNC: 105 MG/DL (ref 70–110)
HBA1C MFR BLD HPLC: 6.5 % (ref 4–5.6)
HCT VFR BLD AUTO: 43.8 % (ref 37–48.5)
HDLC SERPL-MCNC: 65 MG/DL (ref 40–75)
HDLC SERPL: 36.9 % (ref 20–50)
HGB BLD-MCNC: 14.2 G/DL (ref 12–16)
IMM GRANULOCYTES # BLD AUTO: 0.02 K/UL (ref 0–0.04)
IMM GRANULOCYTES NFR BLD AUTO: 0.2 % (ref 0–0.5)
LDLC SERPL CALC-MCNC: 97 MG/DL (ref 63–159)
LYMPHOCYTES # BLD AUTO: 3.7 K/UL (ref 1–4.8)
LYMPHOCYTES NFR BLD: 40.1 % (ref 18–48)
MCH RBC QN AUTO: 29.8 PG (ref 27–31)
MCHC RBC AUTO-ENTMCNC: 32.4 G/DL (ref 32–36)
MCV RBC AUTO: 92 FL (ref 82–98)
MONOCYTES # BLD AUTO: 0.7 K/UL (ref 0.3–1)
MONOCYTES NFR BLD: 7.9 % (ref 4–15)
NEUTROPHILS # BLD AUTO: 4.6 K/UL (ref 1.8–7.7)
NEUTROPHILS NFR BLD: 50.2 % (ref 38–73)
NONHDLC SERPL-MCNC: 111 MG/DL
NRBC BLD-RTO: 0 /100 WBC
PLATELET # BLD AUTO: 295 K/UL (ref 150–350)
PMV BLD AUTO: 10.9 FL (ref 9.2–12.9)
POTASSIUM SERPL-SCNC: 3.8 MMOL/L (ref 3.5–5.1)
PROT SERPL-MCNC: 7.5 G/DL (ref 6–8.4)
RBC # BLD AUTO: 4.77 M/UL (ref 4–5.4)
SODIUM SERPL-SCNC: 140 MMOL/L (ref 136–145)
TRIGL SERPL-MCNC: 70 MG/DL (ref 30–150)
TSH SERPL DL<=0.005 MIU/L-ACNC: 1.47 UIU/ML (ref 0.4–4)
WBC # BLD AUTO: 9.23 K/UL (ref 3.9–12.7)

## 2020-10-02 PROCEDURE — 85025 COMPLETE CBC W/AUTO DIFF WBC: CPT

## 2020-10-02 PROCEDURE — 83036 HEMOGLOBIN GLYCOSYLATED A1C: CPT

## 2020-10-02 PROCEDURE — 82306 VITAMIN D 25 HYDROXY: CPT

## 2020-10-02 PROCEDURE — 80053 COMPREHEN METABOLIC PANEL: CPT

## 2020-10-02 PROCEDURE — 80061 LIPID PANEL: CPT

## 2020-10-02 PROCEDURE — 86703 HIV-1/HIV-2 1 RESULT ANTBDY: CPT

## 2020-10-02 PROCEDURE — 84443 ASSAY THYROID STIM HORMONE: CPT

## 2020-10-02 PROCEDURE — 36415 COLL VENOUS BLD VENIPUNCTURE: CPT | Mod: PO

## 2020-10-03 ENCOUNTER — OFFICE VISIT (OUTPATIENT)
Dept: URGENT CARE | Facility: CLINIC | Age: 55
End: 2020-10-03
Payer: COMMERCIAL

## 2020-10-03 VITALS
TEMPERATURE: 97 F | SYSTOLIC BLOOD PRESSURE: 137 MMHG | OXYGEN SATURATION: 98 % | DIASTOLIC BLOOD PRESSURE: 97 MMHG | BODY MASS INDEX: 47.84 KG/M2 | HEART RATE: 78 BPM | HEIGHT: 63 IN | WEIGHT: 270 LBS | RESPIRATION RATE: 18 BRPM

## 2020-10-03 DIAGNOSIS — M25.562 ACUTE PAIN OF LEFT KNEE: Primary | ICD-10-CM

## 2020-10-03 DIAGNOSIS — M79.10 MUSCLE TENDERNESS: ICD-10-CM

## 2020-10-03 LAB — 25(OH)D3+25(OH)D2 SERPL-MCNC: 25 NG/ML (ref 30–96)

## 2020-10-03 PROCEDURE — 71046 XR CHEST PA AND LATERAL: ICD-10-PCS | Mod: FY,S$GLB,, | Performed by: RADIOLOGY

## 2020-10-03 PROCEDURE — 71046 X-RAY EXAM CHEST 2 VIEWS: CPT | Mod: FY,S$GLB,, | Performed by: RADIOLOGY

## 2020-10-03 PROCEDURE — 73562 X-RAY EXAM OF KNEE 3: CPT | Mod: FY,LT,S$GLB, | Performed by: RADIOLOGY

## 2020-10-03 PROCEDURE — 99214 OFFICE O/P EST MOD 30 MIN: CPT | Mod: 25,S$GLB,, | Performed by: NURSE PRACTITIONER

## 2020-10-03 PROCEDURE — 96372 PR INJECTION,THERAP/PROPH/DIAG2ST, IM OR SUBCUT: ICD-10-PCS | Mod: S$GLB,,, | Performed by: NURSE PRACTITIONER

## 2020-10-03 PROCEDURE — 96372 THER/PROPH/DIAG INJ SC/IM: CPT | Mod: S$GLB,,, | Performed by: NURSE PRACTITIONER

## 2020-10-03 PROCEDURE — 99214 PR OFFICE/OUTPT VISIT, EST, LEVL IV, 30-39 MIN: ICD-10-PCS | Mod: 25,S$GLB,, | Performed by: NURSE PRACTITIONER

## 2020-10-03 PROCEDURE — 73562 XR KNEE 3 VIEW LEFT: ICD-10-PCS | Mod: FY,LT,S$GLB, | Performed by: RADIOLOGY

## 2020-10-03 RX ORDER — KETOROLAC TROMETHAMINE 30 MG/ML
15 INJECTION, SOLUTION INTRAMUSCULAR; INTRAVENOUS
Status: COMPLETED | OUTPATIENT
Start: 2020-10-03 | End: 2020-10-03

## 2020-10-03 RX ORDER — KETOROLAC TROMETHAMINE 30 MG/ML
15 INJECTION, SOLUTION INTRAMUSCULAR; INTRAVENOUS
Status: DISCONTINUED | OUTPATIENT
Start: 2020-10-03 | End: 2020-10-03

## 2020-10-03 RX ORDER — DICLOFENAC SODIUM 10 MG/G
2 GEL TOPICAL 4 TIMES DAILY
Qty: 50 G | Refills: 0 | Status: SHIPPED | OUTPATIENT
Start: 2020-10-03 | End: 2021-05-26

## 2020-10-03 RX ORDER — KETOROLAC TROMETHAMINE 30 MG/ML
60 INJECTION, SOLUTION INTRAMUSCULAR; INTRAVENOUS
Status: CANCELLED | OUTPATIENT
Start: 2020-10-03 | End: 2020-10-03

## 2020-10-03 RX ADMIN — KETOROLAC TROMETHAMINE 15 MG: 30 INJECTION, SOLUTION INTRAMUSCULAR; INTRAVENOUS at 03:10

## 2020-10-03 NOTE — PATIENT INSTRUCTIONS
Orthopedic Follow up Discharge Instructions    If your condition worsens or fails to improve we recommend that you receive another evaluation at the ER immediately or contact your PCP to discuss your concerns or return here. You must understand that you've received an urgent care treatment only and that you may be released before all your medical problems are known or treated. You the patient will arrange for follouwp care as instructed.   If you were prescribed a narcotic or muscle relaxant do not drive or operate heavy machinery while taking these medications   You were given Toradol 60mg IM injection on today.  DO NOT START TAKING YOUR IBUPROFEN until tomorrow, 10/4/2020  here do not also take any over the counter NSAID like ibuprofen, aleve, advil, motrin etc   RICE which means rest, ice compression and elevation are helpful.   If you have Low Back Pain and develop bowel or bladder symptoms or increase pain going down your legs go to the ER immediately.   If you were given a splint wear it at all times.   If you were given crutches use them as we instructed. Do not rest your armpits on the foam pad or you risk compressing the nerves and the vessels there   Follow up with the orthopedist in 1 week if you continue with pain.   Sometimes it can take up to 1 week for stress fractures to show up on an X-ray, and may need reimaging or a CT or MRI of the affected area.

## 2020-10-03 NOTE — PROGRESS NOTES
"Subjective:       Patient ID: Paloma Bang is a 54 y.o. female.    Vitals:  height is 5' 3" (1.6 m) and weight is 122.5 kg (270 lb). Her tympanic temperature is 97.2 °F (36.2 °C). Her blood pressure is 137/97 (abnormal) and her pulse is 78. Her respiration is 18 and oxygen saturation is 98%.     Chief Complaint: Knee Pain and Back Pain    Knee Pain   Incident onset: couple months knee and back pain. The incident occurred at home. The injury mechanism is unknown. The pain is present in the left thigh (pt had blood cloths in the past ). Quality: throbbing. The pain is at a severity of 8/10. The pain is moderate. The pain has been constant since onset. Associated symptoms include an inability to bear weight. She reports no foreign bodies present. The symptoms are aggravated by movement. Treatments tried: Tramadol. The treatment provided no relief.   Back Pain  Pertinent negatives include no chest pain, dysuria, fever, headaches or weakness.       Constitution: Negative for chills, fatigue and fever.   HENT: Negative for congestion and sore throat.    Neck: Negative for painful lymph nodes.   Cardiovascular: Negative for chest pain and leg swelling.   Eyes: Negative for double vision and blurred vision.   Respiratory: Negative for cough and shortness of breath.    Gastrointestinal: Negative for nausea, vomiting and diarrhea.   Genitourinary: Negative for dysuria, frequency, urgency and history of kidney stones.   Musculoskeletal: Positive for pain and back pain. Negative for joint pain, joint swelling, muscle cramps and muscle ache.   Skin: Negative for color change, pale, rash and bruising.   Allergic/Immunologic: Negative for seasonal allergies.   Neurological: Negative for dizziness, history of vertigo, light-headedness, passing out and headaches.   Hematologic/Lymphatic: Negative for swollen lymph nodes.   Psychiatric/Behavioral: Negative for nervous/anxious, sleep disturbance and depression. The patient " is not nervous/anxious.        Objective:      Physical Exam   Constitutional: She is oriented to person, place, and time. She appears well-developed. She is cooperative.  Non-toxic appearance. She does not appear ill. No distress.   HENT:   Head: Normocephalic and atraumatic.   Ears:   Right Ear: Hearing, tympanic membrane, external ear and ear canal normal.   Left Ear: Hearing, tympanic membrane, external ear and ear canal normal.   Nose: Nose normal. No mucosal edema, rhinorrhea or nasal deformity. No epistaxis. Right sinus exhibits no maxillary sinus tenderness and no frontal sinus tenderness. Left sinus exhibits no maxillary sinus tenderness and no frontal sinus tenderness.   Mouth/Throat: Uvula is midline, oropharynx is clear and moist and mucous membranes are normal. No trismus in the jaw. Normal dentition. No uvula swelling. No oropharyngeal exudate, posterior oropharyngeal edema or posterior oropharyngeal erythema.   Eyes: Conjunctivae and lids are normal. No scleral icterus.   Neck: Trachea normal, normal range of motion, full passive range of motion without pain and phonation normal. Neck supple. No neck rigidity. No edema and no erythema present.   Cardiovascular: Normal rate, regular rhythm, normal heart sounds and normal pulses.   Pulmonary/Chest: Effort normal and breath sounds normal. No respiratory distress. She has no decreased breath sounds. She has no rhonchi.   No acute respiratory distress  Able to speak in full sentences without difficulty.     Comments: No acute respiratory distress  Able to speak in full sentences without difficulty.     Abdominal: Soft. Normal appearance and bowel sounds are normal. She exhibits no abdominal bruit, no pulsatile midline mass and no mass.   Musculoskeletal: Normal range of motion.         General: No deformity.      Right lower leg: Normal. She exhibits no swelling, no deformity and no laceration. No edema.      Left lower leg: Normal.      Comments: Calves  bilateral symmetry   No warmth   No streaking or bruising  Mild tenderness in crease of knee   Neurological: She is alert and oriented to person, place, and time. She has normal strength and normal reflexes. No sensory deficit. She exhibits normal muscle tone. Coordination normal.   Skin: Skin is warm, dry, intact, not diaphoretic and not pale. not right lower legPsychiatric: Her speech is normal and behavior is normal. Judgment and thought content normal.   Nursing note and vitals reviewed.      Xr Chest Pa And Lateral    Result Date: 10/3/2020  EXAMINATION: XR CHEST PA AND LATERAL CLINICAL HISTORY: Myalgia, unspecified site TECHNIQUE: PA and lateral views of the chest were performed. COMPARISON: 03/13/2015 FINDINGS: The lungs are clear and free of infiltrate.  No pleural effusion or pneumothorax. The heart is not enlarged.     1.  No acute cardiopulmonary process. Electronically signed by: Abundio Cisneros DO Date:    10/03/2020 Time:    15:32    Xr Knee 3 View Left    Result Date: 10/3/2020  EXAMINATION: XR KNEE 3 VIEW LEFT CLINICAL HISTORY: Pain in left knee TECHNIQUE: AP, lateral, and Merchant views of the left knee were performed. COMPARISON: None FINDINGS: No acute fracture or dislocation.  There is equivocal very minimal joint space narrowing involving the medial compartment of either knee.  Very minimal degenerative change present at the left patellofemoral compartment.  No joint effusion suggested.     As above Electronically signed by: Abundio Cisneros DO Date:    10/03/2020 Time:    15:34  Assessment:       1. Acute pain of left knee    2. Muscle tenderness        Plan:       X-ray reviewed and there were no abnormal findings.  No joint infusion dislocation or fracture noted.  Chest x-ray without acute cardiopulmonary process cough.  No pleural effusion or pneumothorax. Can not rule out DVT in UC setting due to not having U/S, I do not suspect DVT in this patient. This seems to be musculoskeletal pain and  chronic. Pt has appointment on Tuesday with PCP  Acute pain of left knee  -     XR KNEE 3 VIEW LEFT; Future; Expected date: 10/03/2020  -     Ambulatory referral/consult to Orthopedics  -     diclofenac sodium (VOLTAREN) 1 % Gel; Apply 2 g topically 4 (four) times daily.  Dispense: 50 g; Refill: 0  -     Discontinue: ketorolac injection 15 mg  -     ketorolac injection 15 mg    Muscle tenderness  -     XR CHEST PA AND LATERAL; Future; Expected date: 10/03/2020  -     diclofenac sodium (VOLTAREN) 1 % Gel; Apply 2 g topically 4 (four) times daily.  Dispense: 50 g; Refill: 0  -     Discontinue: ketorolac injection 15 mg  -     ketorolac injection 15 mg          Patient Instructions   Orthopedic Follow up Discharge Instructions    If your condition worsens or fails to improve we recommend that you receive another evaluation at the ER immediately or contact your PCP to discuss your concerns or return here. You must understand that you've received an urgent care treatment only and that you may be released before all your medical problems are known or treated. You the patient will arrange for follouwp care as instructed.   If you were prescribed a narcotic or muscle relaxant do not drive or operate heavy machinery while taking these medications   You were given Toradol 60mg IM injection on today.  DO NOT START TAKING YOUR IBUPROFEN until tomorrow, 10/4/2020  here do not also take any over the counter NSAID like ibuprofen, aleve, advil, motrin etc   RICE which means rest, ice compression and elevation are helpful.   If you have Low Back Pain and develop bowel or bladder symptoms or increase pain going down your legs go to the ER immediately.   If you were given a splint wear it at all times.   If you were given crutches use them as we instructed. Do not rest your armpits on the foam pad or you risk compressing the nerves and the vessels there   Follow up with the orthopedist in 1 week if you continue with pain.   Sometimes  it can take up to 1 week for stress fractures to show up on an X-ray, and may need reimaging or a CT or MRI of the affected area.

## 2020-10-05 ENCOUNTER — TELEPHONE (OUTPATIENT)
Dept: ORTHOPEDICS | Facility: CLINIC | Age: 55
End: 2020-10-05

## 2020-10-05 DIAGNOSIS — M25.562 LEFT KNEE PAIN, UNSPECIFIED CHRONICITY: Primary | ICD-10-CM

## 2020-10-05 NOTE — TELEPHONE ENCOUNTER
I left a message for the patient letting her know she can go to the imaging center for x-ray before her appointment.

## 2020-10-06 ENCOUNTER — OFFICE VISIT (OUTPATIENT)
Dept: INTERNAL MEDICINE | Facility: CLINIC | Age: 55
End: 2020-10-06
Payer: COMMERCIAL

## 2020-10-06 ENCOUNTER — PATIENT OUTREACH (OUTPATIENT)
Dept: ADMINISTRATIVE | Facility: OTHER | Age: 55
End: 2020-10-06

## 2020-10-06 VITALS
SYSTOLIC BLOOD PRESSURE: 122 MMHG | HEART RATE: 100 BPM | TEMPERATURE: 98 F | WEIGHT: 269.38 LBS | OXYGEN SATURATION: 97 % | RESPIRATION RATE: 17 BRPM | DIASTOLIC BLOOD PRESSURE: 88 MMHG | HEIGHT: 63 IN | BODY MASS INDEX: 47.73 KG/M2

## 2020-10-06 DIAGNOSIS — Z12.31 VISIT FOR SCREENING MAMMOGRAM: ICD-10-CM

## 2020-10-06 DIAGNOSIS — R10.9 FLANK PAIN: Primary | ICD-10-CM

## 2020-10-06 DIAGNOSIS — Z00.00 ROUTINE MEDICAL EXAM: Primary | ICD-10-CM

## 2020-10-06 LAB — HIV 1+2 AB+HIV1 P24 AG SERPL QL IA: NEGATIVE

## 2020-10-06 PROCEDURE — 99396 PR PREVENTIVE VISIT,EST,40-64: ICD-10-PCS | Mod: S$GLB,,, | Performed by: INTERNAL MEDICINE

## 2020-10-06 PROCEDURE — 3008F PR BODY MASS INDEX (BMI) DOCUMENTED: ICD-10-PCS | Mod: CPTII,S$GLB,, | Performed by: INTERNAL MEDICINE

## 2020-10-06 PROCEDURE — 3079F DIAST BP 80-89 MM HG: CPT | Mod: CPTII,S$GLB,, | Performed by: INTERNAL MEDICINE

## 2020-10-06 PROCEDURE — 3074F PR MOST RECENT SYSTOLIC BLOOD PRESSURE < 130 MM HG: ICD-10-PCS | Mod: CPTII,S$GLB,, | Performed by: INTERNAL MEDICINE

## 2020-10-06 PROCEDURE — 3008F BODY MASS INDEX DOCD: CPT | Mod: CPTII,S$GLB,, | Performed by: INTERNAL MEDICINE

## 2020-10-06 PROCEDURE — 99396 PREV VISIT EST AGE 40-64: CPT | Mod: S$GLB,,, | Performed by: INTERNAL MEDICINE

## 2020-10-06 PROCEDURE — 99999 PR PBB SHADOW E&M-EST. PATIENT-LVL V: ICD-10-PCS | Mod: PBBFAC,,, | Performed by: INTERNAL MEDICINE

## 2020-10-06 PROCEDURE — 3074F SYST BP LT 130 MM HG: CPT | Mod: CPTII,S$GLB,, | Performed by: INTERNAL MEDICINE

## 2020-10-06 PROCEDURE — 3079F PR MOST RECENT DIASTOLIC BLOOD PRESSURE 80-89 MM HG: ICD-10-PCS | Mod: CPTII,S$GLB,, | Performed by: INTERNAL MEDICINE

## 2020-10-06 PROCEDURE — 99999 PR PBB SHADOW E&M-EST. PATIENT-LVL V: CPT | Mod: PBBFAC,,, | Performed by: INTERNAL MEDICINE

## 2020-10-06 NOTE — PROGRESS NOTES
The patient is a 54 y.o. old female who presents to the office for a physical.  Review of labs reveals low vitamin-D.    PAST MEDICAL HISTORY  Past Medical History:   Diagnosis Date    Deep vein thrombosis     occurred after ankle fracture    GERD (gastroesophageal reflux disease)     Joint pain     Morbid obesity     Pulmonary embolism     occurred after ankle fracture       SURGICAL HISTORY:  Past Surgical History:   Procedure Laterality Date    ankle surgery      back surgery      Lumbar spinal fusion with shaving of a disk     SECTION      COLONOSCOPY N/A 2018    Procedure: COLONOSCOPY;  Surgeon: Bob Posada MD;  Location: [facility] ENDO ([address] [place] [address]);  Service: Endoscopy;  Laterality: N/A;  18-BMI 49.68-MS    HYSTERECTOMY          INJECTION OF FACET JOINT Left 2018    Procedure: INJECTION, FACET JOINT;  Surgeon: Jia Yang MD;  Location: [facility] PAIN MGT;  Service: Pain Management;  Laterality: Left;  @ L3-4, L4-5 and L5-S1    INJECTION OF FACET JOINT Bilateral 2019    Procedure: INJECTION, FACET JOINT, L4-L5,L5-S1;  Surgeon: Jia Yang MD;  Location: [facility] PAIN [facility]GT;  Service: Pain Management;  Laterality: Bilateral;    INJECTION OF JOINT Left 2019    Procedure: INJECTION, JOINT, LEFT SI;  Surgeon: Jia Yang MD;  Location: [facility] PAIN [facility]GT;  Service: Pain Management;  Laterality: Left;  INJECTION, JOINT, LEFT SI    LAPAROSCOPIC GASTRIC BANDING      MYOMECTOMY      PILONIDAL CYST DRAINAGE      removed    ROTATOR CUFF REPAIR      Left         MEDS:  Medcard reviewed and updated    ALLERGIES: Allergy Card reviewed and updated    SOCIAL HISTORY:   The patient is a nonsmoker, denies alcohol or illicit drug use.    ROS:  GENERAL: No fever, chills or weight loss.  Positive fatigue.  SKIN: No rashes.  HEAD: Positive headaches.  Denies recent head trauma.  EYES: No photophobia, positive right ocular pain.  Denies diplopia.  EARS: Denies ear pain, discharge or  vertigo.  NOSE: No epistaxis or postnasal drip.  MOUTH & THROAT: No hoarseness or change in voice.   NODES: Denies swollen glands.  CHEST: Denies shortness of breath, wheezing, cough and sputum production.  CARDIOVASCULAR: Denies chest pain or palpitations.  ABDOMEN: Appetite fine. Denies diarrhea, abdominal pain, constipation or blood in stool.  URINARY: No dysuria or hematuria.  Positive left flank pain x 2-3 months  MUSCULOSKELETAL: Left knee pain. Positive back pain.  NEUROLOGIC: No history of seizures.  ENDOCRINE: Denies polyuria.  Positive polydipsia.  PSYCHIATRIC: Denies mood swings, depression, anxiety, homicidal or suicidal thoughts.    SCREENINGS:  Last cholesterol: 2020   Last colonoscopy: 2018  Last mammogram: 2019  Last Pap smear: 2018  Last tetanus: 2016  Last Pneumovax: none  Last eye exam: 2019  Last bone density: none  Last menstrual period: hysterectomy    PE:   Vitals:  Vitals:    10/06/20 1606   BP: 122/88   Pulse: 100   Resp: 17   Temp: 97.5 °F (36.4 °C)       APPEARANCE: Well nourished, well developed, in no acute distress.    EYES: Sclerae anicteric. PERRL. EOMI.      EARS: TM's intact. No retraction or perforation.    NOSE: Mucosa pink. Airway clear.  MOUTH & THROAT: No tonsillar enlargement. No pharyngeal erythema or exudate. No stridor.  NECK: Supple, no thyromegaly.  CHEST: Lungs clear to auscultation with unlabored respirations.  CARDIOVASCULAR: Normal S1, S2. No murmurs. No carotid bruits. No pedal edema.  ABDOMEN: Bowel sounds normal. Not distended. Soft. No tenderness or masses. No organomegaly.  MUSCULOSKELETAL:  Normal gait, no cyanosis or clubbing.   SKIN: Normal skin turgor, warm and dry.  NEUROLOGIC: Cranial Nerves: Intact.  PSYCHIATRIC: The patient is oriented to person, place, and time and has a pleasant affect.        ASSESSMENT/PLAN:  Paloma was seen today for annual exam and flank pain.    Diagnoses and all orders for this visit:    Routine medical exam  -     labs  reviewed  -     continue vitamin-D supplementation    Visit for screening mammogram  -     Mammo Digital Screening Bilat; Future

## 2020-10-07 ENCOUNTER — TELEPHONE (OUTPATIENT)
Dept: INTERNAL MEDICINE | Facility: CLINIC | Age: 55
End: 2020-10-07

## 2020-10-08 ENCOUNTER — HOSPITAL ENCOUNTER (OUTPATIENT)
Dept: RADIOLOGY | Facility: HOSPITAL | Age: 55
Discharge: HOME | End: 2020-10-08
Attending: PHYSICIAN ASSISTANT
Payer: COMMERCIAL

## 2020-10-08 ENCOUNTER — OFFICE VISIT (OUTPATIENT)
Dept: ORTHOPEDICS | Facility: CLINIC | Age: 55
End: 2020-10-08
Payer: COMMERCIAL

## 2020-10-08 ENCOUNTER — HOSPITAL ENCOUNTER (OUTPATIENT)
Dept: RADIOLOGY | Facility: HOSPITAL | Age: 55
Discharge: HOME | End: 2020-10-08
Attending: INTERNAL MEDICINE
Payer: COMMERCIAL

## 2020-10-08 VITALS
TEMPERATURE: 98 F | WEIGHT: 266.81 LBS | DIASTOLIC BLOOD PRESSURE: 84 MMHG | HEIGHT: 63 IN | SYSTOLIC BLOOD PRESSURE: 126 MMHG | BODY MASS INDEX: 47.27 KG/M2 | HEART RATE: 71 BPM

## 2020-10-08 DIAGNOSIS — M25.562 LEFT KNEE PAIN, UNSPECIFIED CHRONICITY: ICD-10-CM

## 2020-10-08 DIAGNOSIS — R10.9 FLANK PAIN: ICD-10-CM

## 2020-10-08 DIAGNOSIS — M17.12 PRIMARY OSTEOARTHRITIS OF LEFT KNEE: Primary | ICD-10-CM

## 2020-10-08 PROCEDURE — 99203 OFFICE O/P NEW LOW 30 MIN: CPT | Mod: S$GLB,,, | Performed by: PHYSICIAN ASSISTANT

## 2020-10-08 PROCEDURE — 99203 PR OFFICE/OUTPT VISIT, NEW, LEVL III, 30-44 MIN: ICD-10-PCS | Mod: S$GLB,,, | Performed by: PHYSICIAN ASSISTANT

## 2020-10-08 PROCEDURE — 73562 X-RAY EXAM OF KNEE 3: CPT | Mod: 26,RT,, | Performed by: RADIOLOGY

## 2020-10-08 PROCEDURE — 73562 XR KNEE ORTHO LEFT WITH FLEXION: ICD-10-PCS | Mod: 26,RT,, | Performed by: RADIOLOGY

## 2020-10-08 PROCEDURE — 3079F PR MOST RECENT DIASTOLIC BLOOD PRESSURE 80-89 MM HG: ICD-10-PCS | Mod: CPTII,S$GLB,, | Performed by: PHYSICIAN ASSISTANT

## 2020-10-08 PROCEDURE — 3074F PR MOST RECENT SYSTOLIC BLOOD PRESSURE < 130 MM HG: ICD-10-PCS | Mod: CPTII,S$GLB,, | Performed by: PHYSICIAN ASSISTANT

## 2020-10-08 PROCEDURE — 3079F DIAST BP 80-89 MM HG: CPT | Mod: CPTII,S$GLB,, | Performed by: PHYSICIAN ASSISTANT

## 2020-10-08 PROCEDURE — 99999 PR PBB SHADOW E&M-EST. PATIENT-LVL IV: CPT | Mod: PBBFAC,,, | Performed by: PHYSICIAN ASSISTANT

## 2020-10-08 PROCEDURE — 3008F BODY MASS INDEX DOCD: CPT | Mod: CPTII,S$GLB,, | Performed by: PHYSICIAN ASSISTANT

## 2020-10-08 PROCEDURE — 99999 PR PBB SHADOW E&M-EST. PATIENT-LVL IV: ICD-10-PCS | Mod: PBBFAC,,, | Performed by: PHYSICIAN ASSISTANT

## 2020-10-08 PROCEDURE — 3008F PR BODY MASS INDEX (BMI) DOCUMENTED: ICD-10-PCS | Mod: CPTII,S$GLB,, | Performed by: PHYSICIAN ASSISTANT

## 2020-10-08 PROCEDURE — 73564 X-RAY EXAM KNEE 4 OR MORE: CPT | Mod: 26,LT,, | Performed by: RADIOLOGY

## 2020-10-08 PROCEDURE — 74018 RADEX ABDOMEN 1 VIEW: CPT | Mod: 26,,, | Performed by: RADIOLOGY

## 2020-10-08 PROCEDURE — 73562 X-RAY EXAM OF KNEE 3: CPT | Mod: TC,RT,59

## 2020-10-08 PROCEDURE — 73564 XR KNEE ORTHO LEFT WITH FLEXION: ICD-10-PCS | Mod: 26,LT,, | Performed by: RADIOLOGY

## 2020-10-08 PROCEDURE — 74018 XR ABDOMEN AP 1 VIEW: ICD-10-PCS | Mod: 26,,, | Performed by: RADIOLOGY

## 2020-10-08 PROCEDURE — 3074F SYST BP LT 130 MM HG: CPT | Mod: CPTII,S$GLB,, | Performed by: PHYSICIAN ASSISTANT

## 2020-10-08 PROCEDURE — 74018 RADEX ABDOMEN 1 VIEW: CPT | Mod: TC

## 2020-10-08 RX ORDER — MELOXICAM 15 MG/1
15 TABLET ORAL DAILY
Qty: 30 TABLET | Refills: 0 | Status: SHIPPED | OUTPATIENT
Start: 2020-10-08 | End: 2020-11-07

## 2020-10-08 NOTE — PROGRESS NOTES
"  SUBJECTIVE:     Chief Complaint : left knee pain    History of Present Illness:  Paloma Bang is a 54 y.o. female seen in clinic today with a chief complaint of left knee pain. Symptoms have been present for one month. Pain is medial and lateral. She admits to catching and locking. She has stiffness when she gets up that resolves with walking. She has tried diclofenac gel, tramadol. She denies previous knee surgery. Misstep on stairs years ago and injured knee. No surgery.     Patient had L ankle fracture in 2004 and during period of immobilization had DVT and PE   Gastric banding 2006 in [place]- in process of getting band removed here  Obesity: BMI 47    Past Medical History:   Diagnosis Date    Deep vein thrombosis     occurred after ankle fracture    GERD (gastroesophageal reflux disease)     Joint pain     Morbid obesity     Pulmonary embolism     occurred after ankle fracture     Review of Systems:  Constitutional: no fever or chills  ENT: no nasal congestion or sore throat  Respiratory: no cough or shortness of breath  Cardiovascular: no chest pain or palpitations  Gastrointestinal: no nausea or vomiting, tolerating diet  Genitourinary: no hematuria or dysuria  Integument/Breast: no rash or pruritis  Hematologic/Lymphatic: no easy bruising or lymphadenopathy  Musculoskeletal: see HPI  Neurological: no seizures or tremors  Behavioral/Psych: no auditory or visual hallucinations    OBJECTIVE:     PHYSICAL EXAM:  Blood pressure 126/84, pulse 71, temperature 97.5 °F (36.4 °C), temperature source Oral, height 5' 3" (1.6 m), weight 121 kg (266 lb 12.8 oz).   General Appearance: WDWN, NAD  Gait: Normal  Neuro/Psych: Mood & affect appropriate  Lungs: Respirations equal and unlabored.   CV: 2+ bilateral upper and lower extremity pulses.   Skin: Intact throughout LE  Extremities: No LE edema    Right Knee Exam  Range of Motion:0-130 active   Effusion:none  Condition of skin:intact  Location of " tenderness:None   Strength:5 of 5 quadriceps strength and 5 of 5 hamstring strength  Stability:stable to testing    Left Knee Exam  Range of Motion:0-120 active   Effusion:not significant  Condition of skin:intact  Location of tenderness:Medial joint line   Strength:5 of 5 quadriceps strength and 5 of 5 hamstring strength  Stability:stable to testing  Pao: + pain medial    Left Hip Examination: no pain with PROM    RADIOGRAPHS: AP, lateral and merchant knee x-rays ordered and images reviewed today by me reveal minimal degenerative changes    ASSESSMENT/PLAN:   Left knee pain with mechanical symptoms  - Mobic 15 mg x 2 weeks. She will check with bariatrics prior to taking medication  - Continue diclofenac gel   - Physical therapy  - Pt will f/u prn if symptoms worsen or do not improve. CSI vs MRI if no relief.  - Pt agrees with plan

## 2020-10-12 ENCOUNTER — HOSPITAL ENCOUNTER (OUTPATIENT)
Dept: RADIOLOGY | Facility: OTHER | Age: 55
Discharge: HOME | End: 2020-10-12
Attending: INTERNAL MEDICINE
Payer: COMMERCIAL

## 2020-10-12 ENCOUNTER — PATIENT MESSAGE (OUTPATIENT)
Dept: PAIN MEDICINE | Facility: CLINIC | Age: 55
End: 2020-10-12

## 2020-10-12 DIAGNOSIS — Z12.31 VISIT FOR SCREENING MAMMOGRAM: ICD-10-CM

## 2020-10-12 PROCEDURE — 77063 BREAST TOMOSYNTHESIS BI: CPT | Mod: 26,,, | Performed by: RADIOLOGY

## 2020-10-12 PROCEDURE — 77067 SCR MAMMO BI INCL CAD: CPT | Mod: TC

## 2020-10-12 PROCEDURE — 77063 MAMMO DIGITAL SCREENING BILAT WITH TOMO: ICD-10-PCS | Mod: 26,,, | Performed by: RADIOLOGY

## 2020-10-12 PROCEDURE — 77067 SCR MAMMO BI INCL CAD: CPT | Mod: 26,,, | Performed by: RADIOLOGY

## 2020-10-12 PROCEDURE — 77067 MAMMO DIGITAL SCREENING BILAT WITH TOMO: ICD-10-PCS | Mod: 26,,, | Performed by: RADIOLOGY

## 2020-10-13 ENCOUNTER — HOSPITAL ENCOUNTER (EMERGENCY)
Facility: HOSPITAL | Age: 55
Discharge: HOME | End: 2020-10-13
Attending: EMERGENCY MEDICINE
Payer: COMMERCIAL

## 2020-10-13 VITALS
OXYGEN SATURATION: 99 % | SYSTOLIC BLOOD PRESSURE: 145 MMHG | RESPIRATION RATE: 17 BRPM | WEIGHT: 266 LBS | HEART RATE: 75 BPM | HEIGHT: 63 IN | DIASTOLIC BLOOD PRESSURE: 59 MMHG | BODY MASS INDEX: 47.13 KG/M2 | TEMPERATURE: 98 F

## 2020-10-13 DIAGNOSIS — M54.9 BACK PAIN, UNSPECIFIED BACK LOCATION, UNSPECIFIED BACK PAIN LATERALITY, UNSPECIFIED CHRONICITY: ICD-10-CM

## 2020-10-13 DIAGNOSIS — R91.1 PULMONARY NODULE: ICD-10-CM

## 2020-10-13 DIAGNOSIS — K59.00 CONSTIPATION, UNSPECIFIED CONSTIPATION TYPE: Primary | ICD-10-CM

## 2020-10-13 LAB
ALBUMIN SERPL BCP-MCNC: 4.2 G/DL (ref 3.5–5.2)
ALP SERPL-CCNC: 84 U/L (ref 55–135)
ALT SERPL W/O P-5'-P-CCNC: 17 U/L (ref 10–44)
ANION GAP SERPL CALC-SCNC: 12 MMOL/L (ref 8–16)
AST SERPL-CCNC: 21 U/L (ref 10–40)
BASOPHILS # BLD AUTO: 0.05 K/UL (ref 0–0.2)
BASOPHILS NFR BLD: 0.5 % (ref 0–1.9)
BILIRUB SERPL-MCNC: 0.7 MG/DL (ref 0.1–1)
BILIRUB UR QL STRIP: NEGATIVE
BNP SERPL-MCNC: <10 PG/ML (ref 0–99)
BUN SERPL-MCNC: 13 MG/DL (ref 6–20)
BUN SERPL-MCNC: 14 MG/DL (ref 6–30)
CALCIUM SERPL-MCNC: 10.2 MG/DL (ref 8.7–10.5)
CHLORIDE SERPL-SCNC: 100 MMOL/L (ref 95–110)
CHLORIDE SERPL-SCNC: 99 MMOL/L (ref 95–110)
CLARITY UR REFRACT.AUTO: CLEAR
CO2 SERPL-SCNC: 27 MMOL/L (ref 23–29)
COLOR UR AUTO: YELLOW
CREAT SERPL-MCNC: 0.7 MG/DL (ref 0.5–1.4)
CREAT SERPL-MCNC: 0.9 MG/DL (ref 0.5–1.4)
DIFFERENTIAL METHOD: NORMAL
EOSINOPHIL # BLD AUTO: 0.1 K/UL (ref 0–0.5)
EOSINOPHIL NFR BLD: 0.9 % (ref 0–8)
ERYTHROCYTE [DISTWIDTH] IN BLOOD BY AUTOMATED COUNT: 14.4 % (ref 11.5–14.5)
EST. GFR  (AFRICAN AMERICAN): >60 ML/MIN/1.73 M^2
EST. GFR  (NON AFRICAN AMERICAN): >60 ML/MIN/1.73 M^2
GLUCOSE SERPL-MCNC: 111 MG/DL (ref 70–110)
GLUCOSE SERPL-MCNC: 113 MG/DL (ref 70–110)
GLUCOSE UR QL STRIP: NEGATIVE
HCT VFR BLD AUTO: 46.4 % (ref 37–48.5)
HCT VFR BLD CALC: 50 %PCV (ref 36–54)
HGB BLD-MCNC: 14.9 G/DL (ref 12–16)
HGB UR QL STRIP: NEGATIVE
IMM GRANULOCYTES # BLD AUTO: 0.03 K/UL (ref 0–0.04)
IMM GRANULOCYTES NFR BLD AUTO: 0.3 % (ref 0–0.5)
KETONES UR QL STRIP: ABNORMAL
LEUKOCYTE ESTERASE UR QL STRIP: NEGATIVE
LYMPHOCYTES # BLD AUTO: 3.2 K/UL (ref 1–4.8)
LYMPHOCYTES NFR BLD: 29.6 % (ref 18–48)
MCH RBC QN AUTO: 29 PG (ref 27–31)
MCHC RBC AUTO-ENTMCNC: 32.1 G/DL (ref 32–36)
MCV RBC AUTO: 90 FL (ref 82–98)
MONOCYTES # BLD AUTO: 1 K/UL (ref 0.3–1)
MONOCYTES NFR BLD: 8.7 % (ref 4–15)
NEUTROPHILS # BLD AUTO: 6.6 K/UL (ref 1.8–7.7)
NEUTROPHILS NFR BLD: 60 % (ref 38–73)
NITRITE UR QL STRIP: NEGATIVE
NRBC BLD-RTO: 0 /100 WBC
PH UR STRIP: 5 [PH] (ref 5–8)
PLATELET # BLD AUTO: 262 K/UL (ref 150–350)
PMV BLD AUTO: 10.5 FL (ref 9.2–12.9)
POC IONIZED CALCIUM: 1.13 MMOL/L (ref 1.06–1.42)
POC TCO2 (MEASURED): 25 MMOL/L (ref 23–29)
POTASSIUM BLD-SCNC: 3.6 MMOL/L (ref 3.5–5.1)
POTASSIUM SERPL-SCNC: 3.8 MMOL/L (ref 3.5–5.1)
PROT SERPL-MCNC: 8.6 G/DL (ref 6–8.4)
PROT UR QL STRIP: NEGATIVE
RBC # BLD AUTO: 5.13 M/UL (ref 4–5.4)
SAMPLE: ABNORMAL
SODIUM BLD-SCNC: 138 MMOL/L (ref 136–145)
SODIUM SERPL-SCNC: 139 MMOL/L (ref 136–145)
SP GR UR STRIP: 1.02 (ref 1–1.03)
TROPONIN I SERPL DL<=0.01 NG/ML-MCNC: <0.006 NG/ML (ref 0–0.03)
URN SPEC COLLECT METH UR: ABNORMAL
WBC # BLD AUTO: 10.95 K/UL (ref 3.9–12.7)

## 2020-10-13 PROCEDURE — 25000003 PHARM REV CODE 250: Performed by: PHYSICIAN ASSISTANT

## 2020-10-13 PROCEDURE — 81003 URINALYSIS AUTO W/O SCOPE: CPT

## 2020-10-13 PROCEDURE — 99284 EMERGENCY DEPT VISIT MOD MDM: CPT | Mod: ,,, | Performed by: EMERGENCY MEDICINE

## 2020-10-13 PROCEDURE — 84484 ASSAY OF TROPONIN QUANT: CPT

## 2020-10-13 PROCEDURE — 63600175 PHARM REV CODE 636 W HCPCS: Performed by: PHYSICIAN ASSISTANT

## 2020-10-13 PROCEDURE — 83880 ASSAY OF NATRIURETIC PEPTIDE: CPT

## 2020-10-13 PROCEDURE — 96374 THER/PROPH/DIAG INJ IV PUSH: CPT | Mod: 59

## 2020-10-13 PROCEDURE — 99284 PR EMERGENCY DEPT VISIT,LEVEL IV: ICD-10-PCS | Mod: ,,, | Performed by: EMERGENCY MEDICINE

## 2020-10-13 PROCEDURE — 25500020 PHARM REV CODE 255: Performed by: EMERGENCY MEDICINE

## 2020-10-13 PROCEDURE — 85025 COMPLETE CBC W/AUTO DIFF WBC: CPT

## 2020-10-13 PROCEDURE — 80053 COMPREHEN METABOLIC PANEL: CPT

## 2020-10-13 PROCEDURE — 99285 EMERGENCY DEPT VISIT HI MDM: CPT | Mod: 25

## 2020-10-13 PROCEDURE — 80047 BASIC METABLC PNL IONIZED CA: CPT | Mod: 59

## 2020-10-13 RX ORDER — LIDOCAINE 50 MG/G
1 PATCH TOPICAL
Status: DISCONTINUED | OUTPATIENT
Start: 2020-10-13 | End: 2020-10-14 | Stop reason: HOSPADM

## 2020-10-13 RX ORDER — ACETAMINOPHEN 500 MG
1000 TABLET ORAL
Status: COMPLETED | OUTPATIENT
Start: 2020-10-13 | End: 2020-10-13

## 2020-10-13 RX ORDER — MORPHINE SULFATE 4 MG/ML
4 INJECTION, SOLUTION INTRAMUSCULAR; INTRAVENOUS ONCE AS NEEDED
Status: DISCONTINUED | OUTPATIENT
Start: 2020-10-13 | End: 2020-10-14 | Stop reason: HOSPADM

## 2020-10-13 RX ORDER — DOCUSATE SODIUM 100 MG/1
100 CAPSULE, LIQUID FILLED ORAL 2 TIMES DAILY PRN
Qty: 28 CAPSULE | Refills: 0 | Status: SHIPPED | OUTPATIENT
Start: 2020-10-13 | End: 2020-10-27

## 2020-10-13 RX ORDER — ORPHENADRINE CITRATE 30 MG/ML
30 INJECTION INTRAMUSCULAR; INTRAVENOUS
Status: COMPLETED | OUTPATIENT
Start: 2020-10-13 | End: 2020-10-13

## 2020-10-13 RX ADMIN — IOHEXOL 100 ML: 350 INJECTION, SOLUTION INTRAVENOUS at 07:10

## 2020-10-13 RX ADMIN — ORPHENADRINE CITRATE 30 MG: 30 INJECTION INTRAMUSCULAR; INTRAVENOUS at 06:10

## 2020-10-13 RX ADMIN — ACETAMINOPHEN 1000 MG: 500 TABLET ORAL at 06:10

## 2020-10-13 RX ADMIN — LIDOCAINE 1 PATCH: 50 PATCH TOPICAL at 06:10

## 2020-10-13 NOTE — ED PROVIDER NOTES
Encounter Date: 10/13/2020       History     Chief Complaint   Patient presents with    Back Pain     radiatates to left side for the past 4 months.      54-year-old female with history significant for morbid obesity, PE/DVT, GERD presents to the ED with a chief complaint of back pain.  Patient reports pain to the left mid back.  Pain began about 2 months ago.  The pain has progressively worsened since onset.  She describes the pain as dull, nagging pain.  It occasionally radiates downward into the lower back, but this is infrequent.  The pain is worse with movement, walking, twisting and significantly worse with inspiration.  She feels that she has to take short breaths due to the pain with breathing.  She feels that the pain is at its worst today.  She had a PE and DVT in  following a surgery.  She has been evaluated at urgent cares a few times.  She reports having x-rays of her chest, stomach, back with no abnormal findings.  She denies any injury to the area.  She denies fever, cough.  She has taken Mobic with some relief of the pain.  She has also attempted treatment with muscle relaxant and tramadol which just makes her sleepy.  She denies any new worsening weakness in her legs, numbness, dysuria.  She does report decreased in frequency of her urination over the past month.        Review of patient's allergies indicates:   Allergen Reactions    Latex, natural rubber      breakout     Past Medical History:   Diagnosis Date    Deep vein thrombosis     occurred after ankle fracture    GERD (gastroesophageal reflux disease)     Joint pain     Morbid obesity     Pulmonary embolism     occurred after ankle fracture     Past Surgical History:   Procedure Laterality Date    ankle surgery      back surgery      Lumbar spinal fusion with shaving of a disk     SECTION      COLONOSCOPY N/A 2018    Procedure: COLONOSCOPY;  Surgeon: Bob Posada MD;  Location: [facility] ([address] [place] [address]);  Service:  Endoscopy;  Laterality: N/A;  6/1/18-BMI 49.68-MS    HYSTERECTOMY      2007    INJECTION OF FACET JOINT Left 7/13/2018    Procedure: INJECTION, FACET JOINT;  Surgeon: Jia Yang MD;  Location: [facility] PAIN MGT;  Service: Pain Management;  Laterality: Left;  @ L3-4, L4-5 and L5-S1    INJECTION OF FACET JOINT Bilateral 8/9/2019    Procedure: INJECTION, FACET JOINT, L4-L5,L5-S1;  Surgeon: Jia Yang MD;  Location: [facility] PAIN MGT;  Service: Pain Management;  Laterality: Bilateral;    INJECTION OF JOINT Left 5/24/2019    Procedure: INJECTION, JOINT, LEFT SI;  Surgeon: Jia Yang MD;  Location: [facility] PAIN MGT;  Service: Pain Management;  Laterality: Left;  INJECTION, JOINT, LEFT SI    LAPAROSCOPIC GASTRIC BANDING      MYOMECTOMY      PILONIDAL CYST DRAINAGE      removed    ROTATOR CUFF REPAIR      Left     Family History   Problem Relation Age of Onset    Diabetes Father     Hypertension Father     Diabetes Mother     Hypertension Mother     Breast cancer Neg Hx     Colon cancer Neg Hx     Ovarian cancer Neg Hx     Acne Neg Hx     Eczema Neg Hx     Lupus Neg Hx     Psoriasis Neg Hx     Melanoma Neg Hx      Social History     Tobacco Use    Smoking status: Never Smoker    Smokeless tobacco: Never Used   Substance Use Topics    Alcohol use: No     Frequency: Never     Drinks per session: Patient refused     Binge frequency: Never     Comment: occasionally/ not weekly    Drug use: No     Review of Systems   Constitutional: Negative for fever.   HENT: Negative for sore throat.    Respiratory: Positive for shortness of breath.    Cardiovascular: Negative for chest pain.   Gastrointestinal: Negative for nausea.   Genitourinary: Negative for dysuria.   Musculoskeletal: Positive for back pain.   Skin: Negative for rash.   Neurological: Negative for weakness and numbness.   Hematological: Does not bruise/bleed easily.       Physical Exam     Initial Vitals [10/13/20 1450]   BP Pulse Resp Temp  SpO2   (!) 145/59 75 17 98.1 °F (36.7 °C) 99 %      MAP       --         Physical Exam    Nursing note and vitals reviewed.  Constitutional: She appears well-developed and well-nourished. She is not diaphoretic.  Non-toxic appearance. She does not appear ill. No distress.   HENT:   Head: Normocephalic and atraumatic.   Neck: Neck supple.   Cardiovascular: Normal rate and regular rhythm. Exam reveals no gallop and no friction rub.    No murmur heard.  Pulses:       Dorsalis pedis pulses are 2+ on the right side and 2+ on the left side.   Pulmonary/Chest: Effort normal and breath sounds normal. No accessory muscle usage. No tachypnea. No respiratory distress. She has no decreased breath sounds. She has no wheezes. She has no rhonchi. She has no rales.   Abdominal: Soft. Normal appearance. She exhibits no distension and no mass. There is abdominal tenderness (Moderate) in the left lower quadrant. There is no guarding.   Musculoskeletal:        Back:       Comments: Significant TTP to the left knee back as shown. No rashes or skin lesions noted to the left mid back   Neurological: She is alert.   5/5 strength to BLE   Skin: No rash noted.   Psychiatric: She has a normal mood and affect. Her behavior is normal.         ED Course   Procedures  Labs Reviewed   URINALYSIS, REFLEX TO URINE CULTURE   CBC W/ AUTO DIFFERENTIAL   COMPREHENSIVE METABOLIC PANEL   TROPONIN I   B-TYPE NATRIURETIC PEPTIDE   ISTAT CHEM8          Imaging Results    None                       Attending Attestation:     Physician Attestation Statement for NP/PA:   I have conducted a face to face encounter with this patient in addition to the NP/PA, due to Medical Complexity    Other NP/PA Attestation Additions:      Medical Decision Makin yo F w/ left flank and left sided abd pain x 2 months ago, getting worse  No hematuria/pritesh of urination/dysuria  BMs every 2 days, + passing gas, stool sometimes loose sometimes formed, no melena/hematochesia  No  "n/v  No fever  No sob  Hx of PE    CT chest/abd pel, no PE, + lung nodule (patient aware of the incidental finding, plan for PCP f/u) also constipation: "Moderate to large amount of stool within the right colon, may reflect developing constipation, correlation recommended.  Please note, there is transition to decompressed descending colon at the level of the distal transverse colon, rather abrupt, however no significant wall thickening in the location.  Follow-up colonoscopy is recommended if not previously performed to exclude malignancy or stricture in the region"    Chart reviewed, patient had colonoscopy in 2018 with one polyp removed. Discussed CT findings with the patient and the concern for possible stricture/malignancy on the CT (however, recent colonoscopy 2 years ago). Pain is improved at this time, she would like to go home, will have her take stool softness (no laxatives), hydration, tylenol for pain control and f/u w/ GI    Dg:   Left flank pain  Constipation  Lung nodule (CT incidental finding)                             Clinical Impression:       ICD-10-CM ICD-9-CM   1. Chest pain  R07.9 786.50                                               Macarena Tracy MD  10/14/20 0034    "

## 2020-10-13 NOTE — Clinical Note
"Paloma Crowe" Merritt was seen and treated in our emergency department on 10/13/2020.  She may return to work on 10/15/2020.       If you have any questions or concerns, please don't hesitate to call.      Sarah Hyed RN    "

## 2020-10-13 NOTE — ED NOTES
iSTAT Chem 8    Na+ 138   K+ 3.6   Cl 99   iCa 1.13   TCO2 25   Glu 113   BUN 14   Creat 0.7   Hct% 50

## 2020-10-13 NOTE — FIRST PROVIDER EVALUATION
Emergency Department TeleTriage Encounter Note      CHIEF COMPLAINT    Chief Complaint   Patient presents with    Back Pain     radiatates to left side for the past 4 months.        VITAL SIGNS   Initial Vitals [10/13/20 1450]   BP Pulse Resp Temp SpO2   (!) 145/59 75 17 98.1 °F (36.7 °C) 99 %      MAP       --            ALLERGIES    Review of patient's allergies indicates:   Allergen Reactions    Latex, natural rubber      breakout       PROVIDER TRIAGE NOTE  This is a teletriage evaluation of a 54 y.o. female presenting to the ED with c/o left sided back pain. No injuries or trauma. Ongoing for several months. No urinary symptoms or radiation. Initial orders will be placed and care will be transferred to an alternate provider when patient is roomed for a full evaluation. Any additional orders and the final disposition will be determined by that provider.         ORDERS  Labs Reviewed - No data to display    ED Orders (720h ago, onward)    None            Virtual Visit Note: The provider triage portion of this emergency department evaluation and documentation was performed via Sift, a HIPAA-compliant telemedicine application, in concert with a tele-presenter in the room. A face to face patient evaluation with one of my colleagues will occur once the patient is placed in an emergency department room.      DISCLAIMER: This note was prepared with Addictive voice recognition transcription software. Garbled syntax, mangled pronouns, and other bizarre constructions may be attributed to that software system.

## 2020-10-13 NOTE — ED TRIAGE NOTES
Pt is a 54 yr old female that presents to the ED today with complaints of back pain. PT states that her back pain is worse with movement and deep breaths. Pt states that this has been going on for a few months but has progressively gotten worse.

## 2020-10-14 ENCOUNTER — PATIENT MESSAGE (OUTPATIENT)
Dept: INTERNAL MEDICINE | Facility: CLINIC | Age: 55
End: 2020-10-14

## 2020-10-14 ENCOUNTER — TELEPHONE (OUTPATIENT)
Dept: INTERNAL MEDICINE | Facility: CLINIC | Age: 55
End: 2020-10-14

## 2020-10-14 DIAGNOSIS — K59.00 CONSTIPATION, UNSPECIFIED CONSTIPATION TYPE: Primary | ICD-10-CM

## 2020-10-14 NOTE — PROVIDER PROGRESS NOTES - EMERGENCY DEPT.
Encounter Date: 10/13/2020 7:11 PM    ED Physician Progress Notes           ED Course: I, Luz Marina Montalvo PA-C, have assumed care of this patient from Marie Callaway PA-C. Patient is a 54 year old female with PMHx of GERD, hx of laparoscopic gastric banding, hx of PE and DVT. She presents to the ED for back pain.      At the time of signout plan was pending laboratory analysis and imaging. Anticipate discharge.     Medications given in the ED:    Medications   orphenadrine injection 30 mg (30 mg Intravenous Given 10/13/20 1853)   acetaminophen tablet 1,000 mg (1,000 mg Oral Given 10/13/20 1853)   iohexoL (OMNIPAQUE 350) injection 100 mL (100 mLs Intravenous Given 10/13/20 1927)     No leukocytosis. Hemodynamically stable. Troponin WNL. UA unremarkable for infectious process. CTA chest (PE study) found to have No convincing pulmonary thromboembolism. CT abdomen pelvis may reflect developing constipation. However, clinically does not fit at this time. Patient reports having BM occurring every two days with mixed stools solid versus loose. + flatus. Do not suspect obstruction at this time.     Patient informed of incidental findings of 0.4 cm pulmonary nodule in the left upper lobe.   patient instructed to F/U with PCP for non contrast CT chest for further characterization/monitoring of finding.     Patient informed of transition of decompressed descending colon at the level of the distal transverse colon, rather abrupt. Patient instructed to Follow-up with  Colonoscopy to exclude malignancy or stricture in the region.    Patient reassessed, reports symptoms improved with ED management. I have discussed emergency department findings, and plan with the patient. Will discharge home with colace and F/U with GI and PCP in approximately one week. Ambulatory referral placed to GI to optimize F/U. Additional verbal discharge instructions were given and discussed with the patient. Patient verbalizes understanding of plan  and agrees. Return precautions given.    Disposition: Discharged.     Impression:     ICD-10-CM ICD-9-CM   1. Constipation, unspecified constipation type  K59.00 564.00   2. Pulmonary nodule  R91.1 793.11   3. Back pain, unspecified back location, unspecified back pain laterality, unspecified chronicity  M54.9 724.5       I have reviewed and concur with the PA's history, physical, assessment, and plan.  I have personally interviewed and examined the patient at bedside.    Macarena Tracy      I have discussed and reviewed with my supervising physician.

## 2020-10-14 NOTE — TELEPHONE ENCOUNTER
ER yesterday due to severe pain on the left side of my back, hard to breathe and walk.  Please look at my test results from the MRI. I need another colonoscopy and see what's going on with one of my lungs.  I'm not released to return to work until 10/15/2020.

## 2020-10-14 NOTE — ED NOTES
Upon discharge patient found to be AAOx4, respirations even and unlabored, skin warm and dry, moves all extremities without difficulty. No new complaints or apparent distress upon discharge. Pt wheeled out to front of ER to await ride.

## 2020-10-14 NOTE — DISCHARGE INSTRUCTIONS
"CT a/p: . Moderate to large amount of stool within the right colon, may reflect developing constipation, correlation recommended.  Please note, there is transition to decompressed descending colon at the level of the distal transverse colon, rather abrupt, however no significant wall thickening in the location.  Follow-up colonoscopy is recommended if not previously performed to exclude malignancy or stricture in the region.   2. The urinary bladder is decompressed however there may be residual wall thickening.    3. Findings suggesting hepatic steatosis noting hepatomegaly.    4. Lap band device without obvious complication.   5. Few scattered colonic diverticula without findings to suggest diverticulitis as clinically questioned.     CT chest:  "1. No convincing pulmonary thromboembolism to the level of the segmental arteries bilaterally noting exam limitations above.   2. 0.4 cm pulmonary nodule in the left upper lobe.  For a solid nodule <6 mm, Fleischner Society 2017 guidelines recommend no routine follow up for a low risk patient, or follow-up with non-contrast chest CT at 12 months in a high risk patient. "    Please return to the Emergency Department immediately for any new or concerning symptoms or if they get worse.   If you were prescribed antibiotics, please take them to completion.   If you were prescribed a narcotic medication, do not drive or operate heavy equipment or machinery, while taking these medications.  Please follow up with your primary care doctor or specialist in one week.  If you smoke, please stop smoking.    Our goal in the emergency department is to always give you outstanding care and exceptional service. You may receive a survey by mail or e-mail in the next week regarding your experience in our ED. We would greatly appreciate your completing and returning the survey. Your feedback provides us with a way to recognize our staff who give very good care and it helps us learn how to " improve when your experience was below our aspiration of excellence.

## 2020-10-14 NOTE — TELEPHONE ENCOUNTER
Please inform patient I recommend referral to GI to determine when repeat colonoscopy would be appropriate.  Also, chest CT showed 0.4 cm nodule in the left upper lobe.  Current guidelines recommend no routine follow-up for low risk patient, or follow-up with noncontrast chest CT at 12 months.

## 2020-10-30 ENCOUNTER — OFFICE VISIT (OUTPATIENT)
Dept: BARIATRICS | Facility: CLINIC | Age: 55
End: 2020-10-30
Payer: COMMERCIAL

## 2020-10-30 ENCOUNTER — OFFICE VISIT (OUTPATIENT)
Dept: GASTROENTEROLOGY | Facility: CLINIC | Age: 55
End: 2020-10-30
Payer: COMMERCIAL

## 2020-10-30 ENCOUNTER — HOSPITAL ENCOUNTER (OUTPATIENT)
Dept: CARDIOLOGY | Facility: CLINIC | Age: 55
Discharge: HOME | End: 2020-10-30
Payer: COMMERCIAL

## 2020-10-30 ENCOUNTER — HOSPITAL ENCOUNTER (OUTPATIENT)
Dept: RADIOLOGY | Facility: HOSPITAL | Age: 55
Discharge: HOME | End: 2020-10-30
Attending: PHYSICIAN ASSISTANT
Payer: COMMERCIAL

## 2020-10-30 ENCOUNTER — TELEPHONE (OUTPATIENT)
Dept: INTERNAL MEDICINE | Facility: CLINIC | Age: 55
End: 2020-10-30

## 2020-10-30 VITALS
SYSTOLIC BLOOD PRESSURE: 124 MMHG | HEART RATE: 85 BPM | WEIGHT: 269.38 LBS | HEIGHT: 63 IN | BODY MASS INDEX: 47.73 KG/M2 | DIASTOLIC BLOOD PRESSURE: 85 MMHG

## 2020-10-30 VITALS
DIASTOLIC BLOOD PRESSURE: 70 MMHG | RESPIRATION RATE: 16 BRPM | HEART RATE: 70 BPM | SYSTOLIC BLOOD PRESSURE: 130 MMHG | WEIGHT: 268.06 LBS | BODY MASS INDEX: 47.5 KG/M2 | HEIGHT: 63 IN

## 2020-10-30 DIAGNOSIS — K21.9 GASTROESOPHAGEAL REFLUX DISEASE, UNSPECIFIED WHETHER ESOPHAGITIS PRESENT: ICD-10-CM

## 2020-10-30 DIAGNOSIS — R11.10 REGURGITATION OF FOOD: ICD-10-CM

## 2020-10-30 DIAGNOSIS — Z98.84 GASTRIC BANDING STATUS: ICD-10-CM

## 2020-10-30 DIAGNOSIS — E66.01 MORBID OBESITY WITH BODY MASS INDEX OF 45.0-49.9 IN ADULT: ICD-10-CM

## 2020-10-30 DIAGNOSIS — E66.01 MORBID OBESITY WITH BODY MASS INDEX OF 45.0-49.9 IN ADULT: Primary | ICD-10-CM

## 2020-10-30 DIAGNOSIS — H53.8 BLURRED VISION: Primary | ICD-10-CM

## 2020-10-30 DIAGNOSIS — R93.3 ABNORMAL CT SCAN, GASTROINTESTINAL TRACT: ICD-10-CM

## 2020-10-30 DIAGNOSIS — K59.00 CONSTIPATION, UNSPECIFIED CONSTIPATION TYPE: Primary | ICD-10-CM

## 2020-10-30 DIAGNOSIS — R13.10 DYSPHAGIA, UNSPECIFIED TYPE: ICD-10-CM

## 2020-10-30 PROCEDURE — 3079F DIAST BP 80-89 MM HG: CPT | Mod: CPTII,S$GLB,, | Performed by: FAMILY MEDICINE

## 2020-10-30 PROCEDURE — 99999 PR PBB SHADOW E&M-EST. PATIENT-LVL IV: CPT | Mod: PBBFAC,,, | Performed by: PHYSICIAN ASSISTANT

## 2020-10-30 PROCEDURE — 99205 OFFICE O/P NEW HI 60 MIN: CPT | Mod: S$GLB,,, | Performed by: PHYSICIAN ASSISTANT

## 2020-10-30 PROCEDURE — 99205 PR OFFICE/OUTPT VISIT, NEW, LEVL V, 60-74 MIN: ICD-10-PCS | Mod: S$GLB,,, | Performed by: PHYSICIAN ASSISTANT

## 2020-10-30 PROCEDURE — 3075F PR MOST RECENT SYSTOLIC BLOOD PRESS GE 130-139MM HG: ICD-10-PCS | Mod: CPTII,S$GLB,, | Performed by: PHYSICIAN ASSISTANT

## 2020-10-30 PROCEDURE — 3008F BODY MASS INDEX DOCD: CPT | Mod: CPTII,S$GLB,, | Performed by: FAMILY MEDICINE

## 2020-10-30 PROCEDURE — 71046 X-RAY EXAM CHEST 2 VIEWS: CPT | Mod: 26,,, | Performed by: RADIOLOGY

## 2020-10-30 PROCEDURE — 3008F PR BODY MASS INDEX (BMI) DOCUMENTED: ICD-10-PCS | Mod: CPTII,S$GLB,, | Performed by: PHYSICIAN ASSISTANT

## 2020-10-30 PROCEDURE — 3079F PR MOST RECENT DIASTOLIC BLOOD PRESSURE 80-89 MM HG: ICD-10-PCS | Mod: CPTII,S$GLB,, | Performed by: FAMILY MEDICINE

## 2020-10-30 PROCEDURE — 99999 PR PBB SHADOW E&M-EST. PATIENT-LVL IV: ICD-10-PCS | Mod: PBBFAC,,, | Performed by: PHYSICIAN ASSISTANT

## 2020-10-30 PROCEDURE — 3074F SYST BP LT 130 MM HG: CPT | Mod: CPTII,S$GLB,, | Performed by: FAMILY MEDICINE

## 2020-10-30 PROCEDURE — 3008F BODY MASS INDEX DOCD: CPT | Mod: CPTII,S$GLB,, | Performed by: PHYSICIAN ASSISTANT

## 2020-10-30 PROCEDURE — 71046 XR CHEST PA AND LATERAL: ICD-10-PCS | Mod: 26,,, | Performed by: RADIOLOGY

## 2020-10-30 PROCEDURE — 99999 PR PBB SHADOW E&M-EST. PATIENT-LVL IV: CPT | Mod: PBBFAC,,, | Performed by: FAMILY MEDICINE

## 2020-10-30 PROCEDURE — 93000 EKG 12-LEAD: ICD-10-PCS | Mod: S$GLB,ICN,, | Performed by: INTERNAL MEDICINE

## 2020-10-30 PROCEDURE — 71046 X-RAY EXAM CHEST 2 VIEWS: CPT | Mod: TC,FY

## 2020-10-30 PROCEDURE — 99214 OFFICE O/P EST MOD 30 MIN: CPT | Mod: S$GLB,,, | Performed by: FAMILY MEDICINE

## 2020-10-30 PROCEDURE — 3074F PR MOST RECENT SYSTOLIC BLOOD PRESSURE < 130 MM HG: ICD-10-PCS | Mod: CPTII,S$GLB,, | Performed by: FAMILY MEDICINE

## 2020-10-30 PROCEDURE — 99999 PR PBB SHADOW E&M-EST. PATIENT-LVL IV: ICD-10-PCS | Mod: PBBFAC,,, | Performed by: FAMILY MEDICINE

## 2020-10-30 PROCEDURE — 93000 ELECTROCARDIOGRAM COMPLETE: CPT | Mod: S$GLB,ICN,, | Performed by: INTERNAL MEDICINE

## 2020-10-30 PROCEDURE — 3075F SYST BP GE 130 - 139MM HG: CPT | Mod: CPTII,S$GLB,, | Performed by: PHYSICIAN ASSISTANT

## 2020-10-30 PROCEDURE — 3008F PR BODY MASS INDEX (BMI) DOCUMENTED: ICD-10-PCS | Mod: CPTII,S$GLB,, | Performed by: FAMILY MEDICINE

## 2020-10-30 PROCEDURE — 99214 PR OFFICE/OUTPT VISIT, EST, LEVL IV, 30-39 MIN: ICD-10-PCS | Mod: S$GLB,,, | Performed by: FAMILY MEDICINE

## 2020-10-30 PROCEDURE — 3078F PR MOST RECENT DIASTOLIC BLOOD PRESSURE < 80 MM HG: ICD-10-PCS | Mod: CPTII,S$GLB,, | Performed by: PHYSICIAN ASSISTANT

## 2020-10-30 PROCEDURE — 3078F DIAST BP <80 MM HG: CPT | Mod: CPTII,S$GLB,, | Performed by: PHYSICIAN ASSISTANT

## 2020-10-30 RX ORDER — OMEPRAZOLE 20 MG/1
20 CAPSULE, DELAYED RELEASE ORAL 2 TIMES DAILY
Qty: 28 CAPSULE | Refills: 0 | Status: SHIPPED | OUTPATIENT
Start: 2020-10-30 | End: 2020-11-13

## 2020-10-30 RX ORDER — SUCRALFATE 1 G/1
1 TABLET ORAL
Qty: 60 TABLET | Refills: 0 | Status: SHIPPED | OUTPATIENT
Start: 2020-10-30 | End: 2020-12-23

## 2020-10-30 NOTE — PATIENT INSTRUCTIONS
1. Schedule scopes  2. Start Fiber supplement (instructions below).  3. Try IcyHot on your back to see if this helps.  4. Start omeprazole twice daily, 30-45 minutes prior to meals, on an empty stomach, with sip of water.  5. Can take Sucralfate (carafate) 10-15 minutes prior to eating to see if this helps with your swallowing issue.

## 2020-10-30 NOTE — PROGRESS NOTES
"Subjective:       Patient ID: Paloma Bang is a 55 y.o. female    Chief Complaint: Consult    HPI: Patient presents to establish care of lap band completed in [place] by Dr. Preston Alfaro in 2005. Pre op weight was 256 lbs. Lowest weight was 204 which she maintained for a little time.   Pt states that shr is having pain on her left side that has progressively gotten worse on the left side. Went to ER and had CT scan which showed some abnormal finding ( see below). Pt states that she "dumps" weekly,meaning that food wont digest but gets stuck. Mainly foods attempts to drink liquids to move food along but it makes it worse and causes her to induces vomiting. Does not matter what she eats, it occurs at least once a week ( induces vomiting) but the dysphagia is a daily thing. Last band adjustment was about two years ago when her band was emptied.     Pt endorses heartburn/GERD daily. Not currently taking anything for her heartburn.    Constipation and diarrhea fluctuating.        Pt goal: 168 lbs  IBW:   140 lbs                                                                   EXERCISE and VITAMINS: Reviewed in bariatric assessment.   MVC and vitamin for hair/nails/skin and vitamin D                                                                                 DIET: reg diet high in carbs low in protein                                                                                                                           Review of Systems   Constitutional: Negative for chills and fever.   HENT: Positive for trouble swallowing. Negative for tinnitus.    Eyes: Negative for visual disturbance.   Respiratory: Negative for cough and shortness of breath.    Cardiovascular: Negative for chest pain, palpitations and leg swelling.   Gastrointestinal: Positive for abdominal pain (LUQ), constipation, diarrhea, nausea (during globus sensation ) and vomiting.   Genitourinary: Positive for decreased urine volume. " Negative for dysuria and hematuria.   Musculoskeletal: Positive for back pain.   Skin: Negative for rash.   Neurological: Negative for light-headedness, numbness and headaches.   Psychiatric/Behavioral: Negative for dysphoric mood and suicidal ideas. The patient is not nervous/anxious.      Objective:      Physical Exam   Constitutional: She is oriented to person, place, and time and well-developed, well-nourished, and in no distress.   HENT:   Head: Normocephalic and atraumatic.   Eyes: Pupils are equal, round, and reactive to light. Conjunctivae and EOM are normal.   Neck: Normal range of motion. Neck supple. No thyromegaly present.   Cardiovascular: Normal rate, regular rhythm and normal heart sounds.   Pulmonary/Chest: Effort normal and breath sounds normal. No respiratory distress.   Abdominal: Soft. Bowel sounds are normal. There is abdominal tenderness (mild to palpation on LUQ worse with movement from laying to sitting).   Port noted    Neurological: She is alert and oriented to person, place, and time.   Skin: Skin is warm and dry. No rash noted.   Psychiatric: Mood, memory, affect and judgment normal.      Assessment:       - weight gain s/p gastric band   - LUQ pain   - Diarrhea/constipation   - GERD  Plan:        EKG CXR LABS UGI EGD Stress test   PCP/ Dietician/Psych clearance GI consult   - needs coloscopy   - Prilosec prescription

## 2020-10-30 NOTE — PROGRESS NOTES
[facility] Consultation Note    Reason for Consult:  The primary encounter diagnosis was Constipation, unspecified constipation type. Diagnoses of Abnormal CT scan, gastrointestinal tract, Dysphagia, unspecified type, Regurgitation of food, and Gastroesophageal reflux disease, unspecified whether esophagitis present were also pertinent to this visit.    PCP:   Jenn Mayorga   [address] [facility] / [place] [id number]    Referring MD:  Jenn Mayorga Md  [address] [facility]  [place] [id number]    HPI:  This is a 55 y.o. female here for evaluation of constipation and abdominal discomfort. She is an established patient, new to me. Seen in the past by Dr Buchanan for a different complaint.    Seen in the ED recently for Abd pain and left flank pain that has been going on for about 2-3 weeks. CT scan revealed moderate constipation throughout, primarily in the right colon. Also concerning for possible stricture on imaging. She is being worked up by Gen Surg for conversion from Lap Band to Gastric Sleeve. Seen by Nguyen MCCRARY today.    States she often has BSC type 1 stools depending on her diet. Used to suffer with constipation, only having about 1 Bm per week, but has since been using a drink, Fiberwise, nightly and this has helped.  Also reports trouble swallowing solids, feels as if they get stuck in the center of her chest, and this causes chest pressure/discomfort. Will try to wash down with liquid but this makes the feeling worse.     Reflux - yes, pyrosis, weekly;   Dysphagia - yes, almost every meal, almost always first meal of the day  Regurgitation - yes  Bowel Habits - Maybe once daily, sometimes twice; usually BSC type 2-3;  - if she's needing Tramadol or drinking protein shakes/eating protein bars, BSC type 1  Rectal Bleeding/Melena- none  NSAIDs - none  Fam Hx - No colon, Stomach, or esophageal cancers; , no IBD, no Celiac    ROS:  Constitutional: No fevers, chills,  No unintentional weight loss  ENT: No allergies  CV: No chest pain  Pulm: No cough, No shortness of breath  Ophtho: + vision changes, readers aren't helping, some blurry vision  GI: see HPI  Derm: No rash  Heme: No lymphadenopathy, + bruising, underneath right breast  MSK: + arthritis, Left knee pain  : No dysuria, No hematuria  Endo: No hot or cold intolerance  Neuro: No syncope, No seizure  Psych: No anxiety, No depression    Medical History:  has a past medical history of Allergy, Arthritis, Deep vein thrombosis, GERD (gastroesophageal reflux disease), Hypertension, Joint pain, Morbid obesity, Neuromuscular disorder, and Pulmonary embolism.    Surgical History:  has a past surgical history that includes  section; ankle surgery; Myomectomy; Laparoscopic gastric banding; Pilonidal cyst drainage; back surgery; Rotator cuff repair; Hysterectomy; Colonoscopy (N/A, 2018); Injection of facet joint (Left, 2018); Injection of joint (Left, 2019); and Injection of facet joint (Bilateral, 2019).    Family History: family history includes Diabetes in her brother, father, and mother; Hypertension in her brother, brother, father, and mother; No Known Problems in her brother and daughter; Pancreatitis in her brother..     Social History:  reports that she has never smoked. She has never used smokeless tobacco. She reports that she does not drink alcohol or use drugs.    Review of patient's allergies indicates:   Allergen Reactions    Latex, natural rubber      breakout       Current Outpatient Medications on File Prior to Visit   Medication Sig Dispense Refill    diclofenac sodium (VOLTAREN) 1 % Gel Apply 2 g topically 4 (four) times daily. 50 g 0    ergocalciferol (ERGOCALCIFEROL) 50,000 unit Cap TAKE 1 CAPSULE BY MOUTH ONE TIME PER WEEK 4 capsule 2    hydroCHLOROthiazide (HYDRODIURIL) 25 MG tablet TAKE 1 TABLET BY MOUTH EVERY DAY 90 tablet 1    losartan (COZAAR) 100 MG tablet TAKE 1 TABLET BY  "MOUTH ONCE DAILY 90 tablet 1    meloxicam (MOBIC) 15 MG tablet Take 1 tablet (15 mg total) by mouth once daily. 30 tablet 0    multivitamin with minerals tablet Take 1 tablet by mouth once daily.      omeprazole (PRILOSEC) 20 MG capsule Take 1 capsule (20 mg total) by mouth 2 (two) times daily. Take with amoxil and biaxin for 14 days 28 capsule 0    traMADol (ULTRAM) 50 mg tablet 1-2 tabs PO q8 PRN severe pain 60 tablet 0    [DISCONTINUED] ergocalciferol (ERGOCALCIFEROL) 50,000 unit Cap TAKE 1 CAPSULE BY MOUTH ONE TIME PER WEEK 4 capsule 2    [DISCONTINUED] fluticasone (FLONASE) 50 mcg/actuation nasal spray        No current facility-administered medications on file prior to visit.          Objective Findings:    Vital Signs Reviewed:  /85   Pulse 85   Ht 5' 3" (1.6 m)   Wt 122.2 kg (269 lb 6.4 oz)   LMP  (LMP Unknown)   BMI 47.72 kg/m²   Body mass index is 47.72 kg/m².    Physical Exam:  General Appearance: Well appearing in no acute distress  Head:   Normocephalic, without obvious abnormality  Eyes:    No scleral icterus  ENT: Neck supple  Lungs: CTA bilaterally in anterior and posterior fields, no wheezes, no crackles.  Heart:  Regular rate and rhythm, S1, S2 normal, no murmurs heard  Abdomen: Soft, non tender, non distended with positive bowel sounds in all four quadrants. No hepatosplenomegaly, ascites, or mass  Extremities: No edema  MSK: +TTP lower thoracic L paraspinal muscle  Skin: No rash  Neurologic: AAO x 3      Labs Reviewed:  Lab Results   Component Value Date    WBC 9.11 10/30/2020    HGB 14.6 10/30/2020    HCT 44.4 10/30/2020     10/30/2020    CHOL 179 10/30/2020    TRIG 68 10/30/2020    HDL 69 10/30/2020    ALT 21 10/30/2020    AST 23 10/30/2020     10/30/2020    K 3.9 10/30/2020     10/30/2020    CREATININE 0.9 10/30/2020    BUN 17 10/30/2020    CO2 28 10/30/2020    HGBA1C 6.6 (H) 10/30/2020       Lab Results   Component Value Date    TIBC 408 10/30/2020    " TRANSFERRIN 276 10/30/2020          Imaging reviewed:  10/13/2020 CT ABd Pelvis with contrast for LLQ Abd pain, r/o diverticulitis  1. Moderate to large amount of stool within the right colon, may reflect developing constipation, correlation recommended.  Please note, there is transition to decompressed descending colon at the level of the distal transverse colon, rather abrupt, however no significant wall thickening in the location.  Follow-up colonoscopy is recommended if not previously performed to exclude malignancy or stricture in the region.  2. The urinary bladder is decompressed however there may be residual wall thickening.  Correlation with urinalysis is recommended to exclude changes of cystitis.  3. Findings suggesting hepatic steatosis noting hepatomegaly.  Correlation with LFTs recommended.  4. Lap band device without obvious complication.  5. Few scattered colonic diverticula without findings to suggest diverticulitis as clinically questioned.    7/9/2020 US Abd for abdominal pain  1. No acute sonographic abnormality identified in the upper abdomen. Please note the pancreas and mid to distal aorta were not examined due to obscuration from overlying bowel gas.  2. Mild hepatomegaly and probable hepatic steatosis    Endoscopy reviewed:  6/13/2018 Screening colonoscopy with Dr Posada  1. Good prep, to cecum  2. One 2 mm polyp in the rectum, removed with a jumbo cold forceps. Resected and retrieved.    - sessile rectal polyp  3. The examination was otherwise normal.  Per guidelines, repeat for 10 years    2/23/2015 EGD w/ Dr Buchanan for LUQ abd pain  1. Normal esophagus.   2. An adjustable gastric banding was found, characterized by healthy appearing mucosa.   3. Post-surgical deformity in the gastric fundus.   4. Erythematous mucosa in the antrum. Biopsied.    - negative for H pylori; mild chronic gastritis   - negative for intestinal metaplasia, dysplasia, or malignancy  5. Normal examined duodenum    55  y.o. female here for evaluation of:    Assessment:  1. Constipation, unspecified constipation type    2. Abnormal CT scan, gastrointestinal tract    3. Dysphagia, unspecified type    4. Regurgitation of food    5. Gastroesophageal reflux disease, unspecified whether esophagitis present         Worsening reflux and dysphagia symptoms. Was rx'ed Omeprazole by Genrg today - she will start this twice daily prior to meals. Recommend proceeding with EGD as ordered, however, due to inconclusive findings on CT scan, will proceed with colonoscopy as well. I did discuss this with Dr Vaz and he agrees that it is necessary for colonoscopy regarding CT findings. Will trial fiber supplement for bowel habits. Upon discussion, it appears she does not have high fiber in her diet. I suspect that her flank pain may be MSK in nature, try IcyHot on this area - f/u with PCP for further evaluation. Will give rx for sucralfate to see if this helps with chest discomfort and reflux.    Recommendations:  1. EGD/Colon  2. Begin Omeprazole as rx'ed by Gen Surg  3. Fiber supplement (instructions in AVS)  4. Sucralfate PRN  5. Try IcyHot to left back/flank    F/U pending scopes      Order summary:  Orders Placed This Encounter    sucralfate (CARAFATE) 1 gram tablet    Case request GI: EGD (ESOPHAGOGASTRODUODENOSCOPY), COLONOSCOPY         Thank you so much for allowing me to participate in the care of GWYN Monroe-C

## 2020-10-30 NOTE — LETTER
November 1, 2020      Jenn Mayorga MD  [address]  [place] [address]           [facility] [address]  [address]  [place] [address]  Phone: [phone]  Fax: [phone]          Patient: Paloma Bang   MR Number: [id number]   YOB: 1965   Date of Visit: 10/30/2020       Dear Dr. Jenn Mayorga:    Thank you for referring Paloma Bang to me for evaluation. Attached you will find relevant portions of my assessment and plan of care.    If you have questions, please do not hesitate to call me. I look forward to following Paloma Bang along with you.    Sincerely,    Joslyn Vences, DNP    Enclosure  CC:  No Recipients    If you would like to receive this communication electronically, please contact [email or web address] or [phone] to request more information on [employer] Link access.    For providers and/or their staff who would like to refer a patient to [facility], please contact us through our one-stop-shop provider referral line, [facility], at [phone].    If you feel you have received this communication in error or would no longer like to receive these types of communications, please e-mail [email or web address]

## 2020-10-30 NOTE — TELEPHONE ENCOUNTER
----- Message from Joslyn Vences DNP sent at 10/30/2020  2:56 PM CDT -----  Regarding: Eye Referral  Noah,  I saw Ms Bang in GI clinic today. During review of systems, she did mentioned worsening vision and that she would appreciate a referral for an eye exam. I do feel this would be more appropriate coming from Dr Mayorga rather than myself.    Thank you and please let me know if there is anything else I can do to help with this matter,  Joslyn

## 2020-10-30 NOTE — PATIENT INSTRUCTIONS
Prior to surgery you will need to complete:  - Dietitian consult and follow up appointments as needed  - PCP clearance  - GI consult ( needs colonscopy)  - Labs  - Chest X-ray  - EKG  - Psychological evaluation, Please call psychiatry [phone] to schedule  - UGI   - EGD   - Stress test     In preparation for bariatric surgery, please complete the following:   · Discuss your current medications with your primary care provider, remember your medications will need to be crushed, chewable, or in liquid form for the first 3-6 months after a gastric bypass or sleeve.  For a gastric band, your medications will need to be crushed indefinitely.    · If you take a blood thinner such as: Coumadin (warfarin), Pradaxa (dabigatran), or Plavix (clopidogrel), you will need to speak with your prescribing provider on how or if this medication can be stopped before surgery.   · If you take a medication for depression or anxiety, you will need to begin crushing or opening the capsule 1-3 months prior to surgery.  Remember to discuss this with the psychologist or psychiatrist that you see.   · If you take medication for arthritis on a daily basis that is considered a non-steroidal anti-inflammatory (NSAID), please discuss with your prescribing physician an alternative medication.  After having gastric bypass or gastric sleeve, this group of medications is not appropriate to take due to increased risk of bleeding stomach ulcers.      DEFINITIONS  Appointments: Pre-scheduled meetings or consultations with any physician, advanced practice provider, dietitian, or psychologist, and labs, imaging studies, sleep studies, etc.   Late cancellation: Cancelling an appointment 24-48 hours prior to scheduled time.  No-Show appointment:  is when    You do NOT arrive to your appointment at the time its scheduled.   You call to cancel or cancel via [facility] less than 24 hours in advance of your scheduled appointment   You show up 15 minutes  AFTER your scheduled appointment time without any notification of being late.     POLICY  1. You are allowed up to 3 cancellations for appointments.    After 3 cancellations your case will be placed on hold for 2 months and after that time you can resume the program.   2. You are allowed only 1 no-show for an appointment.    You will be re-scheduled one time and if there is a 2nd no-show at any point, your case will be placed on hold for 3 months.  After 3 months you can resume the program.     3. Upon resuming the program after being placed on hold for either above mentioned reasons, if you have a late cancel or no show for any appointment, the bariatric team will review if youre an appropriate candidate for surgery at the monthly meeting.

## 2020-11-04 ENCOUNTER — HOSPITAL ENCOUNTER (OUTPATIENT)
Dept: RADIOLOGY | Facility: HOSPITAL | Age: 55
Discharge: HOME | End: 2020-11-04
Attending: PHYSICIAN ASSISTANT
Payer: COMMERCIAL

## 2020-11-04 DIAGNOSIS — E66.01 MORBID OBESITY WITH BODY MASS INDEX OF 45.0-49.9 IN ADULT: ICD-10-CM

## 2020-11-04 DIAGNOSIS — K21.9 GASTROESOPHAGEAL REFLUX DISEASE, UNSPECIFIED WHETHER ESOPHAGITIS PRESENT: ICD-10-CM

## 2020-11-04 DIAGNOSIS — Z98.84 GASTRIC BANDING STATUS: ICD-10-CM

## 2020-11-04 PROCEDURE — 74240 X-RAY XM UPR GI TRC 1CNTRST: CPT | Mod: TC,FY

## 2020-11-04 PROCEDURE — 74240 FL UPPER GI: ICD-10-PCS | Mod: 26,,, | Performed by: RADIOLOGY

## 2020-11-04 PROCEDURE — 74240 X-RAY XM UPR GI TRC 1CNTRST: CPT | Mod: 26,,, | Performed by: RADIOLOGY

## 2020-11-04 PROCEDURE — A9698 NON-RAD CONTRAST MATERIALNOC: HCPCS | Performed by: PHYSICIAN ASSISTANT

## 2020-11-04 PROCEDURE — 25500020 PHARM REV CODE 255: Performed by: PHYSICIAN ASSISTANT

## 2020-11-04 RX ADMIN — BARIUM SULFATE 140 ML: 0.6 SUSPENSION ORAL at 08:11

## 2020-11-05 DIAGNOSIS — A04.8 POSITIVE H. PYLORI TEST: Primary | ICD-10-CM

## 2020-11-05 RX ORDER — AMOXICILLIN 500 MG/1
1000 TABLET, FILM COATED ORAL EVERY 12 HOURS
Qty: 56 TABLET | Refills: 0 | Status: SHIPPED | OUTPATIENT
Start: 2020-11-05 | End: 2020-11-19

## 2020-11-05 RX ORDER — METRONIDAZOLE 500 MG/1
500 TABLET ORAL EVERY 12 HOURS
Qty: 28 TABLET | Refills: 0 | Status: SHIPPED | OUTPATIENT
Start: 2020-11-05 | End: 2020-11-19

## 2020-11-05 RX ORDER — OMEPRAZOLE 20 MG/1
20 CAPSULE, DELAYED RELEASE ORAL 2 TIMES DAILY
Qty: 28 CAPSULE | Refills: 0 | Status: SHIPPED | OUTPATIENT
Start: 2020-11-05 | End: 2021-03-24

## 2020-11-05 RX ORDER — CLARITHROMYCIN 500 MG/1
500 TABLET, FILM COATED ORAL EVERY 12 HOURS
Qty: 28 TABLET | Refills: 0 | Status: SHIPPED | OUTPATIENT
Start: 2020-11-05 | End: 2020-11-19

## 2020-11-10 ENCOUNTER — PATIENT OUTREACH (OUTPATIENT)
Dept: ADMINISTRATIVE | Facility: OTHER | Age: 55
End: 2020-11-10

## 2020-11-12 ENCOUNTER — OFFICE VISIT (OUTPATIENT)
Dept: OPTOMETRY | Facility: CLINIC | Age: 55
End: 2020-11-12
Payer: COMMERCIAL

## 2020-11-12 DIAGNOSIS — Z04.9 DISEASE RULED OUT AFTER EXAMINATION: ICD-10-CM

## 2020-11-12 DIAGNOSIS — H52.03 HYPEROPIA, BILATERAL: Primary | ICD-10-CM

## 2020-11-12 DIAGNOSIS — H25.13 NS (NUCLEAR SCLEROSIS), BILATERAL: ICD-10-CM

## 2020-11-12 DIAGNOSIS — Z46.0 FITTING AND ADJUSTMENT OF SPECTACLES AND CONTACT LENSES: Primary | ICD-10-CM

## 2020-11-12 DIAGNOSIS — H53.8 BLURRED VISION: ICD-10-CM

## 2020-11-12 DIAGNOSIS — H52.4 PRESBYOPIA: ICD-10-CM

## 2020-11-12 DIAGNOSIS — H04.123 DRY EYE SYNDROME, BILATERAL: ICD-10-CM

## 2020-11-12 PROCEDURE — 99999 PR PBB SHADOW E&M-EST. PATIENT-LVL I: ICD-10-PCS | Mod: PBBFAC,,, | Performed by: OPTOMETRIST

## 2020-11-12 PROCEDURE — 99999 PR PBB SHADOW E&M-EST. PATIENT-LVL III: ICD-10-PCS | Mod: PBBFAC,,, | Performed by: OPTOMETRIST

## 2020-11-12 PROCEDURE — 92004 COMPRE OPH EXAM NEW PT 1/>: CPT | Mod: S$GLB,,, | Performed by: OPTOMETRIST

## 2020-11-12 PROCEDURE — 99999 PR PBB SHADOW E&M-EST. PATIENT-LVL I: CPT | Mod: PBBFAC,,, | Performed by: OPTOMETRIST

## 2020-11-12 PROCEDURE — 99999 PR PBB SHADOW E&M-EST. PATIENT-LVL III: CPT | Mod: PBBFAC,,, | Performed by: OPTOMETRIST

## 2020-11-12 PROCEDURE — 92015 DETERMINE REFRACTIVE STATE: CPT | Mod: S$GLB,,, | Performed by: OPTOMETRIST

## 2020-11-12 PROCEDURE — 92310 CONTACT LENS FITTING OU: CPT | Mod: CSM,,, | Performed by: OPTOMETRIST

## 2020-11-12 PROCEDURE — 92015 PR REFRACTION: ICD-10-PCS | Mod: S$GLB,,, | Performed by: OPTOMETRIST

## 2020-11-12 PROCEDURE — 92310 PR CONTACT LENS FITTING (NO CHANGE): ICD-10-PCS | Mod: CSM,,, | Performed by: OPTOMETRIST

## 2020-11-12 PROCEDURE — 92004 PR EYE EXAM, NEW PATIENT,COMPREHESV: ICD-10-PCS | Mod: S$GLB,,, | Performed by: OPTOMETRIST

## 2020-11-12 NOTE — PROGRESS NOTES
HPI     Pt here for annual visit and cl fit   CATALINO about 2 yrs ago   Unsure what cl she is wearing  Has hard time adjusting to BF specs  Wearing readers over cls  Feels like she tends to overwear her cls  Uses eye gtts prn for dry eye  Patient denies diplopia, headaches, flashes/floaters, pain, and   itching/burning/tearing.        Last edited by Shy Valenzuela on 11/12/2020  3:03 PM. (History)            Assessment /Plan     For exam results, see Encounter Report.    Hyperopia, bilateral  Presbyopia    NS (nuclear sclerosis), bilateral   Mild, monitor    Disease ruled out after examination   Good overall ocular health    Dry eye syndrome, bilateral   Continue with art tears PRN   Switch to dailies   Dispensed trials of dailies total one MF OU   Remove nightly, replace daily   oK to order if happy with vision and comfort OU    RTC 1 year

## 2020-11-12 NOTE — LETTER
November 12, 2020      Jenn Mayorga MD  [address]  [place] [address]           [facility] - Optometry 1st Fl  [address]  [facility] [address]  Phone: [phone]  Fax: [phone]          Patient: Paloma Bang   MR Number: [id number]   YOB: 1965   Date of Visit: 11/12/2020       Dear Dr. Jenn Mayorga:    Thank you for referring Paloma Bang to me for evaluation. Attached you will find relevant portions of my assessment and plan of care.    If you have questions, please do not hesitate to call me. I look forward to following Paloma Bang along with you.    Sincerely,    Nilam Cunningham, OD    Enclosure  CC:  No Recipients    If you would like to receive this communication electronically, please contact [email or web address] or [phone] to request more information on [facility] access.    For providers and/or their staff who would like to refer a patient to [facility], please contact us through our one-stop-shop provider referral line, [facility], at [phone].    If you feel you have received this communication in error or would no longer like to receive these types of communications, please e-mail [email or web address]

## 2020-11-19 ENCOUNTER — PATIENT MESSAGE (OUTPATIENT)
Dept: BARIATRICS | Facility: CLINIC | Age: 55
End: 2020-11-19

## 2020-11-19 NOTE — TELEPHONE ENCOUNTER
Patient has been called and appointment has been made to get ECHO STRESS TEST done.    Wyatt Pantoja  Clinical Medical Assistant  [facility]  Bariatric Surgery

## 2020-11-22 ENCOUNTER — PATIENT MESSAGE (OUTPATIENT)
Dept: OPTOMETRY | Facility: CLINIC | Age: 55
End: 2020-11-22

## 2020-11-24 ENCOUNTER — HOSPITAL ENCOUNTER (OUTPATIENT)
Dept: CARDIOLOGY | Facility: HOSPITAL | Age: 55
Discharge: HOME | End: 2020-11-24
Attending: PHYSICIAN ASSISTANT
Payer: COMMERCIAL

## 2020-11-24 ENCOUNTER — OFFICE VISIT (OUTPATIENT)
Dept: OPTOMETRY | Facility: CLINIC | Age: 55
End: 2020-11-24
Payer: COMMERCIAL

## 2020-11-24 VITALS — BODY MASS INDEX: 47.84 KG/M2 | HEIGHT: 63 IN | WEIGHT: 270 LBS

## 2020-11-24 DIAGNOSIS — E66.01 MORBID OBESITY WITH BODY MASS INDEX OF 45.0-49.9 IN ADULT: ICD-10-CM

## 2020-11-24 DIAGNOSIS — H16.202 KERATOCONJUNCTIVITIS OF LEFT EYE: Primary | ICD-10-CM

## 2020-11-24 LAB
ASCENDING AORTA: 2.76 CM
BSA FOR ECHO PROCEDURE: 2.33 M2
CV ECHO LV RWT: 0.45 CM
CV STRESS BASE HR: 80 BPM
DIASTOLIC BLOOD PRESSURE: 80 MMHG
DOP CALC LVOT AREA: 3.5 CM2
DOP CALC LVOT DIAMETER: 2.12 CM
DOP CALC LVOT PEAK VEL: 0.93 M/S
DOP CALC LVOT STROKE VOLUME: 68.97 CM3
DOP CALCLVOT PEAK VEL VTI: 19.55 CM
E WAVE DECELERATION TIME: 143.97 MSEC
E/A RATIO: 1.04
E/E' RATIO: 9.6 M/S
ECHO LV POSTERIOR WALL: 0.95 CM (ref 0.6–1.1)
FRACTIONAL SHORTENING: 43 % (ref 28–44)
INTERVENTRICULAR SEPTUM: 0.9 CM (ref 0.6–1.1)
IVRT: 88.49 MSEC
LA MAJOR: 4.94 CM
LA MINOR: 4.82 CM
LA WIDTH: 3.12 CM
LEFT ATRIUM SIZE: 3.4 CM
LEFT ATRIUM VOLUME INDEX: 20 ML/M2
LEFT ATRIUM VOLUME: 44 CM3
LEFT INTERNAL DIMENSION IN SYSTOLE: 2.41 CM (ref 2.1–4)
LEFT VENTRICLE DIASTOLIC VOLUME INDEX: 36.27 ML/M2
LEFT VENTRICLE DIASTOLIC VOLUME: 79.67 ML
LEFT VENTRICLE MASS INDEX: 57 G/M2
LEFT VENTRICLE SYSTOLIC VOLUME INDEX: 9.3 ML/M2
LEFT VENTRICLE SYSTOLIC VOLUME: 20.39 ML
LEFT VENTRICULAR INTERNAL DIMENSION IN DIASTOLE: 4.22 CM (ref 3.5–6)
LEFT VENTRICULAR MASS: 124.15 G
LV LATERAL E/E' RATIO: 9 M/S
LV SEPTAL E/E' RATIO: 10.29 M/S
MV PEAK A VEL: 0.69 M/S
MV PEAK E VEL: 0.72 M/S
MV STENOSIS PRESSURE HALF TIME: 41.75 MS
MV VALVE AREA P 1/2 METHOD: 5.27 CM2
OHS CV CPX 1 MINUTE RECOVERY HEART RATE: 142 BPM
OHS CV CPX 85 PERCENT MAX PREDICTED HEART RATE MALE: 135
OHS CV CPX ESTIMATED METS: 9
OHS CV CPX MAX PREDICTED HEART RATE: 158
OHS CV CPX PATIENT IS FEMALE: 1
OHS CV CPX PATIENT IS MALE: 0
OHS CV CPX PEAK DIASTOLIC BLOOD PRESSURE: 72 MMHG
OHS CV CPX PEAK HEAR RATE: 166 BPM
OHS CV CPX PEAK RATE PRESSURE PRODUCT: NORMAL
OHS CV CPX PEAK SYSTOLIC BLOOD PRESSURE: 173 MMHG
OHS CV CPX PERCENT MAX PREDICTED HEART RATE ACHIEVED: 105
OHS CV CPX RATE PRESSURE PRODUCT PRESENTING: 9840
PISA TR MAX VEL: 2.11 M/S
PULM VEIN S/D RATIO: 2.13
PV PEAK D VEL: 0.31 M/S
PV PEAK S VEL: 0.66 M/S
RA MAJOR: 4.85 CM
RA PRESSURE: 3 MMHG
RA WIDTH: 3.09 CM
RIGHT VENTRICULAR END-DIASTOLIC DIMENSION: 3.5 CM
RV TISSUE DOPPLER FREE WALL SYSTOLIC VELOCITY 1 (APICAL 4 CHAMBER VIEW): 10.22 CM/S
SINUS: 2.81 CM
STJ: 2.43 CM
STRESS ECHO POST EXERCISE DUR MIN: 5 MINUTES
STRESS ECHO POST EXERCISE DUR SEC: 45 SECONDS
STRESS ST DEPRESSION: 1 MM
SYSTOLIC BLOOD PRESSURE: 123 MMHG
TDI LATERAL: 0.08 M/S
TDI SEPTAL: 0.07 M/S
TDI: 0.08 M/S
TR MAX PG: 18 MMHG
TRICUSPID ANNULAR PLANE SYSTOLIC EXCURSION: 2.05 CM
TV REST PULMONARY ARTERY PRESSURE: 21 MMHG

## 2020-11-24 PROCEDURE — 99999 PR PBB SHADOW E&M-EST. PATIENT-LVL I: CPT | Mod: PBBFAC,,, | Performed by: OPTOMETRIST

## 2020-11-24 PROCEDURE — 93351 STRESS TTE COMPLETE: CPT

## 2020-11-24 PROCEDURE — 93352 STRESS ECHO (CUPID ONLY): ICD-10-PCS | Mod: ,,, | Performed by: INTERNAL MEDICINE

## 2020-11-24 PROCEDURE — 93351 STRESS TTE COMPLETE: CPT | Mod: 26,,, | Performed by: INTERNAL MEDICINE

## 2020-11-24 PROCEDURE — 92012 PR EYE EXAM, EST PATIENT,INTERMED: ICD-10-PCS | Mod: S$GLB,,, | Performed by: OPTOMETRIST

## 2020-11-24 PROCEDURE — 93352 ADMIN ECG CONTRAST AGENT: CPT | Mod: ,,, | Performed by: INTERNAL MEDICINE

## 2020-11-24 PROCEDURE — 92012 INTRM OPH EXAM EST PATIENT: CPT | Mod: S$GLB,,, | Performed by: OPTOMETRIST

## 2020-11-24 PROCEDURE — 63600175 PHARM REV CODE 636 W HCPCS: Performed by: INTERNAL MEDICINE

## 2020-11-24 PROCEDURE — 99999 PR PBB SHADOW E&M-EST. PATIENT-LVL I: ICD-10-PCS | Mod: PBBFAC,,, | Performed by: OPTOMETRIST

## 2020-11-24 PROCEDURE — 93351 STRESS ECHO (CUPID ONLY): ICD-10-PCS | Mod: 26,,, | Performed by: INTERNAL MEDICINE

## 2020-11-24 RX ORDER — FLUOROMETHOLONE 1 MG/ML
1 SUSPENSION/ DROPS OPHTHALMIC 4 TIMES DAILY
Qty: 5 ML | Refills: 0 | Status: SHIPPED | OUTPATIENT
Start: 2020-11-24 | End: 2020-11-28

## 2020-11-24 RX ADMIN — HUMAN ALBUMIN MICROSPHERES AND PERFLUTREN 0.66 MG: 10; .22 INJECTION, SOLUTION INTRAVENOUS at 08:11

## 2020-11-24 NOTE — PROGRESS NOTES
Procedure explained. 22 g sl started in left forearm for optison use. optison given ivp via sl pre and apost ex2d. Denies transfusion reaction. Tolerated well. Sl d/yazan after. Pressure applied.

## 2020-11-24 NOTE — PROGRESS NOTES
HPI     Pt here for urgent visit   Pt started having irritation OS and very red x Sunday  Feels some relief today but still a little itchy and irritated  Some tearing and discharge yesterday  Little blurry va   No photophobia   Pt is cl wearer (was wearing on sat and sun)   Pt does not use any eye drops.      Last edited by Shy Valenzuela on 11/24/2020  9:29 AM. (History)        ROS     Negative for: Heme/Lymph    Last edited by Nilam Cunningham, OD on 11/24/2020  9:52 AM. (History)        Assessment /Plan     For exam results, see Encounter Report.    Keratoconjunctivitis of left eye  -     fluorometholone 0.1% (FML) 0.1 % DrpS; Place 1 drop into both eyes 4 (four) times daily. for 4 days  Dispense: 5 mL; Refill: 0   Refresh art tears Q1-2 hours   CL holiday until resolved    RTC if no improvement

## 2020-11-25 ENCOUNTER — PATIENT MESSAGE (OUTPATIENT)
Dept: GASTROENTEROLOGY | Facility: CLINIC | Age: 55
End: 2020-11-25

## 2020-11-25 ENCOUNTER — TELEPHONE (OUTPATIENT)
Dept: BARIATRICS | Facility: CLINIC | Age: 55
End: 2020-11-25

## 2020-11-25 NOTE — TELEPHONE ENCOUNTER
Phoned patient to review chart and the need for testing and procedures.  The EGD case was placed by LISSETH, but the LISSETH wanted the patient to see the GI to get the C-scope scheduled.  I will send messages to the GI NP, Joslyn Vences, and also to the endo schedulers to help facilitate getting these procedures scheduled.

## 2020-11-25 NOTE — TELEPHONE ENCOUNTER
----- Message from Angel Smith sent at 11/25/2020 12:56 PM CST -----  Patient called to speak w/ someone regarding having her EGD and coloscopy scheduled, requesting callback [phone]

## 2020-12-01 ENCOUNTER — PATIENT MESSAGE (OUTPATIENT)
Dept: INTERNAL MEDICINE | Facility: CLINIC | Age: 55
End: 2020-12-01

## 2020-12-01 ENCOUNTER — TELEPHONE (OUTPATIENT)
Dept: ENDOSCOPY | Facility: HOSPITAL | Age: 55
End: 2020-12-01

## 2020-12-01 DIAGNOSIS — Z12.11 SPECIAL SCREENING FOR MALIGNANT NEOPLASMS, COLON: Primary | ICD-10-CM

## 2020-12-01 RX ORDER — POLYETHYLENE GLYCOL 3350, SODIUM SULFATE ANHYDROUS, SODIUM BICARBONATE, SODIUM CHLORIDE, POTASSIUM CHLORIDE 236; 22.74; 6.74; 5.86; 2.97 G/4L; G/4L; G/4L; G/4L; G/4L
4 POWDER, FOR SOLUTION ORAL ONCE
Qty: 4000 ML | Refills: 0 | Status: SHIPPED | OUTPATIENT
Start: 2020-12-01 | End: 2020-12-01

## 2020-12-02 NOTE — TELEPHONE ENCOUNTER
Pt has lump on top of foot filled with fluid that's getting bigger asking if it could be drained.    LOV:10/06/2020  RTC: not on file

## 2020-12-08 ENCOUNTER — OFFICE VISIT (OUTPATIENT)
Dept: PSYCHIATRY | Facility: CLINIC | Age: 55
End: 2020-12-08
Payer: COMMERCIAL

## 2020-12-08 DIAGNOSIS — Z01.818 PREOP EXAMINATION: Primary | ICD-10-CM

## 2020-12-08 DIAGNOSIS — I10 HYPERTENSION, UNSPECIFIED TYPE: ICD-10-CM

## 2020-12-08 DIAGNOSIS — K21.9 GASTROESOPHAGEAL REFLUX DISEASE, UNSPECIFIED WHETHER ESOPHAGITIS PRESENT: ICD-10-CM

## 2020-12-08 DIAGNOSIS — E66.01 MORBID OBESITY DUE TO EXCESS CALORIES: ICD-10-CM

## 2020-12-08 PROCEDURE — 90791 PR PSYCHIATRIC DIAGNOSTIC EVALUATION: ICD-10-PCS | Mod: 95,,, | Performed by: PSYCHOLOGIST

## 2020-12-08 PROCEDURE — 90791 PSYCH DIAGNOSTIC EVALUATION: CPT | Mod: 95,,, | Performed by: PSYCHOLOGIST
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
## 2020-12-09 ENCOUNTER — PATIENT MESSAGE (OUTPATIENT)
Dept: BARIATRICS | Facility: CLINIC | Age: 55
End: 2020-12-09

## 2020-12-09 ENCOUNTER — TELEPHONE (OUTPATIENT)
Dept: BARIATRICS | Facility: CLINIC | Age: 55
End: 2020-12-09

## 2020-12-14 ENCOUNTER — ANESTHESIA EVENT (OUTPATIENT)
Dept: ENDOSCOPY | Facility: HOSPITAL | Age: 55
End: 2020-12-14
Payer: COMMERCIAL

## 2020-12-14 ENCOUNTER — ANESTHESIA (OUTPATIENT)
Dept: ENDOSCOPY | Facility: HOSPITAL | Age: 55
End: 2020-12-14
Payer: COMMERCIAL

## 2020-12-14 ENCOUNTER — HOSPITAL ENCOUNTER (OUTPATIENT)
Facility: HOSPITAL | Age: 55
Discharge: HOME | End: 2020-12-14
Attending: INTERNAL MEDICINE | Admitting: INTERNAL MEDICINE
Payer: COMMERCIAL

## 2020-12-14 VITALS
DIASTOLIC BLOOD PRESSURE: 77 MMHG | SYSTOLIC BLOOD PRESSURE: 132 MMHG | RESPIRATION RATE: 14 BRPM | OXYGEN SATURATION: 98 % | TEMPERATURE: 98 F | HEART RATE: 78 BPM

## 2020-12-14 DIAGNOSIS — R93.3 ABNORMAL FINDING ON GI TRACT IMAGING: ICD-10-CM

## 2020-12-14 PROCEDURE — E9220 PRA ENDO ANESTHESIA: ICD-10-PCS | Mod: 33,,, | Performed by: NURSE ANESTHETIST, CERTIFIED REGISTERED

## 2020-12-14 PROCEDURE — 43235 PR EGD, FLEX, DIAGNOSTIC: ICD-10-PCS | Mod: 51,,, | Performed by: INTERNAL MEDICINE

## 2020-12-14 PROCEDURE — 63600175 PHARM REV CODE 636 W HCPCS: Performed by: NURSE ANESTHETIST, CERTIFIED REGISTERED

## 2020-12-14 PROCEDURE — 88305 TISSUE EXAM BY PATHOLOGIST: CPT | Mod: 26,,, | Performed by: STUDENT IN AN ORGANIZED HEALTH CARE EDUCATION/TRAINING PROGRAM

## 2020-12-14 PROCEDURE — 37000009 HC ANESTHESIA EA ADD 15 MINS: Performed by: INTERNAL MEDICINE

## 2020-12-14 PROCEDURE — 45385 PR COLONOSCOPY,REMV LESN,SNARE: ICD-10-PCS | Mod: 33,,, | Performed by: INTERNAL MEDICINE

## 2020-12-14 PROCEDURE — E9220 PRA ENDO ANESTHESIA: HCPCS | Mod: 33,,, | Performed by: NURSE ANESTHETIST, CERTIFIED REGISTERED

## 2020-12-14 PROCEDURE — 88305 TISSUE EXAM BY PATHOLOGIST: CPT | Performed by: STUDENT IN AN ORGANIZED HEALTH CARE EDUCATION/TRAINING PROGRAM

## 2020-12-14 PROCEDURE — 27201089 HC SNARE, DISP (ANY): Performed by: INTERNAL MEDICINE

## 2020-12-14 PROCEDURE — 25000003 PHARM REV CODE 250: Performed by: INTERNAL MEDICINE

## 2020-12-14 PROCEDURE — 45385 COLONOSCOPY W/LESION REMOVAL: CPT | Performed by: INTERNAL MEDICINE

## 2020-12-14 PROCEDURE — 88305 TISSUE EXAM BY PATHOLOGIST: ICD-10-PCS | Mod: 26,,, | Performed by: STUDENT IN AN ORGANIZED HEALTH CARE EDUCATION/TRAINING PROGRAM

## 2020-12-14 PROCEDURE — 37000008 HC ANESTHESIA 1ST 15 MINUTES: Performed by: INTERNAL MEDICINE

## 2020-12-14 PROCEDURE — 45385 COLONOSCOPY W/LESION REMOVAL: CPT | Mod: 33,,, | Performed by: INTERNAL MEDICINE

## 2020-12-14 PROCEDURE — 43235 EGD DIAGNOSTIC BRUSH WASH: CPT | Mod: 51,,, | Performed by: INTERNAL MEDICINE

## 2020-12-14 PROCEDURE — 43235 EGD DIAGNOSTIC BRUSH WASH: CPT | Performed by: INTERNAL MEDICINE

## 2020-12-14 RX ORDER — PROPOFOL 10 MG/ML
VIAL (ML) INTRAVENOUS
Status: DISCONTINUED | OUTPATIENT
Start: 2020-12-14 | End: 2020-12-14

## 2020-12-14 RX ORDER — SODIUM CHLORIDE 9 MG/ML
INJECTION, SOLUTION INTRAVENOUS CONTINUOUS
Status: DISCONTINUED | OUTPATIENT
Start: 2020-12-14 | End: 2020-12-14 | Stop reason: HOSPADM

## 2020-12-14 RX ORDER — LIDOCAINE HCL/PF 100 MG/5ML
SYRINGE (ML) INTRAVENOUS
Status: DISCONTINUED | OUTPATIENT
Start: 2020-12-14 | End: 2020-12-14

## 2020-12-14 RX ADMIN — PROPOFOL 50 MG: 10 INJECTION, EMULSION INTRAVENOUS at 01:12

## 2020-12-14 RX ADMIN — Medication 100 MG: at 01:12

## 2020-12-14 RX ADMIN — PROPOFOL 100 MG: 10 INJECTION, EMULSION INTRAVENOUS at 01:12

## 2020-12-14 RX ADMIN — SODIUM CHLORIDE 10 ML/HR: 0.9 INJECTION, SOLUTION INTRAVENOUS at 01:12

## 2020-12-14 NOTE — H&P
Short Stay Endoscopy History and Physical    PCP - Jenn Mayorga MD    Procedure - Colonoscopy  ASA - per anesthesia  Mallampati - per anesthesia  History of Anesthesia problems - no  Family history Anesthesia problems - no   Plan of anesthesia - General    HPI:  This is a 55 y.o. female here for evaluation of :abnormal CT scan  History of gastric band now with reflux and dysphagia    ROS:  Constitutional: No fevers, chills, No weight loss  CV: No chest pain  Pulm: No cough, No shortness of breath  GI: see HPI  Derm: No rash    Medical History:  has a past medical history of Allergy, Arthritis, Deep vein thrombosis, GERD (gastroesophageal reflux disease), Hypertension, Joint pain, Morbid obesity, Neuromuscular disorder, and Pulmonary embolism.    Surgical History:  has a past surgical history that includes  section; ankle surgery; Myomectomy; Laparoscopic gastric banding; Pilonidal cyst drainage; back surgery; Rotator cuff repair; Hysterectomy; Colonoscopy (N/A, 2018); Injection of facet joint (Left, 2018); Injection of joint (Left, 2019); and Injection of facet joint (Bilateral, 2019).    Family History: family history includes Diabetes in her brother, father, and mother; Hypertension in her brother, brother, father, and mother; No Known Problems in her brother and daughter; Pancreatitis in her brother.. Otherwise no colon cancer, inflammatory bowel disease, or GI malignancies.    Social History:  reports that she has never smoked. She has never used smokeless tobacco. She reports that she does not drink alcohol or use drugs.    Review of patient's allergies indicates:   Allergen Reactions    Latex, natural rubber      breakout       Medications:   Medications Prior to Admission   Medication Sig Dispense Refill Last Dose    diclofenac sodium (VOLTAREN) 1 % Gel Apply 2 g topically 4 (four) times daily. 50 g 0 Past Week at Unknown time    ergocalciferol (ERGOCALCIFEROL) 50,000  unit Cap TAKE 1 CAPSULE BY MOUTH ONE TIME PER WEEK 4 capsule 2 Past Week at Unknown time    hydroCHLOROthiazide (HYDRODIURIL) 25 MG tablet TAKE 1 TABLET BY MOUTH EVERY DAY 90 tablet 1 Past Week at Unknown time    losartan (COZAAR) 100 MG tablet TAKE 1 TABLET BY MOUTH ONCE DAILY 90 tablet 1 12/14/2020 at Unknown time    omeprazole (PRILOSEC) 20 MG capsule Take 1 capsule (20 mg total) by mouth 2 (two) times daily. Take with amoxil flagyl and biaxin for 14 days 28 capsule 0 Past Week at Unknown time    sucralfate (CARAFATE) 1 gram tablet Take 1 tablet (1 g total) by mouth before meals and at bedtime as needed (trouble swallowing, Reflux). 60 tablet 0 Past Week at Unknown time    multivitamin with minerals tablet Take 1 tablet by mouth once daily.       traMADol (ULTRAM) 50 mg tablet 1-2 tabs PO q8 PRN severe pain 60 tablet 0 More than a month at Unknown time         Physical Exam:    Vital Signs:   Vitals:    12/14/20 1323   BP: (!) 145/86   Pulse: 76   Resp: 14   Temp: 97.6 °F (36.4 °C)       General Appearance: Well appearing in no acute distress  Eyes:    No scleral icterus  ENT: Neck supple, Lips, mucosa, and tongue normal; teeth and gums normal  Lungs: CTA bilaterally  Heart:  S1, S2 normal, no murmurs heard  Abdomen: Soft, non tender, non distended with positive bowel sounds. No hepatosplenomegaly, ascites, or mass.  Extremities: 2+ pulses, no clubbing, cyanosis or edema  Skin: No rash      Labs:  Lab Results   Component Value Date    WBC 9.11 10/30/2020    HGB 14.6 10/30/2020    HCT 44.4 10/30/2020     10/30/2020    CHOL 179 10/30/2020    TRIG 68 10/30/2020    HDL 69 10/30/2020    ALT 21 10/30/2020    AST 23 10/30/2020     10/30/2020    K 3.9 10/30/2020     10/30/2020    CREATININE 0.9 10/30/2020    BUN 17 10/30/2020    CO2 28 10/30/2020    TSH 1.050 10/30/2020    HGBA1C 6.6 (H) 10/30/2020       I have explained the risks and benefits of endoscopy procedures to the patient including but  not limited to bleeding, perforation, infection, and death.  The patient was asked if they understand and allowed to ask any further questions to their satisfaction.    Geovanny Hong MD

## 2020-12-14 NOTE — ANESTHESIA PREPROCEDURE EVALUATION
12/14/2020  Paloma Bang is a 55 y.o., female.    Anesthesia Evaluation    I have reviewed the Patient Summary Reports.      I have reviewed the Medications.     Review of Systems  Anesthesia Hx:  No problems with previous Anesthesia  Neg history of prior surgery. Denies Family Hx of Anesthesia complications.   Denies Personal Hx of Anesthesia complications.   Social:  Social Alcohol Use, Non-Smoker    Hematology/Oncology:  Hematology Normal   Oncology Normal     EENT/Dental:EENT/Dental Normal   Cardiovascular:   Exercise tolerance: good Hypertension, well controlled    Pulmonary:  Pulmonary Normal    Renal/:  Renal/ Normal     Hepatic/GI:   Bowel Prep. GERD, well controlled    Musculoskeletal:   Arthritis     Neurological:   Neuromuscular Disease,    Endocrine:  Endocrine Normal    Dermatological:  Skin Normal    Psych:  Psychiatric Normal           Physical Exam  General:  Morbid Obesity    Airway/Jaw/Neck:  Airway Findings: Mouth Opening: Normal Tongue: Large  General Airway Assessment: Adult  Mallampati: I  Improves to I with phonation.  TM Distance: Normal, at least 6 cm     Eyes/Ears/Nose:  EYES/EARS/NOSE FINDINGS: Normal   Dental:  Dental Findings: In tact   Chest/Lungs:  Chest/Lungs Findings: Clear to auscultation, Normal Respiratory Rate     Heart/Vascular:  Heart Findings: Rate: Normal  Rhythm: Regular Rhythm  Sounds: Normal  Heart murmur: negative       Mental Status:  Mental Status Findings:  Cooperative, Alert and Oriented         Anesthesia Plan  Type of Anesthesia, risks & benefits discussed:  Anesthesia Type:  general  Patient's Preference: General  Intra-op Monitoring Plan: standard ASA monitors  Intra-op Monitoring Plan Comments:   Post Op Pain Control Plan:   Post Op Pain Control Plan Comments:   Induction:   IV  Beta Blocker:  Patient is not currently on a Beta-Blocker (No  further documentation required).       Informed Consent: Patient understands risks and agrees with Anesthesia plan.  Questions answered. Anesthesia consent signed with patient.  ASA Score: 2     Day of Surgery Review of History & Physical:    H&P update referred to the surgeon.         Ready For Surgery From Anesthesia Perspective.

## 2020-12-14 NOTE — PROVATION PATIENT INSTRUCTIONS
Discharge Summary/Instructions after an Endoscopic Procedure  Patient Name: Paloma Bang  Patient MRN: [id number]  Patient YOB: 1965  Monday, December 14, 2020  Dre Charlton MD  RESTRICTIONS:  During your procedure today, you received medications for sedation.  These   medications may affect your judgment, balance and coordination.  Therefore,   for 24 hours, you have the following restrictions:   - DO NOT drive a car, operate machinery, make legal/financial decisions,   sign important papers or drink alcohol.    ACTIVITY:  Today: no heavy lifting, straining or running due to procedural   sedation/anesthesia.  The following day: return to full activity including work.  DIET:  Eat and drink normally unless instructed otherwise.     TREATMENT FOR COMMON SIDE EFFECTS:  - Mild abdominal pain, nausea, belching, bloating or excessive gas:  rest,   eat lightly and use a heating pad.  - Sore Throat: treat with throat lozenges and/or gargle with warm salt   water.  - Because air was used during the procedure, expelling large amounts of air   from your rectum or belching is normal.  - If a bowel prep was taken, you may not have a bowel movement for 1-3 days.    This is normal.  SYMPTOMS TO WATCH FOR AND REPORT TO YOUR PHYSICIAN:  1. Abdominal pain or bloating, other than gas cramps.  2. Chest pain.  3. Back pain.  4. Signs of infection such as: chills or fever occurring within 24 hours   after the procedure.  5. Rectal bleeding, which would show as bright red, maroon, or black stools.   (A tablespoon of blood from the rectum is not serious, especially if   hemorrhoids are present.)  6. Vomiting.  7. Weakness or dizziness.  GO DIRECTLY TO THE NEAREST EMERGENCY ROOM IF YOU HAVE ANY OF THE FOLLOWING:      Difficulty breathing              Chills and/or fever over 101 F   Persistent vomiting and/or vomiting blood   Severe abdominal pain   Severe chest pain   Black, tarry stools   Bleeding- more than one  tablespoon   Any other symptom or condition that you feel may need urgent attention  Your doctor recommends these additional instructions:  If any biopsies were taken, your doctors clinic will contact you in 1 to 2   weeks with any results.  - Discharge patient to home.   - Resume previous diet.   - Continue present medications.   - Await pathology results.   - Repeat colonoscopy in 7 years for surveillance.  For questions, problems or results please call your physician - Dre Charlton MD at Work:  [phone].  [facility], EMERGENCY ROOM PHONE NUMBER: [phone]  IF A COMPLICATION OR EMERGENCY SITUATION ARISES AND YOU ARE UNABLE TO REACH   YOUR PHYSICIAN - GO DIRECTLY TO THE EMERGENCY ROOM.  Dre Charlton MD  12/14/2020 2:04:49 PM  This report has been verified and signed electronically.  PROVATION

## 2020-12-14 NOTE — PROVATION PATIENT INSTRUCTIONS
Discharge Summary/Instructions after an Endoscopic Procedure  Patient Name: Paloma Bang  Patient MRN: [id number]  Patient YOB: 1965  Monday, December 14, 2020  Dre Charlton MD  RESTRICTIONS:  During your procedure today, you received medications for sedation.  These   medications may affect your judgment, balance and coordination.  Therefore,   for 24 hours, you have the following restrictions:   - DO NOT drive a car, operate machinery, make legal/financial decisions,   sign important papers or drink alcohol.    ACTIVITY:  Today: no heavy lifting, straining or running due to procedural   sedation/anesthesia.  The following day: return to full activity including work.  DIET:  Eat and drink normally unless instructed otherwise.     TREATMENT FOR COMMON SIDE EFFECTS:  - Mild abdominal pain, nausea, belching, bloating or excessive gas:  rest,   eat lightly and use a heating pad.  - Sore Throat: treat with throat lozenges and/or gargle with warm salt   water.  - Because air was used during the procedure, expelling large amounts of air   from your rectum or belching is normal.  - If a bowel prep was taken, you may not have a bowel movement for 1-3 days.    This is normal.  SYMPTOMS TO WATCH FOR AND REPORT TO YOUR PHYSICIAN:  1. Abdominal pain or bloating, other than gas cramps.  2. Chest pain.  3. Back pain.  4. Signs of infection such as: chills or fever occurring within 24 hours   after the procedure.  5. Rectal bleeding, which would show as bright red, maroon, or black stools.   (A tablespoon of blood from the rectum is not serious, especially if   hemorrhoids are present.)  6. Vomiting.  7. Weakness or dizziness.  GO DIRECTLY TO THE NEAREST EMERGENCY ROOM IF YOU HAVE ANY OF THE FOLLOWING:      Difficulty breathing              Chills and/or fever over 101 F   Persistent vomiting and/or vomiting blood   Severe abdominal pain   Severe chest pain   Black, tarry stools   Bleeding- more than one  tablespoon   Any other symptom or condition that you feel may need urgent attention  Your doctor recommends these additional instructions:  If any biopsies were taken, your doctors clinic will contact you in 1 to 2   weeks with any results.  - Discharge patient to home.   - Resume previous diet.   - Continue present medications.   - Return to primary care physician at appointment to be scheduled.  For questions, problems or results please call your physician - Dre Charlton MD at Work:  [phone].  [facility], EMERGENCY ROOM PHONE NUMBER: [phone]  IF A COMPLICATION OR EMERGENCY SITUATION ARISES AND YOU ARE UNABLE TO REACH   YOUR PHYSICIAN - GO DIRECTLY TO THE EMERGENCY ROOM.  Dre Charlton MD  12/14/2020 1:44:57 PM  This report has been verified and signed electronically.  PROVATION

## 2020-12-14 NOTE — ANESTHESIA POSTPROCEDURE EVALUATION
Anesthesia Post Evaluation    Patient: Paloma Bang    Procedure(s) Performed: Procedure(s) (LRB):  EGD (ESOPHAGOGASTRODUODENOSCOPY) (N/A)  COLONOSCOPY (N/A)    Final Anesthesia Type: MAC    Patient location during evaluation: PACU  Patient participation: Yes- Able to Participate  Level of consciousness: awake and alert  Post-procedure vital signs: reviewed and stable  Pain management: adequate  Airway patency: patent    PONV status at discharge: No PONV  Anesthetic complications: no      Cardiovascular status: stable  Respiratory status: unassisted, room air and spontaneous ventilation  Hydration status: euvolemic  Follow-up not needed.          Vitals Value Taken Time   /77 12/14/20 1430   Temp 36.5 12/14/20 1502   Pulse 78 12/14/20 1430   Resp 14 12/14/20 1430   SpO2 98 % 12/14/20 1430         Event Time   Out of Recovery 14:55:52         Pain/Alina Score: Alina Score: 10 (12/14/2020  2:11 PM)

## 2020-12-14 NOTE — TRANSFER OF CARE
Anesthesia Transfer of Care Note    Patient: Paloma Bang    Procedure(s) Performed: Procedure(s) (LRB):  EGD (ESOPHAGOGASTRODUODENOSCOPY) (N/A)  COLONOSCOPY (N/A)    Patient location: GI    Anesthesia Type: general    Transport from OR: Transported from OR on room air with adequate spontaneous ventilation    Post pain: adequate analgesia    Post assessment: no apparent anesthetic complications and tolerated procedure well    Post vital signs: stable    Level of consciousness: awake, alert and oriented    Nausea/Vomiting: no nausea/vomiting    Complications: none    Transfer of care protocol was followed      Last vitals:   Visit Vitals  BP (!) 145/86 (BP Location: Left arm, Patient Position: Lying)   Pulse 76   Temp 36.4 °C (97.6 °F) (Temporal)   Resp 14   LMP  (LMP Unknown)   SpO2 100%   Breastfeeding No

## 2020-12-14 NOTE — DISCHARGE INSTRUCTIONS

## 2020-12-15 ENCOUNTER — PATIENT MESSAGE (OUTPATIENT)
Dept: PSYCHIATRY | Facility: CLINIC | Age: 55
End: 2020-12-15

## 2020-12-17 LAB
FINAL PATHOLOGIC DIAGNOSIS: NORMAL
GROSS: NORMAL
Lab: NORMAL

## 2020-12-18 ENCOUNTER — PATIENT OUTREACH (OUTPATIENT)
Dept: ADMINISTRATIVE | Facility: OTHER | Age: 55
End: 2020-12-18

## 2020-12-18 NOTE — PROGRESS NOTES
LINKS immunization registry not responding  Health Maintenance updated  Chart reviewed for overdue Proactive [facility] ([facility]JA) health maintenance testing (CRS, Breast Ca, Diabetic Eye Exam)   Orders entered:N/A

## 2020-12-22 ENCOUNTER — CLINICAL SUPPORT (OUTPATIENT)
Dept: BARIATRICS | Facility: CLINIC | Age: 55
End: 2020-12-22
Payer: COMMERCIAL

## 2020-12-22 VITALS — WEIGHT: 272.25 LBS | BODY MASS INDEX: 48.24 KG/M2 | HEIGHT: 63 IN

## 2020-12-22 DIAGNOSIS — E66.01 MORBID OBESITY WITH BODY MASS INDEX (BMI) OF 40.0 TO 49.9: ICD-10-CM

## 2020-12-22 DIAGNOSIS — R13.10 DYSPHAGIA, UNSPECIFIED TYPE: ICD-10-CM

## 2020-12-22 DIAGNOSIS — Z71.3 DIETARY COUNSELING AND SURVEILLANCE: ICD-10-CM

## 2020-12-22 PROCEDURE — 99499 UNLISTED E&M SERVICE: CPT | Mod: S$GLB,,, | Performed by: DIETITIAN, REGISTERED

## 2020-12-22 PROCEDURE — 99999 PR PBB SHADOW E&M-EST. PATIENT-LVL II: CPT | Mod: PBBFAC,,, | Performed by: DIETITIAN, REGISTERED

## 2020-12-22 PROCEDURE — 99499 NO LOS: ICD-10-PCS | Mod: S$GLB,,, | Performed by: DIETITIAN, REGISTERED

## 2020-12-22 PROCEDURE — 97802 PR MED NUTR THER, 1ST, INDIV, EA 15 MIN: ICD-10-PCS | Mod: S$GLB,,, | Performed by: DIETITIAN, REGISTERED

## 2020-12-22 PROCEDURE — 97802 MEDICAL NUTRITION INDIV IN: CPT | Mod: S$GLB,,, | Performed by: DIETITIAN, REGISTERED

## 2020-12-22 PROCEDURE — 99999 PR PBB SHADOW E&M-EST. PATIENT-LVL II: ICD-10-PCS | Mod: PBBFAC,,, | Performed by: DIETITIAN, REGISTERED

## 2020-12-22 NOTE — PATIENT INSTRUCTIONS
Bariatric Diet Grocery List      High in Protein:   ? Canned tuna or chicken (packed in water)  ? Lean ground turkey breast or ground round  ? Turkey or chicken (no skin)  ? Lean pork or beef   ? Scrambled, poached, or boiled eggs  ? Baked or broiled fish and seafood (not fried!)  ? Beans, edamame and lentils  ? Low fat deli meats: turkey, chicken, ham, roast beef  ? 1% or Skim Milk, Lactaid, or Soymilk  ? Low-fat cheese, cottage cheese, mozzarella string cheese, ricotta  ? Light yogurt, FF/SF frozen yogurt, custard, SF pudding  ? Protein drinks and protein bars with 0-4 grams sugar     Fruits, Vegetables and Snacks   - Green beans, broccoli, cauliflower, spinach, asparagus, carrots, lima beans, yellow squash, zucchini, etc.  - Apple, pineapple, peach, grapes, banana, watermelon, oranges, etc.  - Fruit canned in its own juice or in water (not in syrup)  - Raw veggies  - Lettuce: dark greens like spinach and Blayne  - Unsalted Nuts  - Justen Links beef jerky     Fluids:   Skim/1% milk, Lactaid, Soymilk  Sugar-free beverages  (decaf and non-carbonated)  Decaf coffee & decaf tea   Water     1200- 1500 calorie Sample meal plan   80-120g protein per day.   Protein drinks and bars: 0-4 grams sugar   Drink lots of water throughout the day and exercise!   MENU # 1   Breakfast: 2 eggs, 1 turkey sausage bernardo, 1 apple   Snack: P3 protein pack  Lunch: 2 roll-ups (sliced turkey, low-fat sliced cheese, mustard), 12 baby carrots dipped in 1 Tbsp natural peanut butter   Mid-Day Snack: ¼ cup unsalted almonds, ½ cup fruit   Dinner: 1 chicken thigh simmered in tomato sauce + 2 Tbsp mozzarella cheese, ½ cup black beans, 1/2 cup steamed carrots   Evening Snack: 1/2 cup grapes with 4 cubes low-fat cheddar cheese   ___________________________________________________   MENU # 2   Breakfast: 200 calorie protein drink   Mid-morning snack : ¼ cup unsalted nuts, medium banana   Lunch: 3oz tuna or chicken salad made with 2 tbsp light felder, over  salad with tomatoes and cucumbers.   Snack: low-fat cheese stick   Dinner: 3oz hamburger bernardo, slice of low-fat cheese, 1 cup yellow squash and zucchini sauteed in nonstick cookspray  Snack: 6oz light yogurt   _______________________________________________________   Menu #3   Breakfast: 6oz plain Greek yogurt + fruit (½ banana OR ½ cup fruit - pineapple, blueberries, strawberries, peach), may add Splenda to shirley.   Lunch: Grilled chicken breast w/ slice low-fat pepper alton cheese, 1/2 cup grilled/baked asparagus and small salad with Salad Spritzer.   Mid-Day snack: 200 calorie protein drink   Dinner: 4oz Grilled fish, ½ cup white beans, ½ cup cooked spinach   Evening Snack: fudgsicle no-sugar-added   Menu # 4   Breakfast: 1 scoop vanilla protein powder + 4oz skim milk + 4oz coffee   Snack: Pure protein bar   Lunch: 2 Lettuce tacos: 3oz seasoned ground turkey wrapped in a Blayne lettuce leaves with 1 Tbsp shredded cheese and dollop low-fat sour cream   Snack: ½ cup cottage cheese, ½ cup pineapple chunks   Dinner: Shrimp omelet: 2 eggs, ½ cup shrimp, green onions, and shredded cheese   ______________________________________________________   Menu #5   Breakfast: 1 cup low-fat cottage cheese, ½ cup peaches (no sugar added)   Snack: 1 apple, 4 cubes cheese   Lunch: 3oz baked pork chop, 1 cup okra and tomato stew   Snack: 1/2 cup black beans + salsa + dollop light sour cream   Dinner: Caprese chicken salad: 3 oz chicken breast, 1oz fresh mozzarella, sliced tomato, 1 Tbsp olive oil, basil   Snack: sugar-free popsicle   _______________________________________________________  Menu #6   Breakfast: ½ cup part-skim ricotta cheese, 2 Tbsp sugar-free strawberry preserves, sprinkle of slivered almonds   Snack: 1 orange + string cheese  Lunch: 3 oz canned chicken, 1oz shredded cheddar cheese, ½ cup black beans, 2 Tbsp salsa   Snack: 200 calorie Protein drink   Dinner: 4 oz grilled salmon steak, over mixed green salad with 2  tbsp light dressing   _______________________________________________________  Menu #7  Breakfast: crust-less quiche (bake 1 egg mixed w/ low fat cheese, broccoli and low sodium ham in a muffin cup until cooked inside)  Snack: 1 light yogurt  Lunch: protein shake (1 scoop powder + 8 oz skim milk, blended w/ ice)  Snack: small apple w/ 2 tbsp PB2 (powdered peanut butter)  Dinner: 2 Turkey meatballs w/ low sugar marinara sauce, 1/2 cup spiralized zucchini (sauteed on stove top w/ nonstick cookspray)    Healthy Eating on the go ~ Pre and Post-Surgery     (*) Means this meal is appropriate for pre-surgery consumption because it is >30g of protein per meal. Post-surgery, may want to split meal in half or save a small portion for a snack.     Tings Kitchen  Item  Calories  Protein (g)   Mediterranean Lamb [place]  350 22   *Chicken Kabobs 290 41   *[place] Kabobs 330 40   Shrimp Kabobs 170 23   *Steak Kabobs 490 42   *Cauliflower Rice Bowl- chicken (salmon, lamb)  490 41   Mediterranean Chicken 260 34   *Protein Power Plate 520 41   Side items: Greek side salad, fruit salad, roasted vegetables, baked falafel       Wilkersons   Item  Calories Protein (g)   Artisan Grilled Chicken [place], no bun  <380 36   *Li Ranch Grilled Chicken Salad, no dressing <320 42   Double Hamburger, no bun <440 25   Side items: apple slices, side salad       Denisses   Item Calories Protein (g)   Grilled Chicken [place], no bun  <370 34   Durango Chicken Salad, half size, no dressing 320  22   Apple Pecan Chicken Salad, half size, no dressing  340 20   Large Chili 250 21   Side items: garden or caesar side salad (no croutons), apple bites       Burger Gerardo   Item Calories Protein (g)   Grilled Chicken [place], no bun <470 37   Whopper Jr., no bun <310 13   Double Hamburger, no bun  <390 23   *Chicken Garden Salad, no croutons, use ½ packet of dressing  <520 40   Side items: garden side salad,         Chick-silva-A  Item Calories Protein (g)    Grilled Chicken [place], no bun  <310 29   Grilled Nuggets, 8 count 140 25   Grilled Chicken Market Salad with light balsamic dressing 330 28   Small Chicken Tortilla Soup, no tortilla strips 340 23   Side items: side salad, superfood side salad, carrot raisin salad, fruit cup,          Sonic  Item Calories Protein (g)   Jr. Hugo, no bun  <330 15   Classic Grilled Chicken [place], no bun <490 31   Hearty [place], no fritos 140 10       Deuces   Item Calories Protein (g)   Blackened Chicken Tenders, 3 piece 170 26   Side items: green beans             [facility]   Item Calories Protein (g)   Unwich: any sandwich made wrapped in lettuce instead of bread. Ask for no felder.      Original Roast Beef Unwich 260 16   Outing Breast Unwich 230 14   Perfect English Unwich 340 22   Ham and Provolone Unwich 350 19   Tuna Salad Unwich 280 11   The Veggie Unwich 410 17   Side items: dill pickle          Chipotle  Item Calories Protein (g)   *Salad with your choice of meat, beans, fresh tomato salsa, fajita veggies, and guacamole (NO rice) ~375 ~40        Applebees  Item Calories Protein (g)   Grilled Chicken Breast 290 38   Serbian Shrimp Salad, no wonton strips, use ½ dressing <390 26   Blackened Shrimp Caesar Salad, no croutons, use ½ dressing  <660 25   *Grilled Chicken Caesar Salad, no croutons, use ½ dressing <770 47   Fleming [place] with Maple Mustard Glaze 350 37   Side Grilled Shrimp Skewer 110 27   Side items: steamed broccoli, garlicky green beans,               [employer]  Item Calories Protein (g)   *Spinach & Mushroom Omelette, no hollandaise sauce  <890 46   *Garden Omelette 840 46   Egg White Vegetable Omelette 380 29   *Grilled Chicken & Veggie Salad, use ½ dressing  <680 38     Olive Garden   Item Calories Protein (g)   *Herb-Grilled [place] 460 45   House salad with, no croutons. (Add grilled chicken or shrimp) <400 25   Side items: steamed broccoli       Walk Ons   Tuna Tini 410 30   Venison [place]- Bowl 330 14    Chicken Arias Pecan Salad      Side items: seasonal fruit, broccoli, green beans side salad

## 2020-12-22 NOTE — PROGRESS NOTES
"NUTRITIONAL CONSULT    Referring Physician: Jeanette Henriquez MD  Reason for MNT Referral: Initial assessment for band to sleeve work-up  Patient presents to establish care of lap band completed in [place] by Dr. Preston Alfaro in 2006. Pre op weight was 256 lbs. Lowest weight was 204 which she maintained for a little time.   Pt states that shr is having pain on her left side that has progressively gotten worse on the left side. Went to ER and had CT scan which showed some abnormal finding ( see below). Pt states that she "dumps" weekly,meaning that food wont digest but gets stuck. Mainly foods attempts to drink liquids to move food along but it makes it worse and causes her to induces vomiting. Does not matter what she eats, it occurs at least once a week ( induces vomiting) but the dysphagia is a daily thing. Last band adjustment was about two years ago when her band was emptied.    Pt endorses heartburn/GERD daily. Not currently taking anything for her heartburn. Constipation and diarrhea fluctuating.         PAST MEDICAL HISTORY:   55 y.o. female  Body mass index is 48.23 kg/m²..  Weight history includes  Pre op weight was 256 lbs. Lowest weight was 204 which she maintained for a little time.     Dieting attempts include weight watcher, hazel [place], noon, keto, nutrisystem, 3 day diet of peanut butter, celery and coffee    Past Medical History:   Diagnosis Date    Allergy     Arthritis     Deep vein thrombosis     occurred after ankle fracture    GERD (gastroesophageal reflux disease)     Hypertension     Joint pain     Morbid obesity     Neuromuscular disorder     Pulmonary embolism     occurred after ankle fracture       CLINICAL DATA:  55 y.o.-year-old Black or  female.  Height: 5'3"  Weight: 272 lbs  IBW: 135 lbs  BMI: 48.23  The patient's goal weight (20% TBW): 220 lbs  Personal goal weight: 168 lbs    Goal for Bariatric Surgery: to improve health and to improve quality of " life    DAILY NUTRITIONAL NEEDS: pre-op nutritional bariatric guidelines to promote weight loss  4779-2354 Calories    Grams Protein    NUTRITION & HEALTH HISTORY:  Greatest challenge: not being hungry    Current diet recall:   B: baconator with large decaf coffee take off one side of bun ; 1.5 cup of grits with decaf coffee  L: Salad  Chunk chicken or tuna out of the can or sandwich made with lettuce wrap  D: Protein and veggies  Snacks: depends on what is in house ; protein shake at times    Current Diet:  Meal pattern: 2-3 meals per day and snacks on occ  Protein supplements: premier or muscle milk  Snackin-1 / day  Vegetables: Likes a variety. Eats daily.  Fruits: Likes a few. Eats rarely.  Beverages: water, sweet tea, coffee with sugar and no milk unless lactaid milk  Dining out: Weekly. twice a week Mostly fast food and take-out.  Cooking at home: Weekly. 2 to three times per week  Mostly baked and crock pot meat, vegetables and infrequently starchy carbs.    Exercise:  Past exercise: Adequate    Current exercise: Adequate  Walk at work 1330-8086 K steps per day  Restrictions to exercise: back pain     Vitamins / Minerals / Herbs:   MV  Vitamin D rx  Hair skin and nails      Labs:   Reviewed  Low Vitamin D    Food Allergies:   None  Lactose intolerant    Social:  Works regular daytime shifts.  Lives with daughter 33 years old.  Grocery shopping and food prep both daughter and pt  Patient believes the household will be supportive after surgery.  Alcohol: None.  Smoking: None.    ASSESSMENT:  · Patient reports attempts at weight loss, only to regain lost weight.  · Patient demonstrated knowledge of healthy eating behaviors and exercise patterns; admits to not eating healthy and not exercising at this point.  · Patient states willingness to change lifestyle and make behavior modifications as evidenced by increased vegetables.        Barriers to Education: none    Stage of change:  determination    NUTRITION DIAGNOSIS:     Morbid Obesity related to Inadequate calorie intake, Inadequate protein intake and Physical inactivity as evidence by BMI.    BARIATRIC DIET DISCUSSION/PLAN:  Discussed diet after surgery and related to patient's food record.  Reviewed nutrition guidelines for before and after surgery.  Answered all questions.  Work on Bariatric Nutrition Checklist.  Work on expanding variety of vegetables.  Work on gradually cutting back on starchy CHO in the diet.  1200-calorie diet.  1500-calorie diet.  5-6 meals per day.  Start including protein supplements in the diet plan daily.  Reduce frequency of dining out.  More grocery shopping and meal preparation at home.  Increase exercise.  Return to clinic.    RECOMMENDATIONS:  Patient is a potential candidate for bariatric surgery.    Needs additional visit(s) with RD.    Patient verbalized understanding.    Expect good  compliance after surgery at this time.    Communicated nutrition plan with bariatric team.    SESSION TIME:  60 minutes

## 2020-12-23 RX ORDER — SUCRALFATE 1 G/1
TABLET ORAL
Qty: 60 TABLET | Refills: 0 | Status: SHIPPED | OUTPATIENT
Start: 2020-12-23 | End: 2021-05-26

## 2020-12-23 NOTE — TELEPHONE ENCOUNTER
MA called pt with her results. No answer LVM for pt to called back for results.----- Message from Joslyn Vences DNP sent at 12/21/2020  2:42 PM CST -----  Reviewed the endoscopy reports and the colon polyp was benign. Repeat Colonoscopy in 7 years.  Please set up follow-up appointment to evaluate her constipation.

## 2020-12-24 ENCOUNTER — TELEPHONE (OUTPATIENT)
Dept: ENDOSCOPY | Facility: HOSPITAL | Age: 55
End: 2020-12-24

## 2020-12-29 ENCOUNTER — TELEPHONE (OUTPATIENT)
Dept: GASTROENTEROLOGY | Facility: CLINIC | Age: 55
End: 2020-12-29

## 2020-12-29 DIAGNOSIS — R11.10 REGURGITATION OF FOOD: Primary | ICD-10-CM

## 2020-12-29 DIAGNOSIS — R13.10 DYSPHAGIA, UNSPECIFIED TYPE: ICD-10-CM

## 2020-12-29 NOTE — TELEPHONE ENCOUNTER
Discussed EGD and colonoscopy results over the phone. She is awaiting decision for lab band revision from Bariatric surgery team. Reports she is still experiencing regurgitation of foods and she is concerned that this is due to lap band malfunction. We discussed that if they deem this may be a motility issue, she may return to clinic and we can consider manometry. In the meantime, she is requesting that we cancel her current follow-up appointment and she will follow-up with me PRN.

## 2021-01-19 ENCOUNTER — CLINICAL SUPPORT (OUTPATIENT)
Dept: BARIATRICS | Facility: CLINIC | Age: 56
End: 2021-01-19
Payer: COMMERCIAL

## 2021-01-19 VITALS — WEIGHT: 269.38 LBS | BODY MASS INDEX: 47.72 KG/M2

## 2021-01-19 DIAGNOSIS — Z98.84 GASTRIC BANDING STATUS: ICD-10-CM

## 2021-01-19 DIAGNOSIS — Z71.3 DIETARY COUNSELING: ICD-10-CM

## 2021-01-19 DIAGNOSIS — E66.01 MORBID OBESITY WITH BODY MASS INDEX OF 45.0-49.9 IN ADULT: ICD-10-CM

## 2021-01-19 PROCEDURE — 97803 PR MED NUTR THER, SUBSQ, INDIV, EA 15 MIN: ICD-10-PCS | Mod: S$GLB,,, | Performed by: DIETITIAN, REGISTERED

## 2021-01-19 PROCEDURE — 99999 PR PBB SHADOW E&M-EST. PATIENT-LVL I: CPT | Mod: PBBFAC,,, | Performed by: DIETITIAN, REGISTERED

## 2021-01-19 PROCEDURE — 99499 UNLISTED E&M SERVICE: CPT | Mod: S$GLB,,, | Performed by: DIETITIAN, REGISTERED

## 2021-01-19 PROCEDURE — 99499 NO LOS: ICD-10-PCS | Mod: S$GLB,,, | Performed by: DIETITIAN, REGISTERED

## 2021-01-19 PROCEDURE — 97803 MED NUTRITION INDIV SUBSEQ: CPT | Mod: S$GLB,,, | Performed by: DIETITIAN, REGISTERED

## 2021-01-19 PROCEDURE — 99999 PR PBB SHADOW E&M-EST. PATIENT-LVL I: ICD-10-PCS | Mod: PBBFAC,,, | Performed by: DIETITIAN, REGISTERED

## 2021-02-05 ENCOUNTER — PATIENT MESSAGE (OUTPATIENT)
Dept: INTERNAL MEDICINE | Facility: CLINIC | Age: 56
End: 2021-02-05

## 2021-02-19 ENCOUNTER — PATIENT MESSAGE (OUTPATIENT)
Dept: BARIATRICS | Facility: CLINIC | Age: 56
End: 2021-02-19

## 2021-02-22 ENCOUNTER — DOCUMENTATION ONLY (OUTPATIENT)
Dept: BARIATRICS | Facility: CLINIC | Age: 56
End: 2021-02-22

## 2021-03-09 ENCOUNTER — TELEPHONE (OUTPATIENT)
Dept: BARIATRICS | Facility: CLINIC | Age: 56
End: 2021-03-09

## 2021-03-10 ENCOUNTER — TELEPHONE (OUTPATIENT)
Dept: BARIATRICS | Facility: CLINIC | Age: 56
End: 2021-03-10

## 2021-03-10 ENCOUNTER — TELEPHONE (OUTPATIENT)
Dept: INTERNAL MEDICINE | Facility: CLINIC | Age: 56
End: 2021-03-10
Payer: COMMERCIAL

## 2021-03-10 DIAGNOSIS — E66.01 MORBID OBESITY: Primary | ICD-10-CM

## 2021-03-10 DIAGNOSIS — Z98.84 GASTRIC BANDING STATUS: ICD-10-CM

## 2021-03-10 DIAGNOSIS — Z71.3 DIETARY COUNSELING: ICD-10-CM

## 2021-03-10 DIAGNOSIS — E55.9 VITAMIN D DEFICIENCY: ICD-10-CM

## 2021-03-10 DIAGNOSIS — K21.9 GASTROESOPHAGEAL REFLUX DISEASE, UNSPECIFIED WHETHER ESOPHAGITIS PRESENT: ICD-10-CM

## 2021-03-16 ENCOUNTER — PATIENT MESSAGE (OUTPATIENT)
Dept: BARIATRICS | Facility: CLINIC | Age: 56
End: 2021-03-16

## 2021-03-16 ENCOUNTER — TELEPHONE (OUTPATIENT)
Dept: BARIATRICS | Facility: CLINIC | Age: 56
End: 2021-03-16
Payer: COMMERCIAL

## 2021-03-18 RX ORDER — ERGOCALCIFEROL 1.25 MG/1
CAPSULE ORAL
Qty: 12 CAPSULE | Refills: 0 | Status: SHIPPED | OUTPATIENT
Start: 2021-03-18 | End: 2021-06-11

## 2021-03-22 ENCOUNTER — PATIENT OUTREACH (OUTPATIENT)
Dept: ADMINISTRATIVE | Facility: OTHER | Age: 56
End: 2021-03-22

## 2021-03-23 ENCOUNTER — TELEPHONE (OUTPATIENT)
Dept: BARIATRICS | Facility: CLINIC | Age: 56
End: 2021-03-23

## 2021-03-23 ENCOUNTER — OFFICE VISIT (OUTPATIENT)
Dept: INTERNAL MEDICINE | Facility: CLINIC | Age: 56
End: 2021-03-23
Payer: COMMERCIAL

## 2021-03-23 VITALS
OXYGEN SATURATION: 100 % | TEMPERATURE: 98 F | DIASTOLIC BLOOD PRESSURE: 83 MMHG | BODY MASS INDEX: 48.9 KG/M2 | SYSTOLIC BLOOD PRESSURE: 123 MMHG | WEIGHT: 276 LBS | HEART RATE: 72 BPM | HEIGHT: 63 IN

## 2021-03-23 DIAGNOSIS — E66.01 MORBID OBESITY WITH BMI OF 45.0-49.9, ADULT: ICD-10-CM

## 2021-03-23 DIAGNOSIS — Z01.818 PREOP EXAM FOR INTERNAL MEDICINE: Primary | ICD-10-CM

## 2021-03-23 PROCEDURE — 99213 OFFICE O/P EST LOW 20 MIN: CPT | Mod: S$GLB,,, | Performed by: INTERNAL MEDICINE

## 2021-03-23 PROCEDURE — 3008F PR BODY MASS INDEX (BMI) DOCUMENTED: ICD-10-PCS | Mod: CPTII,S$GLB,, | Performed by: INTERNAL MEDICINE

## 2021-03-23 PROCEDURE — 99999 PR PBB SHADOW E&M-EST. PATIENT-LVL IV: ICD-10-PCS | Mod: PBBFAC,,, | Performed by: INTERNAL MEDICINE

## 2021-03-23 PROCEDURE — 3079F DIAST BP 80-89 MM HG: CPT | Mod: CPTII,S$GLB,, | Performed by: INTERNAL MEDICINE

## 2021-03-23 PROCEDURE — 3008F BODY MASS INDEX DOCD: CPT | Mod: CPTII,S$GLB,, | Performed by: INTERNAL MEDICINE

## 2021-03-23 PROCEDURE — 99213 PR OFFICE/OUTPT VISIT, EST, LEVL III, 20-29 MIN: ICD-10-PCS | Mod: S$GLB,,, | Performed by: INTERNAL MEDICINE

## 2021-03-23 PROCEDURE — 99999 PR PBB SHADOW E&M-EST. PATIENT-LVL IV: CPT | Mod: PBBFAC,,, | Performed by: INTERNAL MEDICINE

## 2021-03-23 PROCEDURE — 3074F SYST BP LT 130 MM HG: CPT | Mod: CPTII,S$GLB,, | Performed by: INTERNAL MEDICINE

## 2021-03-23 PROCEDURE — 3074F PR MOST RECENT SYSTOLIC BLOOD PRESSURE < 130 MM HG: ICD-10-PCS | Mod: CPTII,S$GLB,, | Performed by: INTERNAL MEDICINE

## 2021-03-23 PROCEDURE — 3079F PR MOST RECENT DIASTOLIC BLOOD PRESSURE 80-89 MM HG: ICD-10-PCS | Mod: CPTII,S$GLB,, | Performed by: INTERNAL MEDICINE

## 2021-03-24 ENCOUNTER — LAB VISIT (OUTPATIENT)
Dept: LAB | Facility: HOSPITAL | Age: 56
End: 2021-03-24
Payer: COMMERCIAL

## 2021-03-24 ENCOUNTER — TELEPHONE (OUTPATIENT)
Dept: BARIATRICS | Facility: CLINIC | Age: 56
End: 2021-03-24

## 2021-03-24 DIAGNOSIS — Z71.3 DIETARY COUNSELING: ICD-10-CM

## 2021-03-24 DIAGNOSIS — Z98.84 GASTRIC BANDING STATUS: ICD-10-CM

## 2021-03-24 DIAGNOSIS — E55.9 VITAMIN D DEFICIENCY: ICD-10-CM

## 2021-03-24 DIAGNOSIS — E66.01 MORBID OBESITY: ICD-10-CM

## 2021-03-24 DIAGNOSIS — K21.9 GASTROESOPHAGEAL REFLUX DISEASE, UNSPECIFIED WHETHER ESOPHAGITIS PRESENT: ICD-10-CM

## 2021-03-24 LAB
25(OH)D3+25(OH)D2 SERPL-MCNC: 24 NG/ML (ref 30–96)
ALBUMIN SERPL BCP-MCNC: 3.5 G/DL (ref 3.5–5.2)
ALP SERPL-CCNC: 90 U/L (ref 55–135)
ALT SERPL W/O P-5'-P-CCNC: 13 U/L (ref 10–44)
ANION GAP SERPL CALC-SCNC: 9 MMOL/L (ref 8–16)
AST SERPL-CCNC: 14 U/L (ref 10–40)
BASOPHILS # BLD AUTO: 0.05 K/UL (ref 0–0.2)
BASOPHILS NFR BLD: 0.5 % (ref 0–1.9)
BILIRUB SERPL-MCNC: 0.6 MG/DL (ref 0.1–1)
BUN SERPL-MCNC: 15 MG/DL (ref 6–20)
CALCIUM SERPL-MCNC: 8.9 MG/DL (ref 8.7–10.5)
CHLORIDE SERPL-SCNC: 103 MMOL/L (ref 95–110)
CO2 SERPL-SCNC: 27 MMOL/L (ref 23–29)
CREAT SERPL-MCNC: 1 MG/DL (ref 0.5–1.4)
DIFFERENTIAL METHOD: ABNORMAL
EOSINOPHIL # BLD AUTO: 0.1 K/UL (ref 0–0.5)
EOSINOPHIL NFR BLD: 1.4 % (ref 0–8)
ERYTHROCYTE [DISTWIDTH] IN BLOOD BY AUTOMATED COUNT: 14.8 % (ref 11.5–14.5)
EST. GFR  (AFRICAN AMERICAN): >60 ML/MIN/1.73 M^2
EST. GFR  (NON AFRICAN AMERICAN): >60 ML/MIN/1.73 M^2
GLUCOSE SERPL-MCNC: 141 MG/DL (ref 70–110)
HCT VFR BLD AUTO: 40.9 % (ref 37–48.5)
HGB BLD-MCNC: 13.8 G/DL (ref 12–16)
IMM GRANULOCYTES # BLD AUTO: 0.04 K/UL (ref 0–0.04)
IMM GRANULOCYTES NFR BLD AUTO: 0.4 % (ref 0–0.5)
LYMPHOCYTES # BLD AUTO: 3.1 K/UL (ref 1–4.8)
LYMPHOCYTES NFR BLD: 31.2 % (ref 18–48)
MCH RBC QN AUTO: 29.2 PG (ref 27–31)
MCHC RBC AUTO-ENTMCNC: 33.7 G/DL (ref 32–36)
MCV RBC AUTO: 87 FL (ref 82–98)
MONOCYTES # BLD AUTO: 0.7 K/UL (ref 0.3–1)
MONOCYTES NFR BLD: 6.8 % (ref 4–15)
NEUTROPHILS # BLD AUTO: 5.9 K/UL (ref 1.8–7.7)
NEUTROPHILS NFR BLD: 59.7 % (ref 38–73)
NRBC BLD-RTO: 0 /100 WBC
PLATELET # BLD AUTO: 246 K/UL (ref 150–350)
PMV BLD AUTO: 10.9 FL (ref 9.2–12.9)
POTASSIUM SERPL-SCNC: 4.2 MMOL/L (ref 3.5–5.1)
PROT SERPL-MCNC: 7.3 G/DL (ref 6–8.4)
RBC # BLD AUTO: 4.73 M/UL (ref 4–5.4)
SODIUM SERPL-SCNC: 139 MMOL/L (ref 136–145)
WBC # BLD AUTO: 9.84 K/UL (ref 3.9–12.7)

## 2021-03-24 PROCEDURE — 36415 COLL VENOUS BLD VENIPUNCTURE: CPT | Performed by: SURGERY

## 2021-03-24 PROCEDURE — 85025 COMPLETE CBC W/AUTO DIFF WBC: CPT | Performed by: SURGERY

## 2021-03-24 PROCEDURE — 80053 COMPREHEN METABOLIC PANEL: CPT | Performed by: SURGERY

## 2021-03-24 PROCEDURE — 82306 VITAMIN D 25 HYDROXY: CPT | Performed by: SURGERY

## 2021-03-25 ENCOUNTER — TELEPHONE (OUTPATIENT)
Dept: BARIATRICS | Facility: CLINIC | Age: 56
End: 2021-03-25

## 2021-04-05 ENCOUNTER — DOCUMENTATION ONLY (OUTPATIENT)
Dept: BARIATRICS | Facility: CLINIC | Age: 56
End: 2021-04-05
Payer: COMMERCIAL

## 2021-04-26 ENCOUNTER — PATIENT MESSAGE (OUTPATIENT)
Dept: RESEARCH | Facility: HOSPITAL | Age: 56
End: 2021-04-26

## 2021-05-07 ENCOUNTER — OFFICE VISIT (OUTPATIENT)
Dept: URGENT CARE | Facility: CLINIC | Age: 56
End: 2021-05-07
Payer: COMMERCIAL

## 2021-05-07 ENCOUNTER — PATIENT MESSAGE (OUTPATIENT)
Dept: INTERNAL MEDICINE | Facility: CLINIC | Age: 56
End: 2021-05-07

## 2021-05-07 VITALS
WEIGHT: 276 LBS | RESPIRATION RATE: 16 BRPM | BODY MASS INDEX: 48.9 KG/M2 | HEART RATE: 74 BPM | OXYGEN SATURATION: 98 % | HEIGHT: 63 IN | TEMPERATURE: 99 F | DIASTOLIC BLOOD PRESSURE: 92 MMHG | SYSTOLIC BLOOD PRESSURE: 137 MMHG

## 2021-05-07 DIAGNOSIS — J02.9 SORE THROAT: ICD-10-CM

## 2021-05-07 DIAGNOSIS — J06.9 VIRAL URI WITH COUGH: Primary | ICD-10-CM

## 2021-05-07 LAB
CTP QC/QA: YES
CTP QC/QA: YES
MOLECULAR STREP A: NEGATIVE
SARS-COV-2 RDRP RESP QL NAA+PROBE: NEGATIVE

## 2021-05-07 PROCEDURE — 3008F PR BODY MASS INDEX (BMI) DOCUMENTED: ICD-10-PCS | Mod: CPTII,S$GLB,, | Performed by: NURSE PRACTITIONER

## 2021-05-07 PROCEDURE — 87651 POCT STREP A MOLECULAR: ICD-10-PCS | Mod: QW,S$GLB,, | Performed by: NURSE PRACTITIONER

## 2021-05-07 PROCEDURE — U0002: ICD-10-PCS | Mod: QW,S$GLB,, | Performed by: NURSE PRACTITIONER

## 2021-05-07 PROCEDURE — U0002 COVID-19 LAB TEST NON-CDC: HCPCS | Mod: QW,S$GLB,, | Performed by: NURSE PRACTITIONER

## 2021-05-07 PROCEDURE — 87651 STREP A DNA AMP PROBE: CPT | Mod: QW,S$GLB,, | Performed by: NURSE PRACTITIONER

## 2021-05-07 PROCEDURE — 99214 OFFICE O/P EST MOD 30 MIN: CPT | Mod: S$GLB,,, | Performed by: NURSE PRACTITIONER

## 2021-05-07 PROCEDURE — 3008F BODY MASS INDEX DOCD: CPT | Mod: CPTII,S$GLB,, | Performed by: NURSE PRACTITIONER

## 2021-05-07 PROCEDURE — 99214 PR OFFICE/OUTPT VISIT, EST, LEVL IV, 30-39 MIN: ICD-10-PCS | Mod: S$GLB,,, | Performed by: NURSE PRACTITIONER

## 2021-05-12 DIAGNOSIS — E11.9 TYPE 2 DIABETES MELLITUS WITHOUT COMPLICATION: ICD-10-CM

## 2021-05-25 ENCOUNTER — TELEPHONE (OUTPATIENT)
Dept: PAIN MEDICINE | Facility: CLINIC | Age: 56
End: 2021-05-25

## 2021-05-26 ENCOUNTER — PATIENT OUTREACH (OUTPATIENT)
Dept: ADMINISTRATIVE | Facility: OTHER | Age: 56
End: 2021-05-26

## 2021-05-26 ENCOUNTER — LAB VISIT (OUTPATIENT)
Dept: LAB | Facility: OTHER | Age: 56
End: 2021-05-26
Payer: COMMERCIAL

## 2021-05-26 ENCOUNTER — OFFICE VISIT (OUTPATIENT)
Dept: PAIN MEDICINE | Facility: CLINIC | Age: 56
End: 2021-05-26
Payer: COMMERCIAL

## 2021-05-26 VITALS
SYSTOLIC BLOOD PRESSURE: 134 MMHG | RESPIRATION RATE: 20 BRPM | HEART RATE: 81 BPM | DIASTOLIC BLOOD PRESSURE: 79 MMHG | BODY MASS INDEX: 48.05 KG/M2 | TEMPERATURE: 98 F | WEIGHT: 271.19 LBS | HEIGHT: 63 IN

## 2021-05-26 DIAGNOSIS — M25.562 CHRONIC PAIN OF LEFT KNEE: Primary | ICD-10-CM

## 2021-05-26 DIAGNOSIS — M17.12 PRIMARY OSTEOARTHRITIS OF LEFT KNEE: ICD-10-CM

## 2021-05-26 DIAGNOSIS — M53.3 SACROILIAC JOINT PAIN: ICD-10-CM

## 2021-05-26 DIAGNOSIS — M51.36 DDD (DEGENERATIVE DISC DISEASE), LUMBAR: ICD-10-CM

## 2021-05-26 DIAGNOSIS — E11.9 TYPE 2 DIABETES MELLITUS WITHOUT COMPLICATION: ICD-10-CM

## 2021-05-26 DIAGNOSIS — M47.816 SPONDYLOSIS OF LUMBAR REGION WITHOUT MYELOPATHY OR RADICULOPATHY: ICD-10-CM

## 2021-05-26 DIAGNOSIS — G89.29 CHRONIC PAIN OF LEFT KNEE: Primary | ICD-10-CM

## 2021-05-26 LAB
ESTIMATED AVG GLUCOSE: 140 MG/DL (ref 68–131)
HBA1C MFR BLD: 6.5 % (ref 4–5.6)

## 2021-05-26 PROCEDURE — 3008F BODY MASS INDEX DOCD: CPT | Mod: CPTII,S$GLB,, | Performed by: NURSE PRACTITIONER

## 2021-05-26 PROCEDURE — 3008F PR BODY MASS INDEX (BMI) DOCUMENTED: ICD-10-PCS | Mod: CPTII,S$GLB,, | Performed by: NURSE PRACTITIONER

## 2021-05-26 PROCEDURE — 99213 PR OFFICE/OUTPT VISIT, EST, LEVL III, 20-29 MIN: ICD-10-PCS | Mod: S$GLB,,, | Performed by: NURSE PRACTITIONER

## 2021-05-26 PROCEDURE — 1125F AMNT PAIN NOTED PAIN PRSNT: CPT | Mod: S$GLB,,, | Performed by: NURSE PRACTITIONER

## 2021-05-26 PROCEDURE — 1125F PR PAIN SEVERITY QUANTIFIED, PAIN PRESENT: ICD-10-PCS | Mod: S$GLB,,, | Performed by: NURSE PRACTITIONER

## 2021-05-26 PROCEDURE — 83036 HEMOGLOBIN GLYCOSYLATED A1C: CPT | Performed by: INTERNAL MEDICINE

## 2021-05-26 PROCEDURE — 99999 PR PBB SHADOW E&M-EST. PATIENT-LVL IV: CPT | Mod: PBBFAC,,, | Performed by: NURSE PRACTITIONER

## 2021-05-26 PROCEDURE — 36415 COLL VENOUS BLD VENIPUNCTURE: CPT | Performed by: INTERNAL MEDICINE

## 2021-05-26 PROCEDURE — 99999 PR PBB SHADOW E&M-EST. PATIENT-LVL IV: ICD-10-PCS | Mod: PBBFAC,,, | Performed by: NURSE PRACTITIONER

## 2021-05-26 PROCEDURE — 99213 OFFICE O/P EST LOW 20 MIN: CPT | Mod: S$GLB,,, | Performed by: NURSE PRACTITIONER

## 2021-05-28 ENCOUNTER — TELEPHONE (OUTPATIENT)
Dept: PAIN MEDICINE | Facility: CLINIC | Age: 56
End: 2021-05-28

## 2021-05-28 DIAGNOSIS — G89.29 CHRONIC PAIN OF LEFT KNEE: Primary | ICD-10-CM

## 2021-05-28 DIAGNOSIS — M25.562 CHRONIC PAIN OF LEFT KNEE: Primary | ICD-10-CM

## 2021-06-03 ENCOUNTER — PATIENT MESSAGE (OUTPATIENT)
Dept: BARIATRICS | Facility: CLINIC | Age: 56
End: 2021-06-03

## 2021-06-03 ENCOUNTER — PATIENT MESSAGE (OUTPATIENT)
Dept: PSYCHIATRY | Facility: CLINIC | Age: 56
End: 2021-06-03

## 2021-06-04 ENCOUNTER — TELEPHONE (OUTPATIENT)
Dept: PAIN MEDICINE | Facility: CLINIC | Age: 56
End: 2021-06-04

## 2021-06-10 ENCOUNTER — TELEPHONE (OUTPATIENT)
Dept: PAIN MEDICINE | Facility: CLINIC | Age: 56
End: 2021-06-10

## 2021-06-11 ENCOUNTER — PROCEDURE VISIT (OUTPATIENT)
Dept: PAIN MEDICINE | Facility: CLINIC | Age: 56
End: 2021-06-11
Payer: COMMERCIAL

## 2021-06-11 VITALS
HEIGHT: 63 IN | WEIGHT: 273.13 LBS | SYSTOLIC BLOOD PRESSURE: 139 MMHG | DIASTOLIC BLOOD PRESSURE: 90 MMHG | HEART RATE: 96 BPM | BODY MASS INDEX: 48.39 KG/M2

## 2021-06-11 DIAGNOSIS — M17.12 PRIMARY OSTEOARTHRITIS OF LEFT KNEE: Primary | ICD-10-CM

## 2021-06-17 ENCOUNTER — PATIENT MESSAGE (OUTPATIENT)
Dept: INTERNAL MEDICINE | Facility: CLINIC | Age: 56
End: 2021-06-17

## 2021-06-21 ENCOUNTER — PATIENT MESSAGE (OUTPATIENT)
Dept: INTERNAL MEDICINE | Facility: CLINIC | Age: 56
End: 2021-06-21

## 2021-06-30 ENCOUNTER — PATIENT OUTREACH (OUTPATIENT)
Dept: ADMINISTRATIVE | Facility: OTHER | Age: 56
End: 2021-06-30

## 2021-07-01 ENCOUNTER — OFFICE VISIT (OUTPATIENT)
Dept: PAIN MEDICINE | Facility: CLINIC | Age: 56
End: 2021-07-01
Payer: COMMERCIAL

## 2021-07-01 VITALS
SYSTOLIC BLOOD PRESSURE: 139 MMHG | TEMPERATURE: 97 F | HEART RATE: 70 BPM | WEIGHT: 273 LBS | HEIGHT: 63 IN | BODY MASS INDEX: 48.37 KG/M2 | DIASTOLIC BLOOD PRESSURE: 91 MMHG | RESPIRATION RATE: 18 BRPM

## 2021-07-01 DIAGNOSIS — M46.1 SACROILIITIS: Primary | ICD-10-CM

## 2021-07-01 DIAGNOSIS — M17.12 PRIMARY OSTEOARTHRITIS OF LEFT KNEE: ICD-10-CM

## 2021-07-01 PROCEDURE — 1125F PR PAIN SEVERITY QUANTIFIED, PAIN PRESENT: ICD-10-PCS | Mod: S$GLB,,, | Performed by: ANESTHESIOLOGY

## 2021-07-01 PROCEDURE — 1125F AMNT PAIN NOTED PAIN PRSNT: CPT | Mod: S$GLB,,, | Performed by: ANESTHESIOLOGY

## 2021-07-01 PROCEDURE — 99214 OFFICE O/P EST MOD 30 MIN: CPT | Mod: S$GLB,,, | Performed by: ANESTHESIOLOGY

## 2021-07-01 PROCEDURE — 3008F BODY MASS INDEX DOCD: CPT | Mod: CPTII,S$GLB,, | Performed by: ANESTHESIOLOGY

## 2021-07-01 PROCEDURE — 99999 PR PBB SHADOW E&M-EST. PATIENT-LVL III: ICD-10-PCS | Mod: PBBFAC,,, | Performed by: ANESTHESIOLOGY

## 2021-07-01 PROCEDURE — 99999 PR PBB SHADOW E&M-EST. PATIENT-LVL III: CPT | Mod: PBBFAC,,, | Performed by: ANESTHESIOLOGY

## 2021-07-01 PROCEDURE — 3008F PR BODY MASS INDEX (BMI) DOCUMENTED: ICD-10-PCS | Mod: CPTII,S$GLB,, | Performed by: ANESTHESIOLOGY

## 2021-07-01 PROCEDURE — 99214 PR OFFICE/OUTPT VISIT, EST, LEVL IV, 30-39 MIN: ICD-10-PCS | Mod: S$GLB,,, | Performed by: ANESTHESIOLOGY

## 2021-07-02 ENCOUNTER — TELEPHONE (OUTPATIENT)
Dept: PAIN MEDICINE | Facility: CLINIC | Age: 56
End: 2021-07-02

## 2021-07-06 ENCOUNTER — PATIENT MESSAGE (OUTPATIENT)
Dept: ADMINISTRATIVE | Facility: HOSPITAL | Age: 56
End: 2021-07-06

## 2021-07-16 ENCOUNTER — HOSPITAL ENCOUNTER (OUTPATIENT)
Facility: OTHER | Age: 56
Discharge: HOME | End: 2021-07-16
Attending: ANESTHESIOLOGY | Admitting: ANESTHESIOLOGY
Payer: COMMERCIAL

## 2021-07-16 VITALS
TEMPERATURE: 99 F | BODY MASS INDEX: 47.84 KG/M2 | OXYGEN SATURATION: 97 % | SYSTOLIC BLOOD PRESSURE: 127 MMHG | HEART RATE: 75 BPM | RESPIRATION RATE: 16 BRPM | WEIGHT: 270 LBS | DIASTOLIC BLOOD PRESSURE: 79 MMHG | HEIGHT: 63 IN

## 2021-07-16 DIAGNOSIS — M46.1 SACROILIITIS: Primary | ICD-10-CM

## 2021-07-16 PROCEDURE — 25500020 PHARM REV CODE 255: Performed by: ANESTHESIOLOGY

## 2021-07-16 PROCEDURE — 27096 INJECT SACROILIAC JOINT: CPT | Mod: 50 | Performed by: ANESTHESIOLOGY

## 2021-07-16 PROCEDURE — 63600175 PHARM REV CODE 636 W HCPCS: Performed by: ANESTHESIOLOGY

## 2021-07-16 PROCEDURE — 27096 PR INJECTION,SACROILIAC JOINT: ICD-10-PCS | Mod: 50,,, | Performed by: ANESTHESIOLOGY

## 2021-07-16 PROCEDURE — 25000003 PHARM REV CODE 250: Performed by: ANESTHESIOLOGY

## 2021-07-16 PROCEDURE — 27096 INJECT SACROILIAC JOINT: CPT | Mod: 50,,, | Performed by: ANESTHESIOLOGY

## 2021-07-16 RX ORDER — TRIAMCINOLONE ACETONIDE 40 MG/ML
INJECTION, SUSPENSION INTRA-ARTICULAR; INTRAMUSCULAR
Status: DISCONTINUED | OUTPATIENT
Start: 2021-07-16 | End: 2021-07-16 | Stop reason: HOSPADM

## 2021-07-16 RX ORDER — LIDOCAINE HYDROCHLORIDE 10 MG/ML
INJECTION INFILTRATION; PERINEURAL
Status: DISCONTINUED | OUTPATIENT
Start: 2021-07-16 | End: 2021-07-16 | Stop reason: HOSPADM

## 2021-07-16 RX ORDER — BUPIVACAINE HYDROCHLORIDE 2.5 MG/ML
INJECTION, SOLUTION EPIDURAL; INFILTRATION; INTRACAUDAL
Status: DISCONTINUED | OUTPATIENT
Start: 2021-07-16 | End: 2021-07-16 | Stop reason: HOSPADM

## 2021-07-16 RX ORDER — ALPRAZOLAM 0.5 MG/1
1 TABLET ORAL ONCE
Status: COMPLETED | OUTPATIENT
Start: 2021-07-16 | End: 2021-07-16

## 2021-07-16 RX ORDER — SODIUM CHLORIDE 9 MG/ML
INJECTION, SOLUTION INTRAVENOUS CONTINUOUS
Status: DISCONTINUED | OUTPATIENT
Start: 2021-07-16 | End: 2021-07-16 | Stop reason: HOSPADM

## 2021-07-16 RX ADMIN — ALPRAZOLAM 1 MG: 0.5 TABLET ORAL at 02:07

## 2021-07-22 ENCOUNTER — IMMUNIZATION (OUTPATIENT)
Dept: INTERNAL MEDICINE | Facility: CLINIC | Age: 56
End: 2021-07-22
Payer: COMMERCIAL

## 2021-07-22 DIAGNOSIS — Z23 NEED FOR VACCINATION: Primary | ICD-10-CM

## 2021-07-22 PROCEDURE — 91300 COVID-19, MRNA, LNP-S, PF, 30 MCG/0.3 ML DOSE VACCINE: CPT | Mod: ,,, | Performed by: INTERNAL MEDICINE

## 2021-07-22 PROCEDURE — 91300 COVID-19, MRNA, LNP-S, PF, 30 MCG/0.3 ML DOSE VACCINE: ICD-10-PCS | Mod: ,,, | Performed by: INTERNAL MEDICINE

## 2021-07-22 PROCEDURE — 0001A COVID-19, MRNA, LNP-S, PF, 30 MCG/0.3 ML DOSE VACCINE: ICD-10-PCS | Mod: CV19,,, | Performed by: INTERNAL MEDICINE

## 2021-07-22 PROCEDURE — 0001A COVID-19, MRNA, LNP-S, PF, 30 MCG/0.3 ML DOSE VACCINE: CPT | Mod: CV19,,, | Performed by: INTERNAL MEDICINE

## 2021-07-29 ENCOUNTER — TELEPHONE (OUTPATIENT)
Dept: PAIN MEDICINE | Facility: CLINIC | Age: 56
End: 2021-07-29

## 2021-07-30 ENCOUNTER — PATIENT MESSAGE (OUTPATIENT)
Dept: INTERNAL MEDICINE | Facility: CLINIC | Age: 56
End: 2021-07-30

## 2021-07-30 DIAGNOSIS — R51.9 NONINTRACTABLE HEADACHE, UNSPECIFIED CHRONICITY PATTERN, UNSPECIFIED HEADACHE TYPE: Primary | ICD-10-CM

## 2021-07-31 ENCOUNTER — PATIENT OUTREACH (OUTPATIENT)
Dept: ADMINISTRATIVE | Facility: OTHER | Age: 56
End: 2021-07-31

## 2021-08-02 ENCOUNTER — OFFICE VISIT (OUTPATIENT)
Dept: PAIN MEDICINE | Facility: CLINIC | Age: 56
End: 2021-08-02
Payer: COMMERCIAL

## 2021-08-02 DIAGNOSIS — M17.12 PRIMARY OSTEOARTHRITIS OF LEFT KNEE: ICD-10-CM

## 2021-08-02 DIAGNOSIS — M47.816 SPONDYLOSIS OF LUMBAR REGION WITHOUT MYELOPATHY OR RADICULOPATHY: ICD-10-CM

## 2021-08-02 DIAGNOSIS — G89.29 CHRONIC PAIN OF LEFT KNEE: ICD-10-CM

## 2021-08-02 DIAGNOSIS — M46.1 SACROILIITIS: Primary | ICD-10-CM

## 2021-08-02 DIAGNOSIS — M25.562 CHRONIC PAIN OF LEFT KNEE: ICD-10-CM

## 2021-08-02 DIAGNOSIS — M51.36 DDD (DEGENERATIVE DISC DISEASE), LUMBAR: ICD-10-CM

## 2021-08-02 PROCEDURE — 1160F RVW MEDS BY RX/DR IN RCRD: CPT | Mod: CPTII,95,, | Performed by: NURSE PRACTITIONER

## 2021-08-02 PROCEDURE — 3044F PR MOST RECENT HEMOGLOBIN A1C LEVEL <7.0%: ICD-10-PCS | Mod: CPTII,95,, | Performed by: NURSE PRACTITIONER

## 2021-08-02 PROCEDURE — 3044F HG A1C LEVEL LT 7.0%: CPT | Mod: CPTII,95,, | Performed by: NURSE PRACTITIONER

## 2021-08-02 PROCEDURE — 99213 OFFICE O/P EST LOW 20 MIN: CPT | Mod: 95,,, | Performed by: NURSE PRACTITIONER

## 2021-08-02 PROCEDURE — 1160F PR REVIEW ALL MEDS BY PRESCRIBER/CLIN PHARMACIST DOCUMENTED: ICD-10-PCS | Mod: CPTII,95,, | Performed by: NURSE PRACTITIONER

## 2021-08-02 PROCEDURE — 1159F MED LIST DOCD IN RCRD: CPT | Mod: CPTII,95,, | Performed by: NURSE PRACTITIONER

## 2021-08-02 PROCEDURE — 1159F PR MEDICATION LIST DOCUMENTED IN MEDICAL RECORD: ICD-10-PCS | Mod: CPTII,95,, | Performed by: NURSE PRACTITIONER

## 2021-08-02 PROCEDURE — 99213 PR OFFICE/OUTPT VISIT, EST, LEVL III, 20-29 MIN: ICD-10-PCS | Mod: 95,,, | Performed by: NURSE PRACTITIONER

## 2021-08-05 ENCOUNTER — LAB VISIT (OUTPATIENT)
Dept: INTERNAL MEDICINE | Facility: CLINIC | Age: 56
End: 2021-08-05
Payer: COMMERCIAL

## 2021-08-05 DIAGNOSIS — R51.9 NONINTRACTABLE HEADACHE, UNSPECIFIED CHRONICITY PATTERN, UNSPECIFIED HEADACHE TYPE: ICD-10-CM

## 2021-08-05 PROCEDURE — U0003 INFECTIOUS AGENT DETECTION BY NUCLEIC ACID (DNA OR RNA); SEVERE ACUTE RESPIRATORY SYNDROME CORONAVIRUS 2 (SARS-COV-2) (CORONAVIRUS DISEASE [COVID-19]), AMPLIFIED PROBE TECHNIQUE, MAKING USE OF HIGH THROUGHPUT TECHNOLOGIES AS DESCRIBED BY CMS-2020-01-R: HCPCS | Performed by: INTERNAL MEDICINE

## 2021-08-05 PROCEDURE — U0005 INFEC AGEN DETEC AMPLI PROBE: HCPCS | Performed by: INTERNAL MEDICINE

## 2021-08-06 LAB
SARS-COV-2 RNA RESP QL NAA+PROBE: NOT DETECTED
SARS-COV-2- CYCLE NUMBER: -1

## 2021-08-09 ENCOUNTER — PATIENT MESSAGE (OUTPATIENT)
Dept: INTERNAL MEDICINE | Facility: CLINIC | Age: 56
End: 2021-08-09

## 2021-08-09 DIAGNOSIS — R51.9 ACUTE NONINTRACTABLE HEADACHE, UNSPECIFIED HEADACHE TYPE: Primary | ICD-10-CM

## 2021-08-10 ENCOUNTER — PATIENT MESSAGE (OUTPATIENT)
Dept: INTERNAL MEDICINE | Facility: CLINIC | Age: 56
End: 2021-08-10

## 2021-08-10 RX ORDER — AZITHROMYCIN 250 MG/1
TABLET, FILM COATED ORAL
Qty: 6 TABLET | Refills: 0 | Status: SHIPPED | OUTPATIENT
Start: 2021-08-10 | End: 2021-08-15

## 2021-08-11 ENCOUNTER — LAB VISIT (OUTPATIENT)
Dept: INTERNAL MEDICINE | Facility: CLINIC | Age: 56
End: 2021-08-11
Payer: COMMERCIAL

## 2021-08-11 DIAGNOSIS — R51.9 ACUTE NONINTRACTABLE HEADACHE, UNSPECIFIED HEADACHE TYPE: ICD-10-CM

## 2021-08-11 PROCEDURE — U0003 INFECTIOUS AGENT DETECTION BY NUCLEIC ACID (DNA OR RNA); SEVERE ACUTE RESPIRATORY SYNDROME CORONAVIRUS 2 (SARS-COV-2) (CORONAVIRUS DISEASE [COVID-19]), AMPLIFIED PROBE TECHNIQUE, MAKING USE OF HIGH THROUGHPUT TECHNOLOGIES AS DESCRIBED BY CMS-2020-01-R: HCPCS | Performed by: INTERNAL MEDICINE

## 2021-08-11 PROCEDURE — U0005 INFEC AGEN DETEC AMPLI PROBE: HCPCS | Performed by: INTERNAL MEDICINE

## 2021-08-12 ENCOUNTER — IMMUNIZATION (OUTPATIENT)
Dept: INTERNAL MEDICINE | Facility: CLINIC | Age: 56
End: 2021-08-12
Payer: COMMERCIAL

## 2021-08-12 DIAGNOSIS — Z23 NEED FOR VACCINATION: Primary | ICD-10-CM

## 2021-08-12 LAB
SARS-COV-2 RNA RESP QL NAA+PROBE: NOT DETECTED
SARS-COV-2- CYCLE NUMBER: -1

## 2021-08-12 PROCEDURE — 0002A COVID-19, MRNA, LNP-S, PF, 30 MCG/0.3 ML DOSE VACCINE: CPT | Mod: CV19,,, | Performed by: INTERNAL MEDICINE

## 2021-08-12 PROCEDURE — 91300 COVID-19, MRNA, LNP-S, PF, 30 MCG/0.3 ML DOSE VACCINE: ICD-10-PCS | Mod: ,,, | Performed by: INTERNAL MEDICINE

## 2021-08-12 PROCEDURE — 0002A COVID-19, MRNA, LNP-S, PF, 30 MCG/0.3 ML DOSE VACCINE: ICD-10-PCS | Mod: CV19,,, | Performed by: INTERNAL MEDICINE

## 2021-08-12 PROCEDURE — 91300 COVID-19, MRNA, LNP-S, PF, 30 MCG/0.3 ML DOSE VACCINE: CPT | Mod: ,,, | Performed by: INTERNAL MEDICINE

## 2021-08-18 ENCOUNTER — PATIENT MESSAGE (OUTPATIENT)
Dept: INTERNAL MEDICINE | Facility: CLINIC | Age: 56
End: 2021-08-18

## 2021-08-18 ENCOUNTER — PATIENT MESSAGE (OUTPATIENT)
Dept: RESEARCH | Facility: HOSPITAL | Age: 56
End: 2021-08-18

## 2021-09-21 ENCOUNTER — TELEPHONE (OUTPATIENT)
Dept: PAIN MEDICINE | Facility: CLINIC | Age: 56
End: 2021-09-21

## 2021-10-04 ENCOUNTER — PATIENT MESSAGE (OUTPATIENT)
Dept: ADMINISTRATIVE | Facility: HOSPITAL | Age: 56
End: 2021-10-04

## 2021-11-08 ENCOUNTER — LAB VISIT (OUTPATIENT)
Dept: LAB | Facility: HOSPITAL | Age: 56
End: 2021-11-08
Attending: INTERNAL MEDICINE
Payer: COMMERCIAL

## 2021-11-08 ENCOUNTER — OFFICE VISIT (OUTPATIENT)
Dept: INTERNAL MEDICINE | Facility: CLINIC | Age: 56
End: 2021-11-08
Payer: COMMERCIAL

## 2021-11-08 VITALS
OXYGEN SATURATION: 98 % | TEMPERATURE: 97 F | HEIGHT: 63 IN | BODY MASS INDEX: 48.9 KG/M2 | DIASTOLIC BLOOD PRESSURE: 82 MMHG | WEIGHT: 276 LBS | SYSTOLIC BLOOD PRESSURE: 130 MMHG | HEART RATE: 71 BPM

## 2021-11-08 DIAGNOSIS — E11.9 TYPE 2 DIABETES MELLITUS WITHOUT COMPLICATION: ICD-10-CM

## 2021-11-08 DIAGNOSIS — Z01.818 PRE-OP EXAM: Primary | ICD-10-CM

## 2021-11-08 DIAGNOSIS — I10 PRIMARY HYPERTENSION: ICD-10-CM

## 2021-11-08 DIAGNOSIS — E66.01 MORBID OBESITY WITH BODY MASS INDEX OF 45.0-49.9 IN ADULT: ICD-10-CM

## 2021-11-08 LAB
ALBUMIN/CREAT UR: NORMAL UG/MG (ref 0–30)
CREAT UR-MCNC: 114 MG/DL (ref 15–325)
MICROALBUMIN UR DL<=1MG/L-MCNC: <5 UG/ML

## 2021-11-08 PROCEDURE — 3079F DIAST BP 80-89 MM HG: CPT | Mod: CPTII,S$GLB,, | Performed by: INTERNAL MEDICINE

## 2021-11-08 PROCEDURE — 1160F RVW MEDS BY RX/DR IN RCRD: CPT | Mod: CPTII,S$GLB,, | Performed by: INTERNAL MEDICINE

## 2021-11-08 PROCEDURE — 1160F PR REVIEW ALL MEDS BY PRESCRIBER/CLIN PHARMACIST DOCUMENTED: ICD-10-PCS | Mod: CPTII,S$GLB,, | Performed by: INTERNAL MEDICINE

## 2021-11-08 PROCEDURE — 3075F SYST BP GE 130 - 139MM HG: CPT | Mod: CPTII,S$GLB,, | Performed by: INTERNAL MEDICINE

## 2021-11-08 PROCEDURE — 99214 PR OFFICE/OUTPT VISIT, EST, LEVL IV, 30-39 MIN: ICD-10-PCS | Mod: S$GLB,,, | Performed by: INTERNAL MEDICINE

## 2021-11-08 PROCEDURE — 82570 ASSAY OF URINE CREATININE: CPT | Performed by: INTERNAL MEDICINE

## 2021-11-08 PROCEDURE — 99999 PR PBB SHADOW E&M-EST. PATIENT-LVL IV: ICD-10-PCS | Mod: PBBFAC,,, | Performed by: INTERNAL MEDICINE

## 2021-11-08 PROCEDURE — 3075F PR MOST RECENT SYSTOLIC BLOOD PRESS GE 130-139MM HG: ICD-10-PCS | Mod: CPTII,S$GLB,, | Performed by: INTERNAL MEDICINE

## 2021-11-08 PROCEDURE — 4010F PR ACE/ARB THEARPY RXD/TAKEN: ICD-10-PCS | Mod: CPTII,S$GLB,, | Performed by: INTERNAL MEDICINE

## 2021-11-08 PROCEDURE — 3008F BODY MASS INDEX DOCD: CPT | Mod: CPTII,S$GLB,, | Performed by: INTERNAL MEDICINE

## 2021-11-08 PROCEDURE — 99999 PR PBB SHADOW E&M-EST. PATIENT-LVL IV: CPT | Mod: PBBFAC,,, | Performed by: INTERNAL MEDICINE

## 2021-11-08 PROCEDURE — 3008F PR BODY MASS INDEX (BMI) DOCUMENTED: ICD-10-PCS | Mod: CPTII,S$GLB,, | Performed by: INTERNAL MEDICINE

## 2021-11-08 PROCEDURE — 1159F MED LIST DOCD IN RCRD: CPT | Mod: CPTII,S$GLB,, | Performed by: INTERNAL MEDICINE

## 2021-11-08 PROCEDURE — 3079F PR MOST RECENT DIASTOLIC BLOOD PRESSURE 80-89 MM HG: ICD-10-PCS | Mod: CPTII,S$GLB,, | Performed by: INTERNAL MEDICINE

## 2021-11-08 PROCEDURE — 4010F ACE/ARB THERAPY RXD/TAKEN: CPT | Mod: CPTII,S$GLB,, | Performed by: INTERNAL MEDICINE

## 2021-11-08 PROCEDURE — 3044F HG A1C LEVEL LT 7.0%: CPT | Mod: CPTII,S$GLB,, | Performed by: INTERNAL MEDICINE

## 2021-11-08 PROCEDURE — 3044F PR MOST RECENT HEMOGLOBIN A1C LEVEL <7.0%: ICD-10-PCS | Mod: CPTII,S$GLB,, | Performed by: INTERNAL MEDICINE

## 2021-11-08 PROCEDURE — 99214 OFFICE O/P EST MOD 30 MIN: CPT | Mod: S$GLB,,, | Performed by: INTERNAL MEDICINE

## 2021-11-08 PROCEDURE — 1159F PR MEDICATION LIST DOCUMENTED IN MEDICAL RECORD: ICD-10-PCS | Mod: CPTII,S$GLB,, | Performed by: INTERNAL MEDICINE

## 2021-11-17 ENCOUNTER — PATIENT MESSAGE (OUTPATIENT)
Dept: INTERNAL MEDICINE | Facility: CLINIC | Age: 56
End: 2021-11-17
Payer: COMMERCIAL

## 2021-11-17 DIAGNOSIS — Z12.31 VISIT FOR SCREENING MAMMOGRAM: Primary | ICD-10-CM

## 2021-11-19 ENCOUNTER — PATIENT MESSAGE (OUTPATIENT)
Dept: INTERNAL MEDICINE | Facility: CLINIC | Age: 56
End: 2021-11-19
Payer: COMMERCIAL

## 2021-11-19 ENCOUNTER — TELEPHONE (OUTPATIENT)
Dept: INTERNAL MEDICINE | Facility: CLINIC | Age: 56
End: 2021-11-19
Payer: COMMERCIAL

## 2021-11-21 ENCOUNTER — PATIENT MESSAGE (OUTPATIENT)
Dept: INTERNAL MEDICINE | Facility: CLINIC | Age: 56
End: 2021-11-21
Payer: COMMERCIAL

## 2021-11-21 DIAGNOSIS — N63.0 BREAST LUMP: Primary | ICD-10-CM

## 2021-12-03 ENCOUNTER — HOSPITAL ENCOUNTER (OUTPATIENT)
Dept: RADIOLOGY | Facility: HOSPITAL | Age: 56
Discharge: HOME | End: 2021-12-03
Attending: INTERNAL MEDICINE
Payer: COMMERCIAL

## 2021-12-03 DIAGNOSIS — N63.0 LUMP, BREAST: ICD-10-CM

## 2021-12-03 DIAGNOSIS — N63.0 BREAST LUMP: ICD-10-CM

## 2021-12-03 PROCEDURE — 76642 US BREAST LEFT LIMITED: ICD-10-PCS | Mod: 26,LT,, | Performed by: RADIOLOGY

## 2021-12-03 PROCEDURE — 77062 BREAST TOMOSYNTHESIS BI: CPT | Mod: 26,,, | Performed by: RADIOLOGY

## 2021-12-03 PROCEDURE — 77066 DX MAMMO INCL CAD BI: CPT | Mod: TC

## 2021-12-03 PROCEDURE — 76642 ULTRASOUND BREAST LIMITED: CPT | Mod: TC,LT

## 2021-12-03 PROCEDURE — 77066 MAMMO DIGITAL DIAGNOSTIC BILAT WITH TOMO: ICD-10-PCS | Mod: 26,,, | Performed by: RADIOLOGY

## 2021-12-03 PROCEDURE — 77066 DX MAMMO INCL CAD BI: CPT | Mod: 26,,, | Performed by: RADIOLOGY

## 2021-12-03 PROCEDURE — 76642 ULTRASOUND BREAST LIMITED: CPT | Mod: 26,LT,, | Performed by: RADIOLOGY

## 2021-12-03 PROCEDURE — 77062 MAMMO DIGITAL DIAGNOSTIC BILAT WITH TOMO: ICD-10-PCS | Mod: 26,,, | Performed by: RADIOLOGY

## 2021-12-08 ENCOUNTER — OFFICE VISIT (OUTPATIENT)
Dept: INTERNAL MEDICINE | Facility: CLINIC | Age: 56
End: 2021-12-08
Payer: COMMERCIAL

## 2021-12-08 VITALS
SYSTOLIC BLOOD PRESSURE: 134 MMHG | OXYGEN SATURATION: 98 % | TEMPERATURE: 98 F | RESPIRATION RATE: 20 BRPM | WEIGHT: 279.75 LBS | HEIGHT: 63 IN | DIASTOLIC BLOOD PRESSURE: 86 MMHG | BODY MASS INDEX: 49.57 KG/M2 | HEART RATE: 80 BPM

## 2021-12-08 DIAGNOSIS — R91.1 SOLITARY PULMONARY NODULE: ICD-10-CM

## 2021-12-08 DIAGNOSIS — H92.09 OTALGIA, UNSPECIFIED LATERALITY: Primary | ICD-10-CM

## 2021-12-08 DIAGNOSIS — L98.9 SKIN LESION: ICD-10-CM

## 2021-12-08 PROCEDURE — 99999 PR PBB SHADOW E&M-EST. PATIENT-LVL IV: CPT | Mod: PBBFAC,,, | Performed by: INTERNAL MEDICINE

## 2021-12-08 PROCEDURE — 99999 PR PBB SHADOW E&M-EST. PATIENT-LVL IV: ICD-10-PCS | Mod: PBBFAC,,, | Performed by: INTERNAL MEDICINE

## 2021-12-08 PROCEDURE — 4010F ACE/ARB THERAPY RXD/TAKEN: CPT | Mod: CPTII,S$GLB,, | Performed by: INTERNAL MEDICINE

## 2021-12-08 PROCEDURE — 99213 OFFICE O/P EST LOW 20 MIN: CPT | Mod: S$GLB,,, | Performed by: INTERNAL MEDICINE

## 2021-12-08 PROCEDURE — 3066F PR DOCUMENTATION OF TREATMENT FOR NEPHROPATHY: ICD-10-PCS | Mod: CPTII,S$GLB,, | Performed by: INTERNAL MEDICINE

## 2021-12-08 PROCEDURE — 3061F PR NEG MICROALBUMINURIA RESULT DOCUMENTED/REVIEW: ICD-10-PCS | Mod: CPTII,S$GLB,, | Performed by: INTERNAL MEDICINE

## 2021-12-08 PROCEDURE — 99213 PR OFFICE/OUTPT VISIT, EST, LEVL III, 20-29 MIN: ICD-10-PCS | Mod: S$GLB,,, | Performed by: INTERNAL MEDICINE

## 2021-12-08 PROCEDURE — 3066F NEPHROPATHY DOC TX: CPT | Mod: CPTII,S$GLB,, | Performed by: INTERNAL MEDICINE

## 2021-12-08 PROCEDURE — 3061F NEG MICROALBUMINURIA REV: CPT | Mod: CPTII,S$GLB,, | Performed by: INTERNAL MEDICINE

## 2021-12-08 PROCEDURE — 4010F PR ACE/ARB THEARPY RXD/TAKEN: ICD-10-PCS | Mod: CPTII,S$GLB,, | Performed by: INTERNAL MEDICINE

## 2021-12-08 RX ORDER — OMEPRAZOLE 40 MG/1
40 CAPSULE, DELAYED RELEASE ORAL DAILY
COMMUNITY
Start: 2021-11-02 | End: 2022-09-13 | Stop reason: SDUPTHER

## 2021-12-08 RX ORDER — ONDANSETRON 4 MG/1
TABLET, ORALLY DISINTEGRATING ORAL
COMMUNITY
Start: 2021-11-02 | End: 2022-08-31

## 2021-12-08 RX ORDER — FLUTICASONE PROPIONATE 50 MCG
1 SPRAY, SUSPENSION (ML) NASAL NIGHTLY
Qty: 16 G | Refills: 3 | Status: SHIPPED | OUTPATIENT
Start: 2021-12-08 | End: 2022-03-09

## 2021-12-08 RX ORDER — AMOXICILLIN AND CLAVULANATE POTASSIUM 875; 125 MG/1; MG/1
1 TABLET, FILM COATED ORAL 2 TIMES DAILY
Qty: 14 TABLET | Refills: 0 | Status: SHIPPED | OUTPATIENT
Start: 2021-12-08 | End: 2021-12-15

## 2022-01-05 ENCOUNTER — PATIENT MESSAGE (OUTPATIENT)
Dept: ADMINISTRATIVE | Facility: OTHER | Age: 57
End: 2022-01-05
Payer: COMMERCIAL

## 2022-01-18 ENCOUNTER — PATIENT MESSAGE (OUTPATIENT)
Dept: ADMINISTRATIVE | Facility: HOSPITAL | Age: 57
End: 2022-01-18
Payer: COMMERCIAL

## 2022-01-31 ENCOUNTER — OFFICE VISIT (OUTPATIENT)
Dept: INTERNAL MEDICINE | Facility: CLINIC | Age: 57
End: 2022-01-31
Payer: COMMERCIAL

## 2022-01-31 ENCOUNTER — PATIENT MESSAGE (OUTPATIENT)
Dept: INTERNAL MEDICINE | Facility: CLINIC | Age: 57
End: 2022-01-31

## 2022-01-31 ENCOUNTER — HOSPITAL ENCOUNTER (OUTPATIENT)
Dept: RADIOLOGY | Facility: HOSPITAL | Age: 57
Discharge: HOME | End: 2022-01-31
Attending: INTERNAL MEDICINE
Payer: COMMERCIAL

## 2022-01-31 VITALS
BODY MASS INDEX: 49.02 KG/M2 | OXYGEN SATURATION: 95 % | HEART RATE: 74 BPM | SYSTOLIC BLOOD PRESSURE: 142 MMHG | HEIGHT: 63 IN | WEIGHT: 276.69 LBS | TEMPERATURE: 98 F | RESPIRATION RATE: 16 BRPM | DIASTOLIC BLOOD PRESSURE: 92 MMHG

## 2022-01-31 DIAGNOSIS — M54.42 ACUTE BILATERAL LOW BACK PAIN WITH LEFT-SIDED SCIATICA: Primary | ICD-10-CM

## 2022-01-31 DIAGNOSIS — M62.830 MUSCLE SPASM OF BACK: ICD-10-CM

## 2022-01-31 DIAGNOSIS — M54.42 ACUTE BILATERAL LOW BACK PAIN WITH LEFT-SIDED SCIATICA: ICD-10-CM

## 2022-01-31 DIAGNOSIS — R60.0 FLUID RETENTION IN LEGS: ICD-10-CM

## 2022-01-31 DIAGNOSIS — E66.01 MORBID OBESITY WITH BODY MASS INDEX OF 45.0-49.9 IN ADULT: ICD-10-CM

## 2022-01-31 DIAGNOSIS — I10 HYPERTENSION, UNSPECIFIED TYPE: ICD-10-CM

## 2022-01-31 PROCEDURE — 1159F PR MEDICATION LIST DOCUMENTED IN MEDICAL RECORD: ICD-10-PCS | Mod: CPTII,S$GLB,, | Performed by: INTERNAL MEDICINE

## 2022-01-31 PROCEDURE — 72100 X-RAY EXAM L-S SPINE 2/3 VWS: CPT | Mod: 26,,, | Performed by: RADIOLOGY

## 2022-01-31 PROCEDURE — 3077F SYST BP >= 140 MM HG: CPT | Mod: CPTII,S$GLB,, | Performed by: INTERNAL MEDICINE

## 2022-01-31 PROCEDURE — 72100 XR LUMBAR SPINE AP AND LATERAL: ICD-10-PCS | Mod: 26,,, | Performed by: RADIOLOGY

## 2022-01-31 PROCEDURE — 1160F PR REVIEW ALL MEDS BY PRESCRIBER/CLIN PHARMACIST DOCUMENTED: ICD-10-PCS | Mod: CPTII,S$GLB,, | Performed by: INTERNAL MEDICINE

## 2022-01-31 PROCEDURE — 99999 PR PBB SHADOW E&M-EST. PATIENT-LVL V: ICD-10-PCS | Mod: PBBFAC,,, | Performed by: INTERNAL MEDICINE

## 2022-01-31 PROCEDURE — 3080F DIAST BP >= 90 MM HG: CPT | Mod: CPTII,S$GLB,, | Performed by: INTERNAL MEDICINE

## 2022-01-31 PROCEDURE — 99214 PR OFFICE/OUTPT VISIT, EST, LEVL IV, 30-39 MIN: ICD-10-PCS | Mod: S$GLB,,, | Performed by: INTERNAL MEDICINE

## 2022-01-31 PROCEDURE — 1159F MED LIST DOCD IN RCRD: CPT | Mod: CPTII,S$GLB,, | Performed by: INTERNAL MEDICINE

## 2022-01-31 PROCEDURE — 3008F BODY MASS INDEX DOCD: CPT | Mod: CPTII,S$GLB,, | Performed by: INTERNAL MEDICINE

## 2022-01-31 PROCEDURE — 3008F PR BODY MASS INDEX (BMI) DOCUMENTED: ICD-10-PCS | Mod: CPTII,S$GLB,, | Performed by: INTERNAL MEDICINE

## 2022-01-31 PROCEDURE — 72100 X-RAY EXAM L-S SPINE 2/3 VWS: CPT | Mod: TC,PO

## 2022-01-31 PROCEDURE — 3077F PR MOST RECENT SYSTOLIC BLOOD PRESSURE >= 140 MM HG: ICD-10-PCS | Mod: CPTII,S$GLB,, | Performed by: INTERNAL MEDICINE

## 2022-01-31 PROCEDURE — 99214 OFFICE O/P EST MOD 30 MIN: CPT | Mod: S$GLB,,, | Performed by: INTERNAL MEDICINE

## 2022-01-31 PROCEDURE — 3080F PR MOST RECENT DIASTOLIC BLOOD PRESSURE >= 90 MM HG: ICD-10-PCS | Mod: CPTII,S$GLB,, | Performed by: INTERNAL MEDICINE

## 2022-01-31 PROCEDURE — 99999 PR PBB SHADOW E&M-EST. PATIENT-LVL V: CPT | Mod: PBBFAC,,, | Performed by: INTERNAL MEDICINE

## 2022-01-31 PROCEDURE — 1160F RVW MEDS BY RX/DR IN RCRD: CPT | Mod: CPTII,S$GLB,, | Performed by: INTERNAL MEDICINE

## 2022-01-31 RX ORDER — MELOXICAM 7.5 MG/1
15 TABLET ORAL DAILY
Qty: 30 TABLET | Refills: 2 | Status: SHIPPED | OUTPATIENT
Start: 2022-01-31 | End: 2022-05-24

## 2022-01-31 RX ORDER — HYDROCHLOROTHIAZIDE 25 MG/1
25 TABLET ORAL DAILY
Qty: 30 TABLET | Refills: 11 | Status: SHIPPED | OUTPATIENT
Start: 2022-01-31 | End: 2022-05-24

## 2022-01-31 RX ORDER — TRAMADOL HYDROCHLORIDE 50 MG/1
50 TABLET ORAL EVERY 6 HOURS PRN
Qty: 40 TABLET | Refills: 0 | Status: SHIPPED | OUTPATIENT
Start: 2022-01-31 | End: 2022-08-31

## 2022-01-31 RX ORDER — CYCLOBENZAPRINE HCL 10 MG
10 TABLET ORAL 3 TIMES DAILY PRN
Qty: 30 TABLET | Refills: 0 | Status: SHIPPED | OUTPATIENT
Start: 2022-01-31 | End: 2022-02-10

## 2022-01-31 NOTE — PATIENT INSTRUCTIONS
1. Lumbar spine xrays today because of acute injury.  2. Meloxicam 15 mgm daily for the inflammation in the lower back.  3. Generic flexeril 10 mgm for muscle spasm.  4. Add HCTZ 25 mgm daily for blood pressure and fluid in legs.  5. Tramadol 50 mgm every 6 hours as needed for pain.  6. Referral to Pain Clinic.

## 2022-01-31 NOTE — PROGRESS NOTES
"Subjective:       Patient ID: Paloma Bang is a 56 y.o. female.    Chief Complaint: Back Pain (Lower back pain radiate down to left leg from being hit by door handle in mail rm as someone was coming through the door. )    Patient whom I have seen previously presents with an acute back injury.  She states that she was "hit in the lower back by a door handle."  Immediate pain with radiation into her left leg.  Of note, patient has history of DJD of lower back with sciatica.  Has had injections for this in Pain Clinic.  She reports that she is in constant pain with weakness in left leg.  Some pain down to the lower left left.  No numbness/tingling.  Has been using cold/heat which seems to help some.  Pain aggravated with standing, bending, walking, sitting.      Also has been having some swelling of both ankles/lower legs.  History of HTN on losartan.    Muscles feel tight in the lower lumbar area.  Pain across from left to right.    Morbidly obese waiting to have gastric surgery for obesity.    Review of Systems   Constitutional: Negative for chills and fever.   HENT: Negative.    Respiratory: Negative.    Cardiovascular: Positive for leg swelling. Negative for chest pain, palpitations and claudication.   Gastrointestinal: Negative.    Genitourinary: Negative.    Musculoskeletal: Positive for back pain and myalgias.   Neurological: Negative for dizziness, syncope, light-headedness and headaches.         Objective:      Physical Exam  Vitals reviewed.   Constitutional:       General: She is not in acute distress.     Appearance: She is obese.   Cardiovascular:      Rate and Rhythm: Normal rate and regular rhythm.      Pulses: Normal pulses.      Heart sounds: Normal heart sounds.   Pulmonary:      Effort: Pulmonary effort is normal. No respiratory distress.      Breath sounds: Normal breath sounds.   Musculoskeletal:      Comments: Decreased ROM of lumbar spine with pain.  Muscles in lumbar area are tight to " palpation and tender to direct palpation.    Decreased muscle strength testing on left compared to the right leg.    Good distal pulses.  Andrés's negative bilaterally.    Minimal pedal edema.   Skin:     General: Skin is warm.   Neurological:      Mental Status: She is alert and oriented to person, place, and time.   Psychiatric:         Mood and Affect: Mood normal.         Behavior: Behavior normal.         Thought Content: Thought content normal.         Judgment: Judgment normal.         Assessment:       Problem List Items Addressed This Visit     Bilateral low back pain with left-sided sciatica - Primary    Relevant Medications    meloxicam (MOBIC) 7.5 MG tablet    traMADoL (ULTRAM) 50 mg tablet    Other Relevant Orders    X-Ray Lumbar Spine Ap And Lateral    Ambulatory referral/consult to Pain Clinic    Morbid obesity with body mass index of 45.0-49.9 in adult      Other Visit Diagnoses     Muscle spasm of back        Relevant Medications    meloxicam (MOBIC) 7.5 MG tablet    traMADoL (ULTRAM) 50 mg tablet    cyclobenzaprine (FLEXERIL) 10 MG tablet    Other Relevant Orders    X-Ray Lumbar Spine Ap And Lateral    Ambulatory referral/consult to Pain Clinic    Hypertension, unspecified type        Relevant Medications    hydroCHLOROthiazide (HYDRODIURIL) 25 MG tablet        fuid retention.  Plan:   1. Update lumbar xrays because of acute injury on chronic problem.  2. Add HCTZ 25 mgm daily for BP and fluid.  3. meloxicam 15 mgm daily for inflammation.  4. Generic flexeril 10 mgm TID for muscle spasms.  5. Tramadol 50 mgm every 6 hours prn pain.   reviewed.  6. Referral to Pain Clinic.  7. Continue cold/heat.

## 2022-02-01 ENCOUNTER — OFFICE VISIT (OUTPATIENT)
Dept: DERMATOLOGY | Facility: CLINIC | Age: 57
End: 2022-02-01
Payer: COMMERCIAL

## 2022-02-01 DIAGNOSIS — L98.9 SKIN LESION: ICD-10-CM

## 2022-02-01 DIAGNOSIS — L98.9 DISEASE OF SKIN AND SUBCUTANEOUS TISSUE: Primary | ICD-10-CM

## 2022-02-01 PROCEDURE — 99203 OFFICE O/P NEW LOW 30 MIN: CPT | Mod: 95,,, | Performed by: DERMATOLOGY

## 2022-02-01 PROCEDURE — 1160F PR REVIEW ALL MEDS BY PRESCRIBER/CLIN PHARMACIST DOCUMENTED: ICD-10-PCS | Mod: CPTII,95,, | Performed by: DERMATOLOGY

## 2022-02-01 PROCEDURE — 99203 PR OFFICE/OUTPT VISIT, NEW, LEVL III, 30-44 MIN: ICD-10-PCS | Mod: 95,,, | Performed by: DERMATOLOGY

## 2022-02-01 PROCEDURE — 1159F PR MEDICATION LIST DOCUMENTED IN MEDICAL RECORD: ICD-10-PCS | Mod: CPTII,95,, | Performed by: DERMATOLOGY

## 2022-02-01 PROCEDURE — 1159F MED LIST DOCD IN RCRD: CPT | Mod: CPTII,95,, | Performed by: DERMATOLOGY

## 2022-02-01 PROCEDURE — 1160F RVW MEDS BY RX/DR IN RCRD: CPT | Mod: CPTII,95,, | Performed by: DERMATOLOGY

## 2022-02-01 RX ORDER — TRIAMCINOLONE ACETONIDE 0.25 MG/G
CREAM TOPICAL
Qty: 30 G | Refills: 3 | Status: SHIPPED | OUTPATIENT
Start: 2022-02-01 | End: 2023-03-06 | Stop reason: SDUPTHER

## 2022-02-01 NOTE — PROGRESS NOTES
Patient Information  Name: Paloma Bang  : 1965  MRN: [id number]     Referring Physician:  Dr. Mayorga   Primary Care Physician:  Dr. Jenn Mayorga MD   Date of Visit: 2022      Subjective:       Paloma Bang is a 56 y.o. female who presents for No chief complaint on file.      Rash - Initial  Affected locations: face (right cheek)  Duration: had similar 6 years ago but recent flare x 7 months.  Signs / symptoms: itching, dryness and redness  Timing: constant  Aggravated by: nothing  Treatments tried: mupirocin.        Patient was last seen in Dermatology: 1/15/2015.    Prior notes by myself reviewed.   Clinical documentation obtained by nursing staff reviewed.    Review of Systems   Skin: Positive for itching and rash.        Objective:    Physical Exam   Constitutional: She appears well-developed and well-nourished. No distress.   Neurological: She is alert and oriented to person, place, and time. She is not disoriented.   Psychiatric: She has a normal mood and affect.   Skin:   Areas Examined (abnormalities noted in diagram):   Head / Face Inspection Performed              Diagram Legend     Erythematous scaling macule/papule c/w actinic keratosis       Vascular papule c/w angioma      Pigmented verrucoid papule/plaque c/w seborrheic keratosis      Yellow umbilicated papule c/w sebaceous hyperplasia      Irregularly shaped tan macule c/w lentigo     1-2 mm smooth white papules consistent with Milia      Movable subcutaneous cyst with punctum c/w epidermal inclusion cyst      Subcutaneous movable cyst c/w pilar cyst      Firm pink to brown papule c/w dermatofibroma      Pedunculated fleshy papule(s) c/w skin tag(s)      Evenly pigmented macule c/w junctional nevus     Mildly variegated pigmented, slightly irregular-bordered macule c/w mildly atypical nevus      Flesh colored to evenly pigmented papule c/w intradermal nevus       Pink pearly papule/plaque c/w basal cell  carcinoma      Erythematous hyperkeratotic cursted plaque c/w SCC      Surgical scar with no sign of skin cancer recurrence      Open and closed comedones      Inflammatory papules and pustules      Verrucoid papule consistent consistent with wart     Erythematous eczematous patches and plaques     Dystrophic onycholytic nail with subungual debris c/w onychomycosis     Umbilicated papule    Erythematous-base heme-crusted tan verrucoid plaque consistent with inflamed seborrheic keratosis     Erythematous Silvery Scaling Plaque c/w Psoriasis     See annotation          [] Data reviewed    [] Prior external notes reviewed    [] Independent review of test    [] Management discussed with another provider    [] Independent historian    Assessment / Plan:        Disease of skin and subcutaneous tissue  Ddx: seb derm vs ACD vs other - difficult to appreciate in pics  -     triamcinolone acetonide 0.025% (KENALOG) 0.025 % cream; AAA right cheek bid  Dispense: 30 g; Refill: 3    Skin lesion  -     Ambulatory referral/consult to Dermatology         f/u in clinic in 4+ weeks to reassess face and check bump under breast    The patient location is: car   The chief complaint leading to consultation is: rash    Visit type: audiovisual    Face to Face time with patient: 7 minutes  9 minutes of total time spent on the encounter, which includes face to face time and non-face to face time preparing to see the patient (eg, review of tests), Obtaining and/or reviewing separately obtained history, Documenting clinical information in the electronic or other health record, Independently interpreting results (not separately reported) and communicating results to the patient/family/caregiver, or Care coordination (not separately reported).         Each patient to whom he or she provides medical services by telemedicine is:  (1) informed of the relationship between the physician and patient and the respective role of any other health care  provider with respect to management of the patient; and (2) notified that he or she may decline to receive medical services by telemedicine and may withdraw from such care at any time.          LOS NUMBER AND COMPLEXITY OF PROBLEMS    COMPLEXITY OF DATA RISK TOTAL TIME (m)   45533  16009 [] 1 self-limited or minor problem [] Minimal to none [] No treatment recommended or patient to monitor. Reassurance.  15-29  10-19   38893  21521 Low  [] 2 or more self limited or minor problems  [] 1 stable chronic illness  [] 1 acute, uncomplicated illness or injury Limited (2)  [] Prior external notes from each unique source  [] Review result of each unique test  [] Order each unique test  OR [] Independent historian Low  []  OTC medications   []  Discussed/Decision for minor skin surgery (no risk factors) 30-44  20-29   84959  30132 Moderate  []  1 or more chronic unstable illness (not at goal or progression or exacerbation) or SE of treatment  []  2 or more stable chronic illnesses  []  1 acute illness with systemic symptoms  []  1 acute complicated injury  []  1 undiagnosed new problem with uncertain prognosis Moderate (1/3 below)  []  3 or more data items        *Now includes independent historian  []  Independent interpretation of a test  []  Discuss management/test with another provider Moderate  []  Prescription drug mgmt  []  Discussed/Decision for Minor surgery with risk factors  []  Mgmt limited by social determinates 45-59  30-39   53923  79151 High  []  1 or more chronic illness with severe exacerbation, progression or SE of treatment  []  1 acute or chronic illness/injury that poses a threat to life or bodily function Extensive (2/3 below)  []  3 or more data items        *Now includes independent historian.  []  Independent interpretation of a test  []  Discuss management/test with another provider High  []  Major surgery with risk discussed  []  Drug therapy requiring intensive monitoring for toxicity  []   Hospitalization  []  Decision for DNR 60-74  40-54

## 2022-02-02 ENCOUNTER — OFFICE VISIT (OUTPATIENT)
Dept: PAIN MEDICINE | Facility: CLINIC | Age: 57
End: 2022-02-02
Payer: COMMERCIAL

## 2022-02-02 VITALS
HEIGHT: 63 IN | BODY MASS INDEX: 47.13 KG/M2 | TEMPERATURE: 98 F | OXYGEN SATURATION: 100 % | SYSTOLIC BLOOD PRESSURE: 127 MMHG | HEART RATE: 71 BPM | RESPIRATION RATE: 18 BRPM | DIASTOLIC BLOOD PRESSURE: 86 MMHG | WEIGHT: 266 LBS

## 2022-02-02 DIAGNOSIS — M79.605 PAIN OF LEFT LOWER EXTREMITY: ICD-10-CM

## 2022-02-02 DIAGNOSIS — M54.42 ACUTE BILATERAL LOW BACK PAIN WITH LEFT-SIDED SCIATICA: Primary | ICD-10-CM

## 2022-02-02 DIAGNOSIS — M48.00 SPINAL STENOSIS, UNSPECIFIED SPINAL REGION: ICD-10-CM

## 2022-02-02 PROCEDURE — 3008F BODY MASS INDEX DOCD: CPT | Mod: CPTII,S$GLB,, | Performed by: ANESTHESIOLOGY

## 2022-02-02 PROCEDURE — 3074F PR MOST RECENT SYSTOLIC BLOOD PRESSURE < 130 MM HG: ICD-10-PCS | Mod: CPTII,S$GLB,, | Performed by: ANESTHESIOLOGY

## 2022-02-02 PROCEDURE — 3079F PR MOST RECENT DIASTOLIC BLOOD PRESSURE 80-89 MM HG: ICD-10-PCS | Mod: CPTII,S$GLB,, | Performed by: ANESTHESIOLOGY

## 2022-02-02 PROCEDURE — 99999 PR PBB SHADOW E&M-EST. PATIENT-LVL V: CPT | Mod: PBBFAC,,, | Performed by: ANESTHESIOLOGY

## 2022-02-02 PROCEDURE — 99999 PR PBB SHADOW E&M-EST. PATIENT-LVL V: ICD-10-PCS | Mod: PBBFAC,,, | Performed by: ANESTHESIOLOGY

## 2022-02-02 PROCEDURE — 1160F PR REVIEW ALL MEDS BY PRESCRIBER/CLIN PHARMACIST DOCUMENTED: ICD-10-PCS | Mod: CPTII,S$GLB,, | Performed by: ANESTHESIOLOGY

## 2022-02-02 PROCEDURE — 1159F MED LIST DOCD IN RCRD: CPT | Mod: CPTII,S$GLB,, | Performed by: ANESTHESIOLOGY

## 2022-02-02 PROCEDURE — 3074F SYST BP LT 130 MM HG: CPT | Mod: CPTII,S$GLB,, | Performed by: ANESTHESIOLOGY

## 2022-02-02 PROCEDURE — 1160F RVW MEDS BY RX/DR IN RCRD: CPT | Mod: CPTII,S$GLB,, | Performed by: ANESTHESIOLOGY

## 2022-02-02 PROCEDURE — 3079F DIAST BP 80-89 MM HG: CPT | Mod: CPTII,S$GLB,, | Performed by: ANESTHESIOLOGY

## 2022-02-02 PROCEDURE — 99213 OFFICE O/P EST LOW 20 MIN: CPT | Mod: S$GLB,,, | Performed by: ANESTHESIOLOGY

## 2022-02-02 PROCEDURE — 1159F PR MEDICATION LIST DOCUMENTED IN MEDICAL RECORD: ICD-10-PCS | Mod: CPTII,S$GLB,, | Performed by: ANESTHESIOLOGY

## 2022-02-02 PROCEDURE — 3008F PR BODY MASS INDEX (BMI) DOCUMENTED: ICD-10-PCS | Mod: CPTII,S$GLB,, | Performed by: ANESTHESIOLOGY

## 2022-02-02 PROCEDURE — 99213 PR OFFICE/OUTPT VISIT, EST, LEVL III, 20-29 MIN: ICD-10-PCS | Mod: S$GLB,,, | Performed by: ANESTHESIOLOGY

## 2022-02-02 NOTE — PROGRESS NOTES
"Chronic patient Established Note (Follow up visit)    SUBJECTIVE:    Interval History 02/02/2022:  Leg pain: The patient returns to clinic today for pain in the left lower lateral leg and calf. She states her chronic back pain has been stable, but her left knee pain has been bothering her. Since the last visit, she had a L knee arthroscopy with partial medial and lateral menisectomy with ongoing pain in the left knee which interferes with ADLs (climbing stairs, walking, sleeping). Current pain intensity is 0/10 (max: 8/10, avg 7/10).   Back pain: She has had an acute lower back injury as well. She has a longstanding history of chronic bilateral lower back pain with left-sided sciatica. The acute injury occurred a week ago when the patient was standing in the doorway and the door was swung open with the door handle striking her in the lower back. Since then, there is a burning sensation at the site of the impact, exacerbated by prolonged sitting, relieved by standing. Pain sometimes radiates down to her left thigh. She is s/p bilateral SI joint injections on 7/16/2021. She previously reported 75% relief of her low back pain which she reports had been stable until this incident. She endorses occasional radicular leg pain. Current pain intensity is 6/10 (max: 8/10, avg 7/10)    Pain Medications:  ------ Meloxicam 15 mgm daily for the inflammation in the lower back.  ------ Generic flexeril 10 mgm for muscle spasm.  ------ Tramadol 50 mgm every 6 hours as needed for pain (hasn't begun taking yet)  Opioid Contract: no   report:  Reviewed and consistent with medication use as prescribed.  Pain Procedures: 7/16/2021: Bilateral SACROILIAC JOINT INJECTION  Physical Therapy/Home Exercise: yes; patient trying to reschedule; had to cancel due to COVID  Interval Images:  X-ray Lumbar 1/31/2022: "Interval progression in the degree of grade 1 anterolisthesis L3 on L4 and interval development of grade 1 anterolisthesis L4 on L5. " "Mild disc narrowing L3-L4 and mild to moderate disc narrowing L4-L5. Moderate to severe disc narrowing, vacuum phenomenon, and opposing endplate sclerosis L5-S1 level."    Interval History 8/2/2021:  The patient returns to clinic today for follow up of low back pain via virtual visit. She is s/p bilateral SI joint injections on 7/16/2021. She reports 75% relief of her low back pain. She reports intermittent low back pain. She denies any radicular leg pain. She does report increased left knee pain after a near fall 2 weeks ago. She also reports increased mid back pain. She is currently experiencing respiratory symptoms. She does report a COVID exposure from work. She is trying to schedule a COVID test. She denies any other health changes.     Interval History 7/1/2021:  Paloma Bang returns to clinic for follow-up of left knee injection. Patient endorses nearly 100% relief of that pain and she has increased mobility following it. She does still report significant back and buttock pain that radiates to the posterior thigh. It is sharp, stabbing in character. She rates it as a 7/10 and is exacerbated by laying flat. It interferes with her sleep and daily activities. This is a similar pain to what she had in 2019 and was improved with an SIJ injection.    Interval History 5/26/2021:  Paloma Bang presents to the clinic for a follow-up appointment for back pain. She has not been seen in over a year. She was previously seen by Dr. Yang, who is no longer at [facility]. She reports increased left knee pain over the last few weeks. She describes this pain as aching and sharp in nature. Her pain is worse with standing and walking. She also reports increased pain with moving from sitting to standing. She also reports increased left sided low back and buttock pain. This pain is sharp in nature. She reports intermittent radiating pain into her left lateral thigh. She denies any other health changes. Her pain " today is 9/10.    Interval History 6/18/2019:  The patient is here for follow up of left sided back pain.  She is s/p left SI joint injection on 5/24/19 with 75% relief of left buttock pain.  She also reports significant improvement in her radicular symptoms.  She has one spot to left lateral area which she would like me to inject today.  She says it feels like a sore muscle.  Otherwise, she is having increased aching pain across the back bilaterally.  She did have benefit with left sided lumbar facet injections about one year ago.  She had an annual check up with PCP last week.  Her pain today is 3/10.    Interval History 5/6/2019:  The patient returns to clinic today. She has not been seen in several months. She reports that her low back pain returned approximately 2 months ago. She does report low back pain that is constant and aching in nature. She reports left buttock pain that is sharp in nature. She does report intermittent radiating pain down the side of her left leg to her ankle. She does report intermittent numbness to the top of her left foot. She denies any right leg pain. Her pain is worse with prolonged sitting and standing. She also reports increased pain at night. She continues to take Robaxin as needed with benefit. She denies any other health changes. Her pain today is 9/10.    Interval History 7/9/2018:  The patient returns to clinic today for follow up. She is s/p left GT bursa injection on 6/25/2018. She reports no significant relief of her pain. She reports increased left sided low back pain that is constant and aching in nature. She denies any radicular leg pain. She reports aching pain in the posterior aspect of her left thigh. Her pain is worse with prolonged standing and walking. She reports that she went to [employer] this past weekend which increased her low back pain. She is no longer taking Etodolac as it caused itching. She continues to take Robaxin as needed with benefit. She was  unable to get the Pennsaid due to cost.    Interval History 6/14/2018:  The patient returns to clinic today for follow up. She reports increased low back and left leg pain that began approximately a month ago. She localizes her pain over her left hip. She describes this pain as constant and aching. This pain is worse with laying on her left side and with prolonged walking. She also reports aching pain across her lower back. She denies any radicular leg pain. She denies any other health changes.     Interval History 11/29/2017:  The patient returns to clinic today for follow up. She is s/p left L3-4, L4-5, and L5-S1 facet joint injections on 11/7/2017. She reports 90% relief of her low back pain. She reports intermittent pain in the left buttock and posterior thigh that is aching in nature. She reports that this pain is tolerable at this time.     HPI:  Paloma Bang is a 53 y.o. female who presents today with low back pain that started in 7/2016 when she fell out of a tub at a hotel.  Since that time, she has had low back pain that is her low back that is equal bilaterally and radiates into the posterior aspect of her left > right leg to the knee.  It used to go past her knee, but this has resolved. She has some ongoing numbness and tingling in her right leg that has been present since prior to her lumbar surgery in 2009.   This pain is described in detail below.    Pain Disability Index Review:  Last 3 PDI Scores 2/2/2022 7/1/2021 6/11/2021   Pain Disability Index (PDI) [phone]       Pain Medications:  None     Opioid Contract: no     report:  Not applicable    Pain Procedures:   11/7/2017- Left L3-4, L4-5, and L5-S1 Facet joint injection   6/25/2018- left GTB injection  7/13/2018- Left L3-4, L4-5, and L5-S1 Facet joint injection   5/24/2019- Left SI joint injection    Physical Therapy/Home Exercise: yes    Imaging:   Xray Left Knee 10/8/2020:  FINDINGS:  Right: There is mild DJD and a mild varus  deformity.     Left: There is mild DJD and a mild varus deformity.     Impression:     No fracture dislocation bone destruction seen.    MRI Lumbar Spine 11/15/2017:  Comparison: Radiograph 7/22/16      Findings:      The vertebral body heights are well maintained, with no fracture.  No marrow signal abnormality suspicious for an infiltrative process.       The conus is normal in appearance, and terminates at the L1-L2 level.  The adjacent soft tissue structures show no significant abnormalities.       There are findings of multi-level  lumbar spondylosis, as below.     L1-L2: There is no focal disc herniation. No significant central canal or neural foraminal narrowing.     L2-L3: Broad-based disc bulge and mild facet arthropathy and ligamentum flavum thickening. There is moderate left neuroforaminal narrowing. Mild canal stenosis.     L3-L4: Broad-based disc bulge with facet arthropathy and ligamentum flavum thickening. This results in moderate bilateral neuroforaminal narrowing and moderate canal stenosis.     L4-L5: Broad-based disc and facet arthropathy with ligamentum flavum thickening. This results in severe bilateral neuroforaminal narrowing and severe canal stenosis.      L5-S1: Intervertebral disc height loss with endplate sclerosis and broad-based disc bulge. There is facet arthropathy as well. This results in severe bilateral neuroforaminal narrowing and moderate canal stenosis.  IMPRESSION:         Multilevel degenerative changes, worse at L4-5 and L5-S1.    Allergies:   Review of patient's allergies indicates:   Allergen Reactions    Latex, natural rubber      breakout       Current Medications:   Current Outpatient Medications   Medication Sig Dispense Refill    cyclobenzaprine (FLEXERIL) 10 MG tablet Take 1 tablet (10 mg total) by mouth 3 (three) times daily as needed for Muscle spasms. 30 tablet 0    ergocalciferol (ERGOCALCIFEROL) 50,000 unit Cap TAKE 1 CAPSULE BY MOUTH ONE TIME PER WEEK 12  capsule 0    fluticasone propionate (FLONASE) 50 mcg/actuation nasal spray 1 spray (50 mcg total) by Each Nostril route every evening. 16 g 3    hydroCHLOROthiazide (HYDRODIURIL) 25 MG tablet Take 1 tablet (25 mg total) by mouth once daily. For fluid and blood pressure 30 tablet 11    losartan (COZAAR) 100 MG tablet TAKE 1 TABLET BY MOUTH ONCE DAILY 90 tablet 1    meloxicam (MOBIC) 7.5 MG tablet Take 2 tablets (15 mg total) by mouth once daily. For inflammation in lower back. 30 tablet 2    multivitamin with minerals tablet Take 1 tablet by mouth once daily.      omeprazole (PRILOSEC) 40 MG capsule Take 40 mg by mouth once daily.      ondansetron (ZOFRAN-ODT) 4 MG TbDL Take by mouth.      traMADoL (ULTRAM) 50 mg tablet Take 1 tablet (50 mg total) by mouth every 6 (six) hours as needed for Pain. 40 tablet 0    triamcinolone acetonide 0.025% (KENALOG) 0.025 % cream AAA right cheek bid 30 g 3     No current facility-administered medications for this visit.       REVIEW OF SYSTEMS:    GENERAL:  No weight loss, malaise or fevers.  HEENT:  Negative for frequent or significant headaches.  NECK:  Negative for lumps, goiter, pain and significant neck swelling.  RESPIRATORY:  Negative for cough, wheezing or shortness of breath.  CARDIOVASCULAR:  Negative for chest pain, leg swelling or palpitations. HTN  GI:  Negative for abdominal discomfort, blood in stools or black stools or change in bowel habits. GERD  MUSCULOSKELETAL:  See HPI.  SKIN:  Negative for lesions, rash, and itching.  PSYCH:  Negative for sleep disturbance, mood disorder and recent psychosocial stressors.  HEMATOLOGY/LYMPHOLOGY:  Negative for prolonged bleeding, bruising easily or swollen nodes. Hx of DVT and PE after fracture.   NEURO:   No history of headaches, syncope, paralysis, seizures or tremors.  All other reviewed and negative other than HPI.    Past Medical History:  Past Medical History:   Diagnosis Date    Allergy     Arthritis     Deep  vein thrombosis     occurred after ankle fracture    GERD (gastroesophageal reflux disease)     Hypertension     Joint pain     Morbid obesity     Neuromuscular disorder     Pulmonary embolism     occurred after ankle fracture       Past Surgical History:  Past Surgical History:   Procedure Laterality Date    ankle surgery      back surgery      Lumbar spinal fusion with shaving of a disk     SECTION      COLONOSCOPY N/A 2018    Procedure: COLONOSCOPY;  Surgeon: Bob Posada MD;  Location: [facility] ENDO (4TH FLR);  Service: Endoscopy;  Laterality: N/A;  18-BMI 49.68-MS    COLONOSCOPY N/A 2020    Procedure: COLONOSCOPY;  Surgeon: Dre Charlton MD;  Location: [facility] ENDO (4TH FLR);  Service: Endoscopy;  Laterality: N/A;    ESOPHAGOGASTRODUODENOSCOPY N/A 2020    Procedure: EGD (ESOPHAGOGASTRODUODENOSCOPY);  Surgeon: Dre Charlton MD;  [facility]  Service: Endoscopy;  Laterality: N/A;  COVID + on 3/30-GT    HYSTERECTOMY          INJECTION OF FACET JOINT Left 2018    Procedure: INJECTION, FACET JOINT;  Surgeon: Jia Yang MD;  Location: [facility] PAIN MGT;  Service: Pain Management;  Laterality: Left;  @ L3-4, L4-5 and L5-S1    INJECTION OF FACET JOINT Bilateral 2019    Procedure: INJECTION, FACET JOINT, L4-L5,L5-S1;  Surgeon: Jia Yang MD;  Location: [facility] PAIN MGT;  Service: Pain Management;  Laterality: Bilateral;    INJECTION OF JOINT Left 2019    Procedure: INJECTION, JOINT, LEFT SI;  Surgeon: Jia Yang MD;  Location: [facility] PAIN MGT;  Service: Pain Management;  Laterality: Left;  INJECTION, JOINT, LEFT SI    INJECTION OF JOINT Bilateral 2021    Procedure: INJECTION, JOINT, SI;  Surgeon: Awilda Ruiz MD;  Location: [facility] PAIN MGT;  Service: Pain Management;  Laterality: Bilateral;    LAPAROSCOPIC GASTRIC BANDING      MYOMECTOMY      PILONIDAL CYST DRAINAGE      removed    ROTATOR CUFF REPAIR      Left        Family History:  Family History   Problem Relation Age of Onset    Diabetes Father     Hypertension Father     Diabetes Mother     Hypertension Mother     Diabetes Brother     Hypertension Brother     No Known Problems Daughter     Pancreatitis Brother     Hypertension Brother     No Known Problems Brother     Breast cancer Neg Hx     Colon cancer Neg Hx     Ovarian cancer Neg Hx     Acne Neg Hx     Eczema Neg Hx     Lupus Neg Hx     Psoriasis Neg Hx     Melanoma Neg Hx        Social History:  Social History     Socioeconomic History    Marital status: Single   Tobacco Use    Smoking status: Never Smoker    Smokeless tobacco: Never Used   Substance and Sexual Activity    Alcohol use: No     Comment: occasionally/ not weekly    Drug use: No    Sexual activity: Not Currently     Birth control/protection: Surgical     Social Determinants of Health     Financial Resource Strain: Low Risk     Difficulty of Paying Living Expenses: Not very hard   Food Insecurity: No Food Insecurity    Worried About Running Out of Food in the Last Year: Never true    Ran Out of Food in the Last Year: Never true   Transportation Needs: No Transportation Needs    Lack of Transportation (Medical): No    Lack of Transportation (Non-Medical): No   Physical Activity: Inactive    Days of Exercise per Week: 0 days    Minutes of Exercise per Session: 40 min   Stress: No Stress Concern Present    Feeling of Stress : Only a little   Social Connections: Unknown    Frequency of Communication with Friends and Family: More than three times a week    Frequency of Social Gatherings with Friends and Family: Once a week    Active Member of Clubs or Organizations: No    Attends Club or Organization Meetings: Never    Marital Status: Never    Housing Stability: High Risk    Unable to Pay for Housing in the Last Year: Yes    Number of Places Lived in the Last Year: 1    Unstable Housing in the Last Year: No  "      OBJECTIVE:    Exam limited due to virtual visit:  General appearance: Well appearing, in no acute distress, alert and oriented x3.  Psych:  Mood and affect appropriate.    Previous physical exam:  /86 (BP Location: Right arm, Patient Position: Sitting, BP Method: Large (Automatic))   Pulse 71   Temp 98.3 °F (36.8 °C) (Oral)   Resp 18   Ht 5' 3" (1.6 m)   Wt 120.7 kg (266 lb)   LMP  (LMP Unknown)   SpO2 100%   BMI 47.12 kg/m²     PHYSICAL EXAMINATION:    General appearance: Well appearing, in no acute distress, alert and oriented x3.  Psych:  Mood and affect appropriate.  Skin: Skin color, texture, turgor normal, no rashes or lesions, in both upper and lower body.  Head/face:  Atraumatic, normocephalic. No palpable lymph nodes  Cor: RRR  Pulm: Symmetric chest rise, no respiratory distress noted.   Back: Straight leg raising in the sitting position is negative to radicular pain bilaterally. There is pain with palpation over bilateral SIJ. Negative facet loading.   Extremities: No deformities, or skin discoloration. Good capillary refill. Bilateral edema 1+ to mid-shin  Musculoskeletal: Tenderness in the left lateral leg / calf on palpation. No tenderness over lower extremity joints. Full ROM of lower extremity joints. Bilateral lower extremity strength is normal and symmetric.  No atrophy or tone abnormalities are noted. Calf is soft.   Neuro: Bilateral lower extremity coordination and muscle stretch reflexes are physiologic and symmetric.  Plantar response are downgoing. No loss of sensation is noted.  Gait: Antalgic- ambulates without assistance.       ASSESSMENT: 56 y.o. year old female with low back and leg pain, consistent with muscle pain.      1. Acute bilateral low back pain with left-sided sciatica  Ambulatory referral/consult to Pain Clinic   2. Muscle spasm of back  Ambulatory referral/consult to Pain Clinic     DISCUSSION: Ms. Bang has severe canal and foraminal stenosis on MRI. " However, her pain was relieved with b/l SIJ injections.     PLAN:   1) Continue Meloxicam daily and flexeril in the evening  2) Left knee Synvisc with Ortho  3) Continue home exercise routine.  4) Follow up as needed    The above plan and management options were discussed at length with patient. Patient is in agreement with the above and verbalized understanding.    Carlitos Hleton  02/02/2022

## 2022-02-14 ENCOUNTER — PATIENT MESSAGE (OUTPATIENT)
Dept: RESEARCH | Facility: HOSPITAL | Age: 57
End: 2022-02-14
Payer: COMMERCIAL

## 2022-02-18 DIAGNOSIS — E11.9 TYPE 2 DIABETES MELLITUS WITHOUT COMPLICATION: ICD-10-CM

## 2022-02-24 ENCOUNTER — PATIENT MESSAGE (OUTPATIENT)
Dept: ADMINISTRATIVE | Facility: HOSPITAL | Age: 57
End: 2022-02-24
Payer: COMMERCIAL

## 2022-02-25 ENCOUNTER — PATIENT MESSAGE (OUTPATIENT)
Dept: INTERNAL MEDICINE | Facility: CLINIC | Age: 57
End: 2022-02-25
Payer: COMMERCIAL

## 2022-03-03 ENCOUNTER — PATIENT MESSAGE (OUTPATIENT)
Dept: INTERNAL MEDICINE | Facility: CLINIC | Age: 57
End: 2022-03-03
Payer: COMMERCIAL

## 2022-03-14 DIAGNOSIS — E11.9 TYPE 2 DIABETES MELLITUS WITHOUT COMPLICATION: ICD-10-CM

## 2022-03-29 LAB
LEFT EYE DM RETINOPATHY: NORMAL
RIGHT EYE DM RETINOPATHY: NORMAL

## 2022-04-09 ENCOUNTER — NURSE TRIAGE (OUTPATIENT)
Dept: ADMINISTRATIVE | Facility: CLINIC | Age: 57
End: 2022-04-09
Payer: COMMERCIAL

## 2022-04-09 NOTE — TELEPHONE ENCOUNTER
"Patient calling regarding n/v/d. Reports she has vomited 5 times today. Vomit hsa been yellow in color. Reports constant abdominal pain > 2 hours. Per protocol, advised to see physician within 4 hours. Verbalized understanding. Plans to use [email or web address]. Knows to call back with new or worsening symptoms.       Reason for Disposition   [1] Constant abdominal pain AND [2] present > 2 hours    Additional Information   Negative: Shock suspected (e.g., cold/pale/clammy skin, too weak to stand, low BP, rapid pulse)   Negative: Difficult to awaken or acting confused  (e.g., disoriented, slurred speech)   Negative: Sounds like a life-threatening emergency to the triager   Negative: [1] Vomiting AND [2] contains red blood or black ("coffee ground") material  (Exception: few red streaks in vomit that only happened once)   Negative: Severe pain in one eye   Negative: Recent head injury (within last 3 days)   Negative: Recent abdominal injury (within last 3 days)   Negative: [1] Insulin-dependent diabetes (Type I) AND [2] glucose > 400 mg/dl (22 mmol/l)   Negative: [1] SEVERE vomiting (e.g., 6 or more times/day) AND [2] present > 8 hours   Negative: [1] MODERATE vomiting (e.g., 3 - 5 times/day) AND [2] age > 60   Negative: Severe headache (e.g., excruciating) (Exception: similar to previous migraines)   Negative: High-risk adult (e.g., diabetes mellitus, brain tumor, V-P shunt, hernia)   Negative: [1] Drinking very little AND [2] dehydration suspected (e.g., no urine > 12 hours, very dry mouth, very lightheaded)   Negative: Patient sounds very sick or weak to the triager   Negative: [1] MILD to MODERATE vomiting (e.g., 1-5 times/day) AND [2] abdomen looks much more swollen than usual   Negative: [1] Vomiting AND [2] contains bile (green color)    Protocols used: ST VOMITING-A-AH      "

## 2022-05-24 ENCOUNTER — TELEPHONE (OUTPATIENT)
Dept: INTERNAL MEDICINE | Facility: CLINIC | Age: 57
End: 2022-05-24

## 2022-05-24 ENCOUNTER — OFFICE VISIT (OUTPATIENT)
Dept: INTERNAL MEDICINE | Facility: CLINIC | Age: 57
End: 2022-05-24
Payer: COMMERCIAL

## 2022-05-24 ENCOUNTER — HOSPITAL ENCOUNTER (EMERGENCY)
Facility: HOSPITAL | Age: 57
Discharge: HOME | End: 2022-05-25
Attending: EMERGENCY MEDICINE
Payer: COMMERCIAL

## 2022-05-24 ENCOUNTER — LAB VISIT (OUTPATIENT)
Dept: LAB | Facility: HOSPITAL | Age: 57
End: 2022-05-24
Attending: INTERNAL MEDICINE
Payer: COMMERCIAL

## 2022-05-24 VITALS
DIASTOLIC BLOOD PRESSURE: 86 MMHG | BODY MASS INDEX: 43.32 KG/M2 | HEART RATE: 97 BPM | OXYGEN SATURATION: 97 % | WEIGHT: 244.5 LBS | HEIGHT: 63 IN | RESPIRATION RATE: 18 BRPM | TEMPERATURE: 97 F | SYSTOLIC BLOOD PRESSURE: 126 MMHG

## 2022-05-24 DIAGNOSIS — R60.9 SWELLING: ICD-10-CM

## 2022-05-24 DIAGNOSIS — Z86.718 PERSONAL HISTORY OF DVT (DEEP VEIN THROMBOSIS): ICD-10-CM

## 2022-05-24 DIAGNOSIS — R79.89 POSITIVE D DIMER: ICD-10-CM

## 2022-05-24 DIAGNOSIS — I82.4Y9 ACUTE DEEP VEIN THROMBOSIS (DVT) OF PROXIMAL VEIN OF LOWER EXTREMITY, UNSPECIFIED LATERALITY: Primary | ICD-10-CM

## 2022-05-24 DIAGNOSIS — J32.9 RHINOSINUSITIS: ICD-10-CM

## 2022-05-24 DIAGNOSIS — I10 HYPERTENSION, UNSPECIFIED TYPE: ICD-10-CM

## 2022-05-24 DIAGNOSIS — R68.89 MULTIPLE COMPLAINTS: ICD-10-CM

## 2022-05-24 DIAGNOSIS — R53.83 FATIGUE, UNSPECIFIED TYPE: ICD-10-CM

## 2022-05-24 DIAGNOSIS — R05.9 COUGH: ICD-10-CM

## 2022-05-24 DIAGNOSIS — Z98.890 RECENT MAJOR SURGERY: ICD-10-CM

## 2022-05-24 DIAGNOSIS — R07.9 CHEST PAIN: ICD-10-CM

## 2022-05-24 DIAGNOSIS — Z90.3 S/P GASTRIC SLEEVE PROCEDURE: ICD-10-CM

## 2022-05-24 DIAGNOSIS — Z09 SURGERY FOLLOW-UP: ICD-10-CM

## 2022-05-24 DIAGNOSIS — R60.0 BILATERAL LEG EDEMA: Primary | ICD-10-CM

## 2022-05-24 DIAGNOSIS — M25.572 LEFT LATERAL ANKLE PAIN: ICD-10-CM

## 2022-05-24 LAB
ALBUMIN SERPL BCP-MCNC: 3.4 G/DL (ref 3.5–5.2)
ALBUMIN SERPL BCP-MCNC: 3.5 G/DL (ref 3.5–5.2)
ALP SERPL-CCNC: 80 U/L (ref 55–135)
ALP SERPL-CCNC: 85 U/L (ref 55–135)
ALT SERPL W/O P-5'-P-CCNC: 20 U/L (ref 10–44)
ALT SERPL W/O P-5'-P-CCNC: 21 U/L (ref 10–44)
ANION GAP SERPL CALC-SCNC: 11 MMOL/L (ref 8–16)
ANION GAP SERPL CALC-SCNC: 12 MMOL/L (ref 8–16)
AST SERPL-CCNC: 27 U/L (ref 10–40)
AST SERPL-CCNC: 28 U/L (ref 10–40)
BASOPHILS # BLD AUTO: 0.05 K/UL (ref 0–0.2)
BASOPHILS # BLD AUTO: 0.06 K/UL (ref 0–0.2)
BASOPHILS NFR BLD: 0.7 % (ref 0–1.9)
BASOPHILS NFR BLD: 1 % (ref 0–1.9)
BILIRUB SERPL-MCNC: 0.5 MG/DL (ref 0.1–1)
BILIRUB SERPL-MCNC: 0.6 MG/DL (ref 0.1–1)
BUN SERPL-MCNC: 10 MG/DL (ref 6–20)
BUN SERPL-MCNC: 8 MG/DL (ref 6–20)
CALCIUM SERPL-MCNC: 9.3 MG/DL (ref 8.7–10.5)
CALCIUM SERPL-MCNC: 9.5 MG/DL (ref 8.7–10.5)
CHLORIDE SERPL-SCNC: 103 MMOL/L (ref 95–110)
CHLORIDE SERPL-SCNC: 104 MMOL/L (ref 95–110)
CO2 SERPL-SCNC: 23 MMOL/L (ref 23–29)
CO2 SERPL-SCNC: 29 MMOL/L (ref 23–29)
CREAT SERPL-MCNC: 0.7 MG/DL (ref 0.5–1.4)
CREAT SERPL-MCNC: 0.8 MG/DL (ref 0.5–1.4)
CTP QC/QA: YES
D DIMER PPP IA.FEU-MCNC: 17.98 MG/L FEU
DIFFERENTIAL METHOD: ABNORMAL
DIFFERENTIAL METHOD: ABNORMAL
EOSINOPHIL # BLD AUTO: 0.2 K/UL (ref 0–0.5)
EOSINOPHIL # BLD AUTO: 0.2 K/UL (ref 0–0.5)
EOSINOPHIL NFR BLD: 3.4 % (ref 0–8)
EOSINOPHIL NFR BLD: 3.6 % (ref 0–8)
ERYTHROCYTE [DISTWIDTH] IN BLOOD BY AUTOMATED COUNT: 15.5 % (ref 11.5–14.5)
ERYTHROCYTE [DISTWIDTH] IN BLOOD BY AUTOMATED COUNT: 16 % (ref 11.5–14.5)
EST. GFR  (AFRICAN AMERICAN): >60 ML/MIN/1.73 M^2
EST. GFR  (AFRICAN AMERICAN): >60 ML/MIN/1.73 M^2
EST. GFR  (NON AFRICAN AMERICAN): >60 ML/MIN/1.73 M^2
EST. GFR  (NON AFRICAN AMERICAN): >60 ML/MIN/1.73 M^2
GLUCOSE SERPL-MCNC: 130 MG/DL (ref 70–110)
GLUCOSE SERPL-MCNC: 141 MG/DL (ref 70–110)
HCT VFR BLD AUTO: 38.1 % (ref 37–48.5)
HCT VFR BLD AUTO: 40.7 % (ref 37–48.5)
HGB BLD-MCNC: 12.9 G/DL (ref 12–16)
HGB BLD-MCNC: 13 G/DL (ref 12–16)
IMM GRANULOCYTES # BLD AUTO: 0.04 K/UL (ref 0–0.04)
IMM GRANULOCYTES # BLD AUTO: 0.05 K/UL (ref 0–0.04)
IMM GRANULOCYTES NFR BLD AUTO: 0.7 % (ref 0–0.5)
IMM GRANULOCYTES NFR BLD AUTO: 0.7 % (ref 0–0.5)
INFLUENZA A, MOLECULAR: NEGATIVE
INFLUENZA B, MOLECULAR: NEGATIVE
LYMPHOCYTES # BLD AUTO: 2.1 K/UL (ref 1–4.8)
LYMPHOCYTES # BLD AUTO: 2.3 K/UL (ref 1–4.8)
LYMPHOCYTES NFR BLD: 34.9 % (ref 18–48)
LYMPHOCYTES NFR BLD: 35.8 % (ref 18–48)
MCH RBC QN AUTO: 28.4 PG (ref 27–31)
MCH RBC QN AUTO: 28.7 PG (ref 27–31)
MCHC RBC AUTO-ENTMCNC: 31.9 G/DL (ref 32–36)
MCHC RBC AUTO-ENTMCNC: 33.9 G/DL (ref 32–36)
MCV RBC AUTO: 84 FL (ref 82–98)
MCV RBC AUTO: 90 FL (ref 82–98)
MONOCYTES # BLD AUTO: 1.2 K/UL (ref 0.3–1)
MONOCYTES # BLD AUTO: 1.3 K/UL (ref 0.3–1)
MONOCYTES NFR BLD: 18.8 % (ref 4–15)
MONOCYTES NFR BLD: 20 % (ref 4–15)
NEUTROPHILS # BLD AUTO: 2.3 K/UL (ref 1.8–7.7)
NEUTROPHILS # BLD AUTO: 2.8 K/UL (ref 1.8–7.7)
NEUTROPHILS NFR BLD: 38.9 % (ref 38–73)
NEUTROPHILS NFR BLD: 41.5 % (ref 38–73)
NRBC BLD-RTO: 0 /100 WBC
NRBC BLD-RTO: 0 /100 WBC
PLATELET # BLD AUTO: 206 K/UL (ref 150–450)
PLATELET # BLD AUTO: 218 K/UL (ref 150–450)
PMV BLD AUTO: 11.3 FL (ref 9.2–12.9)
PMV BLD AUTO: 11.8 FL (ref 9.2–12.9)
POTASSIUM SERPL-SCNC: 3.4 MMOL/L (ref 3.5–5.1)
POTASSIUM SERPL-SCNC: 3.4 MMOL/L (ref 3.5–5.1)
PROT SERPL-MCNC: 7 G/DL (ref 6–8.4)
PROT SERPL-MCNC: 7.4 G/DL (ref 6–8.4)
RBC # BLD AUTO: 4.53 M/UL (ref 4–5.4)
RBC # BLD AUTO: 4.54 M/UL (ref 4–5.4)
SARS-COV-2 RDRP RESP QL NAA+PROBE: NEGATIVE
SODIUM SERPL-SCNC: 139 MMOL/L (ref 136–145)
SODIUM SERPL-SCNC: 143 MMOL/L (ref 136–145)
SPECIMEN SOURCE: NORMAL
TROPONIN I SERPL DL<=0.01 NG/ML-MCNC: <0.006 NG/ML (ref 0–0.03)
WBC # BLD AUTO: 5.86 K/UL (ref 3.9–12.7)
WBC # BLD AUTO: 6.71 K/UL (ref 3.9–12.7)

## 2022-05-24 PROCEDURE — 85025 COMPLETE CBC W/AUTO DIFF WBC: CPT | Performed by: INTERNAL MEDICINE

## 2022-05-24 PROCEDURE — 1159F MED LIST DOCD IN RCRD: CPT | Mod: CPTII,S$GLB,, | Performed by: INTERNAL MEDICINE

## 2022-05-24 PROCEDURE — 99285 EMERGENCY DEPT VISIT HI MDM: CPT | Mod: 25

## 2022-05-24 PROCEDURE — U0005 INFEC AGEN DETEC AMPLI PROBE: HCPCS | Performed by: INTERNAL MEDICINE

## 2022-05-24 PROCEDURE — 1159F PR MEDICATION LIST DOCUMENTED IN MEDICAL RECORD: ICD-10-PCS | Mod: CPTII,S$GLB,, | Performed by: INTERNAL MEDICINE

## 2022-05-24 PROCEDURE — 93010 ELECTROCARDIOGRAM REPORT: CPT | Mod: ,,, | Performed by: INTERNAL MEDICINE

## 2022-05-24 PROCEDURE — 36415 COLL VENOUS BLD VENIPUNCTURE: CPT | Mod: PO | Performed by: INTERNAL MEDICINE

## 2022-05-24 PROCEDURE — 4010F PR ACE/ARB THEARPY RXD/TAKEN: ICD-10-PCS | Mod: CPTII,S$GLB,, | Performed by: INTERNAL MEDICINE

## 2022-05-24 PROCEDURE — 93005 ELECTROCARDIOGRAM TRACING: CPT

## 2022-05-24 PROCEDURE — 3079F DIAST BP 80-89 MM HG: CPT | Mod: CPTII,S$GLB,, | Performed by: INTERNAL MEDICINE

## 2022-05-24 PROCEDURE — 85379 FIBRIN DEGRADATION QUANT: CPT | Performed by: INTERNAL MEDICINE

## 2022-05-24 PROCEDURE — 4010F ACE/ARB THERAPY RXD/TAKEN: CPT | Mod: CPTII,S$GLB,, | Performed by: INTERNAL MEDICINE

## 2022-05-24 PROCEDURE — 3079F PR MOST RECENT DIASTOLIC BLOOD PRESSURE 80-89 MM HG: ICD-10-PCS | Mod: CPTII,S$GLB,, | Performed by: INTERNAL MEDICINE

## 2022-05-24 PROCEDURE — 87502 INFLUENZA DNA AMP PROBE: CPT | Performed by: INTERNAL MEDICINE

## 2022-05-24 PROCEDURE — 99999 PR PBB SHADOW E&M-EST. PATIENT-LVL V: ICD-10-PCS | Mod: PBBFAC,,, | Performed by: INTERNAL MEDICINE

## 2022-05-24 PROCEDURE — 3074F PR MOST RECENT SYSTOLIC BLOOD PRESSURE < 130 MM HG: ICD-10-PCS | Mod: CPTII,S$GLB,, | Performed by: INTERNAL MEDICINE

## 2022-05-24 PROCEDURE — 99215 PR OFFICE/OUTPT VISIT, EST, LEVL V, 40-54 MIN: ICD-10-PCS | Mod: S$GLB,,, | Performed by: INTERNAL MEDICINE

## 2022-05-24 PROCEDURE — 99285 EMERGENCY DEPT VISIT HI MDM: CPT | Mod: CS,,, | Performed by: EMERGENCY MEDICINE

## 2022-05-24 PROCEDURE — 84484 ASSAY OF TROPONIN QUANT: CPT | Performed by: EMERGENCY MEDICINE

## 2022-05-24 PROCEDURE — 99215 OFFICE O/P EST HI 40 MIN: CPT | Mod: S$GLB,,, | Performed by: INTERNAL MEDICINE

## 2022-05-24 PROCEDURE — 99285 PR EMERGENCY DEPT VISIT,LEVEL V: ICD-10-PCS | Mod: CS,,, | Performed by: EMERGENCY MEDICINE

## 2022-05-24 PROCEDURE — U0003 INFECTIOUS AGENT DETECTION BY NUCLEIC ACID (DNA OR RNA); SEVERE ACUTE RESPIRATORY SYNDROME CORONAVIRUS 2 (SARS-COV-2) (CORONAVIRUS DISEASE [COVID-19]), AMPLIFIED PROBE TECHNIQUE, MAKING USE OF HIGH THROUGHPUT TECHNOLOGIES AS DESCRIBED BY CMS-2020-01-R: HCPCS | Performed by: INTERNAL MEDICINE

## 2022-05-24 PROCEDURE — 93010 EKG 12-LEAD: ICD-10-PCS | Mod: ,,, | Performed by: INTERNAL MEDICINE

## 2022-05-24 PROCEDURE — 1160F PR REVIEW ALL MEDS BY PRESCRIBER/CLIN PHARMACIST DOCUMENTED: ICD-10-PCS | Mod: CPTII,S$GLB,, | Performed by: INTERNAL MEDICINE

## 2022-05-24 PROCEDURE — U0002 COVID-19 LAB TEST NON-CDC: HCPCS | Performed by: EMERGENCY MEDICINE

## 2022-05-24 PROCEDURE — 99999 PR PBB SHADOW E&M-EST. PATIENT-LVL V: CPT | Mod: PBBFAC,,, | Performed by: INTERNAL MEDICINE

## 2022-05-24 PROCEDURE — 80053 COMPREHEN METABOLIC PANEL: CPT | Performed by: INTERNAL MEDICINE

## 2022-05-24 PROCEDURE — 3008F BODY MASS INDEX DOCD: CPT | Mod: CPTII,S$GLB,, | Performed by: INTERNAL MEDICINE

## 2022-05-24 PROCEDURE — 80053 COMPREHEN METABOLIC PANEL: CPT | Mod: 91 | Performed by: EMERGENCY MEDICINE

## 2022-05-24 PROCEDURE — 85025 COMPLETE CBC W/AUTO DIFF WBC: CPT | Mod: 91 | Performed by: EMERGENCY MEDICINE

## 2022-05-24 PROCEDURE — 1160F RVW MEDS BY RX/DR IN RCRD: CPT | Mod: CPTII,S$GLB,, | Performed by: INTERNAL MEDICINE

## 2022-05-24 PROCEDURE — 94761 N-INVAS EAR/PLS OXIMETRY MLT: CPT

## 2022-05-24 PROCEDURE — 3008F PR BODY MASS INDEX (BMI) DOCUMENTED: ICD-10-PCS | Mod: CPTII,S$GLB,, | Performed by: INTERNAL MEDICINE

## 2022-05-24 PROCEDURE — 3074F SYST BP LT 130 MM HG: CPT | Mod: CPTII,S$GLB,, | Performed by: INTERNAL MEDICINE

## 2022-05-24 RX ORDER — BENZONATATE 100 MG/1
100 CAPSULE ORAL 3 TIMES DAILY PRN
Status: DISCONTINUED | OUTPATIENT
Start: 2022-05-25 | End: 2022-05-25 | Stop reason: HOSPADM

## 2022-05-24 RX ORDER — LEVOCETIRIZINE DIHYDROCHLORIDE 5 MG/1
5 TABLET, FILM COATED ORAL NIGHTLY
Qty: 30 TABLET | Refills: 0 | Status: SHIPPED | OUTPATIENT
Start: 2022-05-24 | End: 2022-06-15

## 2022-05-24 RX ORDER — BENZONATATE 200 MG/1
200 CAPSULE ORAL 3 TIMES DAILY PRN
Qty: 30 CAPSULE | Refills: 0 | Status: SHIPPED | OUTPATIENT
Start: 2022-05-24 | End: 2022-06-03

## 2022-05-24 RX ORDER — ENOXAPARIN SODIUM 100 MG/ML
100 INJECTION SUBCUTANEOUS
Status: COMPLETED | OUTPATIENT
Start: 2022-05-25 | End: 2022-05-25

## 2022-05-24 NOTE — PROGRESS NOTES
Subjective:     Paloma Bang is a 56 y.o. female who presents for   Chief Complaint   Patient presents with    Foot Swelling     Right ankle, x1 week, no known injury, post bariatric surgery    cold/flu symptoms     X3 days, grandchild sick, not tested for covid, sore throat, dry cough, congestion, fever.    swelling on forearm     Left, from IV 4/30, still sore    Hypertension       HPI    Leg Edema: The patient presents for evaluation of edema in both lower legs. The edema has been moderate. Onset of symptoms was several days ago, and patient reports symptoms have gradually worsened since that time. The edema is present intermittently. The patient states the problem is new. The swelling has been aggravated by nothing. The swelling has been relieved by nothing. Associated factors include: recent surgery sleeve gastectomy in April, risk for DVT and venous insufficiency. Cardiac risk factors: hypertension, obesity (BMI >= 30 kg/m2) and sedentary lifestyle.    Cough: The patient is here for evaluation of a cough. Onset of symptoms was 3-4 days ago. Symptoms have been unchanged since that time. The cough is productive of yellowish-brown sputum and is aggravated by nothing. Associated symptoms include: fever, postnasal drip and sputum production. Patient does not have a history of asthma. Patient does have a history of environmental allergens. Patient has not traveled recently. Patient does not have a history of smoking. Patient has not had a previous Covid test. She went to a [place] on Saturday but she reports that she was already ill.    Hypertension: The patient has been taking medications as instructed, no medication side effects noted, no chest pain on exertion and no palpitations. She has swelling of both legs. She has been holding the antihypertensives after surgery and monitoring her blood pressure. She re-started losartan.    BP Readings from Last 3 Encounters:   05/24/22 126/86   02/02/22  127/86     Bariatric surgery done 4/13/22 and recovery was complicated by nausea and decreased PO intake leading to hospitalization for dehydration from 4/27-4/30.      Review of Systems   Constitutional: Positive for chills, fatigue and fever (subjective). Negative for diaphoresis.   HENT: Positive for congestion (nasal), postnasal drip, rhinorrhea, sneezing and sore throat (started on Saturday or Sunday). Negative for ear pain and sinus pressure.         Took TheraFlu   Eyes: Positive for redness. Negative for visual disturbance.   Respiratory: Positive for cough (dry cough with yellow brownish sputum) and shortness of breath (with going upstairs at home). Negative for chest tightness.    Cardiovascular: Positive for palpitations and leg swelling (BLE leg edema but she feels that R>L). Negative for chest pain.   Gastrointestinal: Positive for constipation. Negative for abdominal pain, nausea and vomiting.   Genitourinary: Negative for decreased urine volume, flank pain and hematuria.   Musculoskeletal: Positive for arthralgias (bilateral ankle pain, h/o left ankle surgery after fracture in past) and joint swelling (ankles). Negative for back pain and neck pain.   Skin: Negative for rash and wound.        Reports lumps on left ankle that enlarge when her legs swell   Allergic/Immunologic: Positive for environmental allergies (patient thinks that symptoms could be related to allergies but her daughter and grandson also have similar symptoms attributed to allergies).   Neurological: Positive for dizziness (when moving around, walking into walls, blames on glasses), weakness, numbness (hands and fingers) and headaches.   Hematological: Negative for adenopathy.   Psychiatric/Behavioral: Positive for dysphoric mood (reports multiple stressors ) and sleep disturbance.           Answers for HPI/ROS submitted by the patient on 5/24/2022  Chronicity: recurrent  Onset: more than 1 year ago  Progression since onset: gradually  improving  Condition status: controlled  anxiety: No  blurred vision: No  chest pain: No  headaches: Yes  malaise/fatigue: Yes  neck pain: No  orthopnea: No  palpitations: Yes  peripheral edema: Yes  PND: No  shortness of breath: No  sweats: No  CAD risks: obesity, post-menopausal state  Compliance problems: no compliance problems  Past treatments: lifestyle changes  Improvement on treatment: significant          Objective:     Physical Exam  Vitals reviewed.   Constitutional:       General: She is awake. She is not in acute distress.     Appearance: Normal appearance. She is well-developed and well-groomed.   HENT:      Head: Normocephalic and atraumatic.      Right Ear: Hearing and external ear normal.      Left Ear: Hearing and external ear normal.      Nose: Nose normal. No congestion.      Mouth/Throat:      Mouth: Mucous membranes are moist.   Eyes:      General: Lids are normal. Vision grossly intact. Gaze aligned appropriately.   Cardiovascular:      Rate and Rhythm: Normal rate and regular rhythm.      Heart sounds: Normal heart sounds. No murmur heard.  Pulmonary:      Effort: Pulmonary effort is normal.      Breath sounds: Normal breath sounds. No decreased breath sounds or wheezing.   Abdominal:      General: Bowel sounds are normal. There is no distension.   Musculoskeletal:         General: Normal range of motion.      Cervical back: Normal range of motion. No rigidity. No pain with movement. Normal range of motion.      Right lower leg: Tenderness (calf muscle) present. 1+ Edema present.      Left lower leg: Tenderness (calf muscle) present. 1+ Edema present.      Right ankle: Swelling present. No tenderness. Normal range of motion.      Left ankle: Swelling present. No tenderness. Normal range of motion.   Lymphadenopathy:      Head:      Right side of head: No submandibular, preauricular or posterior auricular adenopathy.      Left side of head: No submandibular, preauricular or posterior auricular  adenopathy.   Skin:     General: Skin is warm and dry.      Findings: No lesion or rash.      Comments: Raised, fatty lesion lateral ankle that measures approx 3-4 cm, possible lipoma or cyst   Neurological:      Mental Status: She is alert and oriented to person, place, and time.   Psychiatric:         Attention and Perception: Attention normal.         Behavior: Behavior is cooperative.            Assessment:      1. Bilateral leg edema    2. Surgery follow-up    3. Personal history of DVT (deep vein thrombosis)    4. Hypertension, unspecified type    5. Rhinosinusitis    6. Cough    7. Fatigue, unspecified type    8. Positive D dimer    9. S/P gastric sleeve procedure           Plan:     1. Bilateral leg edema  - Comprehensive Metabolic Panel; Future  - CV Ultrasound doppler venous legs bilat; Future    2. Surgery follow-up  - D dimer, quantitative; Future    3. Personal history of DVT (deep vein thrombosis)  - D dimer, quantitative; Future    4. Hypertension, unspecified type  - CBC Auto Differential; Future  - Comprehensive Metabolic Panel; Future    5. Rhinosinusitis  - levocetirizine (XYZAL) 5 MG tablet; Take 1 tablet (5 mg total) by mouth every evening.  Dispense: 30 tablet; Refill: 0  - Influenza A & B by Molecular    6. Cough  - benzonatate (TESSALON) 200 MG capsule; Take 1 capsule (200 mg total) by mouth 3 (three) times daily as needed for Cough.  Dispense: 30 capsule; Refill: 0  - COVID-19 Routine Screening    7. Fatigue, unspecified type  - CBC Auto Differential; Future    8. Positive D dimer  - advised patient to proceed to the ER for evaluation of symptoms with elevated d dimer    9. S/P gastric sleeve procedure      Call if there is no improvement in symptoms    __________________________    Rosalie Gomez MD, PharmD  [facility]- Internal Medicine  American Board of Obesity Medicine diplomate  Office [phone]        Over 50% of 40 minute visit was spent reviewing medical records,  education and discussion of patient's medical conditions and medical management.

## 2022-05-24 NOTE — TELEPHONE ENCOUNTER
Reviewed labs. Positive d-dimer in a patient s/p recent bariatric surgery with leg swelling and mild SOB and h/p DVT/PE.    Called x2 (not accepting calls) and additional number is incorrect. She needs evaluation in ER. Will keep calling and send email.

## 2022-05-25 ENCOUNTER — PATIENT MESSAGE (OUTPATIENT)
Dept: INTERNAL MEDICINE | Facility: CLINIC | Age: 57
End: 2022-05-25
Payer: COMMERCIAL

## 2022-05-25 VITALS
OXYGEN SATURATION: 97 % | WEIGHT: 242 LBS | HEART RATE: 86 BPM | RESPIRATION RATE: 17 BRPM | DIASTOLIC BLOOD PRESSURE: 92 MMHG | TEMPERATURE: 98 F | SYSTOLIC BLOOD PRESSURE: 161 MMHG | BODY MASS INDEX: 42.88 KG/M2 | HEIGHT: 63 IN

## 2022-05-25 LAB
SARS-COV-2 RNA RESP QL NAA+PROBE: NOT DETECTED
SARS-COV-2- CYCLE NUMBER: NORMAL

## 2022-05-25 PROCEDURE — 63600175 PHARM REV CODE 636 W HCPCS: Performed by: EMERGENCY MEDICINE

## 2022-05-25 PROCEDURE — 96372 THER/PROPH/DIAG INJ SC/IM: CPT | Performed by: EMERGENCY MEDICINE

## 2022-05-25 PROCEDURE — 25000003 PHARM REV CODE 250: Performed by: EMERGENCY MEDICINE

## 2022-05-25 RX ADMIN — ENOXAPARIN SODIUM 100 MG: 100 INJECTION SUBCUTANEOUS at 12:05

## 2022-05-25 RX ADMIN — BENZONATATE 100 MG: 100 CAPSULE ORAL at 12:05

## 2022-05-25 NOTE — DISCHARGE INSTRUCTIONS
There was a clot found in your legs.  Given that you are having some shortness of breath, there is concerned that you may have also a pulmonary embolism (blood clot in your lungs) However, your vital signs and other workup was not concerning.    You were started on blood thinning medications and discharged with a prescription for 1 month of blood thinners.  Please follow-up with your primary care doctor and/or Cardiology for continued management.  You will likely need to be on blood thinners for longer than 1 month.    If you develop any worsening shortness of breath, chest pain, feeling like your can not catch her breath, dizziness, weakness, worsening swelling of your legs, or any other new, worsening worrisome symptoms please return to the emergency department for re-evaluation.    A blood thinner causes increased risk for bleeding.  If you are having any significant bleeding please return to the emergency department.

## 2022-05-25 NOTE — ED NOTES
Patient reports SOB, cough, sore throat, CP, BLE swelling and pain in janay legs. D Dimer today =17, reports h/o saddle PE, Had COVID swab and flu swab today

## 2022-05-25 NOTE — ED NOTES
Patient identifiers verified and correct for Ms Bang  C/C: SOB, CP, BLE swelling SEE NN  APPEARANCE: awake and alert in NAD.  SKIN: warm, dry and intact. No breakdown or bruising.  MUSCULOSKELETAL: Patient moving all extremities spontaneously, no obvious swelling or deformities noted. Ambulates independently.  RESPIRATORY: Denies shortness of breath.Respirations unlabored.   CARDIAC: Denies CP, 2+ distal pulses; no peripheral edema  ABDOMEN: S/ND/NT, Denies nausea  : voids spontaneously, denies difficulty  Neurologic: AAO x 4; follows commands equal strength in all extremities; denies numbness/tingling. Denies dizziness  Denies weakness

## 2022-05-25 NOTE — PROVIDER PROGRESS NOTES - EMERGENCY DEPT.
Encounter Date: 5/24/2022    ED Physician Progress Notes        Physician Note:   S/o to me.   Here sent from clinic with positive D-dimer and some shortness of breath complaints but also cough and congestion.   Patient's workup with labs, DVT ultrasound, V/Q scan.  Patient found to have positive DVT and, low to intermediate risk of PE she does have a history of DVT in the past and is not currently on anticoagulation.    Given dose of Lovenox here in the emergency department.  Surgery was over a month ago safe for anticoagulation at this time.    Patient re-evaluated she is resting comfortably, no longer tachycardic.  Point of care ultrasound was undertaken there is no signs of right heart strain.  Troponin is negative.  BNP never resulted.  Patient was ambulated with no significant shortness of breath, did not get hypoxic.    I had a shared decision-making session with the patient regarding admission for monitoring versus discharge home with oral anticoagulation.  She is comfortable going home and will return for any new or worsening symptoms.    She has a hestia score of 0.    Referred urgently to Cardiology for outpatient echo and continued management of her DVT and possibly PE.    ED return precautions for any worsening shortness of breath, chest pain, dizziness or weakness or any other new, worsening worrisome symptoms.    Findings of ED work up explained to patient. Patient agrees with discharge plan and verbalizes understanding of return precautions.

## 2022-05-25 NOTE — ED PROVIDER NOTES
Encounter Date: 2022       History     Chief Complaint   Patient presents with    Multiple complaints     Seen at clinic today, told me may have blood clot in my lungs , leg edema, hx blood clots     HPI     This is a 56-year-old woman with a history of hypertension, DVT/PE after prior surgery, who underwent bariatric surgery recently, and presented to her primary care clinic today concerned for cough, shortness of breath, congestion, bilateral lower extremity edema.  A D-dimer was sent and was noted to be positive at 17. She was referred here for further evaluation.  She reports that she had surgery to revise a gastric band to a gastric sleeve, and postop course was complicated by nausea and vomiting and dehydration, required a second admission. She has been able to take PO at home, but does have ongoing nausea.   Review of patient's allergies indicates:   Allergen Reactions    Latex, natural rubber      breakout     Past Medical History:   Diagnosis Date    Allergy     Arthritis     Deep vein thrombosis     occurred after ankle fracture    GERD (gastroesophageal reflux disease)     Hypertension     Joint pain     Morbid obesity     Neuromuscular disorder     Pulmonary embolism     occurred after ankle fracture     Past Surgical History:   Procedure Laterality Date    ankle surgery      back surgery      Lumbar spinal fusion with shaving of a disk    BARIATRIC SURGERY       SECTION      COLONOSCOPY N/A 2018    Procedure: COLONOSCOPY;  Surgeon: Bob Posada MD;  Location: [address]);  Service: Endoscopy;  Laterality: N/A;  18-BMI 49.68-MS    COLONOSCOPY N/A 2020    Procedure: COLONOSCOPY;  Surgeon: Dre Charlton MD;  Location: [address]);  Service: Endoscopy;  Laterality: N/A;    ESOPHAGOGASTRODUODENOSCOPY N/A 2020    Procedure: EGD (ESOPHAGOGASTRODUODENOSCOPY);  Surgeon: Dre Charlton MD;  Location: [address]);  Service: Endoscopy;   Laterality: N/A;  COVID + on 3/30-GT    HYSTERECTOMY      2007    INJECTION OF FACET JOINT Left 07/13/2018    Procedure: INJECTION, FACET JOINT;  Surgeon: Jia Yang MD;  Location: [facility];  Service: Pain Management;  Laterality: Left;  @ L3-4, L4-5 and L5-S1    INJECTION OF FACET JOINT Bilateral 08/09/2019    Procedure: INJECTION, FACET JOINT, L4-L5,L5-S1;  Surgeon: Jia Yang MD;  Location: [facility];  Service: Pain Management;  Laterality: Bilateral;    INJECTION OF JOINT Left 05/24/2019    Procedure: INJECTION, JOINT, LEFT SI;  Surgeon: Jia Yang MD;  Location: [facility];  Service: Pain Management;  Laterality: Left;  INJECTION, JOINT, LEFT SI    INJECTION OF JOINT Bilateral 07/16/2021    Procedure: INJECTION, JOINT, SI;  Surgeon: Awilda Ruiz MD;  Location: [facility];  Service: Pain Management;  Laterality: Bilateral;    LAPAROSCOPIC GASTRIC BANDING      MYOMECTOMY      PILONIDAL CYST DRAINAGE      removed    ROTATOR CUFF REPAIR      Left     Family History   Problem Relation Age of Onset    Diabetes Father     Hypertension Father     Diabetes Mother     Hypertension Mother     Diabetes Brother     Hypertension Brother     No Known Problems Daughter     Pancreatitis Brother     Hypertension Brother     No Known Problems Brother     Breast cancer Neg Hx     Colon cancer Neg Hx     Ovarian cancer Neg Hx     Acne Neg Hx     Eczema Neg Hx     Lupus Neg Hx     Psoriasis Neg Hx     Melanoma Neg Hx      Social History     Tobacco Use    Smoking status: Never Smoker    Smokeless tobacco: Never Used   Substance Use Topics    Alcohol use: No     Comment: occasionally/ not weekly    Drug use: No     Review of Systems   Constitutional: Positive for fatigue. Negative for chills, diaphoresis and fever.   HENT: Positive for congestion. Negative for rhinorrhea and sore throat.    Eyes: Negative for visual disturbance.   Respiratory: Positive for cough  and shortness of breath. Negative for chest tightness.    Cardiovascular: Positive for leg swelling. Negative for chest pain and palpitations.   Gastrointestinal: Negative for abdominal pain, blood in stool, constipation, diarrhea and vomiting.   Genitourinary: Negative for dysuria, hematuria and urgency.   Musculoskeletal: Negative for back pain.   Skin: Negative for rash.   Neurological: Negative for seizures and syncope.   Hematological: Does not bruise/bleed easily.   Psychiatric/Behavioral: Negative for agitation and hallucinations.       Physical Exam     Initial Vitals [05/24/22 1850]   BP Pulse Resp Temp SpO2   (!) 136/100 (!) 116 20 97.7 °F (36.5 °C) 98 %      MAP       --         Physical Exam  Gen: AxOx3, NAD, well nourished, appears stated age  Eye: EOMI, no scleral icterus, no periorbital edema or ecchymosis  Head: normocephalic, atraumatic, no lesions, scalp appears normal  ENT: neck supple, no stridor, no masses, no drooling or voice changes  CVS: regular tachycardia, no m/r/g, distal pulses intact/symmetric  Pulm: CTAB, no wheezes, rales or rhonchi, no increased work of breathing  Abd: soft, nontender, nondistended, no organomegaly, no CVAT  Ext: bilateral lower extremity edema, with some tenderness over the lateral left ankle, no focal calf tenderness. Pulses are 2+, no lesions, rashes, or deformity  Neuro: GCS15, moving all extremities, gait intact, face grossly symmetric  Psych: normal affect, cooperative, well groomed, makes good eye contact    ED Course   Procedures  Labs Reviewed   CBC W/ AUTO DIFFERENTIAL - Abnormal; Notable for the following components:       Result Value    RDW 15.5 (*)     Immature Granulocytes 0.7 (*)     Immature Grans (Abs) 0.05 (*)     Mono # 1.3 (*)     Mono % 18.8 (*)     All other components within normal limits   COMPREHENSIVE METABOLIC PANEL - Abnormal; Notable for the following components:    Potassium 3.4 (*)     Glucose 141 (*)     Albumin 3.4 (*)     All other  components within normal limits   TROPONIN I   B-TYPE NATRIURETIC PEPTIDE   SARS-COV-2 RDRP GENE    Narrative:     This test utilizes isothermal nucleic acid amplification   technology to detect the SARS-CoV-2 RdRp nucleic acid segment.   The analytical sensitivity (limit of detection) is 125 genome   equivalents/mL.   A POSITIVE result implies infection with the SARS-CoV-2 virus;   the patient is presumed to be contagious.     A NEGATIVE result means that SARS-CoV-2 nucleic acids are not   present above the limit of detection. A NEGATIVE result should be   treated as presumptive. It does not rule out the possibility of   COVID-19 and should not be the sole basis for treatment decisions.   If COVID-19 is strongly suspected based on clinical and exposure   history, re-testing using an alternate molecular assay should be   considered.   This test is only for use under the Food and Drug   Administration s Emergency Use Authorization (EUA).   Commercial kits are provided by "[employer]   Performance characteristics of the EUA have been independently   verified by [facility] Department of   Pathology and Laboratory Medicine.   _________________________________________________________________   The authorized Fact Sheet for Healthcare Providers and the authorized Fact   Sheet for Patients of the ID NOW COVID-19 are available on the FDA   website:     https://www.fda.gov/media/457201/download  https://www.fda.gov/media/872779/download                 Imaging Results          NM Lung Scan Perfusion Particulate (Final result)  Result time 05/24/22 22:28:10   Procedure changed from NM Lung Scan Ventilation Perfusion     Final result by Allan Alberto MD (05/24/22 22:28:10)                 Impression:      This represents a low to intermediate probability for pulmonary embolism.  Further evaluation/follow-up as clinically warranted.  Lower extremity venous ultrasound and/or CTA chest could provide  improved sensitivity if clinically warranted.    Electronically signed by resident: Dre Morris  Date:    05/24/2022  Time:    21:59    Electronically signed by: Allan Alberto MD  Date:    05/24/2022  Time:    22:28             Narrative:    EXAMINATION:  NM LUNG SCAN PERFUSION    CLINICAL HISTORY:  Pulmonary embolism (PE) suspected, high prob;Pulmonary embolism (PE) suspected, positive D-dimer;Rule out PE;    TECHNIQUE:  Following the IV administration of 5.0 mCi of Tc-99m-MAA, multiple images of the thorax were obtained in various projections.  Ventilation images were not performed due to radiopharmaceutical shortage.    COMPARISON:  Chest radiograph 05/24/2022    FINDINGS:  Perfusion: No large segmental or subsegmental distribution perfusion defects.  There is subtle heterogeneity of the right lung, with a possible defect in the posterior mid right lung.  No corresponding abnormality identified in the right lung on comparison chest radiograph.                               X-Ray Chest AP Portable (Final result)  Result time 05/24/22 21:03:00    Final result by Panfilo Yu MD (05/24/22 21:03:00)                 Impression:      No acute process.      Electronically signed by: Panfilo Yu MD  Date:    05/24/2022  Time:    21:03             Narrative:    EXAMINATION:  XR CHEST AP PORTABLE    CLINICAL HISTORY:  Chest Pain;    TECHNIQUE:  Single frontal view of the chest was performed.    COMPARISON:  10/30/2020.    FINDINGS:  Monitoring EKG leads are present.  There is a safety pin overlying the right hemithorax.    The trachea is unremarkable.  The cardiomediastinal silhouette is within normal limits.  The hemidiaphragms are unremarkable.  There are no pleural effusions.  There is no evidence of a pneumothorax.  There is no evidence of pneumomediastinum.  No airspace opacity is present.  There are degenerative changes in the osseous structures.                               X-Ray Ankle Complete Left (Final  result)  Result time 05/24/22 21:01:50    Final result by Allan Alberto MD (05/24/22 21:01:50)                 Impression:      Ankle soft tissue edema.  No displaced fracture.      Electronically signed by: Allan Alberto MD  Date:    05/24/2022  Time:    21:01             Narrative:    EXAMINATION:  XR ANKLE COMPLETE 3 VIEW LEFT    CLINICAL HISTORY:  Pain in left ankle and joints of left foot.    TECHNIQUE:  AP, lateral and oblique views of the left ankle were performed.    COMPARISON:  None.    FINDINGS:  No evidence of acute fracture or dislocation.  Mild-to-moderate degenerative changes in the ankle and hindfoot.  There are chronic changes in the distal tibiofibular region.  Enthesopathic changes at the Achilles tendon insertion.  Mild soft tissue edema, more pronounced along the lateral ankle.  No unexpected radiopaque foreign body.                                 Medications - No data to display  Medical Decision Making:   History:   Old Medical Records: I decided to obtain old medical records.  Old Records Summarized: records from clinic visits.  Initial Assessment:   This is a 56-year-old woman who presents with elevated D-dimer, cough, congestion, shortness of breath and lower extremity edema.  She is recently postop, so her D-dimer is difficult to interpret, though it is profoundly elevated.  Given her history of DVT/PE before, and her tachycardia, we will pursue V/Q scan.  CTA is not currently available due to national critical IV contrast shortage and is torsion or regulation.  I have explained to the patient that V/Q is less sensitive test, but is healing test that we have available.  Given that will supplement with bilateral lower extremity ultrasounds.  She is agreeable to this.  Will also obtain a chest x-ray to evaluate for others etiologies of shortness of breath like pneumonia, aspiration, pulmonary edema.  Plan for labs including troponin, electrolytes.  Clinical Tests:   Lab Tests:  Ordered and Reviewed  Radiological Study: Ordered  ED Management:  V/Q scan and lower extremity ultrasound pending at time of sign out.  Chest x-ray and ankle x-ray by my independent interpretation did not show any obvious abnormality.  Her labs here are reassuring.  Disposition is pending results above.  Care signed out to overnight attending, awaiting these results.                      Clinical Impression:   Final diagnoses:  [R68.89] Multiple complaints  [R07.9] Chest pain  [R60.9] Swelling  [R60.9] Swelling - s/p surgery  [M25.572] Left lateral ankle pain                 Shy Cm MD  05/24/22 7001

## 2022-05-26 ENCOUNTER — PATIENT MESSAGE (OUTPATIENT)
Dept: ADMINISTRATIVE | Facility: HOSPITAL | Age: 57
End: 2022-05-26
Payer: COMMERCIAL

## 2022-05-27 ENCOUNTER — TELEPHONE (OUTPATIENT)
Dept: EMERGENCY MEDICINE | Facility: HOSPITAL | Age: 57
End: 2022-05-27
Payer: COMMERCIAL

## 2022-05-27 ENCOUNTER — TELEPHONE (OUTPATIENT)
Dept: CARDIOLOGY | Facility: CLINIC | Age: 57
End: 2022-05-27

## 2022-05-27 ENCOUNTER — HOSPITAL ENCOUNTER (OUTPATIENT)
Dept: CARDIOLOGY | Facility: HOSPITAL | Age: 57
Discharge: HOME | End: 2022-05-27
Attending: INTERNAL MEDICINE
Payer: COMMERCIAL

## 2022-05-27 DIAGNOSIS — R60.0 BILATERAL LEG EDEMA: ICD-10-CM

## 2022-05-27 PROCEDURE — 93970 EXTREMITY STUDY: CPT | Mod: 26,,, | Performed by: INTERNAL MEDICINE

## 2022-05-27 PROCEDURE — 93970 CV US DOPPLER VENOUS LEGS BILATERAL (CUPID ONLY): ICD-10-PCS | Mod: 26,,, | Performed by: INTERNAL MEDICINE

## 2022-05-27 PROCEDURE — 93970 EXTREMITY STUDY: CPT | Mod: 50

## 2022-05-27 NOTE — TELEPHONE ENCOUNTER
----- Message from Mindy Barry MD sent at 5/27/2022 10:20 AM CDT -----  Can we reach out to this patient and see if she would like to see me today for care of her DVT. I tried calling her twice, but got her voicemail. She just had a DVT study with Zena.

## 2022-05-27 NOTE — TELEPHONE ENCOUNTER
I called to inform Ms. Merritt that Eliquis will be filled at the [facility] on [facility].  Patient voiced understanding.

## 2022-05-27 NOTE — TELEPHONE ENCOUNTER
Patient seen in the ED for DVT was prescribed Eliquis but the dosage was not covered by her insurance.  I obtained information from the ED pharmacist about a free starter pack.  I spoke with the pharmacist, gave her the activation inflammation, prescription will be filled without charge.

## 2022-05-30 ENCOUNTER — PATIENT MESSAGE (OUTPATIENT)
Dept: ADMINISTRATIVE | Facility: HOSPITAL | Age: 57
End: 2022-05-30
Payer: COMMERCIAL

## 2022-06-06 ENCOUNTER — PATIENT MESSAGE (OUTPATIENT)
Dept: INTERNAL MEDICINE | Facility: CLINIC | Age: 57
End: 2022-06-06
Payer: COMMERCIAL

## 2022-06-08 ENCOUNTER — LAB VISIT (OUTPATIENT)
Dept: LAB | Facility: HOSPITAL | Age: 57
End: 2022-06-08
Attending: INTERNAL MEDICINE
Payer: COMMERCIAL

## 2022-06-08 DIAGNOSIS — E11.9 TYPE 2 DIABETES MELLITUS WITHOUT COMPLICATION: ICD-10-CM

## 2022-06-08 LAB
CHOLEST SERPL-MCNC: 164 MG/DL (ref 120–199)
CHOLEST/HDLC SERPL: 3.3 {RATIO} (ref 2–5)
ESTIMATED AVG GLUCOSE: 146 MG/DL (ref 68–131)
HBA1C MFR BLD: 6.7 % (ref 4–5.6)
HDLC SERPL-MCNC: 49 MG/DL (ref 40–75)
HDLC SERPL: 29.9 % (ref 20–50)
LDLC SERPL CALC-MCNC: 103.2 MG/DL (ref 63–159)
NONHDLC SERPL-MCNC: 115 MG/DL
TRIGL SERPL-MCNC: 59 MG/DL (ref 30–150)

## 2022-06-08 PROCEDURE — 80061 LIPID PANEL: CPT | Performed by: INTERNAL MEDICINE

## 2022-06-08 PROCEDURE — 36415 COLL VENOUS BLD VENIPUNCTURE: CPT | Mod: PO | Performed by: INTERNAL MEDICINE

## 2022-06-08 PROCEDURE — 83036 HEMOGLOBIN GLYCOSYLATED A1C: CPT | Performed by: INTERNAL MEDICINE

## 2022-06-15 DIAGNOSIS — J32.9 RHINOSINUSITIS: ICD-10-CM

## 2022-06-15 RX ORDER — LEVOCETIRIZINE DIHYDROCHLORIDE 5 MG/1
TABLET, FILM COATED ORAL
Qty: 30 TABLET | Refills: 0 | Status: SHIPPED | OUTPATIENT
Start: 2022-06-15 | End: 2022-06-27

## 2022-06-15 NOTE — TELEPHONE ENCOUNTER
Refill Routing Note   Medication(s) are not appropriate for processing by [facility] for the following reason(s):      - Non-participating provider    [facility] action(s):  Route          Medication reconciliation completed: No     Appointments  past 12m or future 3m with PCP    Date Provider   Last Visit   12/8/2021 Jenn Mayorga MD   Next Visit   9/13/2022 Jenn Mayorga MD   ED visits in past 90 days: 1        Note composed:1:49 PM 06/15/2022

## 2022-08-31 ENCOUNTER — OFFICE VISIT (OUTPATIENT)
Dept: INTERNAL MEDICINE | Facility: CLINIC | Age: 57
End: 2022-08-31
Payer: COMMERCIAL

## 2022-08-31 ENCOUNTER — LAB VISIT (OUTPATIENT)
Dept: LAB | Facility: HOSPITAL | Age: 57
End: 2022-08-31
Attending: INTERNAL MEDICINE
Payer: COMMERCIAL

## 2022-08-31 VITALS
SYSTOLIC BLOOD PRESSURE: 170 MMHG | TEMPERATURE: 97 F | DIASTOLIC BLOOD PRESSURE: 104 MMHG | HEART RATE: 80 BPM | OXYGEN SATURATION: 99 % | RESPIRATION RATE: 19 BRPM | BODY MASS INDEX: 40.12 KG/M2 | HEIGHT: 63 IN | WEIGHT: 226.44 LBS

## 2022-08-31 DIAGNOSIS — B34.9 VIRAL SYNDROME: Primary | ICD-10-CM

## 2022-08-31 DIAGNOSIS — K52.9 GASTROENTERITIS, ACUTE: ICD-10-CM

## 2022-08-31 DIAGNOSIS — E11.9 TYPE 2 DIABETES MELLITUS WITHOUT COMPLICATION, UNSPECIFIED WHETHER LONG TERM INSULIN USE: ICD-10-CM

## 2022-08-31 LAB
ALBUMIN SERPL BCP-MCNC: 3.9 G/DL (ref 3.5–5.2)
ALP SERPL-CCNC: 86 U/L (ref 55–135)
ALT SERPL W/O P-5'-P-CCNC: 23 U/L (ref 10–44)
ANION GAP SERPL CALC-SCNC: 11 MMOL/L (ref 8–16)
AST SERPL-CCNC: 25 U/L (ref 10–40)
BILIRUB SERPL-MCNC: 0.6 MG/DL (ref 0.1–1)
BUN SERPL-MCNC: 11 MG/DL (ref 6–20)
CALCIUM SERPL-MCNC: 10 MG/DL (ref 8.7–10.5)
CHLORIDE SERPL-SCNC: 104 MMOL/L (ref 95–110)
CO2 SERPL-SCNC: 26 MMOL/L (ref 23–29)
CREAT SERPL-MCNC: 0.9 MG/DL (ref 0.5–1.4)
CTP QC/QA: YES
EST. GFR  (NO RACE VARIABLE): >60 ML/MIN/1.73 M^2
GLUCOSE SERPL-MCNC: 103 MG/DL (ref 70–110)
HAV IGM SERPL QL IA: NORMAL
HBV CORE IGM SERPL QL IA: NORMAL
HBV SURFACE AG SERPL QL IA: NORMAL
HCV AB SERPL QL IA: NORMAL
POTASSIUM SERPL-SCNC: 3.6 MMOL/L (ref 3.5–5.1)
PROT SERPL-MCNC: 7.7 G/DL (ref 6–8.4)
SARS-COV-2 AG RESP QL IA.RAPID: NEGATIVE
SODIUM SERPL-SCNC: 141 MMOL/L (ref 136–145)

## 2022-08-31 PROCEDURE — 4010F ACE/ARB THERAPY RXD/TAKEN: CPT | Mod: CPTII,S$GLB,, | Performed by: INTERNAL MEDICINE

## 2022-08-31 PROCEDURE — 3080F DIAST BP >= 90 MM HG: CPT | Mod: CPTII,S$GLB,, | Performed by: INTERNAL MEDICINE

## 2022-08-31 PROCEDURE — 3008F PR BODY MASS INDEX (BMI) DOCUMENTED: ICD-10-PCS | Mod: CPTII,S$GLB,, | Performed by: INTERNAL MEDICINE

## 2022-08-31 PROCEDURE — 1160F RVW MEDS BY RX/DR IN RCRD: CPT | Mod: CPTII,S$GLB,, | Performed by: INTERNAL MEDICINE

## 2022-08-31 PROCEDURE — 3080F PR MOST RECENT DIASTOLIC BLOOD PRESSURE >= 90 MM HG: ICD-10-PCS | Mod: CPTII,S$GLB,, | Performed by: INTERNAL MEDICINE

## 2022-08-31 PROCEDURE — 3044F HG A1C LEVEL LT 7.0%: CPT | Mod: CPTII,S$GLB,, | Performed by: INTERNAL MEDICINE

## 2022-08-31 PROCEDURE — 3008F BODY MASS INDEX DOCD: CPT | Mod: CPTII,S$GLB,, | Performed by: INTERNAL MEDICINE

## 2022-08-31 PROCEDURE — 87811 SARS-COV-2 COVID19 W/OPTIC: CPT | Mod: QW,S$GLB,, | Performed by: INTERNAL MEDICINE

## 2022-08-31 PROCEDURE — 1159F PR MEDICATION LIST DOCUMENTED IN MEDICAL RECORD: ICD-10-PCS | Mod: CPTII,S$GLB,, | Performed by: INTERNAL MEDICINE

## 2022-08-31 PROCEDURE — 87811 SARS CORONAVIRUS 2 ANTIGEN POCT, MANUAL READ: ICD-10-PCS | Mod: QW,S$GLB,, | Performed by: INTERNAL MEDICINE

## 2022-08-31 PROCEDURE — 4010F PR ACE/ARB THEARPY RXD/TAKEN: ICD-10-PCS | Mod: CPTII,S$GLB,, | Performed by: INTERNAL MEDICINE

## 2022-08-31 PROCEDURE — 3077F SYST BP >= 140 MM HG: CPT | Mod: CPTII,S$GLB,, | Performed by: INTERNAL MEDICINE

## 2022-08-31 PROCEDURE — 99214 PR OFFICE/OUTPT VISIT, EST, LEVL IV, 30-39 MIN: ICD-10-PCS | Mod: S$GLB,,, | Performed by: INTERNAL MEDICINE

## 2022-08-31 PROCEDURE — 3044F PR MOST RECENT HEMOGLOBIN A1C LEVEL <7.0%: ICD-10-PCS | Mod: CPTII,S$GLB,, | Performed by: INTERNAL MEDICINE

## 2022-08-31 PROCEDURE — 80074 ACUTE HEPATITIS PANEL: CPT | Performed by: INTERNAL MEDICINE

## 2022-08-31 PROCEDURE — 1160F PR REVIEW ALL MEDS BY PRESCRIBER/CLIN PHARMACIST DOCUMENTED: ICD-10-PCS | Mod: CPTII,S$GLB,, | Performed by: INTERNAL MEDICINE

## 2022-08-31 PROCEDURE — 99999 PR PBB SHADOW E&M-EST. PATIENT-LVL IV: CPT | Mod: PBBFAC,,, | Performed by: INTERNAL MEDICINE

## 2022-08-31 PROCEDURE — 99214 OFFICE O/P EST MOD 30 MIN: CPT | Mod: S$GLB,,, | Performed by: INTERNAL MEDICINE

## 2022-08-31 PROCEDURE — 3077F PR MOST RECENT SYSTOLIC BLOOD PRESSURE >= 140 MM HG: ICD-10-PCS | Mod: CPTII,S$GLB,, | Performed by: INTERNAL MEDICINE

## 2022-08-31 PROCEDURE — 99999 PR PBB SHADOW E&M-EST. PATIENT-LVL IV: ICD-10-PCS | Mod: PBBFAC,,, | Performed by: INTERNAL MEDICINE

## 2022-08-31 PROCEDURE — 1159F MED LIST DOCD IN RCRD: CPT | Mod: CPTII,S$GLB,, | Performed by: INTERNAL MEDICINE

## 2022-08-31 PROCEDURE — 36415 COLL VENOUS BLD VENIPUNCTURE: CPT | Mod: PO | Performed by: INTERNAL MEDICINE

## 2022-08-31 PROCEDURE — 80053 COMPREHEN METABOLIC PANEL: CPT | Performed by: INTERNAL MEDICINE

## 2022-08-31 RX ORDER — DICYCLOMINE HYDROCHLORIDE 10 MG/1
10 CAPSULE ORAL 4 TIMES DAILY PRN
Qty: 60 CAPSULE | Refills: 1 | Status: SHIPPED | OUTPATIENT
Start: 2022-08-31 | End: 2022-09-15

## 2022-08-31 RX ORDER — ONDANSETRON 4 MG/1
4 TABLET, ORALLY DISINTEGRATING ORAL EVERY 6 HOURS PRN
Qty: 30 TABLET | Refills: 1 | Status: SHIPPED | OUTPATIENT
Start: 2022-08-31 | End: 2023-03-06

## 2022-08-31 NOTE — PROGRESS NOTES
"Subjective:       Patient ID: Paloma Bang is a 56 y.o. female.    Chief Complaint: Nasal Congestion, Headache (4 days), Diarrhea, and Cough (4 days)    HPI    She presents for evaluation of viral syndrome. Symptoms started 6 days ago and are progressing. Symptoms started with sore throat. Rhinorrhea, coughing, postnasal drainage then followed. She then developed nausea and diarrhea two days ago.   She denies fever, but does endorse chills (not sure if menopause related). Some body aches. General malaise.   Few coworkers coughing on Friday.   Covid vaccinated, not boosted. Has not tested at home.   Diarrhea 1-2x/day. Loose yesterday, watery today. A/w abdominal cramping. One episode of nocturnal diarrhea. Ate at new restaurant over the weekend- had fried oysters, but reports tasted off and symptoms began shortly after.     Review of Systems   Constitutional:  Positive for chills. Negative for fever.   HENT:  Positive for postnasal drip, rhinorrhea and sore throat.    Respiratory:  Positive for cough. Negative for shortness of breath.    Gastrointestinal:  Positive for abdominal pain (cramping, resolves w/ BM), diarrhea and nausea.     Objective:        Vitals:    08/31/22 1025   BP: (!) 170/104   BP Location: Right arm   Patient Position: Sitting   BP Method: Large (Manual)   Pulse: 80   Resp: 19   Temp: 97.3 °F (36.3 °C)   TempSrc: Temporal   SpO2: 99%   Weight: 102.7 kg (226 lb 6.6 oz)   Height: 5' 3" (1.6 m)       Body mass index is 40.11 kg/m².    Physical Exam  Constitutional:       General: She is not in acute distress.     Appearance: She is well-developed. She is not diaphoretic.   HENT:      Head: Normocephalic and atraumatic.   Cardiovascular:      Rate and Rhythm: Normal rate and regular rhythm.   Pulmonary:      Effort: Pulmonary effort is normal. No respiratory distress.      Breath sounds: Normal breath sounds. No wheezing, rhonchi or rales.   Abdominal:      General: Bowel sounds are normal. " There is no distension.      Palpations: Abdomen is soft.      Tenderness: There is generalized abdominal tenderness. There is no guarding or rebound.   Musculoskeletal:      Cervical back: Neck supple. No rigidity.   Skin:     General: Skin is warm and dry.      Nails: There is no clubbing.   Neurological:      Mental Status: She is alert.   Psychiatric:         Behavior: Behavior normal.         Judgment: Judgment normal.       Assessment:     1. Viral syndrome    2. Gastroenteritis, acute           Plan:         1. Viral syndrome  - improving. Covid neg.  - SARS Coronavirus 2 Antigen, POCT Manual Read    2. Gastroenteritis, acute  - check labs as below since she ate oysters that seemed off preceding symptoms. Rx as below prn symptoms. Oral hydration. [place] diet as tolerated.  - work excuse provided  - Comprehensive Metabolic Panel; Future  - Hepatitis Panel, Acute; Future  - ondansetron (ZOFRAN-ODT) 4 MG TbDL; Take 1 tablet (4 mg total) by mouth every 6 (six) hours as needed (nausea).  Dispense: 30 tablet; Refill: 1  - dicyclomine (BENTYL) 10 MG capsule; Take 1 capsule (10 mg total) by mouth 4 (four) times daily as needed (abdominal cramping).  Dispense: 60 capsule; Refill: 1    Unless there are intervening problems, Follow up if symptoms worsen or fail to improve.      Patient note was created using MModal Dictation.  Any errors in syntax or even information may not have been identified and edited on initial review prior to signing this note.    30 minutes total time spent on the encounter, which includes face to face time and non-face to face time preparing to see the patient (eg, review of tests), Obtaining and/or reviewing separately obtained history, Documenting clinical information in the electronic or other health record, Independently interpreting results (not separately reported) and communicating results to the patient/family/caregiver, or Care coordination (not separately reported).

## 2022-09-02 RX ORDER — ERGOCALCIFEROL 1.25 MG/1
CAPSULE ORAL
Qty: 12 CAPSULE | Refills: 0 | Status: SHIPPED | OUTPATIENT
Start: 2022-09-02 | End: 2023-05-17 | Stop reason: SDUPTHER

## 2022-09-06 ENCOUNTER — PATIENT MESSAGE (OUTPATIENT)
Dept: ADMINISTRATIVE | Facility: HOSPITAL | Age: 57
End: 2022-09-06
Payer: COMMERCIAL

## 2022-09-13 ENCOUNTER — OFFICE VISIT (OUTPATIENT)
Dept: INTERNAL MEDICINE | Facility: CLINIC | Age: 57
End: 2022-09-13
Payer: COMMERCIAL

## 2022-09-13 ENCOUNTER — LAB VISIT (OUTPATIENT)
Dept: LAB | Facility: HOSPITAL | Age: 57
End: 2022-09-13
Attending: INTERNAL MEDICINE
Payer: COMMERCIAL

## 2022-09-13 VITALS
WEIGHT: 222 LBS | BODY MASS INDEX: 39.34 KG/M2 | HEIGHT: 63 IN | TEMPERATURE: 97 F | SYSTOLIC BLOOD PRESSURE: 130 MMHG | OXYGEN SATURATION: 100 % | DIASTOLIC BLOOD PRESSURE: 86 MMHG | HEART RATE: 69 BPM

## 2022-09-13 DIAGNOSIS — Z00.00 ROUTINE MEDICAL EXAM: ICD-10-CM

## 2022-09-13 DIAGNOSIS — Z12.31 VISIT FOR SCREENING MAMMOGRAM: ICD-10-CM

## 2022-09-13 DIAGNOSIS — Z00.00 ROUTINE GENERAL MEDICAL EXAMINATION AT HEALTH CARE FACILITY: Primary | ICD-10-CM

## 2022-09-13 LAB
BASOPHILS # BLD AUTO: 0.04 K/UL (ref 0–0.2)
BASOPHILS NFR BLD: 0.5 % (ref 0–1.9)
BILIRUB UR QL STRIP: NEGATIVE
CLARITY UR REFRACT.AUTO: CLEAR
COLOR UR AUTO: YELLOW
DIFFERENTIAL METHOD: ABNORMAL
EOSINOPHIL # BLD AUTO: 0.1 K/UL (ref 0–0.5)
EOSINOPHIL NFR BLD: 1.4 % (ref 0–8)
ERYTHROCYTE [DISTWIDTH] IN BLOOD BY AUTOMATED COUNT: 15.5 % (ref 11.5–14.5)
ESTIMATED AVG GLUCOSE: 140 MG/DL (ref 68–131)
GLUCOSE UR QL STRIP: NEGATIVE
HBA1C MFR BLD: 6.5 % (ref 4–5.6)
HCT VFR BLD AUTO: 43.9 % (ref 37–48.5)
HGB BLD-MCNC: 14.4 G/DL (ref 12–16)
HGB UR QL STRIP: ABNORMAL
IMM GRANULOCYTES # BLD AUTO: 0.02 K/UL (ref 0–0.04)
IMM GRANULOCYTES NFR BLD AUTO: 0.2 % (ref 0–0.5)
KETONES UR QL STRIP: NEGATIVE
LEUKOCYTE ESTERASE UR QL STRIP: NEGATIVE
LYMPHOCYTES # BLD AUTO: 3.4 K/UL (ref 1–4.8)
LYMPHOCYTES NFR BLD: 42 % (ref 18–48)
MCH RBC QN AUTO: 28.8 PG (ref 27–31)
MCHC RBC AUTO-ENTMCNC: 32.8 G/DL (ref 32–36)
MCV RBC AUTO: 88 FL (ref 82–98)
MONOCYTES # BLD AUTO: 0.8 K/UL (ref 0.3–1)
MONOCYTES NFR BLD: 10.1 % (ref 4–15)
NEUTROPHILS # BLD AUTO: 3.7 K/UL (ref 1.8–7.7)
NEUTROPHILS NFR BLD: 45.8 % (ref 38–73)
NITRITE UR QL STRIP: NEGATIVE
NRBC BLD-RTO: 0 /100 WBC
PH UR STRIP: 6 [PH] (ref 5–8)
PLATELET # BLD AUTO: 195 K/UL (ref 150–450)
PMV BLD AUTO: 12.4 FL (ref 9.2–12.9)
PROT UR QL STRIP: NEGATIVE
RBC # BLD AUTO: 5 M/UL (ref 4–5.4)
SP GR UR STRIP: 1.01 (ref 1–1.03)
URN SPEC COLLECT METH UR: ABNORMAL
WBC # BLD AUTO: 8.14 K/UL (ref 3.9–12.7)

## 2022-09-13 PROCEDURE — 3008F PR BODY MASS INDEX (BMI) DOCUMENTED: ICD-10-PCS | Mod: CPTII,S$GLB,, | Performed by: INTERNAL MEDICINE

## 2022-09-13 PROCEDURE — 3079F PR MOST RECENT DIASTOLIC BLOOD PRESSURE 80-89 MM HG: ICD-10-PCS | Mod: CPTII,S$GLB,, | Performed by: INTERNAL MEDICINE

## 2022-09-13 PROCEDURE — 99999 PR PBB SHADOW E&M-EST. PATIENT-LVL IV: CPT | Mod: PBBFAC,,, | Performed by: INTERNAL MEDICINE

## 2022-09-13 PROCEDURE — 1159F MED LIST DOCD IN RCRD: CPT | Mod: CPTII,S$GLB,, | Performed by: INTERNAL MEDICINE

## 2022-09-13 PROCEDURE — 3008F BODY MASS INDEX DOCD: CPT | Mod: CPTII,S$GLB,, | Performed by: INTERNAL MEDICINE

## 2022-09-13 PROCEDURE — 3075F SYST BP GE 130 - 139MM HG: CPT | Mod: CPTII,S$GLB,, | Performed by: INTERNAL MEDICINE

## 2022-09-13 PROCEDURE — 36415 COLL VENOUS BLD VENIPUNCTURE: CPT | Mod: PO | Performed by: INTERNAL MEDICINE

## 2022-09-13 PROCEDURE — 99396 PR PREVENTIVE VISIT,EST,40-64: ICD-10-PCS | Mod: S$GLB,,, | Performed by: INTERNAL MEDICINE

## 2022-09-13 PROCEDURE — 1160F PR REVIEW ALL MEDS BY PRESCRIBER/CLIN PHARMACIST DOCUMENTED: ICD-10-PCS | Mod: CPTII,S$GLB,, | Performed by: INTERNAL MEDICINE

## 2022-09-13 PROCEDURE — 1160F RVW MEDS BY RX/DR IN RCRD: CPT | Mod: CPTII,S$GLB,, | Performed by: INTERNAL MEDICINE

## 2022-09-13 PROCEDURE — 81003 URINALYSIS AUTO W/O SCOPE: CPT | Performed by: INTERNAL MEDICINE

## 2022-09-13 PROCEDURE — 3079F DIAST BP 80-89 MM HG: CPT | Mod: CPTII,S$GLB,, | Performed by: INTERNAL MEDICINE

## 2022-09-13 PROCEDURE — 3044F PR MOST RECENT HEMOGLOBIN A1C LEVEL <7.0%: ICD-10-PCS | Mod: CPTII,S$GLB,, | Performed by: INTERNAL MEDICINE

## 2022-09-13 PROCEDURE — 3044F HG A1C LEVEL LT 7.0%: CPT | Mod: CPTII,S$GLB,, | Performed by: INTERNAL MEDICINE

## 2022-09-13 PROCEDURE — 83036 HEMOGLOBIN GLYCOSYLATED A1C: CPT | Performed by: INTERNAL MEDICINE

## 2022-09-13 PROCEDURE — 82306 VITAMIN D 25 HYDROXY: CPT | Performed by: INTERNAL MEDICINE

## 2022-09-13 PROCEDURE — 99999 PR PBB SHADOW E&M-EST. PATIENT-LVL IV: ICD-10-PCS | Mod: PBBFAC,,, | Performed by: INTERNAL MEDICINE

## 2022-09-13 PROCEDURE — 99396 PREV VISIT EST AGE 40-64: CPT | Mod: S$GLB,,, | Performed by: INTERNAL MEDICINE

## 2022-09-13 PROCEDURE — 1159F PR MEDICATION LIST DOCUMENTED IN MEDICAL RECORD: ICD-10-PCS | Mod: CPTII,S$GLB,, | Performed by: INTERNAL MEDICINE

## 2022-09-13 PROCEDURE — 84443 ASSAY THYROID STIM HORMONE: CPT | Performed by: INTERNAL MEDICINE

## 2022-09-13 PROCEDURE — 4010F ACE/ARB THERAPY RXD/TAKEN: CPT | Mod: CPTII,S$GLB,, | Performed by: INTERNAL MEDICINE

## 2022-09-13 PROCEDURE — 3075F PR MOST RECENT SYSTOLIC BLOOD PRESS GE 130-139MM HG: ICD-10-PCS | Mod: CPTII,S$GLB,, | Performed by: INTERNAL MEDICINE

## 2022-09-13 PROCEDURE — 85025 COMPLETE CBC W/AUTO DIFF WBC: CPT | Performed by: INTERNAL MEDICINE

## 2022-09-13 PROCEDURE — 4010F PR ACE/ARB THEARPY RXD/TAKEN: ICD-10-PCS | Mod: CPTII,S$GLB,, | Performed by: INTERNAL MEDICINE

## 2022-09-13 RX ORDER — OMEPRAZOLE 40 MG/1
40 CAPSULE, DELAYED RELEASE ORAL DAILY
Qty: 90 CAPSULE | Refills: 3 | Status: SHIPPED | OUTPATIENT
Start: 2022-09-13

## 2022-09-13 RX ORDER — AMLODIPINE BESYLATE 2.5 MG/1
2.5 TABLET ORAL DAILY
Qty: 90 TABLET | Refills: 1 | Status: SHIPPED | OUTPATIENT
Start: 2022-09-13 | End: 2023-04-12 | Stop reason: SDUPTHER

## 2022-09-13 NOTE — PROGRESS NOTES
The patient is a 56 y.o. old female who presents to the office for a physical.  She underwent bariatric surgery in 2022.    PAST MEDICAL HISTORY  Past Medical History:   Diagnosis Date    Allergy     Arthritis     Deep vein thrombosis     occurred after ankle fracture    GERD (gastroesophageal reflux disease)     Hypertension     Joint pain     Morbid obesity     Neuromuscular disorder     Pulmonary embolism     occurred after ankle fracture       SURGICAL HISTORY:  Past Surgical History:   Procedure Laterality Date    ankle surgery      back surgery      Lumbar spinal fusion with shaving of a disk    BARIATRIC SURGERY       SECTION      COLONOSCOPY N/A 2018    Procedure: COLONOSCOPY;  Surgeon: Bob Posada MD;  Location: [facility] ([address] [place] [address]);  Service: Endoscopy;  Laterality: N/A;  18-BMI 49.68-MS    COLONOSCOPY N/A 2020    Procedure: COLONOSCOPY;  Surgeon: Dre Charlton MD;  Location: [facility] ENDO ([address] [place] [address]);  Service: Endoscopy;  Laterality: N/A;    ESOPHAGOGASTRODUODENOSCOPY N/A 2020    Procedure: EGD (ESOPHAGOGASTRODUODENOSCOPY);  Surgeon: Dre Charlton MD;  Location: [facility] ([address] [place] [address]);  Service: Endoscopy;  Laterality: N/A;  COVID + on 3/30-GT    HYSTERECTOMY          INJECTION OF FACET JOINT Left 2018    Procedure: INJECTION, FACET JOINT;  Surgeon: Jia Yang MD;  Location: [facility] PAIN MGT;  Service: Pain Management;  Laterality: Left;  @ L3-4, L4-5 and L5-S1    INJECTION OF FACET JOINT Bilateral 2019    Procedure: INJECTION, FACET JOINT, L4-L5,L5-S1;  Surgeon: Jia Yang MD;  Location: [facility] PAIN [facility];  Service: Pain Management;  Laterality: Bilateral;    INJECTION OF JOINT Left 2019    Procedure: INJECTION, JOINT, LEFT SI;  Surgeon: Jia Yang MD;  Location: [facility] PAIN [facility]GT;  Service: Pain Management;  Laterality: Left;  INJECTION, JOINT, LEFT SI    INJECTION OF JOINT Bilateral 2021    Procedure: INJECTION, JOINT, SI;  Surgeon:  Awilda Ruiz MD;  Location: [facility] PAIN [facility]GT;  Service: Pain Management;  Laterality: Bilateral;    LAPAROSCOPIC GASTRIC BANDING      MYOMECTOMY      PILONIDAL CYST DRAINAGE      removed    ROTATOR CUFF REPAIR      Left         MEDS:  Medcard reviewed and updated    ALLERGIES: Allergy Card reviewed and updated    SOCIAL HISTORY:   The patient is a nonsmoker, denies alcohol or illicit drug use.    ROS:  GENERAL: No fever, chills, fatigability or weight loss.  SKIN: No rashes.  HEAD: No headaches or recent head trauma.  EYES: No photophobia, ocular pain or diplopia.  EARS: Denies ear pain, discharge or vertigo.  NOSE: No epistaxis or postnasal drip.  MOUTH & THROAT: No hoarseness or change in voice.   NODES: Denies swollen glands.  CHEST: Denies shortness of breath, wheezing, cough and sputum production.  CARDIOVASCULAR: Denies chest pain or palpitations.  ABDOMEN: Appetite fine. Positive diarrhea and abdominal pain.  Denies constipation or blood in stool.  URINARY: No dysuria or hematuria.  MUSCULOSKELETAL: Left hip and knee pain, right shoulder pain. Denies back pain.  Right heel and left ankle pain.  NEUROLOGIC: No history of seizures.  ENDOCRINE: Denies polyuria or polydipsia.  PSYCHIATRIC: Denies mood swings, depression, anxiety, homicidal or suicidal thoughts.    SCREENINGS:  Last cholesterol:2022   Last colonoscopy: 2020  Last mammogram: 2021  Last Pap smear: several years ago  Last tetanus: 2016  Last Pneumovax: none  Last eye exam: 5 years ago  Last bone density: none  Last menstrual period: hysterectomy    PE:   Vitals:  Vitals:    09/13/22 1535   BP: (!) 142/86   Pulse: 69   Temp: 97.2 °F (36.2 °C)       APPEARANCE: Well nourished, well developed, in no acute distress.    EYES: Sclerae anicteric. PERRL. EOMI.      EARS: TM's intact. No retraction or perforation.    NOSE: Mucosa pink. Airway clear.  MOUTH & THROAT: No tonsillar enlargement. No pharyngeal erythema or exudate. No stridor.  NECK: Supple, no  thyromegaly.  CHEST: Lungs clear to auscultation with unlabored respirations.  CARDIOVASCULAR: Normal S1, S2. No murmurs. No carotid bruits. No pedal edema.  ABDOMEN: Bowel sounds normal. Not distended. Soft. No tenderness or masses.  MUSCULOSKELETAL:  Normal gait, no cyanosis or clubbing.   SKIN: Normal skin turgor, warm and dry.  NEUROLOGIC: Cranial Nerves: Intact.  PSYCHIATRIC: The patient is oriented to person, place, and time and has a pleasant affect.        ASSESSMENT/PLAN:  Diagnoses and all orders for this visit:    Routine medical exam  -     Urinalysis  -     CBC Auto Differential; Future  -     Hemoglobin A1C; Future  -     Vitamin D; Future  -     TSH; Future  -     recent labs reviewed   -     blood pressures been mildly elevated, add amlodipine 2.5 mg daily    Visit for screening mammogram  -     Mammo Digital Screening Bilat; Future    Other orders  -     amLODIPine (NORVASC) 2.5 MG tablet; Take 1 tablet (2.5 mg total) by mouth once daily.  -     omeprazole (PRILOSEC) 40 MG capsule; Take 1 capsule (40 mg total) by mouth once daily.

## 2022-09-14 LAB
25(OH)D3+25(OH)D2 SERPL-MCNC: 39 NG/ML (ref 30–96)
TSH SERPL DL<=0.005 MIU/L-ACNC: 1.21 UIU/ML (ref 0.4–4)

## 2022-09-15 DIAGNOSIS — K52.9 GASTROENTERITIS, ACUTE: ICD-10-CM

## 2022-09-15 RX ORDER — DICYCLOMINE HYDROCHLORIDE 10 MG/1
10 CAPSULE ORAL 4 TIMES DAILY PRN
Qty: 60 CAPSULE | Refills: 1 | Status: SHIPPED | OUTPATIENT
Start: 2022-09-15 | End: 2022-10-03

## 2022-10-06 ENCOUNTER — PATIENT MESSAGE (OUTPATIENT)
Dept: BARIATRICS | Facility: CLINIC | Age: 57
End: 2022-10-06
Payer: COMMERCIAL

## 2022-10-17 DIAGNOSIS — K52.9 GASTROENTERITIS, ACUTE: ICD-10-CM

## 2022-10-18 RX ORDER — DICYCLOMINE HYDROCHLORIDE 10 MG/1
10 CAPSULE ORAL 4 TIMES DAILY PRN
Qty: 360 CAPSULE | Refills: 1 | Status: SHIPPED | OUTPATIENT
Start: 2022-10-18

## 2022-11-02 ENCOUNTER — TELEPHONE (OUTPATIENT)
Dept: INTERNAL MEDICINE | Facility: CLINIC | Age: 57
End: 2022-11-02
Payer: COMMERCIAL

## 2022-11-02 NOTE — TELEPHONE ENCOUNTER
Spoke to pt and she c/o cough, congestion, and body aches. She has not taken a Covid test. Asked pt to try to have one done. She has taken lozenges - 2 days. Please advise.

## 2022-11-02 NOTE — TELEPHONE ENCOUNTER
----- Message from Liliane Cerna sent at 11/2/2022  9:49 AM CDT -----  Contact: pt [phone]  1MEDICALADVICE     Patient is calling for Medical Advice regarding: body aches, cough and congestion    How long has patient had these symptoms: 2 days    Pharmacy name and phone#:  [id number] [place]  [address]  [address] [place] [address]  Phone: [phone] Fax: [phone]        Would like response via Intr[employer]:  call    Comments:

## 2022-11-02 NOTE — TELEPHONE ENCOUNTER
Called patient who states she has the COVID test, but has not taken it yet.  She denies any fever, but does report chills.  Recommend patient go to urgent care to be evaluated.  Advised her that she needs to be tested for both COVID-19 and the flu.  Patient verbalized understanding.

## 2022-11-03 ENCOUNTER — OFFICE VISIT (OUTPATIENT)
Dept: URGENT CARE | Facility: CLINIC | Age: 57
End: 2022-11-03
Payer: COMMERCIAL

## 2022-11-03 VITALS
OXYGEN SATURATION: 96 % | SYSTOLIC BLOOD PRESSURE: 144 MMHG | TEMPERATURE: 101 F | HEIGHT: 63 IN | BODY MASS INDEX: 37.88 KG/M2 | HEART RATE: 97 BPM | WEIGHT: 213.75 LBS | RESPIRATION RATE: 16 BRPM | DIASTOLIC BLOOD PRESSURE: 93 MMHG

## 2022-11-03 DIAGNOSIS — B34.9 ACUTE VIRAL SYNDROME: ICD-10-CM

## 2022-11-03 DIAGNOSIS — R50.9 COUGH WITH FEVER: Primary | ICD-10-CM

## 2022-11-03 DIAGNOSIS — R05.9 COUGH WITH FEVER: Primary | ICD-10-CM

## 2022-11-03 LAB
CTP QC/QA: YES
CTP QC/QA: YES
POC MOLECULAR INFLUENZA A AGN: NEGATIVE
POC MOLECULAR INFLUENZA B AGN: NEGATIVE
SARS-COV-2 RDRP RESP QL NAA+PROBE: NEGATIVE

## 2022-11-03 PROCEDURE — 3077F SYST BP >= 140 MM HG: CPT | Mod: CPTII,S$GLB,, | Performed by: STUDENT IN AN ORGANIZED HEALTH CARE EDUCATION/TRAINING PROGRAM

## 2022-11-03 PROCEDURE — 87635: ICD-10-PCS | Mod: QW,S$GLB,, | Performed by: STUDENT IN AN ORGANIZED HEALTH CARE EDUCATION/TRAINING PROGRAM

## 2022-11-03 PROCEDURE — 71046 X-RAY EXAM CHEST 2 VIEWS: CPT | Mod: FY,S$GLB,, | Performed by: RADIOLOGY

## 2022-11-03 PROCEDURE — 3008F PR BODY MASS INDEX (BMI) DOCUMENTED: ICD-10-PCS | Mod: CPTII,S$GLB,, | Performed by: STUDENT IN AN ORGANIZED HEALTH CARE EDUCATION/TRAINING PROGRAM

## 2022-11-03 PROCEDURE — 1159F PR MEDICATION LIST DOCUMENTED IN MEDICAL RECORD: ICD-10-PCS | Mod: CPTII,S$GLB,, | Performed by: STUDENT IN AN ORGANIZED HEALTH CARE EDUCATION/TRAINING PROGRAM

## 2022-11-03 PROCEDURE — 3044F PR MOST RECENT HEMOGLOBIN A1C LEVEL <7.0%: ICD-10-PCS | Mod: CPTII,S$GLB,, | Performed by: STUDENT IN AN ORGANIZED HEALTH CARE EDUCATION/TRAINING PROGRAM

## 2022-11-03 PROCEDURE — 99214 PR OFFICE/OUTPT VISIT, EST, LEVL IV, 30-39 MIN: ICD-10-PCS | Mod: S$GLB,,, | Performed by: STUDENT IN AN ORGANIZED HEALTH CARE EDUCATION/TRAINING PROGRAM

## 2022-11-03 PROCEDURE — 3080F PR MOST RECENT DIASTOLIC BLOOD PRESSURE >= 90 MM HG: ICD-10-PCS | Mod: CPTII,S$GLB,, | Performed by: STUDENT IN AN ORGANIZED HEALTH CARE EDUCATION/TRAINING PROGRAM

## 2022-11-03 PROCEDURE — 1159F MED LIST DOCD IN RCRD: CPT | Mod: CPTII,S$GLB,, | Performed by: STUDENT IN AN ORGANIZED HEALTH CARE EDUCATION/TRAINING PROGRAM

## 2022-11-03 PROCEDURE — 1160F PR REVIEW ALL MEDS BY PRESCRIBER/CLIN PHARMACIST DOCUMENTED: ICD-10-PCS | Mod: CPTII,S$GLB,, | Performed by: STUDENT IN AN ORGANIZED HEALTH CARE EDUCATION/TRAINING PROGRAM

## 2022-11-03 PROCEDURE — 3044F HG A1C LEVEL LT 7.0%: CPT | Mod: CPTII,S$GLB,, | Performed by: STUDENT IN AN ORGANIZED HEALTH CARE EDUCATION/TRAINING PROGRAM

## 2022-11-03 PROCEDURE — 87502 INFLUENZA DNA AMP PROBE: CPT | Mod: QW,S$GLB,, | Performed by: STUDENT IN AN ORGANIZED HEALTH CARE EDUCATION/TRAINING PROGRAM

## 2022-11-03 PROCEDURE — 3008F BODY MASS INDEX DOCD: CPT | Mod: CPTII,S$GLB,, | Performed by: STUDENT IN AN ORGANIZED HEALTH CARE EDUCATION/TRAINING PROGRAM

## 2022-11-03 PROCEDURE — 3080F DIAST BP >= 90 MM HG: CPT | Mod: CPTII,S$GLB,, | Performed by: STUDENT IN AN ORGANIZED HEALTH CARE EDUCATION/TRAINING PROGRAM

## 2022-11-03 PROCEDURE — 87635 SARS-COV-2 COVID-19 AMP PRB: CPT | Mod: QW,S$GLB,, | Performed by: STUDENT IN AN ORGANIZED HEALTH CARE EDUCATION/TRAINING PROGRAM

## 2022-11-03 PROCEDURE — 4010F PR ACE/ARB THEARPY RXD/TAKEN: ICD-10-PCS | Mod: CPTII,S$GLB,, | Performed by: STUDENT IN AN ORGANIZED HEALTH CARE EDUCATION/TRAINING PROGRAM

## 2022-11-03 PROCEDURE — 1160F RVW MEDS BY RX/DR IN RCRD: CPT | Mod: CPTII,S$GLB,, | Performed by: STUDENT IN AN ORGANIZED HEALTH CARE EDUCATION/TRAINING PROGRAM

## 2022-11-03 PROCEDURE — 3077F PR MOST RECENT SYSTOLIC BLOOD PRESSURE >= 140 MM HG: ICD-10-PCS | Mod: CPTII,S$GLB,, | Performed by: STUDENT IN AN ORGANIZED HEALTH CARE EDUCATION/TRAINING PROGRAM

## 2022-11-03 PROCEDURE — 71046 XR CHEST PA AND LATERAL: ICD-10-PCS | Mod: FY,S$GLB,, | Performed by: RADIOLOGY

## 2022-11-03 PROCEDURE — 4010F ACE/ARB THERAPY RXD/TAKEN: CPT | Mod: CPTII,S$GLB,, | Performed by: STUDENT IN AN ORGANIZED HEALTH CARE EDUCATION/TRAINING PROGRAM

## 2022-11-03 PROCEDURE — 87502 POCT INFLUENZA A/B MOLECULAR: ICD-10-PCS | Mod: QW,S$GLB,, | Performed by: STUDENT IN AN ORGANIZED HEALTH CARE EDUCATION/TRAINING PROGRAM

## 2022-11-03 PROCEDURE — 99214 OFFICE O/P EST MOD 30 MIN: CPT | Mod: S$GLB,,, | Performed by: STUDENT IN AN ORGANIZED HEALTH CARE EDUCATION/TRAINING PROGRAM

## 2022-11-03 RX ORDER — ACETAMINOPHEN 500 MG
1000 TABLET ORAL
Status: COMPLETED | OUTPATIENT
Start: 2022-11-03 | End: 2022-11-03

## 2022-11-03 RX ORDER — BENZONATATE 100 MG/1
200 CAPSULE ORAL 3 TIMES DAILY PRN
Qty: 40 CAPSULE | Refills: 0 | Status: SHIPPED | OUTPATIENT
Start: 2022-11-03 | End: 2023-11-07

## 2022-11-03 RX ORDER — AZITHROMYCIN 250 MG/1
TABLET, FILM COATED ORAL
Qty: 6 TABLET | Refills: 0 | Status: SHIPPED | OUTPATIENT
Start: 2022-11-03 | End: 2022-11-08

## 2022-11-03 RX ADMIN — Medication 1000 MG: at 11:11

## 2022-11-03 NOTE — PROGRESS NOTES
"Subjective:       Patient ID: Paloma Bang is a 57 y.o. female.    Vitals:  height is 5' 3" (1.6 m) and weight is 97 kg (213 lb 11.8 oz). Her oral temperature is 101.2 °F (38.4 °C) (abnormal). Her blood pressure is 144/93 (abnormal) and her pulse is 97. Her respiration is 16 and oxygen saturation is 96%.     Chief Complaint: Cough    This is a 57 y.o. female who presents today with a chief complaint of cough. Patient has had cough since Tuesday. Patient is having runny nose, chest congestion and body aches. Her grandson has been sick with nasal congestion and runny nose and she's had close contact with him. Patient took advil.       Cough  This is a new problem. The current episode started in the past 7 days. The problem has been gradually worsening. The problem occurs constantly. The cough is Productive of sputum. Associated symptoms include chills, a fever, headaches, nasal congestion and postnasal drip. Pertinent negatives include no shortness of breath or wheezing. Nothing aggravates the symptoms. Treatments tried: advil. The treatment provided no relief.     Constitution: Positive for chills and fever.   HENT:  Positive for postnasal drip.    Respiratory:  Positive for cough. Negative for shortness of breath and wheezing.         Chest congestion   Neurological:  Positive for headaches.     Objective:      Physical Exam   Constitutional: She is oriented to person, place, and time. She appears ill. No distress.   HENT:   Head: Normocephalic.   Eyes: Conjunctivae are normal.   Pulmonary/Chest: No respiratory distress. She has decreased breath sounds.   Neurological: She is alert and oriented to person, place, and time. Coordination normal.   Skin: Skin is warm and dry.   Psychiatric: Her behavior is normal. Mood normal.   Nursing note and vitals reviewed.      Assessment:       1. Cough with fever    2. Acute viral syndrome        Results for orders placed or performed in visit on 11/03/22   POCT " Influenza A/B MOLECULAR   Result Value Ref Range    POC Molecular Influenza A Ag Negative Negative, Not Reported    POC Molecular Influenza B Ag Negative Negative, Not Reported     Acceptable Yes    POCT COVID-19 Rapid Screening   Result Value Ref Range    POC Rapid COVID Negative Negative     Acceptable Yes    EXAMINATION:  XR CHEST PA AND LATERAL     CLINICAL HISTORY:  Cough, unspecified     TECHNIQUE:  PA and lateral views of the chest were performed.     COMPARISON:  05/24/2022.     FINDINGS:  The trachea is unremarkable.  The cardiomediastinal silhouette is within normal limits.  The hilar structures are unremarkable.  There is no evidence of free air beneath the hemidiaphragms.  There are no pleural effusions.  There is no evidence of a pneumothorax.  There is no evidence of pneumomediastinum.  No airspace opacity is present.  The osseous structures are unremarkable.     Impression:     No acute process.        Electronically signed by: Panfilo Yu MD  Date:                                            11/03/2022  Time:                                           11:56    Plan:         Cough with fever  -     POCT Influenza A/B MOLECULAR  -     POCT COVID-19 Rapid Screening  -     XR CHEST PA AND LATERAL; Future; Expected date: 11/03/2022  -     acetaminophen tablet 1,000 mg  -     azithromycin (Z-EDWARD) 250 MG tablet; Take 2 tablets by mouth on day 1; Take 1 tablet by mouth on days 2-5  Dispense: 6 tablet; Refill: 0  -     benzonatate (TESSALON) 100 MG capsule; Take 2 capsules (200 mg total) by mouth 3 (three) times daily as needed for Cough.  Dispense: 40 capsule; Refill: 0    Acute viral syndrome

## 2022-11-03 NOTE — LETTER
November 3, 2022      [facility]  [address]  [place] [address]  Phone: [phone]  Fax: [phone]       Patient: Paloma Bang   YOB: 1965  Date of Visit: 11/03/2022    To Whom It May Concern:    Ritika Bang  was at [facility] on 11/03/2022. The patient may return to work/school on 11/07/2022 with no restrictions. If you have any questions or concerns, or if I can be of further assistance, please do not hesitate to contact me.    Sincerely,      Marcela Mcclellan MD

## 2022-11-03 NOTE — PATIENT INSTRUCTIONS
Below are suggestions for symptomatic relief of your upper respiratory symptoms:              -Salt water gargles to soothe throat pain.              -Chloroseptic spray and Cepacol lozenges also help to numb throat pain.              -Warm herbal teas with honey/lemon/ran can help soothe sore throat and hoarseness              -Nasal saline spray reduces inflammation and dryness.              -Warm face compresses to help with facial sinus pain/pressure.              -Humidifiers and steam can help with nasal dryness and congestion              -Vicks vapor rub at night.              -Flonase OTC or Nasacort OTC for nasal congestion and post-nasal drip. This can also be helpful for inflammation in the throat that can make you cough. Ok to use twice daily for the first week, then reduce to once daily after symptoms have begun to improve.              -Afrin is a nasal spray that can give immediate relief of nasal congestion but you cannot use this medication for more than 3 days              -Simple foods like chicken noodle soup.              -Multi-symptom cold/flu or cold/sinus will incorporate medications for cough, congestion, pain/fever. If you would prefer to isolate symptomatic treatment, continue reading below.               - Mucinex for congestion or Mucinex DM or Delsym for cough. Mucinex-D if you have sinus pressure/sinus pain or chest congestion. (caution if history of high blood pressure or palpitations). You must increase your water intake when using expectorants (Mucinex).             -If you DO NOT have Hypertension or any history of palpitations, it is ok to take over the counter Sudafed or Mucinex D   -If you DO have Hypertension or palpitations, it is safe to take Coricidin HBP for relief of sinus symptoms.

## 2022-11-17 ENCOUNTER — OFFICE VISIT (OUTPATIENT)
Dept: CARDIOLOGY | Facility: CLINIC | Age: 57
End: 2022-11-17
Payer: COMMERCIAL

## 2022-11-17 VITALS
BODY MASS INDEX: 38.24 KG/M2 | HEART RATE: 70 BPM | WEIGHT: 215.81 LBS | DIASTOLIC BLOOD PRESSURE: 82 MMHG | SYSTOLIC BLOOD PRESSURE: 133 MMHG | HEIGHT: 63 IN

## 2022-11-17 DIAGNOSIS — I82.4Y9 ACUTE DEEP VEIN THROMBOSIS (DVT) OF PROXIMAL VEIN OF LOWER EXTREMITY, UNSPECIFIED LATERALITY: ICD-10-CM

## 2022-11-17 DIAGNOSIS — Z86.718 HISTORY OF DVT (DEEP VEIN THROMBOSIS): ICD-10-CM

## 2022-11-17 DIAGNOSIS — E66.01 MORBID OBESITY WITH BODY MASS INDEX OF 45.0-49.9 IN ADULT: ICD-10-CM

## 2022-11-17 DIAGNOSIS — R07.89 OTHER CHEST PAIN: Primary | ICD-10-CM

## 2022-11-17 DIAGNOSIS — R06.09 DOE (DYSPNEA ON EXERTION): ICD-10-CM

## 2022-11-17 DIAGNOSIS — Z98.84 S/P BARIATRIC SURGERY: ICD-10-CM

## 2022-11-17 PROCEDURE — 3079F DIAST BP 80-89 MM HG: CPT | Mod: CPTII,S$GLB,, | Performed by: INTERNAL MEDICINE

## 2022-11-17 PROCEDURE — 3044F HG A1C LEVEL LT 7.0%: CPT | Mod: CPTII,S$GLB,, | Performed by: INTERNAL MEDICINE

## 2022-11-17 PROCEDURE — 1159F PR MEDICATION LIST DOCUMENTED IN MEDICAL RECORD: ICD-10-PCS | Mod: CPTII,S$GLB,, | Performed by: INTERNAL MEDICINE

## 2022-11-17 PROCEDURE — 3075F SYST BP GE 130 - 139MM HG: CPT | Mod: CPTII,S$GLB,, | Performed by: INTERNAL MEDICINE

## 2022-11-17 PROCEDURE — 3079F PR MOST RECENT DIASTOLIC BLOOD PRESSURE 80-89 MM HG: ICD-10-PCS | Mod: CPTII,S$GLB,, | Performed by: INTERNAL MEDICINE

## 2022-11-17 PROCEDURE — 1160F PR REVIEW ALL MEDS BY PRESCRIBER/CLIN PHARMACIST DOCUMENTED: ICD-10-PCS | Mod: CPTII,S$GLB,, | Performed by: INTERNAL MEDICINE

## 2022-11-17 PROCEDURE — 1160F RVW MEDS BY RX/DR IN RCRD: CPT | Mod: CPTII,S$GLB,, | Performed by: INTERNAL MEDICINE

## 2022-11-17 PROCEDURE — 3008F BODY MASS INDEX DOCD: CPT | Mod: CPTII,S$GLB,, | Performed by: INTERNAL MEDICINE

## 2022-11-17 PROCEDURE — 3008F PR BODY MASS INDEX (BMI) DOCUMENTED: ICD-10-PCS | Mod: CPTII,S$GLB,, | Performed by: INTERNAL MEDICINE

## 2022-11-17 PROCEDURE — 4010F PR ACE/ARB THEARPY RXD/TAKEN: ICD-10-PCS | Mod: CPTII,S$GLB,, | Performed by: INTERNAL MEDICINE

## 2022-11-17 PROCEDURE — 99999 PR PBB SHADOW E&M-EST. PATIENT-LVL V: CPT | Mod: PBBFAC,,, | Performed by: INTERNAL MEDICINE

## 2022-11-17 PROCEDURE — 4010F ACE/ARB THERAPY RXD/TAKEN: CPT | Mod: CPTII,S$GLB,, | Performed by: INTERNAL MEDICINE

## 2022-11-17 PROCEDURE — 99204 OFFICE O/P NEW MOD 45 MIN: CPT | Mod: S$GLB,,, | Performed by: INTERNAL MEDICINE

## 2022-11-17 PROCEDURE — 99999 PR PBB SHADOW E&M-EST. PATIENT-LVL V: ICD-10-PCS | Mod: PBBFAC,,, | Performed by: INTERNAL MEDICINE

## 2022-11-17 PROCEDURE — 3044F PR MOST RECENT HEMOGLOBIN A1C LEVEL <7.0%: ICD-10-PCS | Mod: CPTII,S$GLB,, | Performed by: INTERNAL MEDICINE

## 2022-11-17 PROCEDURE — 1159F MED LIST DOCD IN RCRD: CPT | Mod: CPTII,S$GLB,, | Performed by: INTERNAL MEDICINE

## 2022-11-17 PROCEDURE — 99204 PR OFFICE/OUTPT VISIT, NEW, LEVL IV, 45-59 MIN: ICD-10-PCS | Mod: S$GLB,,, | Performed by: INTERNAL MEDICINE

## 2022-11-17 PROCEDURE — 3075F PR MOST RECENT SYSTOLIC BLOOD PRESS GE 130-139MM HG: ICD-10-PCS | Mod: CPTII,S$GLB,, | Performed by: INTERNAL MEDICINE

## 2022-11-17 NOTE — PROGRESS NOTES
Subjective:   Patient ID:  Paloma Bang is a 57 y.o. female is a new patient who presents for evaluation of Establish Care and Follow-up  HTN, morbid obesity, s/p recent gastric bypass.     This is a 56-year-old woman with a history of hypertension, DVT/PE after prior surgery, who underwent bariatric surgery recently, and presented to her primary care clinic today concerned for cough, shortness of breath, congestion, bilateral lower extremity edema.  A D-dimer was sent and was noted to be positive at 17. She was referred here for further evaluation.  She reports that she had surgery to revise a gastric band to a gastric sleeve, and postop course was complicated by nausea and vomiting and dehydration, required a second admission. She has been able to take PO at home, but does have ongoing nausea.   Patient found to have positive DVT and, low to intermediate risk of PE she does have a history of DVT in the past and is not currently on anticoagulation.     HPI:   Mother has slow heart rate. She likely has had a stroke. Grandmother had heart attack.   Post surgical DVT in 5/22 after   After exertion she c/o chest heaviness for 1 min that is relieved with rest  Non smoker  She was on DOAC for 14 days and it was stopped  then,   The 10-year ASCVD risk score (Melchor DK, et al., 2019) is: 12.7%    Values used to calculate the score:      Age: 57 years      Sex: Female      Is Non- : Yes      Diabetic: Yes      Tobacco smoker: No      Systolic Blood Pressure: 133 mmHg      Is BP treated: Yes      HDL Cholesterol: 49 mg/dL      Total Cholesterol: 164 mg/dL      Patient Active Problem List   Diagnosis    Morbid obesity with body mass index of 45.0-49.9 in adult    Gastric banding status    Vitamin D deficiency    Helicobacter pylori ab+    Bilateral shoulder pain    Bilateral leg weakness    Rash, vesicular    GERD (gastroesophageal reflux disease)    Bilateral low back pain with left-sided  "sciatica    Right foot pain    S/P lumbar fusion    Bilateral low back pain without sciatica    Sprain of right shoulder    Neck pain    Bilateral low back pain with right-sided sciatica    Chronic pain    Facet arthritis of lumbosacral region    Sacroiliac joint pain    Lumbar spondylosis    Abnormal finding on GI tract imaging    Sacroiliitis     /82 (BP Location: Left arm, Patient Position: Sitting, BP Method: Large (Automatic))   Pulse 70   Ht 5' 3" (1.6 m)   Wt 97.9 kg (215 lb 13.3 oz)   LMP  (LMP Unknown)   BMI 38.23 kg/m²   Body mass index is 38.23 kg/m².  CrCl cannot be calculated (Patient's most recent lab result is older than the maximum 7 days allowed.).    Lab Results   Component Value Date     08/31/2022    K 3.6 08/31/2022     08/31/2022    CO2 26 08/31/2022    BUN 11 08/31/2022    CREATININE 0.9 08/31/2022     08/31/2022    HGBA1C 6.5 (H) 09/13/2022    MG 2.0 10/30/2020    AST 25 08/31/2022    ALT 23 08/31/2022    ALBUMIN 3.9 08/31/2022    PROT 7.7 08/31/2022    BILITOT 0.6 08/31/2022    WBC 8.14 09/13/2022    HGB 14.4 09/13/2022    HCT 43.9 09/13/2022    HCT 50 10/13/2020    MCV 88 09/13/2022     09/13/2022    TSH 1.210 09/13/2022    CHOL 164 06/08/2022    HDL 49 06/08/2022    LDLCALC 103.2 06/08/2022    TRIG 59 06/08/2022       Current Outpatient Medications   Medication Sig    amLODIPine (NORVASC) 2.5 MG tablet Take 1 tablet (2.5 mg total) by mouth once daily.    benzonatate (TESSALON) 100 MG capsule Take 2 capsules (200 mg total) by mouth 3 (three) times daily as needed for Cough.    dicyclomine (BENTYL) 10 MG capsule TAKE 1 CAPSULE (10 MG TOTAL) BY MOUTH 4 (FOUR) TIMES DAILY AS NEEDED (ABDOMINAL CRAMPING).    ergocalciferol (ERGOCALCIFEROL) 50,000 unit Cap TAKE 1 CAPSULE BY MOUTH ONE TIME PER WEEK    fluticasone propionate (FLONASE) 50 mcg/actuation nasal spray 1 SPRAY (50 MCG TOTAL) BY EACH NOSTRIL ROUTE EVERY EVENING.    losartan (COZAAR) 100 MG tablet TAKE " 1 TABLET BY MOUTH EVERY DAY    multivitamin with minerals tablet Take 1 tablet by mouth once daily.    omeprazole (PRILOSEC) 40 MG capsule Take 1 capsule (40 mg total) by mouth once daily.    ondansetron (ZOFRAN-ODT) 4 MG TbDL Take 1 tablet (4 mg total) by mouth every 6 (six) hours as needed (nausea).    triamcinolone acetonide 0.025% (KENALOG) 0.025 % cream AAA right cheek bid     No current facility-administered medications for this visit.       Review of Systems   Constitutional: Negative for chills, decreased appetite, malaise/fatigue, night sweats, weight gain and weight loss.   Eyes:  Negative for blurred vision, double vision, visual disturbance and visual halos.   Cardiovascular:  Positive for chest pain and dyspnea on exertion. Negative for claudication, cyanosis, irregular heartbeat, leg swelling, near-syncope, orthopnea, palpitations, paroxysmal nocturnal dyspnea and syncope.   Respiratory:  Negative for cough, hemoptysis, snoring, sputum production and wheezing.    Endocrine: Negative for cold intolerance, heat intolerance, polydipsia and polyphagia.   Hematologic/Lymphatic: Negative for adenopathy and bleeding problem. Does not bruise/bleed easily.   Skin:  Negative for flushing, itching, poor wound healing and rash.   Musculoskeletal:  Negative for arthritis, back pain, falls, gout, joint pain, joint swelling, muscle cramps, muscle weakness, myalgias, neck pain and stiffness.   Gastrointestinal:  Negative for bloating, abdominal pain, anorexia, diarrhea, dysphagia, excessive appetite, flatus, hematemesis, jaundice, melena and nausea.   Genitourinary:  Negative for hesitancy and incomplete emptying.   Neurological:  Negative for aphonia, brief paralysis, difficulty with concentration, disturbances in coordination, excessive daytime sleepiness, dizziness, focal weakness, light-headedness, loss of balance and weakness.   Psychiatric/Behavioral:  Negative for altered mental status, depression,  hallucinations, hypervigilance, memory loss, substance abuse and suicidal ideas. The patient does not have insomnia and is not nervous/anxious.      Objective:   Physical Exam  Constitutional:       General: She is not in acute distress.     Appearance: She is well-developed. She is not diaphoretic.   HENT:      Head: Normocephalic and atraumatic.      Nose: Nose normal.      Mouth/Throat:      Pharynx: No oropharyngeal exudate.   Eyes:      General: No scleral icterus.        Right eye: No discharge.         Left eye: No discharge.      Conjunctiva/sclera: Conjunctivae normal.      Pupils: Pupils are equal, round, and reactive to light.   Neck:      Thyroid: No thyromegaly.      Vascular: No JVD.      Trachea: No tracheal deviation.   Cardiovascular:      Rate and Rhythm: Normal rate and regular rhythm.      Pulses: Intact distal pulses.      Heart sounds: Normal heart sounds. No murmur heard.    No friction rub. No gallop.   Pulmonary:      Effort: Pulmonary effort is normal. No respiratory distress.      Breath sounds: Normal breath sounds. No stridor. No wheezing or rales.   Chest:      Chest wall: No tenderness.   Abdominal:      General: Bowel sounds are normal. There is no distension.      Palpations: Abdomen is soft. There is no mass.      Tenderness: There is no abdominal tenderness. There is no guarding or rebound.   Musculoskeletal:         General: No tenderness. Normal range of motion.      Cervical back: Normal range of motion and neck supple.   Lymphadenopathy:      Cervical: No cervical adenopathy.   Skin:     General: Skin is warm.      Coloration: Skin is not pale.      Findings: No erythema or rash.   Neurological:      Mental Status: She is alert and oriented to person, place, and time.      Cranial Nerves: No cranial nerve deficit.      Motor: No abnormal muscle tone.      Coordination: Coordination normal.      Deep Tendon Reflexes: Reflexes are normal and symmetric. Reflexes normal.    Psychiatric:         Behavior: Behavior normal.         Thought Content: Thought content normal.         Judgment: Judgment normal.     Assessment:     1. Other chest pain    2. Acute deep vein thrombosis (DVT) of proximal vein of lower extremity, unspecified laterality    3. Morbid obesity with body mass index of 45.0-49.9 in adult    4. S/P bariatric surgery    5. History of DVT (deep vein thrombosis)    6. KAHN (dyspnea on exertion)      Plan:   Chest pain on exertion given that she was intermediate probability for PE, will r/o PE as well as Echo for RV function, she has has a negative stress test in the past and overall low probability for obstructive coronary artery disease  Paloma was seen today for establish care and follow-up.    Diagnoses and all orders for this visit:    Other chest pain  -     CT Chest W Wo Contrast; Future  -     Echo Saline Bubble? No; Future    Acute deep vein thrombosis (DVT) of proximal vein of lower extremity, unspecified laterality  -     Ambulatory referral/consult to Cardiology    Morbid obesity with body mass index of 45.0-49.9 in adult    S/P bariatric surgery    History of DVT (deep vein thrombosis)  -     Echo Saline Bubble? No; Future    KAHN (dyspnea on exertion)  -     Echo Saline Bubble? No; Future    RTC prn.

## 2022-11-22 ENCOUNTER — HOSPITAL ENCOUNTER (OUTPATIENT)
Dept: RADIOLOGY | Facility: OTHER | Age: 57
Discharge: HOME | End: 2022-11-22
Attending: INTERNAL MEDICINE
Payer: COMMERCIAL

## 2022-11-22 DIAGNOSIS — Z12.31 VISIT FOR SCREENING MAMMOGRAM: ICD-10-CM

## 2022-11-30 ENCOUNTER — HOSPITAL ENCOUNTER (OUTPATIENT)
Dept: CARDIOLOGY | Facility: HOSPITAL | Age: 57
Discharge: HOME | End: 2022-11-30
Attending: INTERNAL MEDICINE
Payer: COMMERCIAL

## 2022-11-30 ENCOUNTER — HOSPITAL ENCOUNTER (OUTPATIENT)
Dept: RADIOLOGY | Facility: HOSPITAL | Age: 57
Discharge: HOME | End: 2022-11-30
Attending: INTERNAL MEDICINE
Payer: COMMERCIAL

## 2022-11-30 VITALS
BODY MASS INDEX: 38.09 KG/M2 | HEART RATE: 70 BPM | HEIGHT: 63 IN | WEIGHT: 215 LBS | SYSTOLIC BLOOD PRESSURE: 125 MMHG | DIASTOLIC BLOOD PRESSURE: 85 MMHG

## 2022-11-30 DIAGNOSIS — R07.89 OTHER CHEST PAIN: ICD-10-CM

## 2022-11-30 DIAGNOSIS — Z86.718 HISTORY OF DVT (DEEP VEIN THROMBOSIS): ICD-10-CM

## 2022-11-30 DIAGNOSIS — R06.09 DOE (DYSPNEA ON EXERTION): ICD-10-CM

## 2022-11-30 LAB
ASCENDING AORTA: 2.92 CM
AV INDEX (PROSTH): 1.13
AV MEAN GRADIENT: 2 MMHG
AV PEAK GRADIENT: 4 MMHG
AV VALVE AREA: 3.2 CM2
AV VELOCITY RATIO: 1
BSA FOR ECHO PROCEDURE: 2.08 M2
CREAT SERPL-MCNC: 0.7 MG/DL (ref 0.5–1.4)
CV ECHO LV RWT: 0.32 CM
DOP CALC AO PEAK VEL: 1.02 M/S
DOP CALC AO VTI: 20.13 CM
DOP CALC LVOT AREA: 2.8 CM2
DOP CALC LVOT DIAMETER: 1.9 CM
DOP CALC LVOT PEAK VEL: 1.02 M/S
DOP CALC LVOT STROKE VOLUME: 64.47 CM3
DOP CALCLVOT PEAK VEL VTI: 22.75 CM
E WAVE DECELERATION TIME: 168.81 MSEC
E/A RATIO: 1.41
E/E' RATIO: 8.94 M/S
ECHO LV POSTERIOR WALL: 0.71 CM (ref 0.6–1.1)
EJECTION FRACTION: 65 %
FRACTIONAL SHORTENING: 30 % (ref 28–44)
INTERVENTRICULAR SEPTUM: 0.6 CM (ref 0.6–1.1)
LA MAJOR: 3.96 CM
LA MINOR: 3.88 CM
LA WIDTH: 3.66 CM
LEFT ATRIUM SIZE: 3.4 CM
LEFT ATRIUM VOLUME INDEX MOD: 15.6 ML/M2
LEFT ATRIUM VOLUME INDEX: 20.8 ML/M2
LEFT ATRIUM VOLUME MOD: 31 CM3
LEFT ATRIUM VOLUME: 41.46 CM3
LEFT INTERNAL DIMENSION IN SYSTOLE: 3.16 CM (ref 2.1–4)
LEFT VENTRICLE DIASTOLIC VOLUME INDEX: 46.33 ML/M2
LEFT VENTRICLE DIASTOLIC VOLUME: 92.2 ML
LEFT VENTRICLE MASS INDEX: 44 G/M2
LEFT VENTRICLE SYSTOLIC VOLUME INDEX: 19.9 ML/M2
LEFT VENTRICLE SYSTOLIC VOLUME: 39.7 ML
LEFT VENTRICULAR INTERNAL DIMENSION IN DIASTOLE: 4.49 CM (ref 3.5–6)
LEFT VENTRICULAR MASS: 87.62 G
LV LATERAL E/E' RATIO: 8.44 M/S
LV SEPTAL E/E' RATIO: 9.5 M/S
MV A" WAVE DURATION": 11.99 MSEC
MV PEAK A VEL: 0.54 M/S
MV PEAK E VEL: 0.76 M/S
MV STENOSIS PRESSURE HALF TIME: 48.96 MS
MV VALVE AREA P 1/2 METHOD: 4.49 CM2
PISA TR MAX VEL: 2.27 M/S
PULM VEIN S/D RATIO: 1.39
PV PEAK D VEL: 0.44 M/S
PV PEAK S VEL: 0.61 M/S
RA MAJOR: 3.75 CM
RA PRESSURE: 3 MMHG
RA WIDTH: 2.82 CM
RIGHT VENTRICULAR END-DIASTOLIC DIMENSION: 3.21 CM
RV TISSUE DOPPLER FREE WALL SYSTOLIC VELOCITY 1 (APICAL 4 CHAMBER VIEW): 11.27 CM/S
SAMPLE: NORMAL
SINUS: 2.69 CM
STJ: 2.47 CM
TDI LATERAL: 0.09 M/S
TDI SEPTAL: 0.08 M/S
TDI: 0.09 M/S
TR MAX PG: 21 MMHG
TRICUSPID ANNULAR PLANE SYSTOLIC EXCURSION: 2.05 CM
TV REST PULMONARY ARTERY PRESSURE: 24 MMHG

## 2022-11-30 PROCEDURE — 71275 CT ANGIOGRAPHY CHEST: CPT | Mod: TC

## 2022-11-30 PROCEDURE — 71275 CT ANGIOGRAPHY CHEST: CPT | Mod: 26,,, | Performed by: RADIOLOGY

## 2022-11-30 PROCEDURE — 93306 TTE W/DOPPLER COMPLETE: CPT

## 2022-11-30 PROCEDURE — 71275 CTA CHEST NON CORONARY (PE STUDIES): ICD-10-PCS | Mod: 26,,, | Performed by: RADIOLOGY

## 2022-11-30 PROCEDURE — 93306 ECHO (CUPID ONLY): ICD-10-PCS | Mod: 26,,, | Performed by: INTERNAL MEDICINE

## 2022-11-30 PROCEDURE — 25500020 PHARM REV CODE 255: Performed by: INTERNAL MEDICINE

## 2022-11-30 PROCEDURE — 93306 TTE W/DOPPLER COMPLETE: CPT | Mod: 26,,, | Performed by: INTERNAL MEDICINE

## 2022-11-30 RX ADMIN — IOHEXOL 100 ML: 350 INJECTION, SOLUTION INTRAVENOUS at 01:11

## 2022-12-05 ENCOUNTER — PATIENT MESSAGE (OUTPATIENT)
Dept: CARDIOLOGY | Facility: CLINIC | Age: 57
End: 2022-12-05
Payer: COMMERCIAL

## 2022-12-05 NOTE — PROGRESS NOTES
As Dr. Valerio requested me to do is to inform Ms. Bang about her test results. I tried to get in contact with Ms. Bang but did not get an response. I then messaged her stating that her echo (ultrasound of the heart) is normal.  I am now to just waiting for her response.

## 2022-12-06 NOTE — PROGRESS NOTES
As Dr. Valerio requested me to do is to inform Ms. Bang about her test results. I called and informed Ms. Bang that her CT chest is negative for a clot in the lung. She verbalized and understood.

## 2023-01-01 NOTE — TELEPHONE ENCOUNTER
----- Message from Wendi Holden sent at 10/26/2017  7:43 AM CDT -----  Contact: patient [phone]  Pt saw  for back pain and he ordered physical therapy. Pt states that she has not had an mri and feels she needs either pain management or more tests to determine exactly what is causing all of the pain. Pt feels she needs relief soon. Please call to advise. Should she see an orthopedist?    Vital signs stable. Working on breastfeeding every 2-3 hours and on demand with nipple shield, supplemented with bottled formula as needed per parent request. Age appropriate voids and stools. Parents instructed to call with questions/concerns. Parent's requesting bath be done in AM.    Goal Outcome Evaluation:      Plan of Care Reviewed With: parent    Overall Patient Progress: improvingOverall Patient Progress: improving

## 2023-01-05 ENCOUNTER — HOSPITAL ENCOUNTER (OUTPATIENT)
Dept: RADIOLOGY | Facility: HOSPITAL | Age: 58
Discharge: HOME | End: 2023-01-05
Attending: INTERNAL MEDICINE
Payer: COMMERCIAL

## 2023-01-05 DIAGNOSIS — Z12.31 VISIT FOR SCREENING MAMMOGRAM: ICD-10-CM

## 2023-01-05 PROCEDURE — 77067 MAMMO DIGITAL SCREENING BILAT WITH TOMO: ICD-10-PCS | Mod: 26,,, | Performed by: RADIOLOGY

## 2023-01-05 PROCEDURE — 77067 SCR MAMMO BI INCL CAD: CPT | Mod: 26,,, | Performed by: RADIOLOGY

## 2023-01-05 PROCEDURE — 77063 BREAST TOMOSYNTHESIS BI: CPT | Mod: 26,,, | Performed by: RADIOLOGY

## 2023-01-05 PROCEDURE — 77063 MAMMO DIGITAL SCREENING BILAT WITH TOMO: ICD-10-PCS | Mod: 26,,, | Performed by: RADIOLOGY

## 2023-01-05 PROCEDURE — 77067 SCR MAMMO BI INCL CAD: CPT | Mod: TC

## 2023-01-31 ENCOUNTER — TELEPHONE (OUTPATIENT)
Dept: OBSTETRICS AND GYNECOLOGY | Facility: CLINIC | Age: 58
End: 2023-01-31
Payer: COMMERCIAL

## 2023-01-31 NOTE — TELEPHONE ENCOUNTER
----- Message from Malina Kirby sent at 1/31/2023  4:46 PM CST -----  Type:  Patient Returning Call    Who Called:pt  Who Left Message for Patient:Irish Barbie  Does the patient know what this is regarding?:schedule   Would the patient rather a call back or a response via [facility]? call  Best Call Back Number:[phone]  Additional Information:

## 2023-01-31 NOTE — TELEPHONE ENCOUNTER
----- Message from Bill No sent at 1/31/2023 11:28 AM CST -----  Type: appt access     Who Called:pt  Would the patient rather a call back or a response via [facility]? Call   Best Call Back Number:[phone]  Additional Information:     Re est care   Pt would like to come in for left ovary pain   Books were closed

## 2023-02-08 DIAGNOSIS — E11.9 TYPE 2 DIABETES MELLITUS WITHOUT COMPLICATION: ICD-10-CM

## 2023-02-23 ENCOUNTER — PATIENT MESSAGE (OUTPATIENT)
Dept: DERMATOLOGY | Facility: CLINIC | Age: 58
End: 2023-02-23
Payer: COMMERCIAL

## 2023-03-06 ENCOUNTER — OFFICE VISIT (OUTPATIENT)
Dept: DERMATOLOGY | Facility: CLINIC | Age: 58
End: 2023-03-06
Payer: COMMERCIAL

## 2023-03-06 DIAGNOSIS — L91.8 SKIN TAG: ICD-10-CM

## 2023-03-06 DIAGNOSIS — L98.9 DISEASE OF SKIN AND SUBCUTANEOUS TISSUE: ICD-10-CM

## 2023-03-06 DIAGNOSIS — L21.9 SEBORRHEIC DERMATITIS: Primary | ICD-10-CM

## 2023-03-06 DIAGNOSIS — L82.1 SK (SEBORRHEIC KERATOSIS): ICD-10-CM

## 2023-03-06 PROCEDURE — 99999 PR PBB SHADOW E&M-EST. PATIENT-LVL III: CPT | Mod: PBBFAC,,, | Performed by: DERMATOLOGY

## 2023-03-06 PROCEDURE — 1159F MED LIST DOCD IN RCRD: CPT | Mod: CPTII,S$GLB,, | Performed by: DERMATOLOGY

## 2023-03-06 PROCEDURE — 99214 OFFICE O/P EST MOD 30 MIN: CPT | Mod: S$GLB,,, | Performed by: DERMATOLOGY

## 2023-03-06 PROCEDURE — 99999 PR PBB SHADOW E&M-EST. PATIENT-LVL III: ICD-10-PCS | Mod: PBBFAC,,, | Performed by: DERMATOLOGY

## 2023-03-06 PROCEDURE — 1160F PR REVIEW ALL MEDS BY PRESCRIBER/CLIN PHARMACIST DOCUMENTED: ICD-10-PCS | Mod: CPTII,S$GLB,, | Performed by: DERMATOLOGY

## 2023-03-06 PROCEDURE — 1160F RVW MEDS BY RX/DR IN RCRD: CPT | Mod: CPTII,S$GLB,, | Performed by: DERMATOLOGY

## 2023-03-06 PROCEDURE — 1159F PR MEDICATION LIST DOCUMENTED IN MEDICAL RECORD: ICD-10-PCS | Mod: CPTII,S$GLB,, | Performed by: DERMATOLOGY

## 2023-03-06 PROCEDURE — 99214 PR OFFICE/OUTPT VISIT, EST, LEVL IV, 30-39 MIN: ICD-10-PCS | Mod: S$GLB,,, | Performed by: DERMATOLOGY

## 2023-03-06 RX ORDER — KETOCONAZOLE 20 MG/ML
SHAMPOO, SUSPENSION TOPICAL
Qty: 120 ML | Refills: 5 | Status: SHIPPED | OUTPATIENT
Start: 2023-03-06

## 2023-03-06 RX ORDER — IBUPROFEN 200 MG
1 CAPSULE ORAL DAILY
COMMUNITY

## 2023-03-06 RX ORDER — TRIAMCINOLONE ACETONIDE 0.25 MG/G
CREAM TOPICAL
Qty: 30 G | Refills: 3 | Status: SHIPPED | OUTPATIENT
Start: 2023-03-06

## 2023-03-06 NOTE — PROGRESS NOTES
Subjective:       Patient ID:  Paloma Bang is a 57 y.o. female who presents for   Chief Complaint   Patient presents with    Rash     R- cheek and scalp     Mass     Chest      History of Present Illness: The patient presents for follow up of skin check.    The patient was last seen on: 2/1/22 (video) for seb derm vs acd treating with Tac 0.025% cream bid to right cheek x 6 weeks. Resolved after last visit. Intermittent with last flare 6 weeks ago.  Pt is also here today for assessment  of bump underneath breast.     Patient with new complaint of rash  Location: scalp   Duration: 1 month  Symptoms: itchy, dry -- deep itch per pt  Relieving factors/Previous treatments: olive oil once a week with no relief    Washes hair q 2 weeks                  Review of Systems   Skin:  Positive for itching and rash.   Hematologic/Lymphatic: Bruises/bleeds easily (bruise).      Objective:    Physical Exam   Constitutional: She appears well-developed and well-nourished. No distress.   Neurological: She is alert and oriented to person, place, and time. She is not disoriented.   Psychiatric: She has a normal mood and affect.   Skin:   Areas Examined (abnormalities noted in diagram):   Scalp / Hair Palpated and Inspected  Head / Face Inspection Performed  Chest / Axilla Inspection Performed                 Diagram Legend     Erythematous scaling macule/papule c/w actinic keratosis       Vascular papule c/w angioma      Pigmented verrucoid papule/plaque c/w seborrheic keratosis      Yellow umbilicated papule c/w sebaceous hyperplasia      Irregularly shaped tan macule c/w lentigo     1-2 mm smooth white papules consistent with Milia      Movable subcutaneous cyst with punctum c/w epidermal inclusion cyst      Subcutaneous movable cyst c/w pilar cyst      Firm pink to brown papule c/w dermatofibroma      Pedunculated fleshy papule(s) c/w skin tag(s)      Evenly pigmented macule c/w junctional nevus     Mildly variegated  pigmented, slightly irregular-bordered macule c/w mildly atypical nevus      Flesh colored to evenly pigmented papule c/w intradermal nevus       Pink pearly papule/plaque c/w basal cell carcinoma      Erythematous hyperkeratotic cursted plaque c/w SCC      Surgical scar with no sign of skin cancer recurrence      Open and closed comedones      Inflammatory papules and pustules      Verrucoid papule consistent consistent with wart     Erythematous eczematous patches and plaques     Dystrophic onycholytic nail with subungual debris c/w onychomycosis     Umbilicated papule    Erythematous-base heme-crusted tan verrucoid plaque consistent with inflamed seborrheic keratosis     Erythematous Silvery Scaling Plaque c/w Psoriasis     See annotation      Assessment / Plan:        Seborrheic dermatitis  -     ketoconazole (NIZORAL) 2 % shampoo; Wash hair with medicated shampoo at least 2x/week - let sit on scalp at least 5 minutes prior to rinsing  Dispense: 120 mL; Refill: 5  Recommend wash face daily with SebaMed or Vanicream Z-bar. You may find these in local drugstores or search online.  Increase freq of hair washing (currently qoweek)  Cont TAC 0.025% cream bid prn flare    SK (seborrheic keratosis)  These are benign inherited growths without a malignant potential. Reassurance given to patient. No treatment is necessary.     Under breasts:  Cool blow dry after showering 1x/day then apply Zeasorb AF powder.     Skin Tag:   Reassurance given to patient. No treatment is necessary.              Follow up if symptoms worsen or fail to improve.

## 2023-03-06 NOTE — PATIENT INSTRUCTIONS
Recommend wash face daily with SebaMed or Vanicream Z-bar. You may find these in local drugstores or search online.    Under breasts:    Cool blow dry after showering 1x/day then apply Zeasorb AF powder.     SEBORRHEIC KERATOSES        What causes seborrheic keratoses?    Seborrheic keratoses are harmless, common skin growths that first appear during adult life.  As time goes by, more growths appear.  Some persons have a very large number of them.  Seborrheic keratoses appear on both covered and uncovered parts of the body; they are not caused by sunlight.  The tendency to develop seborrheic keratoses is inherited.    Seborrheic keratoses are harmless and never become malignant.  They begin as slightly raised, light brown spots.  Gradually they thicken and take on a rough wartlike surface.  They slowly darken and may turn black.  These color changes are harmless.  Seborrheic keratoses are superficial and look as if they were stuck on the skin.  Persons who have had several seborrheic keratoses can usually recognize this type of benign growth.  However, if you are concerned or unsure about any growth, consult me.    Treatment    Seborrheic keratoses can easily be removed in the office.  The only reason for removing a seborrheic keratosis is your wish to get rid of it.

## 2023-04-03 ENCOUNTER — PATIENT MESSAGE (OUTPATIENT)
Dept: ADMINISTRATIVE | Facility: HOSPITAL | Age: 58
End: 2023-04-03
Payer: COMMERCIAL

## 2023-04-05 DIAGNOSIS — E11.9 TYPE 2 DIABETES MELLITUS WITHOUT COMPLICATION: ICD-10-CM

## 2023-04-12 RX ORDER — LOSARTAN POTASSIUM 100 MG/1
100 TABLET ORAL DAILY
Qty: 90 TABLET | Refills: 1 | Status: SHIPPED | OUTPATIENT
Start: 2023-04-12

## 2023-04-12 RX ORDER — AMLODIPINE BESYLATE 2.5 MG/1
2.5 TABLET ORAL DAILY
Qty: 90 TABLET | Refills: 1 | Status: SHIPPED | OUTPATIENT
Start: 2023-04-12 | End: 2024-04-11

## 2023-04-12 NOTE — TELEPHONE ENCOUNTER
LOV:09/13/2022  RTC:not on file     Pt also ask if you take [facility]? Pt said reflux med does not work, would a referral to GI be ok?

## 2023-04-20 ENCOUNTER — PATIENT OUTREACH (OUTPATIENT)
Dept: ADMINISTRATIVE | Facility: HOSPITAL | Age: 58
End: 2023-04-20
Payer: COMMERCIAL

## 2023-05-17 ENCOUNTER — PATIENT MESSAGE (OUTPATIENT)
Dept: INTERNAL MEDICINE | Facility: CLINIC | Age: 58
End: 2023-05-17
Payer: COMMERCIAL

## 2023-05-17 ENCOUNTER — PATIENT OUTREACH (OUTPATIENT)
Dept: ADMINISTRATIVE | Facility: HOSPITAL | Age: 58
End: 2023-05-17
Payer: COMMERCIAL

## 2023-05-17 RX ORDER — ERGOCALCIFEROL 1.25 MG/1
50000 CAPSULE ORAL
Qty: 12 CAPSULE | Refills: 0 | Status: SHIPPED | OUTPATIENT
Start: 2023-05-17

## 2023-05-17 NOTE — LETTER
AUTHORIZATION FOR RELEASE OF   CONFIDENTIAL INFORMATION    Dear [facility],    We are seeing Paloma Bang, date of birth 1965, in the clinic at [facility]. Jenn Mayorga MD is the patient's PCP. Paloma Bang has an outstanding lab/procedure at the time we reviewed her chart. In order to help keep her health information updated, she has authorized us to request the following medical record(s):        (  )  MAMMOGRAM                                      (  )  COLONOSCOPY      (  )  PAP SMEAR                                          (  )  OUTSIDE LAB RESULTS     (  )  DEXA SCAN                                          ( x )  EYE EXAM            (  )  FOOT EXAM                                          (  )  ENTIRE RECORD     (  )  OUTSIDE IMMUNIZATIONS                 (  )  _______________         Please fax records to Jenn Mayorga MD, [phone]          Patient Name: Paloma Bang  : 1965  Patient Phone #: [phone]

## 2023-05-19 ENCOUNTER — PATIENT OUTREACH (OUTPATIENT)
Dept: ADMINISTRATIVE | Facility: HOSPITAL | Age: 58
End: 2023-05-19
Payer: COMMERCIAL

## 2023-06-11 ENCOUNTER — PATIENT MESSAGE (OUTPATIENT)
Dept: ADMINISTRATIVE | Facility: HOSPITAL | Age: 58
End: 2023-06-11
Payer: COMMERCIAL

## 2023-06-21 DIAGNOSIS — E11.9 TYPE 2 DIABETES MELLITUS WITHOUT COMPLICATION: ICD-10-CM

## 2023-06-27 ENCOUNTER — LAB VISIT (OUTPATIENT)
Dept: LAB | Facility: HOSPITAL | Age: 58
End: 2023-06-27
Attending: INTERNAL MEDICINE
Payer: COMMERCIAL

## 2023-06-27 DIAGNOSIS — E11.9 TYPE 2 DIABETES MELLITUS WITHOUT COMPLICATION: ICD-10-CM

## 2023-06-27 LAB
CHOLEST SERPL-MCNC: 167 MG/DL (ref 120–199)
CHOLEST/HDLC SERPL: 2.8 {RATIO} (ref 2–5)
ESTIMATED AVG GLUCOSE: 120 MG/DL (ref 68–131)
HBA1C MFR BLD: 5.8 % (ref 4–5.6)
HDLC SERPL-MCNC: 60 MG/DL (ref 40–75)
HDLC SERPL: 35.9 % (ref 20–50)
LDLC SERPL CALC-MCNC: 95.6 MG/DL (ref 63–159)
NONHDLC SERPL-MCNC: 107 MG/DL
TRIGL SERPL-MCNC: 57 MG/DL (ref 30–150)

## 2023-06-27 PROCEDURE — 83036 HEMOGLOBIN GLYCOSYLATED A1C: CPT | Performed by: INTERNAL MEDICINE

## 2023-06-27 PROCEDURE — 80061 LIPID PANEL: CPT | Performed by: INTERNAL MEDICINE

## 2023-06-27 PROCEDURE — 36415 COLL VENOUS BLD VENIPUNCTURE: CPT | Mod: PO | Performed by: INTERNAL MEDICINE

## 2023-06-28 ENCOUNTER — LAB VISIT (OUTPATIENT)
Dept: LAB | Facility: HOSPITAL | Age: 58
End: 2023-06-28
Attending: INTERNAL MEDICINE
Payer: COMMERCIAL

## 2023-06-28 ENCOUNTER — TELEPHONE (OUTPATIENT)
Dept: INTERNAL MEDICINE | Facility: CLINIC | Age: 58
End: 2023-06-28
Payer: COMMERCIAL

## 2023-06-28 DIAGNOSIS — Z00.00 ROUTINE GENERAL MEDICAL EXAMINATION AT HEALTH CARE FACILITY: Primary | ICD-10-CM

## 2023-06-28 DIAGNOSIS — Z00.00 ROUTINE GENERAL MEDICAL EXAMINATION AT HEALTH CARE FACILITY: ICD-10-CM

## 2023-06-28 LAB
ALBUMIN/CREAT UR: 3.3 UG/MG (ref 0–30)
CREAT UR-MCNC: 335 MG/DL (ref 15–325)
MICROALBUMIN UR DL<=1MG/L-MCNC: 11 UG/ML

## 2023-06-28 PROCEDURE — 82570 ASSAY OF URINE CREATININE: CPT | Performed by: INTERNAL MEDICINE

## 2023-06-30 ENCOUNTER — PATIENT MESSAGE (OUTPATIENT)
Dept: INTERNAL MEDICINE | Facility: CLINIC | Age: 58
End: 2023-06-30
Payer: COMMERCIAL

## 2023-11-07 ENCOUNTER — LAB VISIT (OUTPATIENT)
Dept: LAB | Facility: HOSPITAL | Age: 58
End: 2023-11-07
Attending: INTERNAL MEDICINE
Payer: COMMERCIAL

## 2023-11-07 ENCOUNTER — OFFICE VISIT (OUTPATIENT)
Dept: INTERNAL MEDICINE | Facility: CLINIC | Age: 58
End: 2023-11-07
Payer: COMMERCIAL

## 2023-11-07 VITALS
SYSTOLIC BLOOD PRESSURE: 124 MMHG | WEIGHT: 169.75 LBS | HEIGHT: 63 IN | TEMPERATURE: 98 F | HEART RATE: 64 BPM | BODY MASS INDEX: 30.08 KG/M2 | RESPIRATION RATE: 16 BRPM | DIASTOLIC BLOOD PRESSURE: 72 MMHG

## 2023-11-07 DIAGNOSIS — I10 HYPERTENSION, UNSPECIFIED TYPE: ICD-10-CM

## 2023-11-07 DIAGNOSIS — R19.7 DIARRHEA, UNSPECIFIED TYPE: ICD-10-CM

## 2023-11-07 DIAGNOSIS — R19.7 DIARRHEA, UNSPECIFIED TYPE: Primary | ICD-10-CM

## 2023-11-07 LAB
ALBUMIN SERPL BCP-MCNC: 3.8 G/DL (ref 3.5–5.2)
ALP SERPL-CCNC: 70 U/L (ref 55–135)
ALT SERPL W/O P-5'-P-CCNC: 16 U/L (ref 10–44)
ANION GAP SERPL CALC-SCNC: 7 MMOL/L (ref 8–16)
AST SERPL-CCNC: 23 U/L (ref 10–40)
BASOPHILS # BLD AUTO: 0.02 K/UL (ref 0–0.2)
BASOPHILS NFR BLD: 0.4 % (ref 0–1.9)
BILIRUB SERPL-MCNC: 0.4 MG/DL (ref 0.1–1)
BUN SERPL-MCNC: 10 MG/DL (ref 6–20)
CALCIUM SERPL-MCNC: 9.6 MG/DL (ref 8.7–10.5)
CHLORIDE SERPL-SCNC: 109 MMOL/L (ref 95–110)
CO2 SERPL-SCNC: 27 MMOL/L (ref 23–29)
CREAT SERPL-MCNC: 0.7 MG/DL (ref 0.5–1.4)
DIFFERENTIAL METHOD: ABNORMAL
EOSINOPHIL # BLD AUTO: 0.1 K/UL (ref 0–0.5)
EOSINOPHIL NFR BLD: 2.2 % (ref 0–8)
ERYTHROCYTE [DISTWIDTH] IN BLOOD BY AUTOMATED COUNT: 14.8 % (ref 11.5–14.5)
EST. GFR  (NO RACE VARIABLE): >60 ML/MIN/1.73 M^2
GLUCOSE SERPL-MCNC: 90 MG/DL (ref 70–110)
HCT VFR BLD AUTO: 42.1 % (ref 37–48.5)
HGB BLD-MCNC: 13.3 G/DL (ref 12–16)
IMM GRANULOCYTES # BLD AUTO: 0.02 K/UL (ref 0–0.04)
IMM GRANULOCYTES NFR BLD AUTO: 0.4 % (ref 0–0.5)
LYMPHOCYTES # BLD AUTO: 1.9 K/UL (ref 1–4.8)
LYMPHOCYTES NFR BLD: 36.7 % (ref 18–48)
MCH RBC QN AUTO: 28.5 PG (ref 27–31)
MCHC RBC AUTO-ENTMCNC: 31.6 G/DL (ref 32–36)
MCV RBC AUTO: 90 FL (ref 82–98)
MONOCYTES # BLD AUTO: 0.5 K/UL (ref 0.3–1)
MONOCYTES NFR BLD: 10.1 % (ref 4–15)
NEUTROPHILS # BLD AUTO: 2.5 K/UL (ref 1.8–7.7)
NEUTROPHILS NFR BLD: 50.2 % (ref 38–73)
NRBC BLD-RTO: 0 /100 WBC
PLATELET # BLD AUTO: 166 K/UL (ref 150–450)
PMV BLD AUTO: 12.1 FL (ref 9.2–12.9)
POTASSIUM SERPL-SCNC: 4.5 MMOL/L (ref 3.5–5.1)
PROT SERPL-MCNC: 7.2 G/DL (ref 6–8.4)
RBC # BLD AUTO: 4.66 M/UL (ref 4–5.4)
SODIUM SERPL-SCNC: 143 MMOL/L (ref 136–145)
WBC # BLD AUTO: 5.04 K/UL (ref 3.9–12.7)

## 2023-11-07 PROCEDURE — 36415 COLL VENOUS BLD VENIPUNCTURE: CPT | Mod: PO | Performed by: INTERNAL MEDICINE

## 2023-11-07 PROCEDURE — 3044F HG A1C LEVEL LT 7.0%: CPT | Mod: CPTII,S$GLB,, | Performed by: INTERNAL MEDICINE

## 2023-11-07 PROCEDURE — 85025 COMPLETE CBC W/AUTO DIFF WBC: CPT | Performed by: INTERNAL MEDICINE

## 2023-11-07 PROCEDURE — 3066F NEPHROPATHY DOC TX: CPT | Mod: CPTII,S$GLB,, | Performed by: INTERNAL MEDICINE

## 2023-11-07 PROCEDURE — 3074F SYST BP LT 130 MM HG: CPT | Mod: CPTII,S$GLB,, | Performed by: INTERNAL MEDICINE

## 2023-11-07 PROCEDURE — 1160F PR REVIEW ALL MEDS BY PRESCRIBER/CLIN PHARMACIST DOCUMENTED: ICD-10-PCS | Mod: CPTII,S$GLB,, | Performed by: INTERNAL MEDICINE

## 2023-11-07 PROCEDURE — 1160F RVW MEDS BY RX/DR IN RCRD: CPT | Mod: CPTII,S$GLB,, | Performed by: INTERNAL MEDICINE

## 2023-11-07 PROCEDURE — 3061F PR NEG MICROALBUMINURIA RESULT DOCUMENTED/REVIEW: ICD-10-PCS | Mod: CPTII,S$GLB,, | Performed by: INTERNAL MEDICINE

## 2023-11-07 PROCEDURE — 3078F DIAST BP <80 MM HG: CPT | Mod: CPTII,S$GLB,, | Performed by: INTERNAL MEDICINE

## 2023-11-07 PROCEDURE — 3044F PR MOST RECENT HEMOGLOBIN A1C LEVEL <7.0%: ICD-10-PCS | Mod: CPTII,S$GLB,, | Performed by: INTERNAL MEDICINE

## 2023-11-07 PROCEDURE — 99213 PR OFFICE/OUTPT VISIT, EST, LEVL III, 20-29 MIN: ICD-10-PCS | Mod: S$GLB,,, | Performed by: INTERNAL MEDICINE

## 2023-11-07 PROCEDURE — 99999 PR PBB SHADOW E&M-EST. PATIENT-LVL IV: ICD-10-PCS | Mod: PBBFAC,,, | Performed by: INTERNAL MEDICINE

## 2023-11-07 PROCEDURE — 99213 OFFICE O/P EST LOW 20 MIN: CPT | Mod: S$GLB,,, | Performed by: INTERNAL MEDICINE

## 2023-11-07 PROCEDURE — 3078F PR MOST RECENT DIASTOLIC BLOOD PRESSURE < 80 MM HG: ICD-10-PCS | Mod: CPTII,S$GLB,, | Performed by: INTERNAL MEDICINE

## 2023-11-07 PROCEDURE — 3008F PR BODY MASS INDEX (BMI) DOCUMENTED: ICD-10-PCS | Mod: CPTII,S$GLB,, | Performed by: INTERNAL MEDICINE

## 2023-11-07 PROCEDURE — 4010F ACE/ARB THERAPY RXD/TAKEN: CPT | Mod: CPTII,S$GLB,, | Performed by: INTERNAL MEDICINE

## 2023-11-07 PROCEDURE — 3074F PR MOST RECENT SYSTOLIC BLOOD PRESSURE < 130 MM HG: ICD-10-PCS | Mod: CPTII,S$GLB,, | Performed by: INTERNAL MEDICINE

## 2023-11-07 PROCEDURE — 4010F PR ACE/ARB THEARPY RXD/TAKEN: ICD-10-PCS | Mod: CPTII,S$GLB,, | Performed by: INTERNAL MEDICINE

## 2023-11-07 PROCEDURE — 99999 PR PBB SHADOW E&M-EST. PATIENT-LVL IV: CPT | Mod: PBBFAC,,, | Performed by: INTERNAL MEDICINE

## 2023-11-07 PROCEDURE — 3008F BODY MASS INDEX DOCD: CPT | Mod: CPTII,S$GLB,, | Performed by: INTERNAL MEDICINE

## 2023-11-07 PROCEDURE — 3061F NEG MICROALBUMINURIA REV: CPT | Mod: CPTII,S$GLB,, | Performed by: INTERNAL MEDICINE

## 2023-11-07 PROCEDURE — 3066F PR DOCUMENTATION OF TREATMENT FOR NEPHROPATHY: ICD-10-PCS | Mod: CPTII,S$GLB,, | Performed by: INTERNAL MEDICINE

## 2023-11-07 PROCEDURE — 1159F MED LIST DOCD IN RCRD: CPT | Mod: CPTII,S$GLB,, | Performed by: INTERNAL MEDICINE

## 2023-11-07 PROCEDURE — 1159F PR MEDICATION LIST DOCUMENTED IN MEDICAL RECORD: ICD-10-PCS | Mod: CPTII,S$GLB,, | Performed by: INTERNAL MEDICINE

## 2023-11-07 PROCEDURE — 80053 COMPREHEN METABOLIC PANEL: CPT | Performed by: INTERNAL MEDICINE

## 2023-11-07 RX ORDER — PHENTERMINE HYDROCHLORIDE 37.5 MG/1
37.5 TABLET ORAL DAILY
COMMUNITY
Start: 2023-06-30

## 2023-11-07 RX ORDER — DIPHENOXYLATE HYDROCHLORIDE AND ATROPINE SULFATE 2.5; .025 MG/1; MG/1
1 TABLET ORAL 4 TIMES DAILY PRN
Qty: 30 TABLET | Refills: 0 | Status: SHIPPED | OUTPATIENT
Start: 2023-11-07 | End: 2023-11-17

## 2023-12-04 NOTE — PROGRESS NOTES
Subjective:       Patient ID: Paloma Bang is a 58 y.o. female.    Chief Complaint: Abdominal Pain (About a week, about started 10/31.  Grandson has strept, related?  Bariatric dr was thinking of checking her for gallbladder problem but didn't have insurance coverage at the time.  Gave urine cup in case you need a test. ) and Diarrhea    HPI  The patient presents today with complaints of abdominal cramping and diarrhea.  The patient normally sees Dr. Mayorga as her PCP.  Symptoms have been present for over a week now.  She has been having 4-5 watery bowel movements per day.  Other symptoms include body ache and headache.  Patient also describes malaise and fatigue.  In addition she has noted upper respiratory symptoms of mild rhinorrhea and sinus congestion.  She denies any cough.  Occasional sharp chest pains were noted yesterday but these have resolved.  The pain did not appear to be pleuritic in nature.  Her oral intake recently has included chicken noodle soup, hard boiled egg, and a bagel with cream cheese.  Home treatments tried have included Pepto-Bismol, ginger ale, honey with ginger tea.  She reports her grandson recently was diagnosed with strep throat.  She denies having any sore throat.  Self blood pressures have ranged from 124-132 systolic over 70-90 diastolic recently.    Patient reports she fell 3-4 weeks ago hurting her left hip and left knee after she twisted her ankle.  She states that she fell onto concrete.    Past surgeries have included left ankle surgery for fracture repair.  She is also status post bariatric surgery in 04/2022 with Dr. Marion.  Her maximum weight was 279 lb prior to bariatric surgery.    Active medical conditions include hypertension, GERD, and arthritis.    Allergies:  Latex, natural rubber.  No antibiotic or other medication allergies noted.    Review of Systems   Constitutional:  Positive for appetite change and fatigue. Negative for fever.   HENT:  Positive for  rhinorrhea and sinus pressure/congestion. Negative for sore throat and trouble swallowing.    Respiratory:  Negative for cough and shortness of breath.    Cardiovascular:  Negative for chest pain and leg swelling.   Gastrointestinal:  Positive for abdominal pain and diarrhea. Negative for blood in stool, nausea and vomiting.   Genitourinary:  Negative for dysuria and flank pain.   Musculoskeletal:  Positive for arthralgias.   Neurological:  Positive for headaches.            Physical Exam  Vitals and nursing note reviewed.   Constitutional:       General: She is not in acute distress.     Appearance: Normal appearance. She is well-developed.   HENT:      Head: Normocephalic and atraumatic.      Mouth/Throat:      Mouth: Mucous membranes are moist.      Pharynx: Oropharynx is clear.   Eyes:      General: No scleral icterus.     Extraocular Movements: Extraocular movements intact.      Conjunctiva/sclera: Conjunctivae normal.   Neck:      Thyroid: No thyromegaly.      Vascular: No JVD.   Cardiovascular:      Rate and Rhythm: Normal rate and regular rhythm.      Heart sounds: Normal heart sounds. No murmur heard.     No friction rub. No gallop.   Pulmonary:      Effort: Pulmonary effort is normal. No respiratory distress.      Breath sounds: Normal breath sounds. No wheezing or rales.   Abdominal:      General: Bowel sounds are normal. There is no distension.      Palpations: Abdomen is soft. There is no mass.      Tenderness: There is no abdominal tenderness.   Musculoskeletal:         General: Tenderness present. Normal range of motion.      Cervical back: Normal range of motion and neck supple.      Right lower leg: No edema.      Left lower leg: No edema.      Comments: Left knee:  Slight tenderness is present on flexion and extension.  No effusion is appreciated.    Left hip:  Negative IVETTE test.  Range of motion is intact.    Right hip:  Negative IVETTE test.    Back:  Negative straight leg raising test  bilaterally.  No CVA percussion tenderness.   Lymphadenopathy:      Cervical: No cervical adenopathy.   Skin:     General: Skin is warm and dry.      Findings: No rash.   Neurological:      Mental Status: She is alert and oriented to person, place, and time.   Psychiatric:         Mood and Affect: Mood normal.         Behavior: Behavior normal.               Assessment & Plan:      Paloma was seen today for abdominal pain and diarrhea.  Stool studies will be obtained to check for pathogens.  Blood tests will be obtained today.  A limited trial of Lomotil will be ordered.  The patient should maintain hydration with sips of clear liquids.  She should avoid dairy products until diarrhea has improved.  She should also avoid fried or fatty foods for now.  The patient will follow-up with her PCP for her annual checkup as scheduled.    Diagnoses and all orders for this visit:    Diarrhea, unspecified type  -     CBC Auto Differential; Future  -     Comprehensive Metabolic Panel; Future  -     Stool Exam-Ova,Cysts,Parasites; Future  -     CULTURE, STOOL; Future  -     WBC, Stool; Future  -     Giardia / Cryptosporidum, EIA; Future  -     CLOSTRIDIUM DIFFICILE; Future    Hypertension, unspecified type  -     CBC Auto Differential; Future  -     Comprehensive Metabolic Panel; Future    Other orders  -     diphenoxylate-atropine 2.5-0.025 mg (LOMOTIL) 2.5-0.025 mg per tablet; Take 1 tablet by mouth 4 (four) times daily as needed for Diarrhea.         Follow up if symptoms worsen or fail to improve.     Elliott Montoya MD

## 2023-12-28 ENCOUNTER — TELEPHONE (OUTPATIENT)
Dept: INTERNAL MEDICINE | Facility: CLINIC | Age: 58
End: 2023-12-28
Payer: COMMERCIAL

## 2023-12-28 NOTE — TELEPHONE ENCOUNTER
----- Message from Ana Gibbons sent at 12/28/2023  2:28 PM CST -----  Contact: :naomy   Paloma would like a call back. She has a  cough throat pain & headache about a month     Please advise

## 2023-12-29 RX ORDER — AMOXICILLIN 875 MG/1
875 TABLET, FILM COATED ORAL EVERY 12 HOURS
Qty: 14 TABLET | Refills: 0 | Status: SHIPPED | OUTPATIENT
Start: 2023-12-29

## 2023-12-29 NOTE — TELEPHONE ENCOUNTER
Please inform patient that I have sent prescription for amoxicillin to her pharmacy electronically.  If her symptoms fail to resolve, she will need to schedule an appointment.  Antibiotics have been prescribed due to the length of her symptoms.

## (undated) DEVICE — DRESSING LEUKOPLAST FLEX 1X3IN

## (undated) DEVICE — BANDAGE ADHESIVE